# Patient Record
Sex: FEMALE | Race: BLACK OR AFRICAN AMERICAN | NOT HISPANIC OR LATINO | URBAN - METROPOLITAN AREA
[De-identification: names, ages, dates, MRNs, and addresses within clinical notes are randomized per-mention and may not be internally consistent; named-entity substitution may affect disease eponyms.]

---

## 2017-03-11 ENCOUNTER — EMERGENCY (EMERGENCY)
Facility: HOSPITAL | Age: 77
LOS: 1 days | Discharge: HOME | End: 2017-03-11
Attending: EMERGENCY MEDICINE | Admitting: EMERGENCY MEDICINE
Payer: MEDICARE

## 2017-03-11 VITALS
SYSTOLIC BLOOD PRESSURE: 140 MMHG | OXYGEN SATURATION: 100 % | DIASTOLIC BLOOD PRESSURE: 69 MMHG | TEMPERATURE: 98 F | HEART RATE: 58 BPM | RESPIRATION RATE: 16 BRPM

## 2017-03-11 VITALS
DIASTOLIC BLOOD PRESSURE: 74 MMHG | SYSTOLIC BLOOD PRESSURE: 125 MMHG | TEMPERATURE: 98 F | HEART RATE: 52 BPM | RESPIRATION RATE: 16 BRPM | OXYGEN SATURATION: 100 %

## 2017-03-11 DIAGNOSIS — K62.89 OTHER SPECIFIED DISEASES OF ANUS AND RECTUM: Chronic | ICD-10-CM

## 2017-03-11 DIAGNOSIS — K27.9 PEPTIC ULCER, SITE UNSPECIFIED, UNSPECIFIED AS ACUTE OR CHRONIC, WITHOUT HEMORRHAGE OR PERFORATION: Chronic | ICD-10-CM

## 2017-03-11 DIAGNOSIS — Z96.659 PRESENCE OF UNSPECIFIED ARTIFICIAL KNEE JOINT: Chronic | ICD-10-CM

## 2017-03-11 DIAGNOSIS — Z90.710 ACQUIRED ABSENCE OF BOTH CERVIX AND UTERUS: Chronic | ICD-10-CM

## 2017-03-11 LAB
ALBUMIN SERPL ELPH-MCNC: 3.7 G/DL — SIGNIFICANT CHANGE UP (ref 3.3–5)
ALP SERPL-CCNC: 79 U/L — SIGNIFICANT CHANGE UP (ref 40–120)
ALT FLD-CCNC: 14 U/L — SIGNIFICANT CHANGE UP (ref 4–33)
APPEARANCE UR: CLEAR — SIGNIFICANT CHANGE UP
AST SERPL-CCNC: 27 U/L — SIGNIFICANT CHANGE UP (ref 4–32)
BACTERIA # UR AUTO: SIGNIFICANT CHANGE UP
BASOPHILS # BLD AUTO: 0.02 K/UL — SIGNIFICANT CHANGE UP (ref 0–0.2)
BASOPHILS NFR BLD AUTO: 0.4 % — SIGNIFICANT CHANGE UP (ref 0–2)
BILIRUB SERPL-MCNC: 0.6 MG/DL — SIGNIFICANT CHANGE UP (ref 0.2–1.2)
BILIRUB UR-MCNC: NEGATIVE — SIGNIFICANT CHANGE UP
BLOOD UR QL VISUAL: NEGATIVE — SIGNIFICANT CHANGE UP
BUN SERPL-MCNC: 26 MG/DL — HIGH (ref 7–23)
CALCIUM SERPL-MCNC: 9.8 MG/DL — SIGNIFICANT CHANGE UP (ref 8.4–10.5)
CHLORIDE SERPL-SCNC: 103 MMOL/L — SIGNIFICANT CHANGE UP (ref 98–107)
CK MB BLD-MCNC: 1.36 NG/ML — SIGNIFICANT CHANGE UP (ref 1–4.7)
CK MB BLD-MCNC: SIGNIFICANT CHANGE UP (ref 0–2.5)
CK SERPL-CCNC: 112 U/L — SIGNIFICANT CHANGE UP (ref 25–170)
CO2 SERPL-SCNC: 24 MMOL/L — SIGNIFICANT CHANGE UP (ref 22–31)
COLOR SPEC: SIGNIFICANT CHANGE UP
CREAT SERPL-MCNC: 0.82 MG/DL — SIGNIFICANT CHANGE UP (ref 0.5–1.3)
EOSINOPHIL # BLD AUTO: 0.04 K/UL — SIGNIFICANT CHANGE UP (ref 0–0.5)
EOSINOPHIL NFR BLD AUTO: 0.8 % — SIGNIFICANT CHANGE UP (ref 0–6)
GLUCOSE SERPL-MCNC: 101 MG/DL — HIGH (ref 70–99)
GLUCOSE UR-MCNC: NEGATIVE — SIGNIFICANT CHANGE UP
HCT VFR BLD CALC: 37.5 % — SIGNIFICANT CHANGE UP (ref 34.5–45)
HGB BLD-MCNC: 12.2 G/DL — SIGNIFICANT CHANGE UP (ref 11.5–15.5)
IMM GRANULOCYTES NFR BLD AUTO: 0 % — SIGNIFICANT CHANGE UP (ref 0–1.5)
KETONES UR-MCNC: NEGATIVE — SIGNIFICANT CHANGE UP
LEUKOCYTE ESTERASE UR-ACNC: NEGATIVE — SIGNIFICANT CHANGE UP
LYMPHOCYTES # BLD AUTO: 1.77 K/UL — SIGNIFICANT CHANGE UP (ref 1–3.3)
LYMPHOCYTES # BLD AUTO: 35.2 % — SIGNIFICANT CHANGE UP (ref 13–44)
MCHC RBC-ENTMCNC: 26.2 PG — LOW (ref 27–34)
MCHC RBC-ENTMCNC: 32.5 % — SIGNIFICANT CHANGE UP (ref 32–36)
MCV RBC AUTO: 80.5 FL — SIGNIFICANT CHANGE UP (ref 80–100)
MONOCYTES # BLD AUTO: 0.33 K/UL — SIGNIFICANT CHANGE UP (ref 0–0.9)
MONOCYTES NFR BLD AUTO: 6.6 % — SIGNIFICANT CHANGE UP (ref 2–14)
NEUTROPHILS # BLD AUTO: 2.87 K/UL — SIGNIFICANT CHANGE UP (ref 1.8–7.4)
NEUTROPHILS NFR BLD AUTO: 57 % — SIGNIFICANT CHANGE UP (ref 43–77)
NITRITE UR-MCNC: NEGATIVE — SIGNIFICANT CHANGE UP
PH UR: 6.5 — SIGNIFICANT CHANGE UP (ref 4.6–8)
PLATELET # BLD AUTO: 139 K/UL — LOW (ref 150–400)
PMV BLD: 12.6 FL — SIGNIFICANT CHANGE UP (ref 7–13)
POTASSIUM SERPL-MCNC: 4.2 MMOL/L — SIGNIFICANT CHANGE UP (ref 3.5–5.3)
POTASSIUM SERPL-SCNC: 4.2 MMOL/L — SIGNIFICANT CHANGE UP (ref 3.5–5.3)
PROT SERPL-MCNC: 8.1 G/DL — SIGNIFICANT CHANGE UP (ref 6–8.3)
PROT UR-MCNC: NEGATIVE — SIGNIFICANT CHANGE UP
RBC # BLD: 4.66 M/UL — SIGNIFICANT CHANGE UP (ref 3.8–5.2)
RBC # FLD: 15.6 % — HIGH (ref 10.3–14.5)
SODIUM SERPL-SCNC: 141 MMOL/L — SIGNIFICANT CHANGE UP (ref 135–145)
SP GR SPEC: 1 — SIGNIFICANT CHANGE UP (ref 1–1.03)
TROPONIN T SERPL-MCNC: < 0.06 NG/ML — SIGNIFICANT CHANGE UP (ref 0–0.06)
TROPONIN T SERPL-MCNC: < 0.06 NG/ML — SIGNIFICANT CHANGE UP (ref 0–0.06)
UROBILINOGEN FLD QL: NORMAL E.U. — SIGNIFICANT CHANGE UP (ref 0.1–0.2)
WBC # BLD: 5.03 K/UL — SIGNIFICANT CHANGE UP (ref 3.8–10.5)
WBC # FLD AUTO: 5.03 K/UL — SIGNIFICANT CHANGE UP (ref 3.8–10.5)
WBC UR QL: SIGNIFICANT CHANGE UP (ref 0–?)

## 2017-03-11 PROCEDURE — 93010 ELECTROCARDIOGRAM REPORT: CPT

## 2017-03-11 PROCEDURE — 74176 CT ABD & PELVIS W/O CONTRAST: CPT | Mod: 26

## 2017-03-11 PROCEDURE — 71010: CPT | Mod: 26

## 2017-03-11 PROCEDURE — 99284 EMERGENCY DEPT VISIT MOD MDM: CPT | Mod: 25,GC

## 2017-03-11 RX ORDER — MORPHINE SULFATE 50 MG/1
4 CAPSULE, EXTENDED RELEASE ORAL ONCE
Qty: 0 | Refills: 0 | Status: DISCONTINUED | OUTPATIENT
Start: 2017-03-11 | End: 2017-03-11

## 2017-03-11 RX ORDER — ONDANSETRON 8 MG/1
4 TABLET, FILM COATED ORAL ONCE
Qty: 0 | Refills: 0 | Status: COMPLETED | OUTPATIENT
Start: 2017-03-11 | End: 2017-03-11

## 2017-03-11 RX ORDER — POLYETHYLENE GLYCOL 3350 17 G/17G
17 POWDER, FOR SOLUTION ORAL
Qty: 119 | Refills: 0
Start: 2017-03-11 | End: 2017-03-18

## 2017-03-11 RX ADMIN — MORPHINE SULFATE 4 MILLIGRAM(S): 50 CAPSULE, EXTENDED RELEASE ORAL at 12:35

## 2017-03-11 RX ADMIN — MORPHINE SULFATE 4 MILLIGRAM(S): 50 CAPSULE, EXTENDED RELEASE ORAL at 12:36

## 2017-03-11 NOTE — ED PROVIDER NOTE - OBJECTIVE STATEMENT
78yo F with PMH of CAD (OM2 100%, RPL1 90%), MVP, L carotid stenosis, DM2, HLD, h/o PUD s/p surg, p/w R lateral/RLQ/flank pain. Reports 78yo F with PMH of CAD (OM2 100%, RPL1 90%), MVP, L carotid stenosis, DM2, HLD, h/o PUD s/p surg, p/w R lateral abd pain x 1 day. Pt has b/l LBP x 1 week (Had heavy lifting 2d in a row). Woke up this morning with R lateral pain, dull sensation, radiating to groin, constant, severity 8/10, associated with nausea and heart "feeling funny". Denies fever, chills, CP, palpitation, pounding, irregular heart beat, SOB, heartburn, constipation, vomit, hematuria, dysuria or vaginal discharge/bleed. Last BM this morning with normal contour and no melena. 76yo F with PMH of CAD (OM2 100%, RPL1 90%), MVP, L carotid stenosis, DM2, HLD, h/o PUD s/p surg, p/w R lateral abd pain x 1 day. Pt has b/l LBP x 1 week (Had heavy lifting 2d in a row). Woke up this morning with R lateral pain, dull sensation, radiating to groin, constant, 8/10, associated with nausea and after onset of flank pain chest "feeling funny". Cannot describe chest discomfort, states not pain, pressure, heaviness, just felt weird, resolved spontaneously, lasted a few seconds.  Denies fever, chills, CP, palpitation, pounding, irregular heart beat, SOB, no upper back pain, constipation, vomit, hematuria, dysuria or vaginal discharge/bleed. Last BM this morning with normal contour and no melena.

## 2017-03-11 NOTE — ED PROVIDER NOTE - MEDICAL DECISION MAKING DETAILS
CBC, CMP, CE, lipase, UA, CT abd, pain control. CBC, CMP, CE, lipase, UA, CT abd, pain control.  Fink att: 76 yo woman with CAD presenting with two complaints.   R flank pain, abdomen benign.  Differential includes, nephrolithiasis, mass, less likely AAA.  No CVAT, not c/w pyelonephritis.  Transient chest sensation very atypical, patient has CAD being managed medically at this time.  Repeat troponin.  Radial pulses symmetric, no murmur, no sharp, stabbing, or abrupt onset chest pain, not c/w aortic dissection.  No PE risk factors. CBC, CMP, CE, lipase, UA, CT abd, pain control.  Fink att: 76 yo woman with CAD presenting with two complaints.   R flank pain, abdomen benign.  Differential includes, nephrolithiasis, mass, less likely AAA.  No CVAT, not c/w pyelonephritis.  Transient chest sensation very atypical, patient has CAD being managed medically at this time.  Repeat troponin.  Radial pulses symmetric, no murmur, no sharp, stabbing, or abrupt onset chest pain, not c/w aortic dissection.  No PE risk factors..

## 2017-03-11 NOTE — ED PROVIDER NOTE - FAMILY HISTORY
Mother  Still living? Unknown  Family history of stroke, Age at diagnosis: Age Unknown     Sibling  Still living? Yes, Estimated age: Age Unknown  Family history of stroke, Age at diagnosis: Age Unknown  Family history of hypertension, Age at diagnosis: Age Unknown  Family history of throat cancer, Age at diagnosis: Age Unknown  Family history of heart disease, Age at diagnosis: Age Unknown Mother  Still living? Unknown  Family history of stroke, Age at diagnosis: Age Unknown     Sibling  Still living? Yes, Estimated age: Age Unknown  Family history of stroke, Age at diagnosis: Age Unknown  Family history of hypertension, Age at diagnosis: Age Unknown  Family history of throat cancer, Age at diagnosis: Age Unknown  Family history of heart disease, Age at diagnosis: Age Unknown  Family history of multiple myeloma, Age at diagnosis: Age Unknown     Child  Still living? Unknown  Family history of breast cancer, Age at diagnosis: Age Unknown

## 2017-03-11 NOTE — ED ADULT TRIAGE NOTE - CHIEF COMPLAINT QUOTE
pt with a c/o chest discomfort and lower right sided flank pain. pt denies sob.  pmhx HTN , CAD  , Pre diabetic

## 2017-03-11 NOTE — ED ADULT NURSE NOTE - OBJECTIVE STATEMENT
PAtient recevied to spot 19, A&ox4, ambulatory.  PAtient reporting lower back pan 8/10 bilaterally for the last week.  LAst night patient states she woke up with 8/10 RLQ pain radiating to right groin.  Denies dysuria, denies hematuria.  Patient also c/o nausea, denies chest pain, denies SOB.

## 2017-03-11 NOTE — ED PROVIDER NOTE - PHYSICAL EXAMINATION
Tay att: General: Well appearing, nontoxic, no acute distress; Head: Normocephalic Atraumatic; Eyes: PERRL, EOMI; ENT: Airway patent; Neck: supple; Chest: Lungs clear to auscultation bilateral; Cardiac: Regular rate and rhythm, no murmurs, rubs or gallops; Abdomen: soft, no CVAT, nondistended; no guarding or rebound; Musculoskeletal: Calves symmetric, nontender, no palpable cord; Skin: No rash, normal skin tone; Neuro: Alert and Oriented to person, place, and time; No focal deficit, CN 2-12 symmetric and intact

## 2017-03-11 NOTE — ED PROVIDER NOTE - PMH
Anemia    Arthritis    Carotid artery stenosis  L  DM2 (diabetes mellitus, type 2)  diet controlled  Hypercholesterolemia    Hypothyroidism    MVP (mitral valve prolapse)    Uterine cancer  treated surgically

## 2017-03-11 NOTE — ED PROVIDER NOTE - CHPI ED SYMPTOMS NEG
no vomiting/no hematuria/no fever/no chills/no diarrhea/no blood in stool/no burning urination/no dysuria

## 2017-03-11 NOTE — ED PROVIDER NOTE - PSH
H/O total knee replacement  R  H/O: hysterectomy  due to Uterine cancer  PUD (peptic ulcer disease)  treated surgically in 1980  Rectal cyst  treated surgically

## 2017-05-25 ENCOUNTER — APPOINTMENT (OUTPATIENT)
Dept: VASCULAR SURGERY | Facility: CLINIC | Age: 77
End: 2017-05-25

## 2017-05-25 VITALS
HEIGHT: 62 IN | WEIGHT: 164 LBS | SYSTOLIC BLOOD PRESSURE: 125 MMHG | HEART RATE: 62 BPM | TEMPERATURE: 97.8 F | DIASTOLIC BLOOD PRESSURE: 85 MMHG | BODY MASS INDEX: 30.18 KG/M2

## 2017-08-03 ENCOUNTER — OUTPATIENT (OUTPATIENT)
Dept: OUTPATIENT SERVICES | Facility: HOSPITAL | Age: 77
LOS: 1 days | Discharge: HOME | End: 2017-08-03
Payer: MEDICARE

## 2017-08-03 VITALS
RESPIRATION RATE: 14 BRPM | HEART RATE: 52 BPM | SYSTOLIC BLOOD PRESSURE: 92 MMHG | DIASTOLIC BLOOD PRESSURE: 55 MMHG | OXYGEN SATURATION: 100 % | TEMPERATURE: 98 F

## 2017-08-03 DIAGNOSIS — R94.39 ABNORMAL RESULT OF OTHER CARDIOVASCULAR FUNCTION STUDY: ICD-10-CM

## 2017-08-03 DIAGNOSIS — Z90.710 ACQUIRED ABSENCE OF BOTH CERVIX AND UTERUS: Chronic | ICD-10-CM

## 2017-08-03 DIAGNOSIS — K27.9 PEPTIC ULCER, SITE UNSPECIFIED, UNSPECIFIED AS ACUTE OR CHRONIC, WITHOUT HEMORRHAGE OR PERFORATION: Chronic | ICD-10-CM

## 2017-08-03 DIAGNOSIS — K62.89 OTHER SPECIFIED DISEASES OF ANUS AND RECTUM: Chronic | ICD-10-CM

## 2017-08-03 DIAGNOSIS — Z96.659 PRESENCE OF UNSPECIFIED ARTIFICIAL KNEE JOINT: Chronic | ICD-10-CM

## 2017-08-03 LAB
BUN SERPL-MCNC: 27 MG/DL — HIGH (ref 7–23)
CALCIUM SERPL-MCNC: 10.6 MG/DL — HIGH (ref 8.4–10.5)
CHLORIDE SERPL-SCNC: 106 MMOL/L — SIGNIFICANT CHANGE UP (ref 98–107)
CO2 SERPL-SCNC: 28 MMOL/L — SIGNIFICANT CHANGE UP (ref 22–31)
CREAT SERPL-MCNC: 0.99 MG/DL — SIGNIFICANT CHANGE UP (ref 0.5–1.3)
GLUCOSE SERPL-MCNC: 97 MG/DL — SIGNIFICANT CHANGE UP (ref 70–99)
HBA1C BLD-MCNC: 6.1 % — HIGH (ref 4–5.6)
HCT VFR BLD CALC: 41.2 % — SIGNIFICANT CHANGE UP (ref 34.5–45)
HGB BLD-MCNC: 13.3 G/DL — SIGNIFICANT CHANGE UP (ref 11.5–15.5)
MCHC RBC-ENTMCNC: 26 PG — LOW (ref 27–34)
MCHC RBC-ENTMCNC: 32.3 % — SIGNIFICANT CHANGE UP (ref 32–36)
MCV RBC AUTO: 80.6 FL — SIGNIFICANT CHANGE UP (ref 80–100)
NRBC # FLD: 0 — SIGNIFICANT CHANGE UP
PLATELET # BLD AUTO: 161 K/UL — SIGNIFICANT CHANGE UP (ref 150–400)
PMV BLD: 12.6 FL — SIGNIFICANT CHANGE UP (ref 7–13)
POTASSIUM SERPL-MCNC: 3.8 MMOL/L — SIGNIFICANT CHANGE UP (ref 3.5–5.3)
POTASSIUM SERPL-SCNC: 3.8 MMOL/L — SIGNIFICANT CHANGE UP (ref 3.5–5.3)
RBC # BLD: 5.11 M/UL — SIGNIFICANT CHANGE UP (ref 3.8–5.2)
RBC # FLD: 15.8 % — HIGH (ref 10.3–14.5)
SODIUM SERPL-SCNC: 147 MMOL/L — HIGH (ref 135–145)
WBC # BLD: 5.05 K/UL — SIGNIFICANT CHANGE UP (ref 3.8–10.5)
WBC # FLD AUTO: 5.05 K/UL — SIGNIFICANT CHANGE UP (ref 3.8–10.5)

## 2017-08-03 PROCEDURE — 93010 ELECTROCARDIOGRAM REPORT: CPT

## 2017-08-03 RX ORDER — ATORVASTATIN CALCIUM 80 MG/1
1 TABLET, FILM COATED ORAL
Qty: 0 | Refills: 0 | COMMUNITY

## 2017-08-03 RX ORDER — SODIUM CHLORIDE 9 MG/ML
3 INJECTION INTRAMUSCULAR; INTRAVENOUS; SUBCUTANEOUS EVERY 8 HOURS
Qty: 0 | Refills: 0 | Status: DISCONTINUED | OUTPATIENT
Start: 2017-08-03 | End: 2017-08-18

## 2017-08-03 RX ORDER — CARVEDILOL PHOSPHATE 80 MG/1
1 CAPSULE, EXTENDED RELEASE ORAL
Qty: 0 | Refills: 0 | COMMUNITY

## 2017-08-03 RX ORDER — AMLODIPINE BESYLATE 2.5 MG/1
1 TABLET ORAL
Qty: 0 | Refills: 0 | COMMUNITY

## 2017-08-03 NOTE — H&P CARDIOLOGY - FAMILY HISTORY
Mother  Still living? Unknown  Family history of stroke, Age at diagnosis: Age Unknown     Sibling  Still living? Yes, Estimated age: Age Unknown  Family history of stroke, Age at diagnosis: Age Unknown  Family history of hypertension, Age at diagnosis: Age Unknown  Family history of throat cancer, Age at diagnosis: Age Unknown  Family history of heart disease, Age at diagnosis: Age Unknown  Family history of multiple myeloma, Age at diagnosis: Age Unknown     Child  Still living? Unknown  Family history of breast cancer, Age at diagnosis: Age Unknown

## 2017-08-03 NOTE — H&P CARDIOLOGY - HISTORY OF PRESENT ILLNESS
77 y.o. female presents today for elective cardiac catheterization. The patient c/o chest pain with exertion (walking fast, running) started 1 year ago, L sided, 4/10, resolved rest. Denies SOB, palpitations. Admits to occasional dizziness with body position changes (getting up from sitting position fast). Denies b/l lower extremities edema. 77 y.o. female presents today for elective cardiac catheterization. The patient c/o chest pain with exertion (walking fast, running) started 1 year ago, L sided, 4/10, resolved rest. Denies SOB, palpitations. Admits to occasional dizziness with body position changes (getting up from sitting position fast). Denies b/l lower extremities edema. The patient was evaluated by her cardiologist, was was found to have abnormal stress test, recommended to have cardiac cath.     Cardiac cath 8/2016 - OM2 100%, oRCA 75%, Normal LV  Stress test 7/2017 - inferolateral ischemia. EF 58%

## 2017-08-03 NOTE — H&P CARDIOLOGY - PMH
Anemia    Arthritis    Carotid artery stenosis  L  DM2 (diabetes mellitus, type 2)  diet controlled  Hypercholesterolemia    Hypothyroidism    MVP (mitral valve prolapse)    PUD (peptic ulcer disease)    Uterine cancer  treated surgically Anemia    Arthritis    CAD (coronary artery disease)  OM 2 100%, RCA 75  Carotid artery stenosis  L  DM2 (diabetes mellitus, type 2)  diet controlled  Hypercholesterolemia    Hypothyroidism    MVP (mitral valve prolapse)    PUD (peptic ulcer disease)    Uterine cancer  treated surgically

## 2017-10-18 ENCOUNTER — EMERGENCY (EMERGENCY)
Facility: HOSPITAL | Age: 77
LOS: 1 days | Discharge: HOME | End: 2017-10-18
Attending: EMERGENCY MEDICINE | Admitting: EMERGENCY MEDICINE
Payer: MEDICARE

## 2017-10-18 VITALS
OXYGEN SATURATION: 100 % | HEART RATE: 52 BPM | DIASTOLIC BLOOD PRESSURE: 86 MMHG | SYSTOLIC BLOOD PRESSURE: 105 MMHG | RESPIRATION RATE: 15 BRPM

## 2017-10-18 VITALS
TEMPERATURE: 98 F | OXYGEN SATURATION: 100 % | SYSTOLIC BLOOD PRESSURE: 102 MMHG | DIASTOLIC BLOOD PRESSURE: 60 MMHG | RESPIRATION RATE: 18 BRPM | HEART RATE: 49 BPM

## 2017-10-18 DIAGNOSIS — K27.9 PEPTIC ULCER, SITE UNSPECIFIED, UNSPECIFIED AS ACUTE OR CHRONIC, WITHOUT HEMORRHAGE OR PERFORATION: Chronic | ICD-10-CM

## 2017-10-18 DIAGNOSIS — Z90.710 ACQUIRED ABSENCE OF BOTH CERVIX AND UTERUS: Chronic | ICD-10-CM

## 2017-10-18 DIAGNOSIS — Z96.659 PRESENCE OF UNSPECIFIED ARTIFICIAL KNEE JOINT: Chronic | ICD-10-CM

## 2017-10-18 DIAGNOSIS — K62.89 OTHER SPECIFIED DISEASES OF ANUS AND RECTUM: Chronic | ICD-10-CM

## 2017-10-18 DIAGNOSIS — R42 DIZZINESS AND GIDDINESS: ICD-10-CM

## 2017-10-18 LAB
ALBUMIN SERPL ELPH-MCNC: 3.9 G/DL — SIGNIFICANT CHANGE UP (ref 3.3–5)
ALP SERPL-CCNC: 65 U/L — SIGNIFICANT CHANGE UP (ref 40–120)
ALT FLD-CCNC: 20 U/L — SIGNIFICANT CHANGE UP (ref 4–33)
AST SERPL-CCNC: 52 U/L — HIGH (ref 4–32)
BASE EXCESS BLDV CALC-SCNC: 1.1 MMOL/L — SIGNIFICANT CHANGE UP
BASOPHILS # BLD AUTO: 0.03 K/UL — SIGNIFICANT CHANGE UP (ref 0–0.2)
BASOPHILS NFR BLD AUTO: 0.7 % — SIGNIFICANT CHANGE UP (ref 0–2)
BILIRUB SERPL-MCNC: 0.4 MG/DL — SIGNIFICANT CHANGE UP (ref 0.2–1.2)
BLOOD GAS VENOUS - CREATININE: 0.74 MG/DL — SIGNIFICANT CHANGE UP (ref 0.5–1.3)
BUN SERPL-MCNC: 19 MG/DL — SIGNIFICANT CHANGE UP (ref 7–23)
BUN SERPL-MCNC: 22 MG/DL — SIGNIFICANT CHANGE UP (ref 7–23)
CALCIUM SERPL-MCNC: 8.2 MG/DL — LOW (ref 8.4–10.5)
CALCIUM SERPL-MCNC: 9.3 MG/DL — SIGNIFICANT CHANGE UP (ref 8.4–10.5)
CHLORIDE BLDV-SCNC: 110 MMOL/L — HIGH (ref 96–108)
CHLORIDE SERPL-SCNC: 105 MMOL/L — SIGNIFICANT CHANGE UP (ref 98–107)
CHLORIDE SERPL-SCNC: 108 MMOL/L — HIGH (ref 98–107)
CK MB BLD-MCNC: 1 — SIGNIFICANT CHANGE UP (ref 0–2.5)
CK MB BLD-MCNC: 1.51 NG/ML — SIGNIFICANT CHANGE UP (ref 1–4.7)
CK SERPL-CCNC: 155 U/L — SIGNIFICANT CHANGE UP (ref 25–170)
CO2 SERPL-SCNC: 24 MMOL/L — SIGNIFICANT CHANGE UP (ref 22–31)
CO2 SERPL-SCNC: 26 MMOL/L — SIGNIFICANT CHANGE UP (ref 22–31)
CREAT SERPL-MCNC: 0.77 MG/DL — SIGNIFICANT CHANGE UP (ref 0.5–1.3)
CREAT SERPL-MCNC: 0.82 MG/DL — SIGNIFICANT CHANGE UP (ref 0.5–1.3)
EOSINOPHIL # BLD AUTO: 0.11 K/UL — SIGNIFICANT CHANGE UP (ref 0–0.5)
EOSINOPHIL NFR BLD AUTO: 2.4 % — SIGNIFICANT CHANGE UP (ref 0–6)
GAS PNL BLDV: 138 MMOL/L — SIGNIFICANT CHANGE UP (ref 136–146)
GLUCOSE BLDV-MCNC: 107 — HIGH (ref 70–99)
GLUCOSE SERPL-MCNC: 104 MG/DL — HIGH (ref 70–99)
GLUCOSE SERPL-MCNC: 107 MG/DL — HIGH (ref 70–99)
HCO3 BLDV-SCNC: 24 MMOL/L — SIGNIFICANT CHANGE UP (ref 20–27)
HCT VFR BLD CALC: 39.8 % — SIGNIFICANT CHANGE UP (ref 34.5–45)
HCT VFR BLDV CALC: 38.9 % — SIGNIFICANT CHANGE UP (ref 34.5–45)
HGB BLD-MCNC: 12.7 G/DL — SIGNIFICANT CHANGE UP (ref 11.5–15.5)
HGB BLDV-MCNC: 12.6 G/DL — SIGNIFICANT CHANGE UP (ref 11.5–15.5)
IMM GRANULOCYTES # BLD AUTO: 0.01 # — SIGNIFICANT CHANGE UP
IMM GRANULOCYTES NFR BLD AUTO: 0.2 % — SIGNIFICANT CHANGE UP (ref 0–1.5)
LACTATE BLDV-MCNC: 2 MMOL/L — SIGNIFICANT CHANGE UP (ref 0.5–2)
LYMPHOCYTES # BLD AUTO: 1.72 K/UL — SIGNIFICANT CHANGE UP (ref 1–3.3)
LYMPHOCYTES # BLD AUTO: 37.8 % — SIGNIFICANT CHANGE UP (ref 13–44)
MCHC RBC-ENTMCNC: 26.1 PG — LOW (ref 27–34)
MCHC RBC-ENTMCNC: 31.9 % — LOW (ref 32–36)
MCV RBC AUTO: 81.9 FL — SIGNIFICANT CHANGE UP (ref 80–100)
MONOCYTES # BLD AUTO: 0.42 K/UL — SIGNIFICANT CHANGE UP (ref 0–0.9)
MONOCYTES NFR BLD AUTO: 9.2 % — SIGNIFICANT CHANGE UP (ref 2–14)
NEUTROPHILS # BLD AUTO: 2.26 K/UL — SIGNIFICANT CHANGE UP (ref 1.8–7.4)
NEUTROPHILS NFR BLD AUTO: 49.7 % — SIGNIFICANT CHANGE UP (ref 43–77)
NRBC # FLD: 0 — SIGNIFICANT CHANGE UP
PCO2 BLDV: 52 MMHG — HIGH (ref 41–51)
PH BLDV: 7.33 PH — SIGNIFICANT CHANGE UP (ref 7.32–7.43)
PLATELET # BLD AUTO: 125 K/UL — LOW (ref 150–400)
PMV BLD: 13.1 FL — HIGH (ref 7–13)
PO2 BLDV: 34 MMHG — LOW (ref 35–40)
POTASSIUM BLDV-SCNC: 5.8 MMOL/L — HIGH (ref 3.4–4.5)
POTASSIUM SERPL-MCNC: 3.8 MMOL/L — SIGNIFICANT CHANGE UP (ref 3.5–5.3)
POTASSIUM SERPL-MCNC: SIGNIFICANT CHANGE UP MMOL/L (ref 3.5–5.3)
POTASSIUM SERPL-SCNC: 3.8 MMOL/L — SIGNIFICANT CHANGE UP (ref 3.5–5.3)
POTASSIUM SERPL-SCNC: SIGNIFICANT CHANGE UP MMOL/L (ref 3.5–5.3)
PROT SERPL-MCNC: 8.4 G/DL — HIGH (ref 6–8.3)
RBC # BLD: 4.86 M/UL — SIGNIFICANT CHANGE UP (ref 3.8–5.2)
RBC # FLD: 15.7 % — HIGH (ref 10.3–14.5)
SAO2 % BLDV: 58 % — LOW (ref 60–85)
SODIUM SERPL-SCNC: 139 MMOL/L — SIGNIFICANT CHANGE UP (ref 135–145)
SODIUM SERPL-SCNC: 144 MMOL/L — SIGNIFICANT CHANGE UP (ref 135–145)
TROPONIN T SERPL-MCNC: < 0.06 NG/ML — SIGNIFICANT CHANGE UP (ref 0–0.06)
WBC # BLD: 4.55 K/UL — SIGNIFICANT CHANGE UP (ref 3.8–10.5)
WBC # FLD AUTO: 4.55 K/UL — SIGNIFICANT CHANGE UP (ref 3.8–10.5)

## 2017-10-18 PROCEDURE — 99285 EMERGENCY DEPT VISIT HI MDM: CPT

## 2017-10-18 PROCEDURE — 70450 CT HEAD/BRAIN W/O DYE: CPT | Mod: 26

## 2017-10-18 RX ORDER — ACETAMINOPHEN 500 MG
650 TABLET ORAL ONCE
Qty: 0 | Refills: 0 | Status: COMPLETED | OUTPATIENT
Start: 2017-10-18 | End: 2017-10-18

## 2017-10-18 RX ORDER — SODIUM CHLORIDE 9 MG/ML
1000 INJECTION INTRAMUSCULAR; INTRAVENOUS; SUBCUTANEOUS ONCE
Qty: 0 | Refills: 0 | Status: COMPLETED | OUTPATIENT
Start: 2017-10-18 | End: 2017-10-18

## 2017-10-18 RX ORDER — MECLIZINE HCL 12.5 MG
25 TABLET ORAL ONCE
Qty: 0 | Refills: 0 | Status: COMPLETED | OUTPATIENT
Start: 2017-10-18 | End: 2017-10-18

## 2017-10-18 RX ADMIN — Medication 650 MILLIGRAM(S): at 12:08

## 2017-10-18 RX ADMIN — Medication 650 MILLIGRAM(S): at 13:32

## 2017-10-18 RX ADMIN — SODIUM CHLORIDE 1000 MILLILITER(S): 9 INJECTION INTRAMUSCULAR; INTRAVENOUS; SUBCUTANEOUS at 12:08

## 2017-10-18 RX ADMIN — Medication 25 MILLIGRAM(S): at 12:08

## 2017-10-18 NOTE — ED PROVIDER NOTE - MEDICAL DECISION MAKING DETAILS
Dizziness with improving symptoms, still mild ataxia- will give meclizine, tylenol for ha, labs and ct head, neuro c/s

## 2017-10-18 NOTE — ED ADULT TRIAGE NOTE - CHIEF COMPLAINT QUOTE
Patient brought to ER from water aerobics and completed workout. Felt dizzy and things got a little foggy. Has a slight headache and nausea.

## 2017-10-18 NOTE — CONSULT NOTE ADULT - PROBLEM SELECTOR RECOMMENDATION 9
- If symptoms resolve as above, recommend outpatient neurology follow up for further management, Dr. Quan [phone]  - If symptoms persist would obtain MRI brain w/o contrast, MRA head w/o contrast, MRA neck w/ contrast  - C/w ASA and lipitor - Consider orthostatic vitals  - If symptoms resolve as above, recommend outpatient neurology follow up for further management, Dr. Quan [phone]  - If symptoms persist would obtain MRI brain w/o contrast, MRA head w/o contrast, MRA neck w/ contrast  - C/w ASA and lipitor - Consider orthostatic vitals  - If symptoms resolve as above, recommend outpatient neurology follow up for further management, Dr. Quan [phone]  - If symptoms persist would obtain MRI brain w/o contrast, MRA head w/o contrast, MRA neck w/ contrast  - C/w ASA and lipitor

## 2017-10-18 NOTE — CONSULT NOTE ADULT - SUBJECTIVE AND OBJECTIVE BOX
Neurology Consult Note    Name  MARYCARMEN CAMPOVERDE    HPI: 78 y/o RH AA woman w/ PMH HTN, HLD, CAD (no stents) c/o dizziness since 9:20 AM today. Patient was at a water aerobics class, finished the class, and went to get dressed. While getting dressed, she started to feel dizziness, described as lightheadedness. She denies vertigo at this presentation (has had peripheral vertigo in the past). Symptoms were worse with movement. Also felt unsteady on her feet. The lightheadedness worsened over the course of the next 30-40 minutes while driving. Patient felt her vision become unfocused during her drive. Describes a frontal headache a/w her dizziness, but denies pain. States it is more of a severe lightheadedness. Only ate half a banana this morning, but usually does not eat breakfast. Denies any focal weakness or numbness. No longer has blurred vision. Feels her dizziness has also improved after receiving IV fluids and meclizine. Still feels mildly unsteady when ambulating.    Review of Systems:  Constitutional: No fever, chills, fatigue, weakness  Eyes: As above  ENT:  No difficulty hearing, tinnitus, vertigo; No sinus or throat pain  Neck: No pain or stiffness  Respiratory: No cough, dyspnea, wheezing   Cardiovascular: No chest pain, palpitations  Gastrointestinal: No abdominal or epigastric pain. No nausea, vomiting  No diarrhea or constipation.   Genitourinary: No dysuria, frequency, hematuria or incontinence  Neurological: As above  Psychiatric: No depression, anxiety, mood swings or difficulty sleeping  Musculoskeletal: No joint pain or swelling; No muscle, back or extremity pain  Skin: No itching, burning, rashes or lesions   Endocrine: No heat or cold intolerance  Allergy and Immunologic: No hives or eczema    MEDICATIONS  (STANDING): · 	amLODIPine 5 mg oral tablet: 1 tab(s) orally once a day  · 	atorvastatin 20 mg oral tablet: 1 tab(s) orally once a day  · 	carvedilol 6.25 mg oral tablet: 1 tab(s) orally 2 times a day  · 	hydroCHLOROthiazide 25 mg oral tablet: 1 tab(s) orally once a day  · 	Imdur: 90 milligram(s) orally once a day  · 	levothyroxine 75 mcg (0.075 mg) oral tablet: 1 tab(s) orally once a day  · 	aspirin 81 mg oral tablet: 1 tab(s) orally once a day    Allergies  latex (Rash)  penicillin (Faint)    PAST MEDICAL & SURGICAL HISTORY:  CAD (coronary artery disease): OM 2 100%, RCA 75  PUD (peptic ulcer disease)  Anemia  Arthritis  Remote uterine cancer: treated surgically  Carotid artery stenosis: L (follows w/ vascular surgery)  MVP (mitral valve prolapse)  Hypercholesterolemia  Hypothyroidism  Rectal cyst: treated surgically  PUD (peptic ulcer disease): treated surgically in 1980  H/O: hysterectomy: due to Uterine cancer  H/O total knee replacement: R    FH: Mother and sister had strokes    SH: Denies EtOH, cigarette, or drug use    Objective:   Vital Signs Last 24 Hrs  T(C): 36.4 (18 Oct 2017 11:38), Max: 36.5 (18 Oct 2017 10:54)  T(F): 97.6 (18 Oct 2017 11:38), Max: 97.7 (18 Oct 2017 10:54)  HR: 54 (18 Oct 2017 11:38) (49 - 54)  BP: 104/68 (18 Oct 2017 11:38) (102/60 - 104/68)  RR: 14 (18 Oct 2017 11:38) (14 - 18)  SpO2: 100% (18 Oct 2017 11:38) (100% - 100%)    General Adult Exam  GENERAL APPEARANCE: Well developed, well nourished, alert and cooperative, and appears to be in no acute distress.    Neurological Exam:  Mental Status: Orientated to self, date and place.  Attention intact.  No aphasia or neglect.     Cranial Nerves: PERRL, EOMI, VFF, no nystagmus or diplopia. CN V1-3 intact to light touch.  No facial asymmetry.  Tongue, uvula and palate midline.  Sternocleidomastoid and Trapezius intact bilaterally.    Motor:   Tone: normal.                  Strength:   No drift x 4 extremities  [] Upper extremity                      Delt       Bicep    Tricep                                                  R         5/5        5/5        5/5       5/5                                               L          5/5        5/5        5/5       5/5  [] Lower extremity                       HF          KE          KF        DF         PF                                               R        5/5        5/5        5/5       5/5       5/5                                               L         5/5        5/5       5/5       5/5        5/5  Pronator drift: none                 Tremor: No resting, postural or action tremor.  No myoclonus.    Sensation: intact to light touch x 4 extremities. No extinction on double simultaneous stimulation    Deep Tendon Reflexes: 2+ bilateral biceps, triceps, brachioradialis, 2+ left knee, right knee 0 (s/p knee replacement)  Toes flexor bilaterally    Coord: No ataxia or dysmetria on FTN or HTS b/l    Gait: Cautious. No ataxia. Able to ambulate independently and turn without difficulty    Other:      10-18    139  |  105  |  22  ----------------------------<  104<H>  Test not performed SPECIMEN GROSSLY HEMOLYZED   |  24  |  0.82    Ca    8.2<L>      18 Oct 2017 11:35    TPro  8.4<H>  /  Alb  3.9  /  TBili  0.4  /  DBili  x   /  AST  52<H>  /  ALT  20  /  AlkPhos  65  10-18    LIVER FUNCTIONS - ( 18 Oct 2017 11:35 )  Alb: 3.9 g/dL / Pro: 8.4 g/dL / ALK PHOS: 65 u/L / ALT: 20 u/L / AST: 52 u/L / GGT: x             Radiology    < from: CT Head No Cont (10.18.17 @ 12:55) >    Findings:  The lateral ventricles have a normal configuration.    There is no evidence of acute hemorrhage, mass or mass-effect in the   posterior fossa or in the supratentorial region.    Evaluation of the osseous structures with the appropriate window   demonstrates hyperostosis frontalis interna which is likely of no   clinical significance.    The visualized paranasal sinuses mastoid and middle ear regions appear   clear..    Impression:  Unremarkable noncontrast head CT.    < end of copied text > Neurology Consult Note    Name  MARYCARMEN CAMPOVERDE    HPI: 76 y/o RH AA woman w/ PMH HTN, HLD, CAD (no stents) c/o dizziness since 9:20 AM today. Patient was at a water aerobics class, finished the class, and went to get dressed. While getting dressed, she started to feel dizziness, described as lightheadedness. She denies vertigo at this presentation (has had peripheral vertigo in the past). Symptoms were worse with movement. Also felt unsteady on her feet. The lightheadedness worsened over the course of the next 30-40 minutes while driving. Patient felt her vision become unfocused during her drive. Describes a frontal headache a/w her dizziness, but denies pain. States it is more of a severe lightheadedness. Only ate half a banana this morning, but usually does not eat breakfast. Denies any focal weakness or numbness. No longer has blurred vision. Feels her dizziness has also improved after receiving IV fluids and meclizine. Still feels mildly unsteady when ambulating. Patient felt same symptoms after water aerobics before, but they resolved faster.    Review of Systems:  Constitutional: No fever, chills, fatigue, weakness  Eyes: As above  ENT:  No difficulty hearing, tinnitus, vertigo; No sinus or throat pain  Neck: No pain or stiffness  Respiratory: No cough, dyspnea, wheezing   Cardiovascular: No chest pain, palpitations  Gastrointestinal: No abdominal or epigastric pain. No nausea, vomiting  No diarrhea or constipation.   Genitourinary: No dysuria, frequency, hematuria or incontinence  Neurological: As above  Psychiatric: No depression, anxiety, mood swings or difficulty sleeping  Musculoskeletal: No joint pain or swelling; No muscle, back or extremity pain  Skin: No itching, burning, rashes or lesions   Endocrine: No heat or cold intolerance  Allergy and Immunologic: No hives or eczema    MEDICATIONS  (STANDING): · 	amLODIPine 5 mg oral tablet: 1 tab(s) orally once a day  · 	atorvastatin 20 mg oral tablet: 1 tab(s) orally once a day  · 	carvedilol 6.25 mg oral tablet: 1 tab(s) orally 2 times a day  · 	hydroCHLOROthiazide 25 mg oral tablet: 1 tab(s) orally once a day  · 	Imdur: 90 milligram(s) orally once a day  · 	levothyroxine 75 mcg (0.075 mg) oral tablet: 1 tab(s) orally once a day  · 	aspirin 81 mg oral tablet: 1 tab(s) orally once a day    Allergies  latex (Rash)  penicillin (Faint)    PAST MEDICAL & SURGICAL HISTORY:  CAD (coronary artery disease): OM 2 100%, RCA 75  PUD (peptic ulcer disease)  Anemia  Arthritis  Remote uterine cancer: treated surgically  Carotid artery stenosis: L (follows w/ vascular surgery)  MVP (mitral valve prolapse)  Hypercholesterolemia  Hypothyroidism  Rectal cyst: treated surgically  PUD (peptic ulcer disease): treated surgically in 1980  H/O: hysterectomy: due to Uterine cancer  H/O total knee replacement: R    FH: Mother and sister had strokes    SH: Denies EtOH, cigarette, or drug use    Objective:   Vital Signs Last 24 Hrs  T(C): 36.4 (18 Oct 2017 11:38), Max: 36.5 (18 Oct 2017 10:54)  T(F): 97.6 (18 Oct 2017 11:38), Max: 97.7 (18 Oct 2017 10:54)  HR: 54 (18 Oct 2017 11:38) (49 - 54)  BP: 104/68 (18 Oct 2017 11:38) (102/60 - 104/68)  RR: 14 (18 Oct 2017 11:38) (14 - 18)  SpO2: 100% (18 Oct 2017 11:38) (100% - 100%)    General Adult Exam  GENERAL APPEARANCE: Well developed, well nourished, alert and cooperative, and appears to be in no acute distress.    Neurological Exam:  Mental Status: Orientated to self, date and place.  Attention intact.  No aphasia or neglect.     Cranial Nerves: PERRL, EOMI, VFF, no nystagmus or diplopia. CN V1-3 intact to light touch.  No facial asymmetry.  Tongue, uvula and palate midline.  Sternocleidomastoid and Trapezius intact bilaterally.    Motor:   Tone: normal.                  Strength:   No drift x 4 extremities  [] Upper extremity                      Delt       Bicep    Tricep                                                  R         5/5        5/5        5/5       5/5                                               L          5/5        5/5        5/5       5/5  [] Lower extremity                       HF          KE          KF        DF         PF                                               R        5/5        5/5        5/5       5/5       5/5                                               L         5/5        5/5       5/5       5/5        5/5  Pronator drift: none                 Tremor: No resting, postural or action tremor.  No myoclonus.    Sensation: intact to light touch x 4 extremities. No extinction on double simultaneous stimulation    Deep Tendon Reflexes: 2+ bilateral biceps, triceps, brachioradialis, 2+ left knee, right knee 0 (s/p knee replacement)  Toes flexor bilaterally    Coord: No ataxia or dysmetria on FTN or HTS b/l    Gait: Cautious. No ataxia. Able to ambulate independently and turn without difficulty    Other:      10-18    139  |  105  |  22  ----------------------------<  104<H>  Test not performed SPECIMEN GROSSLY HEMOLYZED   |  24  |  0.82    Ca    8.2<L>      18 Oct 2017 11:35    TPro  8.4<H>  /  Alb  3.9  /  TBili  0.4  /  DBili  x   /  AST  52<H>  /  ALT  20  /  AlkPhos  65  10-18    LIVER FUNCTIONS - ( 18 Oct 2017 11:35 )  Alb: 3.9 g/dL / Pro: 8.4 g/dL / ALK PHOS: 65 u/L / ALT: 20 u/L / AST: 52 u/L / GGT: x             Radiology    < from: CT Head No Cont (10.18.17 @ 12:55) >    Findings:  The lateral ventricles have a normal configuration.    There is no evidence of acute hemorrhage, mass or mass-effect in the   posterior fossa or in the supratentorial region.    Evaluation of the osseous structures with the appropriate window   demonstrates hyperostosis frontalis interna which is likely of no   clinical significance.    The visualized paranasal sinuses mastoid and middle ear regions appear   clear..    Impression:  Unremarkable noncontrast head CT.    < end of copied text >

## 2017-10-18 NOTE — CONSULT NOTE ADULT - ATTENDING COMMENTS
pt withvertigo, improving, nonfocal exam. If continues to improve may fu as outpt for further neuro blackmon

## 2017-10-18 NOTE — ED PROVIDER NOTE - OBJECTIVE STATEMENT
77 year old female, PMHx of HTN, pre-diabetes, HLD, CAD (no stents needed one month ago s/p cath); presents to the ED for dizziness. Patient states she was in her water aerobics class this morning at the [facility] and felt normal. However, when she was getting changed she began to feel dizzy. She states she felt "weird" and off balance. She got into her car and was driving home when the dizziness became worse and she began having changes in her vision described as "Hard to focus", prompting her to call 911 and come to the ED. Endorses nausea, no vomiting, and a gradual onset, frontal headache. She states she has had vertigo x 2 several years ago and these symptoms were different. She denies chest pain, shortness of breath, f/c/n/v/d, numbness, tingling, weakness, or other complaints.

## 2017-10-18 NOTE — ED PROVIDER NOTE - PROGRESS NOTE DETAILS
Aldair: patient ambulating well, feels steady on her feet ambulating. Spoke with neurology, ok with an outpt w/u with Dr. Lobo. Will provide patient with his number for follow up.

## 2017-10-18 NOTE — ED PROVIDER NOTE - CHPI ED SYMPTOMS NEG
no weakness/no numbness/no confusion/no loss of consciousness/no vomiting/no change in level of consciousness

## 2017-10-18 NOTE — ED ADULT NURSE NOTE - OBJECTIVE STATEMENT
Pt. received in room 10. Pt. a&ox3. Respirations appear equal and unlabored, breath sounds clear bilaterally. Pt. is 78yo female complaining of dizziness and nausea after attempting to drive home after completing a water aerobics class. Pt. states she became too dizzy to drive and began experiencing blurry vision she pulled over and called 911. Pt. is bradycardiac and hypotensive. Pt. denies chest pain, SOB, change in LOC, numbness/tingling. Abdomen is soft, non-tender, non-distended. Skin is clear, dry, intact, and cool to touch. IV lock in place 22g right hand. Labs sent. MD at bedside. Pt. on cardiac monitor. Will continue to monitor.

## 2017-10-18 NOTE — ED ADULT NURSE NOTE - PMH
Anemia    Arthritis    CAD (coronary artery disease)  OM 2 100%, RCA 75  Carotid artery stenosis  L  DM2 (diabetes mellitus, type 2)  diet controlled  Hypercholesterolemia    Hypothyroidism    MVP (mitral valve prolapse)    PUD (peptic ulcer disease)    Uterine cancer  treated surgically

## 2017-10-18 NOTE — CONSULT NOTE ADULT - ASSESSMENT
78 y/o RH AA woman w/ PMH HTN, HLD, CAD (no stents) c/o lightheadedness and unsteadiness since this morning following aerobic class, symptoms improving after meclizine and IV hydration. Neurologic exam reveals no focal abnormalities. CTH demonstrates no acute pathology. Given symptom description, may be more likely pre-syncopal event. If symptoms resolve following completion of IV fluids and after eating, would recommend outpatient neurology followup. If they persist, given risk factors, may pursue additional studies.

## 2017-10-18 NOTE — ED ADULT NURSE REASSESSMENT NOTE - NS ED NURSE REASSESS COMMENT FT1
Pt. reports feeling much better than arrival. Reports no nausea, slight dizziness, and slight headache at a 2/10. Pt. awaiting neuro consult for dispo.

## 2017-10-24 ENCOUNTER — LABORATORY RESULT (OUTPATIENT)
Age: 77
End: 2017-10-24

## 2017-10-24 ENCOUNTER — APPOINTMENT (OUTPATIENT)
Dept: RHEUMATOLOGY | Facility: CLINIC | Age: 77
End: 2017-10-24
Payer: MEDICARE

## 2017-10-24 VITALS
DIASTOLIC BLOOD PRESSURE: 70 MMHG | OXYGEN SATURATION: 97 % | HEIGHT: 62 IN | SYSTOLIC BLOOD PRESSURE: 119 MMHG | HEART RATE: 54 BPM | WEIGHT: 160 LBS | TEMPERATURE: 97.6 F | BODY MASS INDEX: 29.44 KG/M2

## 2017-10-24 DIAGNOSIS — E03.9 HYPOTHYROIDISM, UNSPECIFIED: ICD-10-CM

## 2017-10-24 DIAGNOSIS — M31.4 AORTIC ARCH SYNDROME [TAKAYASU]: ICD-10-CM

## 2017-10-24 PROCEDURE — 99205 OFFICE O/P NEW HI 60 MIN: CPT | Mod: GC

## 2017-10-25 LAB
ALBUMIN SERPL ELPH-MCNC: 3.9 G/DL
ALP BLD-CCNC: 77 U/L
ALT SERPL-CCNC: 14 U/L
ANION GAP SERPL CALC-SCNC: 15 MMOL/L
APPEARANCE: CLEAR
AST SERPL-CCNC: 22 U/L
BACTERIA: NEGATIVE
BASOPHILS # BLD AUTO: 0.01 K/UL
BASOPHILS NFR BLD AUTO: 0.3 %
BILIRUB SERPL-MCNC: 0.4 MG/DL
BILIRUBIN URINE: NEGATIVE
BLOOD URINE: NEGATIVE
BUN SERPL-MCNC: 24 MG/DL
CALCIUM SERPL-MCNC: 10 MG/DL
CHLORIDE SERPL-SCNC: 109 MMOL/L
CO2 SERPL-SCNC: 20 MMOL/L
COLOR: YELLOW
CREAT SERPL-MCNC: 0.89 MG/DL
CREAT SPEC-SCNC: 58 MG/DL
CREAT/PROT UR: 0.2 RATIO
CRP SERPL-MCNC: 1 MG/DL
EOSINOPHIL # BLD AUTO: 0.1 K/UL
EOSINOPHIL NFR BLD AUTO: 2.5 %
ERYTHROCYTE [SEDIMENTATION RATE] IN BLOOD BY WESTERGREN METHOD: 53 MM/HR
GLUCOSE QUALITATIVE U: NEGATIVE MG/DL
GLUCOSE SERPL-MCNC: 100 MG/DL
HBV CORE IGG+IGM SER QL: NONREACTIVE
HBV SURFACE AB SER QL: NONREACTIVE
HBV SURFACE AG SER QL: NONREACTIVE
HCT VFR BLD CALC: 34.8 %
HCV AB SER QL: NONREACTIVE
HCV S/CO RATIO: 0.12 S/CO
HGB BLD-MCNC: 11.5 G/DL
IMM GRANULOCYTES NFR BLD AUTO: 0 %
KETONES URINE: NEGATIVE
LEUKOCYTE ESTERASE URINE: NEGATIVE
LYMPHOCYTES # BLD AUTO: 1.94 K/UL
LYMPHOCYTES NFR BLD AUTO: 49.2 %
MAN DIFF?: NORMAL
MCHC RBC-ENTMCNC: 26.1 PG
MCHC RBC-ENTMCNC: 33 GM/DL
MCV RBC AUTO: 79.1 FL
MICROSCOPIC-UA: NORMAL
MONOCYTES # BLD AUTO: 0.33 K/UL
MONOCYTES NFR BLD AUTO: 8.4 %
NEUTROPHILS # BLD AUTO: 1.56 K/UL
NEUTROPHILS NFR BLD AUTO: 39.6 %
NITRITE URINE: NEGATIVE
PH URINE: 7
PLATELET # BLD AUTO: 135 K/UL
POTASSIUM SERPL-SCNC: 4.2 MMOL/L
PROT SERPL-MCNC: 7.6 G/DL
PROT UR-MCNC: 10 MG/DL
PROTEIN URINE: NEGATIVE MG/DL
RBC # BLD: 4.4 M/UL
RBC # FLD: 16.1 %
RED BLOOD CELLS URINE: 1 /HPF
SODIUM SERPL-SCNC: 144 MMOL/L
SPECIFIC GRAVITY URINE: 1.02
SQUAMOUS EPITHELIAL CELLS: 0 /HPF
UROBILINOGEN URINE: NEGATIVE MG/DL
WBC # FLD AUTO: 3.94 K/UL
WHITE BLOOD CELLS URINE: 1 /HPF

## 2017-10-26 LAB
ADJUSTED MITOGEN: 9.2 IU/ML
ADJUSTED TB AG: -0.01 IU/ML
IL6 SERPL-MCNC: 2.2 PG/ML
M TB IFN-G BLD-IMP: NEGATIVE
QUANTIFERON GOLD NIL: 0.05 IU/ML

## 2017-11-03 ENCOUNTER — TRANSCRIPTION ENCOUNTER (OUTPATIENT)
Age: 77
End: 2017-11-03

## 2017-11-27 ENCOUNTER — MOBILE ON CALL (OUTPATIENT)
Age: 77
End: 2017-11-27

## 2018-02-08 ENCOUNTER — APPOINTMENT (OUTPATIENT)
Dept: VASCULAR SURGERY | Facility: CLINIC | Age: 78
End: 2018-02-08
Payer: MEDICARE

## 2018-02-08 VITALS — HEIGHT: 62 IN | WEIGHT: 160 LBS | BODY MASS INDEX: 29.44 KG/M2 | TEMPERATURE: 97.7 F

## 2018-02-08 PROCEDURE — 99213 OFFICE O/P EST LOW 20 MIN: CPT | Mod: 25

## 2018-02-08 PROCEDURE — 93923 UPR/LXTR ART STDY 3+ LVLS: CPT

## 2018-06-18 ENCOUNTER — INPATIENT (INPATIENT)
Facility: HOSPITAL | Age: 78
LOS: 4 days | Discharge: HOME | End: 2018-06-23
Attending: INTERNAL MEDICINE | Admitting: INTERNAL MEDICINE
Payer: MEDICARE

## 2018-06-18 VITALS
DIASTOLIC BLOOD PRESSURE: 57 MMHG | TEMPERATURE: 101 F | SYSTOLIC BLOOD PRESSURE: 95 MMHG | RESPIRATION RATE: 18 BRPM | OXYGEN SATURATION: 100 % | HEART RATE: 59 BPM

## 2018-06-18 DIAGNOSIS — Z96.659 PRESENCE OF UNSPECIFIED ARTIFICIAL KNEE JOINT: Chronic | ICD-10-CM

## 2018-06-18 DIAGNOSIS — Z90.710 ACQUIRED ABSENCE OF BOTH CERVIX AND UTERUS: Chronic | ICD-10-CM

## 2018-06-18 DIAGNOSIS — K62.89 OTHER SPECIFIED DISEASES OF ANUS AND RECTUM: Chronic | ICD-10-CM

## 2018-06-18 DIAGNOSIS — K27.9 PEPTIC ULCER, SITE UNSPECIFIED, UNSPECIFIED AS ACUTE OR CHRONIC, WITHOUT HEMORRHAGE OR PERFORATION: Chronic | ICD-10-CM

## 2018-06-18 LAB
ALBUMIN SERPL ELPH-MCNC: 3.8 G/DL — SIGNIFICANT CHANGE UP (ref 3.3–5)
ALP SERPL-CCNC: 74 U/L — SIGNIFICANT CHANGE UP (ref 40–120)
ALT FLD-CCNC: 12 U/L — SIGNIFICANT CHANGE UP (ref 4–33)
APTT BLD: 26.1 SEC — LOW (ref 27.5–37.4)
AST SERPL-CCNC: 19 U/L — SIGNIFICANT CHANGE UP (ref 4–32)
BASE EXCESS BLDV CALC-SCNC: 3.8 MMOL/L — SIGNIFICANT CHANGE UP
BASOPHILS # BLD AUTO: 0.02 K/UL — SIGNIFICANT CHANGE UP (ref 0–0.2)
BASOPHILS NFR BLD AUTO: 0.2 % — SIGNIFICANT CHANGE UP (ref 0–2)
BILIRUB SERPL-MCNC: 0.7 MG/DL — SIGNIFICANT CHANGE UP (ref 0.2–1.2)
BLD GP AB SCN SERPL QL: NEGATIVE — SIGNIFICANT CHANGE UP
BLOOD GAS VENOUS - CREATININE: 0.78 MG/DL — SIGNIFICANT CHANGE UP (ref 0.5–1.3)
BUN SERPL-MCNC: 14 MG/DL — SIGNIFICANT CHANGE UP (ref 7–23)
CALCIUM SERPL-MCNC: 9.6 MG/DL — SIGNIFICANT CHANGE UP (ref 8.4–10.5)
CHLORIDE BLDV-SCNC: 112 MMOL/L — HIGH (ref 96–108)
CHLORIDE SERPL-SCNC: 104 MMOL/L — SIGNIFICANT CHANGE UP (ref 98–107)
CO2 SERPL-SCNC: 25 MMOL/L — SIGNIFICANT CHANGE UP (ref 22–31)
CREAT SERPL-MCNC: 0.82 MG/DL — SIGNIFICANT CHANGE UP (ref 0.5–1.3)
EOSINOPHIL # BLD AUTO: 0.1 K/UL — SIGNIFICANT CHANGE UP (ref 0–0.5)
EOSINOPHIL NFR BLD AUTO: 1 % — SIGNIFICANT CHANGE UP (ref 0–6)
GAS PNL BLDV: 138 MMOL/L — SIGNIFICANT CHANGE UP (ref 136–146)
GLUCOSE BLDV-MCNC: 105 — HIGH (ref 70–99)
GLUCOSE SERPL-MCNC: 99 MG/DL — SIGNIFICANT CHANGE UP (ref 70–99)
HCO3 BLDV-SCNC: 26 MMOL/L — SIGNIFICANT CHANGE UP (ref 20–27)
HCT VFR BLD CALC: 38.1 % — SIGNIFICANT CHANGE UP (ref 34.5–45)
HCT VFR BLDV CALC: 38.4 % — SIGNIFICANT CHANGE UP (ref 34.5–45)
HGB BLD-MCNC: 12.1 G/DL — SIGNIFICANT CHANGE UP (ref 11.5–15.5)
HGB BLDV-MCNC: 12.5 G/DL — SIGNIFICANT CHANGE UP (ref 11.5–15.5)
IMM GRANULOCYTES # BLD AUTO: 0.01 # — SIGNIFICANT CHANGE UP
IMM GRANULOCYTES NFR BLD AUTO: 0.1 % — SIGNIFICANT CHANGE UP (ref 0–1.5)
INR BLD: 1.09 — SIGNIFICANT CHANGE UP (ref 0.88–1.17)
LACTATE BLDV-MCNC: 1 MMOL/L — SIGNIFICANT CHANGE UP (ref 0.5–2)
LYMPHOCYTES # BLD AUTO: 2.16 K/UL — SIGNIFICANT CHANGE UP (ref 1–3.3)
LYMPHOCYTES # BLD AUTO: 21.8 % — SIGNIFICANT CHANGE UP (ref 13–44)
MCHC RBC-ENTMCNC: 25.6 PG — LOW (ref 27–34)
MCHC RBC-ENTMCNC: 31.8 % — LOW (ref 32–36)
MCV RBC AUTO: 80.7 FL — SIGNIFICANT CHANGE UP (ref 80–100)
MONOCYTES # BLD AUTO: 0.65 K/UL — SIGNIFICANT CHANGE UP (ref 0–0.9)
MONOCYTES NFR BLD AUTO: 6.5 % — SIGNIFICANT CHANGE UP (ref 2–14)
NEUTROPHILS # BLD AUTO: 6.99 K/UL — SIGNIFICANT CHANGE UP (ref 1.8–7.4)
NEUTROPHILS NFR BLD AUTO: 70.4 % — SIGNIFICANT CHANGE UP (ref 43–77)
NRBC # FLD: 0 — SIGNIFICANT CHANGE UP
OB PNL STL: NEGATIVE — SIGNIFICANT CHANGE UP
PCO2 BLDV: 46 MMHG — SIGNIFICANT CHANGE UP (ref 41–51)
PH BLDV: 7.4 PH — SIGNIFICANT CHANGE UP (ref 7.32–7.43)
PLATELET # BLD AUTO: 140 K/UL — LOW (ref 150–400)
PMV BLD: 12.2 FL — SIGNIFICANT CHANGE UP (ref 7–13)
PO2 BLDV: < 24 MMHG — LOW (ref 35–40)
POTASSIUM BLDV-SCNC: 3.8 MMOL/L — SIGNIFICANT CHANGE UP (ref 3.4–4.5)
POTASSIUM SERPL-MCNC: 3.9 MMOL/L — SIGNIFICANT CHANGE UP (ref 3.5–5.3)
POTASSIUM SERPL-SCNC: 3.9 MMOL/L — SIGNIFICANT CHANGE UP (ref 3.5–5.3)
PROT SERPL-MCNC: 8.1 G/DL — SIGNIFICANT CHANGE UP (ref 6–8.3)
PROTHROM AB SERPL-ACNC: 12.5 SEC — SIGNIFICANT CHANGE UP (ref 9.8–13.1)
RBC # BLD: 4.72 M/UL — SIGNIFICANT CHANGE UP (ref 3.8–5.2)
RBC # FLD: 16.2 % — HIGH (ref 10.3–14.5)
RH IG SCN BLD-IMP: POSITIVE — SIGNIFICANT CHANGE UP
RH IG SCN BLD-IMP: POSITIVE — SIGNIFICANT CHANGE UP
SAO2 % BLDV: 27.1 % — LOW (ref 60–85)
SODIUM SERPL-SCNC: 141 MMOL/L — SIGNIFICANT CHANGE UP (ref 135–145)
WBC # BLD: 9.93 K/UL — SIGNIFICANT CHANGE UP (ref 3.8–10.5)
WBC # FLD AUTO: 9.93 K/UL — SIGNIFICANT CHANGE UP (ref 3.8–10.5)

## 2018-06-18 RX ORDER — SODIUM CHLORIDE 9 MG/ML
1000 INJECTION INTRAMUSCULAR; INTRAVENOUS; SUBCUTANEOUS ONCE
Qty: 0 | Refills: 0 | Status: COMPLETED | OUTPATIENT
Start: 2018-06-18 | End: 2018-06-18

## 2018-06-18 RX ADMIN — SODIUM CHLORIDE 1000 MILLILITER(S): 9 INJECTION INTRAMUSCULAR; INTRAVENOUS; SUBCUTANEOUS at 23:01

## 2018-06-18 RX ADMIN — SODIUM CHLORIDE 1000 MILLILITER(S): 9 INJECTION INTRAMUSCULAR; INTRAVENOUS; SUBCUTANEOUS at 21:16

## 2018-06-18 NOTE — ED PROVIDER NOTE - PROGRESS NOTE DETAILS
We discussed w/pt that we felt the pt would benefit from an inpatient hospital admission despite the inherent risks of hospitalization.  The pt agreed to admission.  Discussed case w/admitting attending medical doctor who agreed to admit to their service.  Text-paged the MAR ([id number]) w/pt's information & my spectralink ([id number]).

## 2018-06-18 NOTE — ED PROVIDER NOTE - PHYSICAL EXAMINATION
no pallor, large laparotomy scar no pallor, large laparotomy scar  No abd bruits or thrills.  No pulsatile abd masses.  No pulse deficits x4 ext.  dry mm

## 2018-06-18 NOTE — ED PROVIDER NOTE - OBJECTIVE STATEMENT
78y F hx PUDz s/p surg ~15yrs ago, CAD no stents, HTN, GERD, preDM now sent from  p/w BRBPR.  Pt st yesterday she had multiple episodes of n/v/d w/ retching, nonbloody BM.  this has resolved today.  Today pt st he has no appetite, feels tired & has mild nagging b/l lower abd pain.  Today when she had a small formed BM, she noticed the stool coated in blood but none on the TP.  Denies fever, hx Afib,     PMD: Erik Aragon  Meds: ASA, amlodipine, coreg, valsartan, atrovastatin, synthroid, imdur

## 2018-06-18 NOTE — ED ADULT TRIAGE NOTE - CHIEF COMPLAINT QUOTE
pt BIBA from urgent care, pt sent to ED for evaluation of BRBPR today.  pt reports blood was on her stool.  denies abd pain.  deneis blood thinners

## 2018-06-18 NOTE — ED ADULT NURSE NOTE - OBJECTIVE STATEMENT
received pt to room 9 for evaluation of bright red blood per rectum on stool x 1 episode today, sent by urgent care for further evaluation, denies use of anticoagulants. pt presents awake a&ox4, denies dizziness or ha. skin warm, dry, appropriate for race. respirations even unlabored. denies cp or sob. abdomen soft nontender nondistended. denies n/v/d. denies fevers/chills, endorses decreased PO intake. ivl placed. bloods drawn and sent. ns 0.9% bolus infusing. family at bedside.

## 2018-06-18 NOTE — ED PROVIDER NOTE - MEDICAL DECISION MAKING DETAILS
vipin abd pain & blood on stool following a day of n/v/d w/ lethargy & anorexia today.  soft BP.  No obvious infectious etiology.  Likely hypovolemic.  DDx also includes colitis, abd infection, GI bleed vs less likely ischemic colitis.  Plan for labs, CT, volume repletion & ongoing eval vipin abd pain & blood on stool following a day of n/v/d w/ lethargy & anorexia today.  soft BP.  No obvious infectious etiology.  Likely hypovolemic.  DDx also includes colitis, abd infection, GI bleed vs less likely ischemic colitis.  No rectal fever on my exam. Plan for labs, CT, volume repletion & ongoing eval

## 2018-06-19 ENCOUNTER — TRANSCRIPTION ENCOUNTER (OUTPATIENT)
Age: 78
End: 2018-06-19

## 2018-06-19 DIAGNOSIS — R93.8 ABNORMAL FINDINGS ON DIAGNOSTIC IMAGING OF OTHER SPECIFIED BODY STRUCTURES: ICD-10-CM

## 2018-06-19 DIAGNOSIS — R05 COUGH: ICD-10-CM

## 2018-06-19 DIAGNOSIS — K52.9 NONINFECTIVE GASTROENTERITIS AND COLITIS, UNSPECIFIED: ICD-10-CM

## 2018-06-19 DIAGNOSIS — D64.9 ANEMIA, UNSPECIFIED: ICD-10-CM

## 2018-06-19 DIAGNOSIS — E11.9 TYPE 2 DIABETES MELLITUS WITHOUT COMPLICATIONS: ICD-10-CM

## 2018-06-19 LAB
CRP SERPL-MCNC: 92.5 MG/L — HIGH
ERYTHROCYTE [SEDIMENTATION RATE] IN BLOOD: 43 MM/HR — HIGH (ref 4–25)
HCT VFR BLD CALC: 36.3 % — SIGNIFICANT CHANGE UP (ref 34.5–45)
HCT VFR BLD CALC: 41.1 % — SIGNIFICANT CHANGE UP (ref 34.5–45)
HGB BLD-MCNC: 11.6 G/DL — SIGNIFICANT CHANGE UP (ref 11.5–15.5)
HGB BLD-MCNC: 12.7 G/DL — SIGNIFICANT CHANGE UP (ref 11.5–15.5)
MCHC RBC-ENTMCNC: 25.2 PG — LOW (ref 27–34)
MCHC RBC-ENTMCNC: 25.6 PG — LOW (ref 27–34)
MCHC RBC-ENTMCNC: 30.9 % — LOW (ref 32–36)
MCHC RBC-ENTMCNC: 32 % — SIGNIFICANT CHANGE UP (ref 32–36)
MCV RBC AUTO: 80 FL — SIGNIFICANT CHANGE UP (ref 80–100)
MCV RBC AUTO: 81.7 FL — SIGNIFICANT CHANGE UP (ref 80–100)
NRBC # FLD: 0 — SIGNIFICANT CHANGE UP
NRBC # FLD: 0 — SIGNIFICANT CHANGE UP
PLATELET # BLD AUTO: 136 K/UL — LOW (ref 150–400)
PLATELET # BLD AUTO: 141 K/UL — LOW (ref 150–400)
PMV BLD: 11.3 FL — SIGNIFICANT CHANGE UP (ref 7–13)
PMV BLD: 12.7 FL — SIGNIFICANT CHANGE UP (ref 7–13)
RBC # BLD: 4.54 M/UL — SIGNIFICANT CHANGE UP (ref 3.8–5.2)
RBC # BLD: 5.03 M/UL — SIGNIFICANT CHANGE UP (ref 3.8–5.2)
RBC # FLD: 16.3 % — HIGH (ref 10.3–14.5)
RBC # FLD: 16.5 % — HIGH (ref 10.3–14.5)
WBC # BLD: 6.14 K/UL — SIGNIFICANT CHANGE UP (ref 3.8–10.5)
WBC # BLD: 7.69 K/UL — SIGNIFICANT CHANGE UP (ref 3.8–10.5)
WBC # FLD AUTO: 6.14 K/UL — SIGNIFICANT CHANGE UP (ref 3.8–10.5)
WBC # FLD AUTO: 7.69 K/UL — SIGNIFICANT CHANGE UP (ref 3.8–10.5)

## 2018-06-19 PROCEDURE — 99222 1ST HOSP IP/OBS MODERATE 55: CPT | Mod: GC

## 2018-06-19 PROCEDURE — 71250 CT THORAX DX C-: CPT | Mod: 26

## 2018-06-19 PROCEDURE — 74177 CT ABD & PELVIS W/CONTRAST: CPT | Mod: 26

## 2018-06-19 RX ORDER — ASPIRIN/CALCIUM CARB/MAGNESIUM 324 MG
81 TABLET ORAL DAILY
Qty: 0 | Refills: 0 | Status: DISCONTINUED | OUTPATIENT
Start: 2018-06-19 | End: 2018-06-23

## 2018-06-19 RX ORDER — ISOSORBIDE MONONITRATE 60 MG/1
60 TABLET, EXTENDED RELEASE ORAL DAILY
Qty: 0 | Refills: 0 | Status: DISCONTINUED | OUTPATIENT
Start: 2018-06-19 | End: 2018-06-23

## 2018-06-19 RX ORDER — ATORVASTATIN CALCIUM 80 MG/1
20 TABLET, FILM COATED ORAL AT BEDTIME
Qty: 0 | Refills: 0 | Status: DISCONTINUED | OUTPATIENT
Start: 2018-06-19 | End: 2018-06-23

## 2018-06-19 RX ORDER — IPRATROPIUM/ALBUTEROL SULFATE 18-103MCG
3 AEROSOL WITH ADAPTER (GRAM) INHALATION EVERY 6 HOURS
Qty: 0 | Refills: 0 | Status: DISCONTINUED | OUTPATIENT
Start: 2018-06-19 | End: 2018-06-23

## 2018-06-19 RX ORDER — AMLODIPINE BESYLATE 2.5 MG/1
5 TABLET ORAL DAILY
Qty: 0 | Refills: 0 | Status: DISCONTINUED | OUTPATIENT
Start: 2018-06-19 | End: 2018-06-23

## 2018-06-19 RX ORDER — METRONIDAZOLE 500 MG
500 TABLET ORAL EVERY 8 HOURS
Qty: 0 | Refills: 0 | Status: DISCONTINUED | OUTPATIENT
Start: 2018-06-19 | End: 2018-06-23

## 2018-06-19 RX ORDER — METRONIDAZOLE 500 MG
500 TABLET ORAL ONCE
Qty: 0 | Refills: 0 | Status: COMPLETED | OUTPATIENT
Start: 2018-06-19 | End: 2018-06-19

## 2018-06-19 RX ORDER — CIPROFLOXACIN LACTATE 400MG/40ML
400 VIAL (ML) INTRAVENOUS EVERY 12 HOURS
Qty: 0 | Refills: 0 | Status: DISCONTINUED | OUTPATIENT
Start: 2018-06-19 | End: 2018-06-23

## 2018-06-19 RX ORDER — CIPROFLOXACIN LACTATE 400MG/40ML
400 VIAL (ML) INTRAVENOUS ONCE
Qty: 0 | Refills: 0 | Status: COMPLETED | OUTPATIENT
Start: 2018-06-19 | End: 2018-06-19

## 2018-06-19 RX ORDER — LEVOTHYROXINE SODIUM 125 MCG
75 TABLET ORAL DAILY
Qty: 0 | Refills: 0 | Status: DISCONTINUED | OUTPATIENT
Start: 2018-06-19 | End: 2018-06-23

## 2018-06-19 RX ORDER — HYDROCHLOROTHIAZIDE 25 MG
25 TABLET ORAL DAILY
Qty: 0 | Refills: 0 | Status: DISCONTINUED | OUTPATIENT
Start: 2018-06-19 | End: 2018-06-23

## 2018-06-19 RX ORDER — ACETAMINOPHEN 500 MG
650 TABLET ORAL EVERY 6 HOURS
Qty: 0 | Refills: 0 | Status: DISCONTINUED | OUTPATIENT
Start: 2018-06-19 | End: 2018-06-23

## 2018-06-19 RX ORDER — CARVEDILOL PHOSPHATE 80 MG/1
6.25 CAPSULE, EXTENDED RELEASE ORAL EVERY 12 HOURS
Qty: 0 | Refills: 0 | Status: DISCONTINUED | OUTPATIENT
Start: 2018-06-19 | End: 2018-06-20

## 2018-06-19 RX ORDER — SODIUM CHLORIDE 9 MG/ML
1000 INJECTION INTRAMUSCULAR; INTRAVENOUS; SUBCUTANEOUS
Qty: 0 | Refills: 0 | Status: DISCONTINUED | OUTPATIENT
Start: 2018-06-19 | End: 2018-06-22

## 2018-06-19 RX ADMIN — Medication 100 MILLIGRAM(S): at 22:39

## 2018-06-19 RX ADMIN — Medication 100 MILLIGRAM(S): at 08:00

## 2018-06-19 RX ADMIN — Medication 650 MILLIGRAM(S): at 14:18

## 2018-06-19 RX ADMIN — SODIUM CHLORIDE 75 MILLILITER(S): 9 INJECTION INTRAMUSCULAR; INTRAVENOUS; SUBCUTANEOUS at 13:27

## 2018-06-19 RX ADMIN — Medication 200 MILLIGRAM(S): at 17:02

## 2018-06-19 RX ADMIN — Medication 3 MILLILITER(S): at 22:24

## 2018-06-19 RX ADMIN — ATORVASTATIN CALCIUM 20 MILLIGRAM(S): 80 TABLET, FILM COATED ORAL at 22:39

## 2018-06-19 RX ADMIN — Medication 200 MILLIGRAM(S): at 05:17

## 2018-06-19 RX ADMIN — Medication 100 MILLIGRAM(S): at 14:18

## 2018-06-19 RX ADMIN — Medication 650 MILLIGRAM(S): at 15:18

## 2018-06-19 NOTE — CONSULT NOTE ADULT - ATTENDING COMMENTS
BOWEL: Status post gastrectomy and gastrojejunostomy. Wall thickening of   the mid descending to the proximal sigmoid colon. No bowel obstruction.   Unremarkable appendix.   PERITONEUM: No free air or drainable collection.  VESSELS: Atherosclerotic change of the abdominal aorta and its branches   with associated ostial narrowing at the celiac axis, renal arteries and   URIEL, which otherwise patent beyond the origins. Attenuated, unopacified   proximal SMA with distal reconstitution, indicating chronic   occlusion/thrombosis noted on 2/24/2015.  RETROPERITONEUM: No lymphadenopathy.    ABDOMINAL WALL: Postsurgical changes along the anterior abdominal and   pelvic wall. Small fat-containing umbilical hernia.  BONES: Degenerative changes of the spine.    IMPRESSION:     Wall thickening of the mid descending to the proximal sigmoid colon,   suggesting colitis (infectious, inflammatory or ischemic in etiology).   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    Patchy right middle and lower lobe opacities, suspicious for pneumonia.   Recommend clinical correlation and follow-up to assess resolution   following completion of treatment.    Additional findings as described. Pt seen and examined. Agree with fellow's note. Plan discussed with patient and primary team.     Patient had chief complaint of BRBPR/colitis.     Plan as above. Please call with questions. Will sign off.

## 2018-06-19 NOTE — DISCHARGE NOTE ADULT - ADDITIONAL INSTRUCTIONS
outpatient GI clinic [phone]  in 1 month of discharge outpatient GI clinic [phone]  in 1 month of discharge  Follow up with Dr Hamilton within 1 week. Call for appointment.  Take medications as prescribed

## 2018-06-19 NOTE — H&P ADULT - ASSESSMENT
pt with above history presented with bleeding bright blood per rectum  < from: CT Abdomen and Pelvis w/ Oral Cont and w/ IV Cont (06.19.18 @ 02:23) >  Wall thickening of the mid descending to the proximal sigmoid colon,   suggesting colitis (infectious, inflammatory or ischemic in etiology).   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    Patchy right middle and lower lobe opacities, suspicious for pneumonia.     < end of copied text >  - evaluated by gI - cbc q12, start cipro/ flagyl   - pulm eval     DM2- ISS    CAD - cont aspirin, statin, imdur    Hypothyroidism - cont home dose synthroid f/u TFTS

## 2018-06-19 NOTE — CONSULT NOTE ADULT - SUBJECTIVE AND OBJECTIVE BOX
Patient is a 78y old  Female who presents with BRBPR     HPI: 79 yo female with pmh as below p/w brbpr. Hx goes back to 2 days ptp when the patient had profuse vomiting (10 x) 3 hours after eating lobster after which she felt tired and dizzy. Vomiting resolved then she developed profuse watery diarrhea around 3 x with large volume. yesterday she had a soft small bm with a large amount of blood, no bms today.  Patient otherwise notes having URI symptoms since 3 weeks for which she s receiving medications (cant remember).   Patient only takes asa as antiplatelet agent, no NSAIDs as per her      No GI   egd 15 yrs ago had PUD s/p surgery (was not bleeding as per her)   Colonoscopy 15 yrs ago ? result   FH non significant  usually has 3 x normal to hard bms per day, no weight loss        ROS wnl except as described above     PAST MEDICAL & SURGICAL HISTORY:  CAD (coronary artery disease): OM 2 100%, RCA 75  PUD (peptic ulcer disease)  DM2 (diabetes mellitus, type 2): diet controlled  Anemia  Arthritis  Uterine cancer: treated surgically  Carotid artery stenosis: L  MVP (mitral valve prolapse)  Hypercholesterolemia  Hypothyroidism  Rectal cyst: treated surgically  PUD (peptic ulcer disease): treated surgically in 1980  H/O: hysterectomy: due to Uterine cancer  H/O total knee replacement: R      Home Medications:  amLODIPine 5 mg oral tablet: 1 tab(s) orally once a day (03 Aug 2017 13:55)  aspirin 81 mg oral tablet: 1 tab(s) orally once a day (03 Aug 2017 13:55)  atorvastatin 20 mg oral tablet: 1 tab(s) orally once a day (03 Aug 2017 13:55)  carvedilol 6.25 mg oral tablet: 1 tab(s) orally 2 times a day (03 Aug 2017 13:55)  hydroCHLOROthiazide 25 mg oral tablet: 1 tab(s) orally once a day (03 Aug 2017 13:55)  Imdur: 90 milligram(s) orally once a day (03 Aug 2017 13:55)  levothyroxine 75 mcg (0.075 mg) oral tablet: 1 tab(s) orally once a day (03 Aug 2017 13:55)      MEDICATIONS  (STANDING):    MEDICATIONS  (PRN):      Allergies    latex (Rash)  penicillin (Faint)    Intolerances        FAMILY HISTORY:  Family history of breast cancer  Family history of multiple myeloma  Family history of heart disease  Family history of throat cancer  Family history of hypertension  Family history of stroke      SOCIAL    REVIEW OF SYSTEMS    Vital Signs Last 24 Hrs  T(C): 36.9 (19 Jun 2018 06:33), Max: 38.1 (18 Jun 2018 19:29)  T(F): 98.5 (19 Jun 2018 06:33), Max: 100.5 (18 Jun 2018 19:29)  HR: 53 (19 Jun 2018 09:20) (53 - 68)  BP: 87/54 (19 Jun 2018 09:20) (87/54 - 120/83)  BP(mean): --  RR: 18 (19 Jun 2018 09:20) (16 - 19)  SpO2: 99% (19 Jun 2018 09:20) (99% - 100%)    GENERAL:  no distress  SKIN: intact   HEENT:  NC/AT,  anicteric  CHEST:   no increased effort, breath sounds clear  HEART:  Regular rhythm  ABDOMEN:  Soft, non-tender, non-distended, normoactive bowel sounds,  no masses ,no hepato-splenomegaly, no signs of chronic liver disease  EXTEREMITIES:  no cyanosis        CBC Full  -  ( 18 Jun 2018 21:20 )  WBC Count : 9.93 K/uL  Hemoglobin : 12.1 g/dL  Hematocrit : 38.1 %  Platelet Count - Automated : 140 K/uL  Mean Cell Volume : 80.7 fL  Mean Cell Hemoglobin : 25.6 pg  Mean Cell Hemoglobin Concentration : 31.8 %  Auto Neutrophil # : 6.99 K/uL  Auto Lymphocyte # : 2.16 K/uL  Auto Monocyte # : 0.65 K/uL  Auto Eosinophil # : 0.10 K/uL  Auto Basophil # : 0.02 K/uL  Auto Neutrophil % : 70.4 %  Auto Lymphocyte % : 21.8 %  Auto Monocyte % : 6.5 %  Auto Eosinophil % : 1.0 %  Auto Basophil % : 0.2 %      Hemoglobin: 12.1 g/dL (06-18-18 @ 21:20)  Aspartate Aminotransferase (AST/SGOT): 19 u/L (06-18-18 @ 21:20)  Bilirubin Total, Serum: 0.7 mg/dL (06-18-18 @ 21:20)  Alanine Aminotransferase (ALT/SGPT): 12 u/L (06-18-18 @ 21:20)  Alkaline Phosphatase, Serum: 74 u/L (06-18-18 @ 21:20)  INR: 1.09 (06-18-18 @ 21:20)      PT/INR - ( 18 Jun 2018 21:20 )   PT: 12.5 SEC;   INR: 1.09          PTT - ( 18 Jun 2018 21:20 )  PTT:26.1 SEC    06-18    141  |  104  |  14  ----------------------------<  99  3.9   |  25  |  0.82    Ca    9.6      18 Jun 2018 21:20    TPro  8.1  /  Alb  3.8  /  TBili  0.7  /  DBili  x   /  AST  19  /  ALT  12  /  AlkPhos  74  06-18              RADIOLOGY Patient is a 78y old  Female who presents with BRBPR     HPI: 77 yo female with pmh as below p/w brbpr. Hx goes back to 2 days ptp when the patient had profuse vomiting (10 x) 3 hours after eating lobster after which she felt tired and dizzy. Vomiting resolved then she developed profuse watery diarrhea around 3 x with large volume. yesterday she had a soft small bm with a large amount of blood, no bms today.  Patient otherwise notes having URI symptoms since 3 weeks for which she s receiving medications (cant remember).   Patient only takes asa as antiplatelet agent, no NSAIDs as per her      No GI   egd 15 yrs ago had PUD s/p surgery (was not bleeding as per her)   Colonoscopy 15 yrs ago ? result   FH non significant  usually has 3 x normal to hard bms per day, no weight loss        ROS wnl except as described above     PAST MEDICAL & SURGICAL HISTORY:  CAD (coronary artery disease): OM 2 100%, RCA 75  PUD (peptic ulcer disease)  DM2 (diabetes mellitus, type 2): diet controlled  Anemia  Arthritis  Uterine cancer: treated surgically  Carotid artery stenosis: L  MVP (mitral valve prolapse)  Hypercholesterolemia  Hypothyroidism  Rectal cyst: treated surgically  PUD (peptic ulcer disease): treated surgically in 1980  H/O: hysterectomy: due to Uterine cancer  H/O total knee replacement: R      Home Medications:  amLODIPine 5 mg oral tablet: 1 tab(s) orally once a day (03 Aug 2017 13:55)  aspirin 81 mg oral tablet: 1 tab(s) orally once a day (03 Aug 2017 13:55)  atorvastatin 20 mg oral tablet: 1 tab(s) orally once a day (03 Aug 2017 13:55)  carvedilol 6.25 mg oral tablet: 1 tab(s) orally 2 times a day (03 Aug 2017 13:55)  hydroCHLOROthiazide 25 mg oral tablet: 1 tab(s) orally once a day (03 Aug 2017 13:55)  Imdur: 90 milligram(s) orally once a day (03 Aug 2017 13:55)  levothyroxine 75 mcg (0.075 mg) oral tablet: 1 tab(s) orally once a day (03 Aug 2017 13:55)      MEDICATIONS  (STANDING):    MEDICATIONS  (PRN):      Allergies    latex (Rash)  penicillin (Faint)    Intolerances        FAMILY HISTORY:  Family history of breast cancer  Family history of multiple myeloma  Family history of heart disease  Family history of throat cancer  Family history of hypertension  Family history of stroke      SOCIAL    REVIEW OF SYSTEMS    Vital Signs Last 24 Hrs  T(C): 36.9 (19 Jun 2018 06:33), Max: 38.1 (18 Jun 2018 19:29)  T(F): 98.5 (19 Jun 2018 06:33), Max: 100.5 (18 Jun 2018 19:29)  HR: 53 (19 Jun 2018 09:20) (53 - 68)  BP: 87/54 (19 Jun 2018 09:20) (87/54 - 120/83)  BP(mean): --  RR: 18 (19 Jun 2018 09:20) (16 - 19)  SpO2: 99% (19 Jun 2018 09:20) (99% - 100%)    GENERAL:  no distress  SKIN: intact   HEENT:  NC/AT,  anicteric  CHEST:   no increased effort, breath sounds clear  HEART:  Regular rhythm  ABDOMEN:  Soft, non-tender, non-distended, normoactive bowel sounds,  no masses ,no hepato-splenomegaly, no signs of chronic liver disease  EXTEREMITIES:  no cyanosis        CBC Full  -  ( 18 Jun 2018 21:20 )  WBC Count : 9.93 K/uL  Hemoglobin : 12.1 g/dL  Hematocrit : 38.1 %  Platelet Count - Automated : 140 K/uL  Mean Cell Volume : 80.7 fL  Mean Cell Hemoglobin : 25.6 pg  Mean Cell Hemoglobin Concentration : 31.8 %  Auto Neutrophil # : 6.99 K/uL  Auto Lymphocyte # : 2.16 K/uL  Auto Monocyte # : 0.65 K/uL  Auto Eosinophil # : 0.10 K/uL  Auto Basophil # : 0.02 K/uL  Auto Neutrophil % : 70.4 %  Auto Lymphocyte % : 21.8 %  Auto Monocyte % : 6.5 %  Auto Eosinophil % : 1.0 %  Auto Basophil % : 0.2 %      Hemoglobin: 12.1 g/dL (06-18-18 @ 21:20)  Aspartate Aminotransferase (AST/SGOT): 19 u/L (06-18-18 @ 21:20)  Bilirubin Total, Serum: 0.7 mg/dL (06-18-18 @ 21:20)  Alanine Aminotransferase (ALT/SGPT): 12 u/L (06-18-18 @ 21:20)  Alkaline Phosphatase, Serum: 74 u/L (06-18-18 @ 21:20)  INR: 1.09 (06-18-18 @ 21:20)      PT/INR - ( 18 Jun 2018 21:20 )   PT: 12.5 SEC;   INR: 1.09          PTT - ( 18 Jun 2018 21:20 )  PTT:26.1 SEC    06-18    141  |  104  |  14  ----------------------------<  99  3.9   |  25  |  0.82    Ca    9.6      18 Jun 2018 21:20    TPro  8.1  /  Alb  3.8  /  TBili  0.7  /  DBili  x   /  AST  19  /  ALT  12  /  AlkPhos  74  06-18              RADIOLOGY CTAP: Status post gastrectomy and gastrojejunostomy. Wall thickening of the mid descending to the proximal sigmoid colon. Attenuated, unopacified proximal SMA with distal reconstitution, indicating chronic occlusion/thrombosis noted on 2/24/2015. Postsurgical changes along the anterior abdominal and pelvic wall. Small fat-containing umbilical hernia.  Patchy right middle and lower lobe opacities, suspicious for pneumonia.

## 2018-06-19 NOTE — DISCHARGE NOTE ADULT - SECONDARY DIAGNOSIS.
Pneumonia Type 2 diabetes mellitus without complication, unspecified whether long term insulin use Anemia, unspecified type Hypothyroidism Hypercholesterolemia CAD (coronary artery disease)

## 2018-06-19 NOTE — DISCHARGE NOTE ADULT - OTHER SIGNIFICANT FINDINGS
EKG: NSR @ 62, 1st deg avb  ESR: 43            PLT: 140           Occult neg            CRP: 92.5  6/19 CT Abd/Pelvis w/cont - wall thickening of the mid descending to the proximal sigmoid colon, suggesting colitis (infectious, inflammatory or ischemic in etiology). Chronic proximal SMA occlusion/thrombosis, note on 2/24/15. Patchy right middle and lower lobe opacities, suspicious for pneumonia. Recommend clinical correlation and follow up to assess resolution following completion of treatment. Additional findings as described.  6/19 CT Chest w/o cont - unchanged patchy nodular opacities in the right middle and lower lobes consistent with pneumia.  6/21/18 - Echo -  Mitral annular calcification, otherwise normal mitral  valve.  2. Increased relative wall thickness with normal left ventricular mass index, consistent with concentric left ventricular remodeling.  3. Normal left ventricular systolic function. No segmental wall motion abnormalities.  4. The right ventricle is not well visualized.  5. Estimated pulmonary artery systolic pressure equals 58 mm Hg, assuming right atrial pressure equals 10  mm Hg, consistent with moderate pulmonary hypertension.

## 2018-06-19 NOTE — DISCHARGE NOTE ADULT - MEDICATION SUMMARY - MEDICATIONS TO CHANGE
I will SWITCH the dose or number of times a day I take the medications listed below when I get home from the hospital:    Imdur  -- 90 milligram(s) by mouth once a day

## 2018-06-19 NOTE — DISCHARGE NOTE ADULT - CARE PLAN
Principal Discharge DX:	Ulcerative colitis  Goal:	Resolution of symptoms  Assessment and plan of treatment:	Follow up with PMD  within 1 week, Call for appointment  Secondary Diagnosis:	Pneumonia  Assessment and plan of treatment:	Follow with PMD  Secondary Diagnosis:	Type 2 diabetes mellitus without complication, unspecified whether long term insulin use  Assessment and plan of treatment:	Follow with PMD  Secondary Diagnosis:	Anemia, unspecified type  Assessment and plan of treatment:	Follow with PMD  Secondary Diagnosis:	Hypothyroidism  Assessment and plan of treatment:	Follow with PMD  Secondary Diagnosis:	Hypercholesterolemia  Assessment and plan of treatment:	Follow with PMD  Secondary Diagnosis:	CAD (coronary artery disease)  Assessment and plan of treatment:	Follow with PMD

## 2018-06-19 NOTE — PATIENT PROFILE ADULT. - NS TRANSFER PATIENT BELONGINGS
Jewelry/Money (specify)/$30,,1 pair of earrings, wallet, pair jeans, tshirt, pair of flip flops, bra, panties/Cell Phone/PDA (specify)

## 2018-06-19 NOTE — DISCHARGE NOTE ADULT - PROVIDER TOKENS
TOKEN:'[id number]',FREE:[LAST:[GI clinic],PHONE:[(   )    -],FAX:[(   )    -],ADDRESS:[outpatient GI clinic [phone]  in 1 month of discharge]]

## 2018-06-19 NOTE — H&P ADULT - HISTORY OF PRESENT ILLNESS
78y F hx PUDz s/p surg ~15yrs ago, CAD no stents, HTN, GERD, preDM now sent from  p/w BRBPR.  Pt st yesterday she had multiple episodes of n/v/d w/ retching, nonbloody BM.  this has resolved today.  Today pt st he has no appetite, feels tired & has mild nagging b/l lower abd pain.  Today when she had a small formed BM, she noticed the stool coated in blood but none on the TP.  Denies fever, hx Afib, 78y F hx PUDz s/p surg ~15yrs ago, CAD no stents, HTN, GERD, preDM now sent from  p/w BRBPR.  Pt st yesterday she had multiple episodes of n/v/d w/ retching, nonbloody BM.  this has resolved today.  Today pt st he has no appetite, feels tired & has mild nagging b/l lower abd pain.  Today when she had a small formed BM, she noticed the stool coated in blood but none on the TP.  Denies fever, cough, chest pain, shortness of breath, weakness numbness in ext , headache, sore throat , no recent use of abx , weight loss, lossof appetite, chnage in bowle habits

## 2018-06-19 NOTE — CONSULT NOTE ADULT - ASSESSMENT
79 yo female with pmh as below p/w brbpr. Hx goes back to 2 days ptp when the patient had profuse vomiting (10 x) 3 hours after eating lobster after which she felt tired and dizzy. Vomiting resolved then she developed profuse watery diarrhea around 3 x with large volume. yesterday she had a soft small bm with a large amount of blood, no bms today.    1- Watery diarrhea / BRBPR   r/o infectious diarrhea complicated by ischemic colitis vs infectious colitis vs IBD   check esr, crp, stool C Diff, ova parasite, culture and stain, stool giardia, serum ameba   IVF   advance diet to lactose free low residue   c/w cipro and flagyl   avoid hypotension   daily cbc 77 yo female with pmh as below p/w brbpr. Hx goes back to 2 days ptp when the patient had profuse vomiting (10 x) 3 hours after eating lobster after which she felt tired and dizzy. Vomiting resolved then she developed profuse watery diarrhea around 3 x with large volume. yesterday she had a soft small bm with a large amount of blood, no bms today.    1- Watery diarrhea / BRBPR   r/o infectious diarrhea complicated by ischemic colitis vs infectious colitis vs IBD   check esr, crp, stool C Diff, ova parasite, culture and stain, stool giardia, serum ameba   IVF   start clear liquids for now    c/w cipro and flagyl   avoid hypotension   daily cbc   will discuss flex sigm / colonoscopy     2- chronic SMA thrombus   observe for now     3- r/o PNA / chronic thrombocytopenia   management as per primary team 79 yo female with pmh as below p/w brbpr. Hx goes back to 2 days ptp when the patient had profuse vomiting (10 x) 3 hours after eating lobster after which she felt tired and dizzy. Vomiting resolved then she developed profuse watery diarrhea around 3 x with large volume. yesterday she had a soft small bm with a large amount of blood, no bms today.     1- Watery diarrhea / BRBPR   r/o infectious diarrhea complicated by ischemic colitis vs infectious colitis vs IBD   check esr, crp, stool C Diff, ova parasite, culture and stain, stool giardia, serum ameba   diet as tolerated.     c/w cipro and flagyl   avoid hypotension   daily cbc   If not improving, will consider flexible sigmoidoscopy. Otherwise will sign off. Please call us with questions.   Pt merits outpatient colonoscopy as well. She should follow-up [phone] in 1month of discharge.     2- chronic SMA thrombus   observe for now     3- r/o PNA / chronic thrombocytopenia   management as per primary team

## 2018-06-19 NOTE — DISCHARGE NOTE ADULT - HOSPITAL COURSE
78y F hx PUDz s/p surg ~15yrs ago, CAD no stents, HTN, GERD, preDM now sent from  p/w BRBPR.  Hospital Course:    Pneumonia  -monitor off abx, no signs and symptoms    Colitis  -monitor CBC  -f/u outpatient GI clinic [phone]  in 1 month of discharge 78y F hx PUDz s/p surg ~15yrs ago, CAD no stents, HTN, GERD, preDM now sent from  p/w BRBPR.  Hospital Course:  ESR: 43            PLT: 140           Occult neg            CRP: 92.5  6/19 CT Abd/Pelvis w/cont - wall thickening of the mid descending to the proximal sigmoid colon, suggesting colitis (infectious, inflammatory or ischemic in etiology). Chronic proximal SMA occlusion/thrombosis, note on 2/24/15. Patchy right middle and lower lobe opacities, suspicious for pneumonia. Recommend clinical correlation and follow up to assess resolution following completion of treatment. Additional findings as described.  6/19 CT Chest w/o cont - unchanged patchy nodular opacities in the right middle and lower lobes consistent with pneumia.  6/21/18 - Echo -  Mitral annular calcification, otherwise normal mitral  valve.  2. Increased relative wall thickness with normal left ventricular mass index, consistent with concentric left ventricular remodeling.  3. Normal left ventricular systolic function. No segmental wall motion abnormalities.  4. The right ventricle is not well visualized.  5. Estimated pulmonary artery systolic pressure equals 58 mm Hg, assuming right atrial pressure equals 10  mm Hg, consistent with moderate pulmonary hypertension.        Pneumonia  -monitor off abx, no signs and symptoms    ulcerative Colitis  -monitor CBC, on Cipro /Flagyl antibiotics  -f/u outpatient GI clinic [phone]  in 1 month of discharge

## 2018-06-19 NOTE — DISCHARGE NOTE ADULT - CARE PROVIDER_API CALL
Jim Hamilton), Internal Medicine  [address] [place] [address]  Phone: [phone]  Fax: [phone]    GI clinic,   outpatient GI clinic [phone]  in 1 month of discharge  Phone: (   )    -  Fax: (   )    -

## 2018-06-19 NOTE — CHART NOTE - NSCHARTNOTEFT_GEN_A_CORE
Spoke with Dr Hamilton regarding CT chest results, no need for new antibiotic as she is asymptomatic, pt currently on cipro/flagyl for colitis.

## 2018-06-19 NOTE — DISCHARGE NOTE ADULT - MEDICATION SUMMARY - MEDICATIONS TO TAKE
I will START or STAY ON the medications listed below when I get home from the hospital:    Flagyl 500 mg oral tablet  -- 1 tab(s) by mouth 3 times a day x 5 days  -- Indication: For Colitis    aspirin 81 mg oral delayed release tablet  -- 1 tab(s) by mouth once a day  -- Indication: For CAD (coronary artery disease)    acetaminophen 325 mg oral tablet  -- 2 tab(s) by mouth every 6 hours, As needed, Moderate Pain (4 - 6)  -- Indication: For Pain    isosorbide mononitrate 60 mg oral tablet, extended release  -- 1 tab(s) by mouth once a day  -- Indication: For CAD (coronary artery disease)    atorvastatin 20 mg oral tablet  -- 1 tab(s) by mouth once a day (at bedtime)  -- Indication: For Hypercholesterolemia    carvedilol 6.25 mg oral tablet  -- 1 tab(s) by mouth every 12 hours  -- Indication: For CAD (coronary artery disease)    albuterol 90 mcg/inh inhalation aerosol  -- 2 puff(s) inhaled 4 times a day, As Needed  -- Indication: For shortness of breath    amLODIPine 5 mg oral tablet  -- 1 tab(s) by mouth once a day  -- Indication: For Hypertension    hydroCHLOROthiazide 25 mg oral tablet  -- 1 tab(s) by mouth once a day  -- Indication: For Hypertension    guaiFENesin 600 mg oral tablet, extended release  -- 1 tab(s) by mouth every 12 hours  -- Indication: For Cough, persistentco    MiraLax - oral powder for reconstitution  -- 17 gram(s) by mouth once a day  -- Dilute this medication with liquid before administration.  It is very important that you take or use this exactly as directed.  Do not skip doses or discontinue unless directed by your doctor.    -- Indication: For Constipation    Cipro 500 mg oral tablet  -- 1 tab(s) by mouth every 12 hours x 5 days  -- Indication: For Colitis    levothyroxine 75 mcg (0.075 mg) oral tablet  -- 1 tab(s) by mouth once a day  -- Indication: For Hypothyroidism

## 2018-06-19 NOTE — CONSULT NOTE ADULT - SUBJECTIVE AND OBJECTIVE BOX
PULMONARY CONSULT    Initial HPI on admission:  HPI:  78y F hx PUDz s/p surg ~15yrs ago, CAD no stents, HTN, GERD, preDM now sent from  p/w BRBPR.  Pt st yesterday she had multiple episodes of n/v/d w/ retching, nonbloody BM.  this has resolved today.  Today pt st he has no appetite, feels tired & has mild nagging b/l lower abd pain.  Today when she had a small formed BM, she noticed the stool coated in blood but none on the TP.  Denies fever, hx Afib, (19 Jun 2018 16:02)      BRIEF HOSPITAL COURSE: Complains of cough X 3 weeks. Started with upper resp symptoms, then proceeded to tickle in throat. Denies postnasal drip. Cough was dry and then had yellow phlegm recently. Denies fever or chills. Was given cough syrup but no abx by primary.     PAST MEDICAL & SURGICAL HISTORY:  CAD (coronary artery disease): OM 2 100%, RCA 75  PUD (peptic ulcer disease)  DM2 (diabetes mellitus, type 2): diet controlled  Anemia  Arthritis  Uterine cancer: treated surgically  Carotid artery stenosis: L  MVP (mitral valve prolapse)  Hypercholesterolemia  Hypothyroidism  Rectal cyst: treated surgically  PUD (peptic ulcer disease): treated surgically in 1980  H/O: hysterectomy: due to Uterine cancer  H/O total knee replacement: R    Allergies    latex (Rash)  penicillin (Faint)    Intolerances      FAMILY HISTORY:  Family history of breast cancer  Family history of multiple myeloma  Family history of heart disease  Family history of throat cancer  Family history of hypertension  Family history of stroke    Social history: no tobacco    Review of Systems:  CONSTITUTIONAL: No fever, chills, or fatigue  EYES: No eye pain, visual disturbances, or discharge  ENMT:  No difficulty hearing, tinnitus, vertigo; No sinus or throat pain  NECK: No pain or stiffness  RESPIRATORY: Per above  CARDIOVASCULAR: No chest pain, palpitations, dizziness, or leg swelling  GASTROINTESTINAL: N/V/BRBPR  GENITOURINARY: No dysuria, frequency, hematuria, or incontinence  NEUROLOGICAL: No headaches, memory loss, loss of strength, numbness, or tremors  SKIN: No itching, burning, rashes, or lesions   MUSCULOSKELETAL: No joint pain or swelling; No muscle, back, or extremity pain  PSYCHIATRIC: No depression, anxiety, mood swings, or difficulty sleeping      Medications:  MEDICATIONS  (STANDING):  ciprofloxacin   IVPB 400 milliGRAM(s) IV Intermittent every 12 hours  metroNIDAZOLE  IVPB 500 milliGRAM(s) IV Intermittent every 8 hours  sodium chloride 0.9%. 1000 milliLiter(s) (75 mL/Hr) IV Continuous <Continuous>    MEDICATIONS  (PRN):  acetaminophen   Tablet. 650 milliGRAM(s) Oral every 6 hours PRN Moderate Pain (4 - 6)    Vital Signs Last 24 Hrs  T(C): 36.7 (19 Jun 2018 15:31), Max: 38.1 (18 Jun 2018 19:29)  T(F): 98 (19 Jun 2018 15:31), Max: 100.5 (18 Jun 2018 19:29)  HR: 49 (19 Jun 2018 15:31) (49 - 68)  BP: 122/52 (19 Jun 2018 15:31) (87/54 - 122/52)  BP(mean): --  RR: 16 (19 Jun 2018 15:31) (16 - 19)  SpO2: 100% (19 Jun 2018 15:31) (99% - 100%)      VBG pH 7.40 06-18 @ 21:20  VBG pCO2 46 06-18 @ 21:20  VBG O2 sat 27.1 06-18 @ 21:20  VBG lactate 1.0 06-18 @ 21:20    LABS:                        12.7   7.69  )-----------( 136      ( 19 Jun 2018 13:30 )             41.1     06-18    141  |  104  |  14  ----------------------------<  99  3.9   |  25  |  0.82    Ca    9.6      18 Jun 2018 21:20    TPro  8.1  /  Alb  3.8  /  TBili  0.7  /  DBili  x   /  AST  19  /  ALT  12  /  AlkPhos  74  06-18    CAPILLARY BLOOD GLUCOSE    PT/INR - ( 18 Jun 2018 21:20 )   PT: 12.5 SEC;   INR: 1.09          PTT - ( 18 Jun 2018 21:20 )  PTT:26.1 SEC    CULTURES: (if applicable)    Physical Examination:    General: No acute distress.      HEENT: Pupils equal, reactive to light.  Symmetric.    PULM: mildly coarse BS b/l    CVS: S1, S2    ABD: Soft, nondistended, nontender, normoactive bowel sounds, no masses    EXT: No edema, nontender    SKIN: Warm and well perfused, no rashes noted.    NEURO: Alert, oriented, interactive, nonfocal    RADIOLOGY REVIEWED  CXR:    CT abd: colitis, scattered RLL opacities    TTE:

## 2018-06-19 NOTE — DISCHARGE NOTE ADULT - PATIENT PORTAL LINK FT
You can access the [facility] Patient Portal, offered by [facility], by registering with the following website: [email or web address]

## 2018-06-20 LAB
ALBUMIN SERPL ELPH-MCNC: 3.3 G/DL — SIGNIFICANT CHANGE UP (ref 3.3–5)
ALBUMIN SERPL ELPH-MCNC: 3.3 G/DL — SIGNIFICANT CHANGE UP (ref 3.3–5)
ALP SERPL-CCNC: 68 U/L — SIGNIFICANT CHANGE UP (ref 40–120)
ALP SERPL-CCNC: 68 U/L — SIGNIFICANT CHANGE UP (ref 40–120)
ALT FLD-CCNC: 11 U/L — SIGNIFICANT CHANGE UP (ref 4–33)
ALT FLD-CCNC: 11 U/L — SIGNIFICANT CHANGE UP (ref 4–33)
AST SERPL-CCNC: 18 U/L — SIGNIFICANT CHANGE UP (ref 4–32)
AST SERPL-CCNC: 18 U/L — SIGNIFICANT CHANGE UP (ref 4–32)
BASOPHILS # BLD AUTO: 0.02 K/UL — SIGNIFICANT CHANGE UP (ref 0–0.2)
BASOPHILS NFR BLD AUTO: 0.4 % — SIGNIFICANT CHANGE UP (ref 0–2)
BILIRUB DIRECT SERPL-MCNC: 0.1 MG/DL — SIGNIFICANT CHANGE UP (ref 0.1–0.2)
BILIRUB SERPL-MCNC: 0.6 MG/DL — SIGNIFICANT CHANGE UP (ref 0.2–1.2)
BILIRUB SERPL-MCNC: 0.6 MG/DL — SIGNIFICANT CHANGE UP (ref 0.2–1.2)
BUN SERPL-MCNC: 7 MG/DL — SIGNIFICANT CHANGE UP (ref 7–23)
BUN SERPL-MCNC: 7 MG/DL — SIGNIFICANT CHANGE UP (ref 7–23)
CALCIUM SERPL-MCNC: 9.3 MG/DL — SIGNIFICANT CHANGE UP (ref 8.4–10.5)
CALCIUM SERPL-MCNC: 9.3 MG/DL — SIGNIFICANT CHANGE UP (ref 8.4–10.5)
CHLORIDE SERPL-SCNC: 104 MMOL/L — SIGNIFICANT CHANGE UP (ref 98–107)
CHLORIDE SERPL-SCNC: 104 MMOL/L — SIGNIFICANT CHANGE UP (ref 98–107)
CO2 SERPL-SCNC: 26 MMOL/L — SIGNIFICANT CHANGE UP (ref 22–31)
CO2 SERPL-SCNC: 26 MMOL/L — SIGNIFICANT CHANGE UP (ref 22–31)
CREAT SERPL-MCNC: 0.68 MG/DL — SIGNIFICANT CHANGE UP (ref 0.5–1.3)
CREAT SERPL-MCNC: 0.68 MG/DL — SIGNIFICANT CHANGE UP (ref 0.5–1.3)
EOSINOPHIL # BLD AUTO: 0.17 K/UL — SIGNIFICANT CHANGE UP (ref 0–0.5)
EOSINOPHIL NFR BLD AUTO: 3.2 % — SIGNIFICANT CHANGE UP (ref 0–6)
GLUCOSE SERPL-MCNC: 81 MG/DL — SIGNIFICANT CHANGE UP (ref 70–99)
GLUCOSE SERPL-MCNC: 81 MG/DL — SIGNIFICANT CHANGE UP (ref 70–99)
HBA1C BLD-MCNC: 5.7 % — HIGH (ref 4–5.6)
HCT VFR BLD CALC: 36.7 % — SIGNIFICANT CHANGE UP (ref 34.5–45)
HCT VFR BLD CALC: 36.7 % — SIGNIFICANT CHANGE UP (ref 34.5–45)
HGB BLD-MCNC: 11.9 G/DL — SIGNIFICANT CHANGE UP (ref 11.5–15.5)
HGB BLD-MCNC: 11.9 G/DL — SIGNIFICANT CHANGE UP (ref 11.5–15.5)
IMM GRANULOCYTES # BLD AUTO: 0.01 # — SIGNIFICANT CHANGE UP
IMM GRANULOCYTES NFR BLD AUTO: 0.2 % — SIGNIFICANT CHANGE UP (ref 0–1.5)
LYMPHOCYTES # BLD AUTO: 1.21 K/UL — SIGNIFICANT CHANGE UP (ref 1–3.3)
LYMPHOCYTES # BLD AUTO: 22.5 % — SIGNIFICANT CHANGE UP (ref 13–44)
MCHC RBC-ENTMCNC: 25.5 PG — LOW (ref 27–34)
MCHC RBC-ENTMCNC: 25.5 PG — LOW (ref 27–34)
MCHC RBC-ENTMCNC: 32.4 % — SIGNIFICANT CHANGE UP (ref 32–36)
MCHC RBC-ENTMCNC: 32.4 % — SIGNIFICANT CHANGE UP (ref 32–36)
MCV RBC AUTO: 78.8 FL — LOW (ref 80–100)
MCV RBC AUTO: 78.8 FL — LOW (ref 80–100)
MONOCYTES # BLD AUTO: 0.53 K/UL — SIGNIFICANT CHANGE UP (ref 0–0.9)
MONOCYTES NFR BLD AUTO: 9.9 % — SIGNIFICANT CHANGE UP (ref 2–14)
NEUTROPHILS # BLD AUTO: 3.44 K/UL — SIGNIFICANT CHANGE UP (ref 1.8–7.4)
NEUTROPHILS NFR BLD AUTO: 63.8 % — SIGNIFICANT CHANGE UP (ref 43–77)
NRBC # FLD: 0 — SIGNIFICANT CHANGE UP
NRBC # FLD: 0 — SIGNIFICANT CHANGE UP
PLATELET # BLD AUTO: 124 K/UL — LOW (ref 150–400)
PLATELET # BLD AUTO: 124 K/UL — LOW (ref 150–400)
PMV BLD: 12.2 FL — SIGNIFICANT CHANGE UP (ref 7–13)
PMV BLD: 12.2 FL — SIGNIFICANT CHANGE UP (ref 7–13)
POTASSIUM SERPL-MCNC: 3.1 MMOL/L — LOW (ref 3.5–5.3)
POTASSIUM SERPL-MCNC: 3.1 MMOL/L — LOW (ref 3.5–5.3)
POTASSIUM SERPL-SCNC: 3.1 MMOL/L — LOW (ref 3.5–5.3)
POTASSIUM SERPL-SCNC: 3.1 MMOL/L — LOW (ref 3.5–5.3)
PROCALCITONIN SERPL-MCNC: 0.02 NG/ML — SIGNIFICANT CHANGE UP (ref 0.02–0.1)
PROT SERPL-MCNC: 7.7 G/DL — SIGNIFICANT CHANGE UP (ref 6–8.3)
PROT SERPL-MCNC: 7.7 G/DL — SIGNIFICANT CHANGE UP (ref 6–8.3)
RBC # BLD: 4.66 M/UL — SIGNIFICANT CHANGE UP (ref 3.8–5.2)
RBC # BLD: 4.66 M/UL — SIGNIFICANT CHANGE UP (ref 3.8–5.2)
RBC # FLD: 16.5 % — HIGH (ref 10.3–14.5)
RBC # FLD: 16.5 % — HIGH (ref 10.3–14.5)
SODIUM SERPL-SCNC: 140 MMOL/L — SIGNIFICANT CHANGE UP (ref 135–145)
SODIUM SERPL-SCNC: 140 MMOL/L — SIGNIFICANT CHANGE UP (ref 135–145)
WBC # BLD: 5.38 K/UL — SIGNIFICANT CHANGE UP (ref 3.8–10.5)
WBC # BLD: 5.38 K/UL — SIGNIFICANT CHANGE UP (ref 3.8–10.5)
WBC # FLD AUTO: 5.38 K/UL — SIGNIFICANT CHANGE UP (ref 3.8–10.5)
WBC # FLD AUTO: 5.38 K/UL — SIGNIFICANT CHANGE UP (ref 3.8–10.5)

## 2018-06-20 RX ORDER — CARVEDILOL PHOSPHATE 80 MG/1
6.25 CAPSULE, EXTENDED RELEASE ORAL EVERY 12 HOURS
Qty: 0 | Refills: 0 | Status: DISCONTINUED | OUTPATIENT
Start: 2018-06-20 | End: 2018-06-21

## 2018-06-20 RX ORDER — POTASSIUM CHLORIDE 20 MEQ
40 PACKET (EA) ORAL ONCE
Qty: 0 | Refills: 0 | Status: COMPLETED | OUTPATIENT
Start: 2018-06-20 | End: 2018-06-20

## 2018-06-20 RX ADMIN — ISOSORBIDE MONONITRATE 60 MILLIGRAM(S): 60 TABLET, EXTENDED RELEASE ORAL at 05:53

## 2018-06-20 RX ADMIN — Medication 40 MILLIEQUIVALENT(S): at 17:35

## 2018-06-20 RX ADMIN — Medication 100 MILLIGRAM(S): at 21:28

## 2018-06-20 RX ADMIN — SODIUM CHLORIDE 75 MILLILITER(S): 9 INJECTION INTRAMUSCULAR; INTRAVENOUS; SUBCUTANEOUS at 05:04

## 2018-06-20 RX ADMIN — Medication 100 MILLIGRAM(S): at 14:23

## 2018-06-20 RX ADMIN — SODIUM CHLORIDE 100 MILLILITER(S): 9 INJECTION INTRAMUSCULAR; INTRAVENOUS; SUBCUTANEOUS at 17:11

## 2018-06-20 RX ADMIN — Medication 200 MILLIGRAM(S): at 17:37

## 2018-06-20 RX ADMIN — Medication 200 MILLIGRAM(S): at 05:53

## 2018-06-20 RX ADMIN — SODIUM CHLORIDE 75 MILLILITER(S): 9 INJECTION INTRAMUSCULAR; INTRAVENOUS; SUBCUTANEOUS at 09:54

## 2018-06-20 RX ADMIN — Medication 3 MILLILITER(S): at 16:14

## 2018-06-20 RX ADMIN — Medication 3 MILLILITER(S): at 22:48

## 2018-06-20 RX ADMIN — Medication 100 MILLIGRAM(S): at 05:04

## 2018-06-20 RX ADMIN — SODIUM CHLORIDE 100 MILLILITER(S): 9 INJECTION INTRAMUSCULAR; INTRAVENOUS; SUBCUTANEOUS at 21:32

## 2018-06-20 RX ADMIN — Medication 81 MILLIGRAM(S): at 11:59

## 2018-06-20 RX ADMIN — ATORVASTATIN CALCIUM 20 MILLIGRAM(S): 80 TABLET, FILM COATED ORAL at 21:28

## 2018-06-20 RX ADMIN — Medication 75 MICROGRAM(S): at 05:53

## 2018-06-20 RX ADMIN — Medication 3 MILLILITER(S): at 09:44

## 2018-06-20 NOTE — PROGRESS NOTE ADULT - SUBJECTIVE AND OBJECTIVE BOX
Follow-up Pulm Progress Note    No new respiratory events overnight.  Denies SOB/CP. o2 sat 99% on ra, + cough    Medications:  MEDICATIONS  (STANDING):  ALBUTerol/ipratropium for Nebulization 3 milliLiter(s) Nebulizer every 6 hours  amLODIPine   Tablet 5 milliGRAM(s) Oral daily  aspirin enteric coated 81 milliGRAM(s) Oral daily  atorvastatin 20 milliGRAM(s) Oral at bedtime  carvedilol 6.25 milliGRAM(s) Oral every 12 hours  ciprofloxacin   IVPB 400 milliGRAM(s) IV Intermittent every 12 hours  hydrochlorothiazide 25 milliGRAM(s) Oral daily  isosorbide   mononitrate ER Tablet (IMDUR) 60 milliGRAM(s) Oral daily  levothyroxine 75 MICROGram(s) Oral daily  metroNIDAZOLE  IVPB 500 milliGRAM(s) IV Intermittent every 8 hours  sodium chloride 0.9%. 1000 milliLiter(s) (75 mL/Hr) IV Continuous <Continuous>    MEDICATIONS  (PRN):  acetaminophen   Tablet. 650 milliGRAM(s) Oral every 6 hours PRN Moderate Pain (4 - 6)          Vital Signs Last 24 Hrs  T(C): 36.8 (20 Jun 2018 05:52), Max: 36.8 (20 Jun 2018 05:52)  T(F): 98.3 (20 Jun 2018 05:52), Max: 98.3 (20 Jun 2018 05:52)  HR: 56 (20 Jun 2018 05:52) (49 - 63)  BP: 124/64 (20 Jun 2018 05:52) (87/54 - 124/64)  BP(mean): --  RR: 18 (20 Jun 2018 05:52) (16 - 18)  SpO2: 100% (20 Jun 2018 05:52) (96% - 100%)      VBG pH 7.40 06-18 @ 21:20    VBG pCO2 46 06-18 @ 21:20    VBG O2 sat 27.1 06-18 @ 21:20    VBG lactate 1.0 06-18 @ 21:20          LABS:                        11.9   5.38  )-----------( 124      ( 20 Jun 2018 07:18 )             36.7     06-20    140  |  104  |  7   ----------------------------<  81  3.1<L>   |  26  |  0.68    Ca    9.3      20 Jun 2018 07:18    TPro  7.7  /  Alb  3.3  /  TBili  0.6  /  DBili  0.1  /  AST  18  /  ALT  11  /  AlkPhos  68  06-20          CAPILLARY BLOOD GLUCOSE        PT/INR - ( 18 Jun 2018 21:20 )   PT: 12.5 SEC;   INR: 1.09          PTT - ( 18 Jun 2018 21:20 )  PTT:26.1 SEC                    CULTURES: (if applicable)          Physical Examination:  PULM: Coarse ronchi R>L bilaterally, no significant sputum production  CVS: S1, S2 heard    RADIOLOGY REVIEWED  CXR:     CT chest:/prelim- R lung pactch nodular/ggo Follow-up Pulm Progress Note    No new respiratory events overnight.  Denies SOB/CP. o2 sat 99% on ra, + cough    Medications:  MEDICATIONS  (STANDING):  ALBUTerol/ipratropium for Nebulization 3 milliLiter(s) Nebulizer every 6 hours  amLODIPine   Tablet 5 milliGRAM(s) Oral daily  aspirin enteric coated 81 milliGRAM(s) Oral daily  atorvastatin 20 milliGRAM(s) Oral at bedtime  carvedilol 6.25 milliGRAM(s) Oral every 12 hours  ciprofloxacin   IVPB 400 milliGRAM(s) IV Intermittent every 12 hours  hydrochlorothiazide 25 milliGRAM(s) Oral daily  isosorbide   mononitrate ER Tablet (IMDUR) 60 milliGRAM(s) Oral daily  levothyroxine 75 MICROGram(s) Oral daily  metroNIDAZOLE  IVPB 500 milliGRAM(s) IV Intermittent every 8 hours  sodium chloride 0.9%. 1000 milliLiter(s) (75 mL/Hr) IV Continuous <Continuous>    MEDICATIONS  (PRN):  acetaminophen   Tablet. 650 milliGRAM(s) Oral every 6 hours PRN Moderate Pain (4 - 6)    Vital Signs Last 24 Hrs  T(C): 36.8 (20 Jun 2018 05:52), Max: 36.8 (20 Jun 2018 05:52)  T(F): 98.3 (20 Jun 2018 05:52), Max: 98.3 (20 Jun 2018 05:52)  HR: 56 (20 Jun 2018 05:52) (49 - 63)  BP: 124/64 (20 Jun 2018 05:52) (87/54 - 124/64)  BP(mean): --  RR: 18 (20 Jun 2018 05:52) (16 - 18)  SpO2: 100% (20 Jun 2018 05:52) (96% - 100%)      VBG pH 7.40 06-18 @ 21:20    VBG pCO2 46 06-18 @ 21:20    VBG O2 sat 27.1 06-18 @ 21:20    VBG lactate 1.0 06-18 @ 21:20    LABS:                        11.9   5.38  )-----------( 124      ( 20 Jun 2018 07:18 )             36.7     06-20    140  |  104  |  7   ----------------------------<  81  3.1<L>   |  26  |  0.68    Ca    9.3      20 Jun 2018 07:18    TPro  7.7  /  Alb  3.3  /  TBili  0.6  /  DBili  0.1  /  AST  18  /  ALT  11  /  AlkPhos  68  06-20    CAPILLARY BLOOD GLUCOSE    PT/INR - ( 18 Jun 2018 21:20 )   PT: 12.5 SEC;   INR: 1.09          PTT - ( 18 Jun 2018 21:20 )  PTT:26.1 SEC    CULTURES: (if applicable)    Physical Examination:  PULM: Coarse rhonchi R>L bilaterally, no significant sputum production  CVS: S1, S2 heard    RADIOLOGY REVIEWED  CXR:     CT chest:/prelim- R lung patch nodular/ggo Follow-up Pulm Progress Note    No new respiratory events overnight.  Denies SOB/CP. o2 sat 99% on ra, + cough    Medications:  MEDICATIONS  (STANDING):  ALBUTerol/ipratropium for Nebulization 3 milliLiter(s) Nebulizer every 6 hours  amLODIPine   Tablet 5 milliGRAM(s) Oral daily  aspirin enteric coated 81 milliGRAM(s) Oral daily  atorvastatin 20 milliGRAM(s) Oral at bedtime  carvedilol 6.25 milliGRAM(s) Oral every 12 hours  ciprofloxacin   IVPB 400 milliGRAM(s) IV Intermittent every 12 hours  hydrochlorothiazide 25 milliGRAM(s) Oral daily  isosorbide   mononitrate ER Tablet (IMDUR) 60 milliGRAM(s) Oral daily  levothyroxine 75 MICROGram(s) Oral daily  metroNIDAZOLE  IVPB 500 milliGRAM(s) IV Intermittent every 8 hours  sodium chloride 0.9%. 1000 milliLiter(s) (75 mL/Hr) IV Continuous <Continuous>    MEDICATIONS  (PRN):  acetaminophen   Tablet. 650 milliGRAM(s) Oral every 6 hours PRN Moderate Pain (4 - 6)    Vital Signs Last 24 Hrs  T(C): 36.8 (20 Jun 2018 05:52), Max: 36.8 (20 Jun 2018 05:52)  T(F): 98.3 (20 Jun 2018 05:52), Max: 98.3 (20 Jun 2018 05:52)  HR: 56 (20 Jun 2018 05:52) (49 - 63)  BP: 124/64 (20 Jun 2018 05:52) (87/54 - 124/64)  BP(mean): --  RR: 18 (20 Jun 2018 05:52) (16 - 18)  SpO2: 100% (20 Jun 2018 05:52) (96% - 100%)      VBG pH 7.40 06-18 @ 21:20    VBG pCO2 46 06-18 @ 21:20    VBG O2 sat 27.1 06-18 @ 21:20    VBG lactate 1.0 06-18 @ 21:20    LABS:                        11.9   5.38  )-----------( 124      ( 20 Jun 2018 07:18 )             36.7     06-20    140  |  104  |  7   ----------------------------<  81  3.1<L>   |  26  |  0.68    Ca    9.3      20 Jun 2018 07:18    TPro  7.7  /  Alb  3.3  /  TBili  0.6  /  DBili  0.1  /  AST  18  /  ALT  11  /  AlkPhos  68  06-20    CAPILLARY BLOOD GLUCOSE    PT/INR - ( 18 Jun 2018 21:20 )   PT: 12.5 SEC;   INR: 1.09          PTT - ( 18 Jun 2018 21:20 )  PTT:26.1 SEC    CULTURES: (if applicable)    Physical Examination:  PULM: Coarse rhonchi R>L bilaterally, no significant sputum production  CVS: S1, S2 heard    RADIOLOGY REVIEWED  CXR:     CT chest:/prelim- RML/RLL opacities c/w PNA

## 2018-06-20 NOTE — CONSULT NOTE ADULT - SUBJECTIVE AND OBJECTIVE BOX
CHIEF COMPLAINT: Diarrhea    HISTORY OF PRESENT ILLNESS:  This is an 78 year old woman with CAD, HTN, and pre-diabetes who presented to [facility] on 6/19/2018 with BRBPR. Ms. Dennis was found to have colitis. She denies chest pain or SOB.     Allergies  latex (Rash)  penicillin (Faint)	    MEDICATIONS:  amLODIPine   Tablet 5 milliGRAM(s) Oral daily  aspirin enteric coated 81 milliGRAM(s) Oral daily  carvedilol 6.25 milliGRAM(s) Oral every 12 hours  hydrochlorothiazide 25 milliGRAM(s) Oral daily  isosorbide   mononitrate ER Tablet (IMDUR) 60 milliGRAM(s) Oral daily  ciprofloxacin   IVPB 400 milliGRAM(s) IV Intermittent every 12 hours  metroNIDAZOLE  IVPB 500 milliGRAM(s) IV Intermittent every 8 hours  ALBUTerol/ipratropium for Nebulization 3 milliLiter(s) Nebulizer every 6 hours  acetaminophen   Tablet. 650 milliGRAM(s) Oral every 6 hours PRN  atorvastatin 20 milliGRAM(s) Oral at bedtime  levothyroxine 75 MICROGram(s) Oral daily    sodium chloride 0.9%. 1000 milliLiter(s) IV Continuous <Continuous>      PAST MEDICAL & SURGICAL HISTORY:  CAD (coronary artery disease): OM 2 100%, RCA 75  PUD (peptic ulcer disease)  DM2 (diabetes mellitus, type 2): diet controlled  Anemia  Arthritis  Uterine cancer: treated surgically  Carotid artery stenosis: L  MVP (mitral valve prolapse)  Hypercholesterolemia  Hypothyroidism  Rectal cyst: treated surgically  PUD (peptic ulcer disease): treated surgically in 1980  H/O: hysterectomy: due to Uterine cancer  H/O total knee replacement: R      FAMILY HISTORY:  Family history of breast cancer  Family history of multiple myeloma  Family history of heart disease  Family history of throat cancer  Family history of hypertension  Family history of stroke      SOCIAL HISTORY:    Non-smoker        REVIEW OF SYSTEMS:  See HPI, otherwise complete 10 point review of systems negative      PHYSICAL EXAM:  T(C): 36.9 (06-20-18 @ 14:28), Max: 36.9 (06-20-18 @ 14:28)  HR: 82 (06-20-18 @ 16:15) (53 - 84)  BP: 86/48 (06-20-18 @ 14:28) (85/46 - 124/64)  RR: 16 (06-20-18 @ 14:28) (16 - 18)  SpO2: 98% (06-20-18 @ 16:15) (96% - 100%)  Wt(kg): --  I&O's Summary      Appearance: No Acute Distress	  HEENT:  Normal oral mucosa, PERRL, EOMI	  Cardiovascular: Normal S1 S2, No JVD, No murmurs/rubs/gallops  Respiratory: Lungs clear to auscultation bilaterally  Gastrointestinal:  Soft, Non-tender, + BS	  Skin: No rashes, No ecchymoses, No cyanosis	  Neurologic: Non-focal  Extremities: No clubbing, cyanosis or edema  Vascular: Peripheral pulses palpable 2+ bilaterally  Psychiatry: A & O x 3, Mood & affect appropriate    Laboratory Data:	 	    CBC Full  -  ( 20 Jun 2018 07:18 )  WBC Count : 5.38 K/uL  Hemoglobin : 11.9 g/dL  Hematocrit : 36.7 %  Platelet Count - Automated : 124 K/uL  Mean Cell Volume : 78.8 fL  Mean Cell Hemoglobin : 25.5 pg  Mean Cell Hemoglobin Concentration : 32.4 %  Auto Neutrophil # : 3.44 K/uL  Auto Lymphocyte # : 1.21 K/uL  Auto Monocyte # : 0.53 K/uL  Auto Eosinophil # : 0.17 K/uL  Auto Basophil # : 0.02 K/uL  Auto Neutrophil % : 63.8 %  Auto Lymphocyte % : 22.5 %  Auto Monocyte % : 9.9 %  Auto Eosinophil % : 3.2 %  Auto Basophil % : 0.4 %    06-20    140  |  104  |  7   ----------------------------<  81  3.1<L>   |  26  |  0.68  06-18    141  |  104  |  14  ----------------------------<  99  3.9   |  25  |  0.82    Ca    9.3      20 Jun 2018 07:18  Ca    9.6      18 Jun 2018 21:20    TPro  7.7  /  Alb  3.3  /  TBili  0.6  /  DBili  0.1  /  AST  18  /  ALT  11  /  AlkPhos  68  06-20  TPro  8.1  /  Alb  3.8  /  TBili  0.7  /  DBili  x   /  AST  19  /  ALT  12  /  AlkPhos  74  06-18      Interpretation of Telemetry: Sinus	    ECG:  	      Assessment: 78 year old woman with CAD, HTN, and pre-DM presents with colitis.    Plan of Care:    #CAD-   Ms. Page displays no signs or symptoms of acute coronary syndrome.  EKG is sinus without ischemic changes.   Continue ASA, statin, and Coreg for medical management of known RCA and LCx disease.  Consider TTE for dilated pulmonary artery seen on CT scan.    #HTN-   BP controlled on current regimen.       64 minutes spent on total encounter; more than 50% of the visit was spent counseling and/or coordinating care by the attending physician.   	  Sumit Bailey MD [facility]  Cardiovascular Diseases  [phone] CHIEF COMPLAINT: Diarrhea    HISTORY OF PRESENT ILLNESS:  This is an 78 year old woman with CAD, HTN, and pre-diabetes who presented to [facility] on 6/19/2018 with BRBPR. Ms. Dennis was found to have colitis. She denies chest pain or SOB.     Allergies  latex (Rash)  penicillin (Faint)	    MEDICATIONS:  amLODIPine   Tablet 5 milliGRAM(s) Oral daily  aspirin enteric coated 81 milliGRAM(s) Oral daily  carvedilol 6.25 milliGRAM(s) Oral every 12 hours  hydrochlorothiazide 25 milliGRAM(s) Oral daily  isosorbide   mononitrate ER Tablet (IMDUR) 60 milliGRAM(s) Oral daily  ciprofloxacin   IVPB 400 milliGRAM(s) IV Intermittent every 12 hours  metroNIDAZOLE  IVPB 500 milliGRAM(s) IV Intermittent every 8 hours  ALBUTerol/ipratropium for Nebulization 3 milliLiter(s) Nebulizer every 6 hours  acetaminophen   Tablet. 650 milliGRAM(s) Oral every 6 hours PRN  atorvastatin 20 milliGRAM(s) Oral at bedtime  levothyroxine 75 MICROGram(s) Oral daily    sodium chloride 0.9%. 1000 milliLiter(s) IV Continuous <Continuous>      PAST MEDICAL & SURGICAL HISTORY:  CAD (coronary artery disease): OM 2 100%, RCA 75  PUD (peptic ulcer disease)  DM2 (diabetes mellitus, type 2): diet controlled  Anemia  Arthritis  Uterine cancer: treated surgically  Carotid artery stenosis: L  MVP (mitral valve prolapse)  Hypercholesterolemia  Hypothyroidism  Rectal cyst: treated surgically  PUD (peptic ulcer disease): treated surgically in 1980  H/O: hysterectomy: due to Uterine cancer  H/O total knee replacement: R      FAMILY HISTORY:  Family history of breast cancer  Family history of multiple myeloma  Family history of heart disease  Family history of throat cancer  Family history of hypertension  Family history of stroke      SOCIAL HISTORY:    Non-smoker        REVIEW OF SYSTEMS:  See HPI, otherwise complete 10 point review of systems negative      PHYSICAL EXAM:  T(C): 36.9 (06-20-18 @ 14:28), Max: 36.9 (06-20-18 @ 14:28)  HR: 82 (06-20-18 @ 16:15) (53 - 84)  BP: 86/48 (06-20-18 @ 14:28) (85/46 - 124/64)  RR: 16 (06-20-18 @ 14:28) (16 - 18)  SpO2: 98% (06-20-18 @ 16:15) (96% - 100%)  Wt(kg): --  I&O's Summary      Appearance: No Acute Distress	  HEENT:  Normal oral mucosa, PERRL, EOMI	  Cardiovascular: Normal S1 S2, No JVD, No murmurs/rubs/gallops  Respiratory: Lungs clear to auscultation bilaterally  Gastrointestinal:  Soft, Non-tender, + BS	  Skin: No rashes, No ecchymoses, No cyanosis	  Neurologic: Non-focal  Extremities: No clubbing, cyanosis or edema  Vascular: Peripheral pulses palpable 2+ bilaterally  Psychiatry: A & O x 3, Mood & affect appropriate    Laboratory Data:	 	    CBC Full  -  ( 20 Jun 2018 07:18 )  WBC Count : 5.38 K/uL  Hemoglobin : 11.9 g/dL  Hematocrit : 36.7 %  Platelet Count - Automated : 124 K/uL  Mean Cell Volume : 78.8 fL  Mean Cell Hemoglobin : 25.5 pg  Mean Cell Hemoglobin Concentration : 32.4 %  Auto Neutrophil # : 3.44 K/uL  Auto Lymphocyte # : 1.21 K/uL  Auto Monocyte # : 0.53 K/uL  Auto Eosinophil # : 0.17 K/uL  Auto Basophil # : 0.02 K/uL  Auto Neutrophil % : 63.8 %  Auto Lymphocyte % : 22.5 %  Auto Monocyte % : 9.9 %  Auto Eosinophil % : 3.2 %  Auto Basophil % : 0.4 %    06-20    140  |  104  |  7   ----------------------------<  81  3.1<L>   |  26  |  0.68  06-18    141  |  104  |  14  ----------------------------<  99  3.9   |  25  |  0.82    Ca    9.3      20 Jun 2018 07:18  Ca    9.6      18 Jun 2018 21:20    TPro  7.7  /  Alb  3.3  /  TBili  0.6  /  DBili  0.1  /  AST  18  /  ALT  11  /  AlkPhos  68  06-20  TPro  8.1  /  Alb  3.8  /  TBili  0.7  /  DBili  x   /  AST  19  /  ALT  12  /  AlkPhos  74  06-18      Interpretation of Telemetry: n/a  ECG:  Sinus; 1st degree AV delay      Assessment: 78 year old woman with CAD, HTN, and pre-DM presents with colitis.    Plan of Care:    #CAD-   Ms. Page displays no signs or symptoms of acute coronary syndrome.  EKG is sinus; 1st degree AV delay without ischemic changes.   Continue ASA, statin, and Coreg for medical management of known RCA and LCx disease.  Consider TTE for dilated pulmonary artery seen on CT scan.    #HTN-   BP controlled on current regimen.       64 minutes spent on total encounter; more than 50% of the visit was spent counseling and/or coordinating care by the attending physician.   	  Sumit Bailey MD [facility]  Cardiovascular Diseases  [phone]

## 2018-06-20 NOTE — PROGRESS NOTE ADULT - PROBLEM SELECTOR PLAN 1
f/u final report of ct chest  r/o pna vs pneumonitis  await procalcitonin level f/u final report of ct chest  r/o pna vs pneumonitis  await procalcitonin level  pt clinically responding  if she worsens, would broaden coverage  cipro not ideal for PNA but should cover most bugs

## 2018-06-20 NOTE — PROGRESS NOTE ADULT - SUBJECTIVE AND OBJECTIVE BOX
chief complaint : brbpr        SUBJECTIVE / OVERNIGHT EVENTS: pt denies abd pain, bleeding per rectum, c/o bloating sensation , no chest pain, sob      MEDICATIONS  (STANDING):  ALBUTerol/ipratropium for Nebulization 3 milliLiter(s) Nebulizer every 6 hours  amLODIPine   Tablet 5 milliGRAM(s) Oral daily  aspirin enteric coated 81 milliGRAM(s) Oral daily  atorvastatin 20 milliGRAM(s) Oral at bedtime  carvedilol 6.25 milliGRAM(s) Oral every 12 hours  ciprofloxacin   IVPB 400 milliGRAM(s) IV Intermittent every 12 hours  hydrochlorothiazide 25 milliGRAM(s) Oral daily  isosorbide   mononitrate ER Tablet (IMDUR) 60 milliGRAM(s) Oral daily  levothyroxine 75 MICROGram(s) Oral daily  metroNIDAZOLE  IVPB 500 milliGRAM(s) IV Intermittent every 8 hours  sodium chloride 0.9%. 1000 milliLiter(s) (100 mL/Hr) IV Continuous <Continuous>    MEDICATIONS  (PRN):  acetaminophen   Tablet. 650 milliGRAM(s) Oral every 6 hours PRN Moderate Pain (4 - 6)        CAPILLARY BLOOD GLUCOSE    Vital Signs Last 24 Hrs  T(C): 36.7 (20 Jun 2018 21:06), Max: 36.9 (20 Jun 2018 14:28)  T(F): 98 (20 Jun 2018 21:06), Max: 98.4 (20 Jun 2018 14:28)  HR: 53 (20 Jun 2018 21:06) (53 - 84)  BP: 116/60 (20 Jun 2018 21:06) (80/51 - 124/64)  BP(mean): --  RR: 18 (20 Jun 2018 21:06) (16 - 18)  SpO2: 100% (20 Jun 2018 21:06) (96% - 100%)    I&O's Summary      Constitutional: No fever, fatigue  Skin: No rash.  Eyes: No recent vision problems or eye pain.  ENT: No congestion, ear pain, or sore throat.  Cardiovascular: No chest pain or palpation.  Respiratory: No cough, shortness of breath, congestion, or wheezing.  Gastrointestinal: No abdominal pain, nausea, vomiting, or diarrhea. bloating+  Genitourinary: No dysuria.  Musculoskeletal: No joint swelling.  Neurologic: No headache.    PHYSICAL EXAM:  GENERAL: NAD  EYES: EOMI, PERRLA  NECK: Supple, No JVD  CHEST/LUNG: dec breath sounds rt base  HEART:  S1 , S2 +  ABDOMEN: soft, bs+  EXTREMITIES: no edema   NEUROLOGY:alert awake oriented       LABS:                        11.9   5.38  )-----------( 124      ( 20 Jun 2018 07:18 )             36.7     06-20    140  |  104  |  7   ----------------------------<  81  3.1<L>   |  26  |  0.68    Ca    9.3      20 Jun 2018 07:18    TPro  7.7  /  Alb  3.3  /  TBili  0.6  /  DBili  0.1  /  AST  18  /  ALT  11  /  AlkPhos  68  06-20              RADIOLOGY & ADDITIONAL TESTS:    Imaging Personally Reviewed:    Consultant(s) Notes Reviewed:      Care Discussed with Consultants/Other Providers:

## 2018-06-21 LAB
BUN SERPL-MCNC: 7 MG/DL — SIGNIFICANT CHANGE UP (ref 7–23)
CALCIUM SERPL-MCNC: 8.9 MG/DL — SIGNIFICANT CHANGE UP (ref 8.4–10.5)
CHLORIDE SERPL-SCNC: 108 MMOL/L — HIGH (ref 98–107)
CO2 SERPL-SCNC: 20 MMOL/L — LOW (ref 22–31)
CREAT SERPL-MCNC: 0.66 MG/DL — SIGNIFICANT CHANGE UP (ref 0.5–1.3)
GLUCOSE SERPL-MCNC: 101 MG/DL — HIGH (ref 70–99)
HCT VFR BLD CALC: 32.4 % — LOW (ref 34.5–45)
HGB BLD-MCNC: 10.4 G/DL — LOW (ref 11.5–15.5)
L PNEUMO AG UR QL: NEGATIVE — SIGNIFICANT CHANGE UP
MAGNESIUM SERPL-MCNC: 1.9 MG/DL — SIGNIFICANT CHANGE UP (ref 1.6–2.6)
MCHC RBC-ENTMCNC: 25.3 PG — LOW (ref 27–34)
MCHC RBC-ENTMCNC: 32.1 % — SIGNIFICANT CHANGE UP (ref 32–36)
MCV RBC AUTO: 78.8 FL — LOW (ref 80–100)
NRBC # FLD: 0 — SIGNIFICANT CHANGE UP
O+P SPEC CONC: SIGNIFICANT CHANGE UP
PLATELET # BLD AUTO: 123 K/UL — LOW (ref 150–400)
PMV BLD: 12.1 FL — SIGNIFICANT CHANGE UP (ref 7–13)
POTASSIUM SERPL-MCNC: 3.7 MMOL/L — SIGNIFICANT CHANGE UP (ref 3.5–5.3)
POTASSIUM SERPL-SCNC: 3.7 MMOL/L — SIGNIFICANT CHANGE UP (ref 3.5–5.3)
RBC # BLD: 4.11 M/UL — SIGNIFICANT CHANGE UP (ref 3.8–5.2)
RBC # FLD: 16.3 % — HIGH (ref 10.3–14.5)
SODIUM SERPL-SCNC: 140 MMOL/L — SIGNIFICANT CHANGE UP (ref 135–145)
SPECIMEN SOURCE: SIGNIFICANT CHANGE UP
SPECIMEN SOURCE: SIGNIFICANT CHANGE UP
TRI STN SPEC: SIGNIFICANT CHANGE UP
WBC # BLD: 4.5 K/UL — SIGNIFICANT CHANGE UP (ref 3.8–10.5)
WBC # FLD AUTO: 4.5 K/UL — SIGNIFICANT CHANGE UP (ref 3.8–10.5)

## 2018-06-21 PROCEDURE — 93306 TTE W/DOPPLER COMPLETE: CPT | Mod: 26

## 2018-06-21 RX ORDER — CARVEDILOL PHOSPHATE 80 MG/1
6.25 CAPSULE, EXTENDED RELEASE ORAL EVERY 12 HOURS
Qty: 0 | Refills: 0 | Status: DISCONTINUED | OUTPATIENT
Start: 2018-06-21 | End: 2018-06-23

## 2018-06-21 RX ADMIN — AMLODIPINE BESYLATE 5 MILLIGRAM(S): 2.5 TABLET ORAL at 05:37

## 2018-06-21 RX ADMIN — Medication 75 MICROGRAM(S): at 05:43

## 2018-06-21 RX ADMIN — Medication 81 MILLIGRAM(S): at 13:23

## 2018-06-21 RX ADMIN — ATORVASTATIN CALCIUM 20 MILLIGRAM(S): 80 TABLET, FILM COATED ORAL at 21:14

## 2018-06-21 RX ADMIN — SODIUM CHLORIDE 100 MILLILITER(S): 9 INJECTION INTRAMUSCULAR; INTRAVENOUS; SUBCUTANEOUS at 16:53

## 2018-06-21 RX ADMIN — Medication 100 MILLIGRAM(S): at 21:13

## 2018-06-21 RX ADMIN — ISOSORBIDE MONONITRATE 60 MILLIGRAM(S): 60 TABLET, EXTENDED RELEASE ORAL at 13:23

## 2018-06-21 RX ADMIN — Medication 3 MILLILITER(S): at 04:28

## 2018-06-21 RX ADMIN — Medication 100 MILLIGRAM(S): at 06:59

## 2018-06-21 RX ADMIN — Medication 100 MILLIGRAM(S): at 13:23

## 2018-06-21 RX ADMIN — Medication 200 MILLIGRAM(S): at 18:00

## 2018-06-21 RX ADMIN — Medication 25 MILLIGRAM(S): at 05:43

## 2018-06-21 RX ADMIN — Medication 3 MILLILITER(S): at 10:37

## 2018-06-21 RX ADMIN — CARVEDILOL PHOSPHATE 6.25 MILLIGRAM(S): 80 CAPSULE, EXTENDED RELEASE ORAL at 18:00

## 2018-06-21 RX ADMIN — Medication 200 MILLIGRAM(S): at 05:37

## 2018-06-21 RX ADMIN — Medication 3 MILLILITER(S): at 15:46

## 2018-06-21 RX ADMIN — Medication 3 MILLILITER(S): at 22:05

## 2018-06-21 RX ADMIN — CARVEDILOL PHOSPHATE 6.25 MILLIGRAM(S): 80 CAPSULE, EXTENDED RELEASE ORAL at 05:43

## 2018-06-21 NOTE — CONSULT NOTE ADULT - ASSESSMENT
1. colitis possibly ischemic  2. anemia  3. chronic CAD stable no active angina  4. PVD peripheral BP lower than central BP  5. no active cardia issues    Recommend  proceed as per medicine/GI/vascular  no further cardiac worrkup  take BP in legs

## 2018-06-21 NOTE — PROGRESS NOTE ADULT - SUBJECTIVE AND OBJECTIVE BOX
Follow-up Pulm Progress Note    No new respiratory events overnight.  Denies SOB/CP. cough resolved "i feel like new" ambulated to bathroom without sob    Medications:  MEDICATIONS  (STANDING):  ALBUTerol/ipratropium for Nebulization 3 milliLiter(s) Nebulizer every 6 hours  amLODIPine   Tablet 5 milliGRAM(s) Oral daily  aspirin enteric coated 81 milliGRAM(s) Oral daily  atorvastatin 20 milliGRAM(s) Oral at bedtime  carvedilol 6.25 milliGRAM(s) Oral every 12 hours  ciprofloxacin   IVPB 400 milliGRAM(s) IV Intermittent every 12 hours  hydrochlorothiazide 25 milliGRAM(s) Oral daily  isosorbide   mononitrate ER Tablet (IMDUR) 60 milliGRAM(s) Oral daily  levothyroxine 75 MICROGram(s) Oral daily  metroNIDAZOLE  IVPB 500 milliGRAM(s) IV Intermittent every 8 hours  sodium chloride 0.9%. 1000 milliLiter(s) (100 mL/Hr) IV Continuous <Continuous>    MEDICATIONS  (PRN):  acetaminophen   Tablet. 650 milliGRAM(s) Oral every 6 hours PRN Moderate Pain (4 - 6)          Vital Signs Last 24 Hrs  T(C): 36.6 (21 Jun 2018 05:33), Max: 36.9 (20 Jun 2018 14:28)  T(F): 97.8 (21 Jun 2018 05:33), Max: 98.4 (20 Jun 2018 14:28)  HR: 58 (21 Jun 2018 05:33) (50 - 84)  BP: 124/70 (21 Jun 2018 05:33) (80/51 - 124/70)  BP(mean): --  RR: 18 (21 Jun 2018 05:33) (16 - 18)  SpO2: 100% (21 Jun 2018 05:33) (98% - 100%)              LABS:                        10.4   4.50  )-----------( 123      ( 21 Jun 2018 06:26 )             32.4     06-21    140  |  108<H>  |  7   ----------------------------<  101<H>  3.7   |  20<L>  |  0.66    Ca    8.9      21 Jun 2018 06:26  Mg     1.9     06-21    TPro  7.7  /  Alb  3.3  /  TBili  0.6  /  DBili  0.1  /  AST  18  /  ALT  11  /  AlkPhos  68  06-20          CAPILLARY BLOOD GLUCOSE            Procalcitonin, Serum: 0.02 ng/mL (06-20-18 @ 07:18)                  CULTURES: (if applicable)          Physical Examination:  PULM: minimal scattered rhonchi  bilaterally, no significant sputum production  CVS: S1, S2 heard    RADIOLOGY REVIEWED  CXR:     CT chest:< from: CT Chest No Cont (06.19.18 @ 20:44) >    IMPRESSION:     Tree-in-bud and nodular opacities in the right middle and lower lobes,   consistent with bronchopneumonia. A followup is recommended in 4-6 weeks   to ensure resolution.     < end of copied text >

## 2018-06-21 NOTE — PROGRESS NOTE ADULT - PROBLEM SELECTOR PLAN 1
pna vs pneumonitis  procalcitonin negative  pt clinically responding  if she worsens, would broaden coverage  cipro not ideal for PNA but should cover most bugs    f/u ct chest non contrast in 6 weeks to ensure resolution of pna, d/w pt

## 2018-06-21 NOTE — CONSULT NOTE ADULT - SUBJECTIVE AND OBJECTIVE BOX
CHIEF COMPLAINT:  evalute BP and cardiac status  HISTORY OF PRESENT ILLNESS:  HPI:  78y F hx PUDz s/p surg ~15yrs ago, CAD no stents, HTN, GERD, preDM now sent from  p/w BRBPR.  Pt st yesterday she had multiple episodes of n/v/d w/ retching, nonbloody BM.  this has resolved today.  Today pt st he has no appetite, feels tired & has mild nagging b/l lower abd pain.  Today when she had a small formed BM, she noticed the stool coated in blood but none on the TP.  Denies fever, cough, chest pain, shortness of breath, weakness numbness in ext , headache, sore throat , no recent use of abx , weight loss, lossof appetite, chnage in bowle habits (19 Jun 2018 16:02)    Patient has hx of periprral vascular disease BP difficult to obtain in arms and centrally patient is hypertensiove. BP best taken in legs  has severe osital and mid RCA disease on cath 6 months ago patient opted for medical therapy  today feels well no N,V diaphoresis  PAST MEDICAL & SURGICAL HISTORY:  CAD (coronary artery disease): OM 2 100%, RCA 75  PUD (peptic ulcer disease)  DM2 (diabetes mellitus, type 2): diet controlled  Anemia  Arthritis  Uterine cancer: treated surgically  Carotid artery stenosis: L  MVP (mitral valve prolapse)  Hypercholesterolemia  Hypothyroidism  Rectal cyst: treated surgically  PUD (peptic ulcer disease): treated surgically in 1980  H/O: hysterectomy: due to Uterine cancer  H/O total knee replacement: R          MEDICATIONS:  amLODIPine   Tablet 5 milliGRAM(s) Oral daily  aspirin enteric coated 81 milliGRAM(s) Oral daily  carvedilol 6.25 milliGRAM(s) Oral every 12 hours  hydrochlorothiazide 25 milliGRAM(s) Oral daily  isosorbide   mononitrate ER Tablet (IMDUR) 60 milliGRAM(s) Oral daily    ciprofloxacin   IVPB 400 milliGRAM(s) IV Intermittent every 12 hours  metroNIDAZOLE  IVPB 500 milliGRAM(s) IV Intermittent every 8 hours    ALBUTerol/ipratropium for Nebulization 3 milliLiter(s) Nebulizer every 6 hours    acetaminophen   Tablet. 650 milliGRAM(s) Oral every 6 hours PRN      atorvastatin 20 milliGRAM(s) Oral at bedtime  levothyroxine 75 MICROGram(s) Oral daily    sodium chloride 0.9%. 1000 milliLiter(s) IV Continuous <Continuous>      FAMILY HISTORY:  Family history of breast cancer  Family history of multiple myeloma  Family history of heart disease  Family history of throat cancer  Family history of hypertension  Family history of stroke      Allergies    latex (Rash)  penicillin (Faint)    Intolerances      PHYSICAL EXAM:  T(C): 36.6 (06-21-18 @ 05:33), Max: 36.9 (06-20-18 @ 14:28)  HR: 58 (06-21-18 @ 05:33) (50 - 84)  BP: 124/70 (06-21-18 @ 05:33) (80/51 - 124/70)  RR: 18 (06-21-18 @ 05:33) (16 - 18)  SpO2: 100% (06-21-18 @ 05:33) (98% - 100%)  Wt(kg): --  I&O's Summary      Appearance: Normal looks well NAD	  HEENT:   Normal oral mucosa, PERRL, EOMI	  Cardiovascular: Normal S1 S2, No JVD, No murmurs P2 increased  Respiratory: Lungs decreased Bs bases	  Psychiatry: A & O x 3, Mood & affect appropriate  Gastrointestinal:  Soft, Non-tender, + BS	  Skin: No rashes, No ecchymoses, No cyanosis	  Neurologic: Non-focal  Extremities: No clubbing, cyanosis or edema    TELEMETRY: 	    ECG:  	  RADIOLOGY:   < from: CT Chest No Cont (06.19.18 @ 20:44) >  MPRESSION:     Tree-in-bud and nodular opacities in the right middle and lower lobes,   consistent with bronchopneumonia. A followup is recommended in 4-6 weeks   to ensure resolution.       Dilated main pulmonary artery, which can be seen in setting of pulmonary   hypertension.       < from: CT Abdomen and Pelvis w/ Oral Cont and w/ IV Cont (06.19.18 @ 02:23) >  IMPRESSION:     Wall thickening of the mid descending to the proximal sigmoid colon,   suggesting colitis (infectious, inflammatory or ischemic in etiology).   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    Patchy right middle and lower lobe opacities, suspicious for pneumonia.   Recommend clinical correlation and follow-up to assess resolution   following completion of treatment.    Additional findings as described.    < end of copied text >  	  	  LABS:	 	    CARDIAC MARKERS:                                  10.4   4.50  )-----------( 123      ( 21 Jun 2018 06:26 )             32.4     06-21    140  |  108<H>  |  7   ----------------------------<  101<H>  3.7   |  20<L>  |  0.66    Ca    8.9      21 Jun 2018 06:26  Mg     1.9     06-21    TPro  7.7  /  Alb  3.3  /  TBili  0.6  /  DBili  0.1  /  AST  18  /  ALT  11  /  AlkPhos  68  06-20    proBNP:   Lipid Profile:   HgA1c:   TSH:

## 2018-06-21 NOTE — PROGRESS NOTE ADULT - SUBJECTIVE AND OBJECTIVE BOX
chief complaint : brbpr        SUBJECTIVE / OVERNIGHT EVENTS: pt denies abd pain, bleeding per rectum, c/o bloating sensation , no chest pain, sob    MEDICATIONS  (STANDING):  ALBUTerol/ipratropium for Nebulization 3 milliLiter(s) Nebulizer every 6 hours  amLODIPine   Tablet 5 milliGRAM(s) Oral daily  aspirin enteric coated 81 milliGRAM(s) Oral daily  atorvastatin 20 milliGRAM(s) Oral at bedtime  carvedilol 6.25 milliGRAM(s) Oral every 12 hours  ciprofloxacin   IVPB 400 milliGRAM(s) IV Intermittent every 12 hours  hydrochlorothiazide 25 milliGRAM(s) Oral daily  isosorbide   mononitrate ER Tablet (IMDUR) 60 milliGRAM(s) Oral daily  levothyroxine 75 MICROGram(s) Oral daily  metroNIDAZOLE  IVPB 500 milliGRAM(s) IV Intermittent every 8 hours  sodium chloride 0.9%. 1000 milliLiter(s) (100 mL/Hr) IV Continuous <Continuous>    MEDICATIONS  (PRN):  acetaminophen   Tablet. 650 milliGRAM(s) Oral every 6 hours PRN Moderate Pain (4 - 6)    Vital Signs Last 24 Hrs  T(C): 36.6 (21 Jun 2018 21:06), Max: 36.6 (21 Jun 2018 05:33)  T(F): 97.9 (21 Jun 2018 21:06), Max: 97.9 (21 Jun 2018 21:06)  HR: 50 (21 Jun 2018 22:05) (50 - 84)  BP: 111/61 (21 Jun 2018 21:06) (111/61 - 153/95)  BP(mean): --  RR: 18 (21 Jun 2018 21:06) (18 - 18)  SpO2: 98% (21 Jun 2018 22:05) (98% - 100%)    Constitutional: No fever, fatigue  Skin: No rash.  Eyes: No recent vision problems or eye pain.  ENT: No congestion, ear pain, or sore throat.  Cardiovascular: No chest pain or palpation.  Respiratory: No cough, shortness of breath, congestion, or wheezing.  Gastrointestinal: No abdominal pain, nausea, vomiting, or diarrhea. bloating+  Genitourinary: No dysuria.  Musculoskeletal: No joint swelling.  Neurologic: No headache.    PHYSICAL EXAM:  GENERAL: NAD  EYES: EOMI, PERRLA  NECK: Supple, No JVD  CHEST/LUNG: dec breath sounds rt base  HEART:  S1 , S2 +  ABDOMEN: soft, bs+  EXTREMITIES: no edema   NEUROLOGY:alert awake oriented       LABS:  06-21    140  |  108<H>  |  7   ----------------------------<  101<H>  3.7   |  20<L>  |  0.66    Ca    8.9      21 Jun 2018 06:26  Mg     1.9     06-21    TPro  7.7  /  Alb  3.3  /  TBili  0.6  /  DBili  0.1  /  AST  18  /  ALT  11  /  AlkPhos  68  06-20    Creatinine Trend: 0.66 <--, 0.68 <--, 0.82 <--                        10.4   4.50  )-----------( 123      ( 21 Jun 2018 06:26 )             32.4     Urine Studies:            LIVER FUNCTIONS - ( 20 Jun 2018 07:18 )  Alb: 3.3 g/dL / Pro: 7.7 g/dL / ALK PHOS: 68 u/L / ALT: 11 u/L / AST: 18 u/L / GGT: x

## 2018-06-21 NOTE — PROGRESS NOTE ADULT - SUBJECTIVE AND OBJECTIVE BOX
Cardiovascular Disease Progress Note    Overnight events: No acute events overnight. Ms. Dennis denies chest pain or SOB.   Otherwise review of systems negative    Objective Findings:  T(C): 36.6 (06-21-18 @ 05:33), Max: 36.9 (06-20-18 @ 14:28)  HR: 58 (06-21-18 @ 05:33) (50 - 84)  BP: 124/70 (06-21-18 @ 05:33) (80/51 - 124/70)  RR: 18 (06-21-18 @ 05:33) (16 - 18)  SpO2: 100% (06-21-18 @ 05:33) (98% - 100%)  Wt(kg): --  Daily     Daily       Physical Exam:  Gen: NAD  HEENT: EOMI  CV: RRR, normal S1 + S2, no m/r/g  Lungs: CTAB  Abd: soft, non-tender  Ext: No edema    Telemetry: n/a    Laboratory Data:                        10.4   4.50  )-----------( 123      ( 21 Jun 2018 06:26 )             32.4     06-21    140  |  108<H>  |  7   ----------------------------<  101<H>  3.7   |  20<L>  |  0.66    Ca    8.9      21 Jun 2018 06:26  Mg     1.9     06-21    TPro  7.7  /  Alb  3.3  /  TBili  0.6  /  DBili  0.1  /  AST  18  /  ALT  11  /  AlkPhos  68  06-20              Inpatient Medications:  MEDICATIONS  (STANDING):  ALBUTerol/ipratropium for Nebulization 3 milliLiter(s) Nebulizer every 6 hours  amLODIPine   Tablet 5 milliGRAM(s) Oral daily  aspirin enteric coated 81 milliGRAM(s) Oral daily  atorvastatin 20 milliGRAM(s) Oral at bedtime  carvedilol 6.25 milliGRAM(s) Oral every 12 hours  ciprofloxacin   IVPB 400 milliGRAM(s) IV Intermittent every 12 hours  hydrochlorothiazide 25 milliGRAM(s) Oral daily  isosorbide   mononitrate ER Tablet (IMDUR) 60 milliGRAM(s) Oral daily  levothyroxine 75 MICROGram(s) Oral daily  metroNIDAZOLE  IVPB 500 milliGRAM(s) IV Intermittent every 8 hours  sodium chloride 0.9%. 1000 milliLiter(s) (100 mL/Hr) IV Continuous <Continuous>      Assessment: 78 year old woman with CAD, HTN, and pre-DM presents with colitis.    Plan of Care:    #CAD-   Ms. Page displays no signs or symptoms of acute coronary syndrome.  EKG is sinus; 1st degree AV delay without ischemic changes.   Continue ASA, statin, and Coreg for medical management of known RCA and LCx disease.  Consider TTE for dilated pulmonary artery seen on CT scan.    #HTN-   BP controlled on current regimen.       Over 25 minutes spent on total encounter; more than 50% of the visit was spent counseling and/or coordinating care by the attending physician.      Sumit Bailey MD [facility]  Cardiovascular Disease  [phone] Cardiovascular Disease Progress Note    Overnight events: No acute events overnight. Ms. Dennis denies chest pain or SOB.   Otherwise review of systems negative    Objective Findings:  T(C): 36.6 (06-21-18 @ 05:33), Max: 36.9 (06-20-18 @ 14:28)  HR: 58 (06-21-18 @ 05:33) (50 - 84)  BP: 124/70 (06-21-18 @ 05:33) (80/51 - 124/70)  RR: 18 (06-21-18 @ 05:33) (16 - 18)  SpO2: 100% (06-21-18 @ 05:33) (98% - 100%)  Wt(kg): --  Daily     Daily       Physical Exam:  Gen: NAD  HEENT: EOMI  CV: RRR, normal S1 + S2, no m/r/g  Lungs: CTAB  Abd: soft, non-tender  Ext: No edema    Telemetry: n/a    Laboratory Data:                        10.4   4.50  )-----------( 123      ( 21 Jun 2018 06:26 )             32.4     06-21    140  |  108<H>  |  7   ----------------------------<  101<H>  3.7   |  20<L>  |  0.66    Ca    8.9      21 Jun 2018 06:26  Mg     1.9     06-21    TPro  7.7  /  Alb  3.3  /  TBili  0.6  /  DBili  0.1  /  AST  18  /  ALT  11  /  AlkPhos  68  06-20              Inpatient Medications:  MEDICATIONS  (STANDING):  ALBUTerol/ipratropium for Nebulization 3 milliLiter(s) Nebulizer every 6 hours  amLODIPine   Tablet 5 milliGRAM(s) Oral daily  aspirin enteric coated 81 milliGRAM(s) Oral daily  atorvastatin 20 milliGRAM(s) Oral at bedtime  carvedilol 6.25 milliGRAM(s) Oral every 12 hours  ciprofloxacin   IVPB 400 milliGRAM(s) IV Intermittent every 12 hours  hydrochlorothiazide 25 milliGRAM(s) Oral daily  isosorbide   mononitrate ER Tablet (IMDUR) 60 milliGRAM(s) Oral daily  levothyroxine 75 MICROGram(s) Oral daily  metroNIDAZOLE  IVPB 500 milliGRAM(s) IV Intermittent every 8 hours  sodium chloride 0.9%. 1000 milliLiter(s) (100 mL/Hr) IV Continuous <Continuous>      Assessment: 78 year old woman with CAD, HTN, and pre-DM presents with colitis.    Plan of Care:    #CAD-   Ms. Page displays no signs or symptoms of acute coronary syndrome.  EKG is sinus; 1st degree AV delay without ischemic changes.   Continue ASA, statin, and Coreg for medical management of known RCA and LCx disease.  Consider TTE for dilated pulmonary artery seen on CT scan.    #HTN-   BP controlled on current regimen.     Management of colitis, as per primary team.       Over 25 minutes spent on total encounter; more than 50% of the visit was spent counseling and/or coordinating care by the attending physician.      Sumit Bailey MD [facility]  Cardiovascular Disease  [phone]

## 2018-06-22 LAB
BASOPHILS # BLD AUTO: 0.03 K/UL — SIGNIFICANT CHANGE UP (ref 0–0.2)
BASOPHILS NFR BLD AUTO: 0.7 % — SIGNIFICANT CHANGE UP (ref 0–2)
BUN SERPL-MCNC: 9 MG/DL — SIGNIFICANT CHANGE UP (ref 7–23)
CALCIUM SERPL-MCNC: 9.5 MG/DL — SIGNIFICANT CHANGE UP (ref 8.4–10.5)
CHLORIDE SERPL-SCNC: 107 MMOL/L — SIGNIFICANT CHANGE UP (ref 98–107)
CO2 SERPL-SCNC: 25 MMOL/L — SIGNIFICANT CHANGE UP (ref 22–31)
CREAT SERPL-MCNC: 0.77 MG/DL — SIGNIFICANT CHANGE UP (ref 0.5–1.3)
EOSINOPHIL # BLD AUTO: 0.17 K/UL — SIGNIFICANT CHANGE UP (ref 0–0.5)
EOSINOPHIL NFR BLD AUTO: 4.1 % — SIGNIFICANT CHANGE UP (ref 0–6)
GLUCOSE SERPL-MCNC: 91 MG/DL — SIGNIFICANT CHANGE UP (ref 70–99)
HCT VFR BLD CALC: 30.4 % — LOW (ref 34.5–45)
HGB BLD-MCNC: 9.9 G/DL — LOW (ref 11.5–15.5)
IMM GRANULOCYTES # BLD AUTO: 0.01 # — SIGNIFICANT CHANGE UP
IMM GRANULOCYTES NFR BLD AUTO: 0.2 % — SIGNIFICANT CHANGE UP (ref 0–1.5)
LYMPHOCYTES # BLD AUTO: 1.41 K/UL — SIGNIFICANT CHANGE UP (ref 1–3.3)
LYMPHOCYTES # BLD AUTO: 34.4 % — SIGNIFICANT CHANGE UP (ref 13–44)
MCHC RBC-ENTMCNC: 25.4 PG — LOW (ref 27–34)
MCHC RBC-ENTMCNC: 32.6 % — SIGNIFICANT CHANGE UP (ref 32–36)
MCV RBC AUTO: 78.1 FL — LOW (ref 80–100)
MONOCYTES # BLD AUTO: 0.5 K/UL — SIGNIFICANT CHANGE UP (ref 0–0.9)
MONOCYTES NFR BLD AUTO: 12.2 % — SIGNIFICANT CHANGE UP (ref 2–14)
NEUTROPHILS # BLD AUTO: 1.98 K/UL — SIGNIFICANT CHANGE UP (ref 1.8–7.4)
NEUTROPHILS NFR BLD AUTO: 48.4 % — SIGNIFICANT CHANGE UP (ref 43–77)
NRBC # FLD: 0 — SIGNIFICANT CHANGE UP
PLATELET # BLD AUTO: 117 K/UL — LOW (ref 150–400)
PMV BLD: 12.7 FL — SIGNIFICANT CHANGE UP (ref 7–13)
POTASSIUM SERPL-MCNC: 3.7 MMOL/L — SIGNIFICANT CHANGE UP (ref 3.5–5.3)
POTASSIUM SERPL-SCNC: 3.7 MMOL/L — SIGNIFICANT CHANGE UP (ref 3.5–5.3)
RBC # BLD: 3.89 M/UL — SIGNIFICANT CHANGE UP (ref 3.8–5.2)
RBC # FLD: 16.5 % — HIGH (ref 10.3–14.5)
SODIUM SERPL-SCNC: 143 MMOL/L — SIGNIFICANT CHANGE UP (ref 135–145)
WBC # BLD: 4.1 K/UL — SIGNIFICANT CHANGE UP (ref 3.8–10.5)
WBC # FLD AUTO: 4.1 K/UL — SIGNIFICANT CHANGE UP (ref 3.8–10.5)

## 2018-06-22 RX ADMIN — ATORVASTATIN CALCIUM 20 MILLIGRAM(S): 80 TABLET, FILM COATED ORAL at 22:16

## 2018-06-22 RX ADMIN — AMLODIPINE BESYLATE 5 MILLIGRAM(S): 2.5 TABLET ORAL at 05:36

## 2018-06-22 RX ADMIN — Medication 25 MILLIGRAM(S): at 05:36

## 2018-06-22 RX ADMIN — Medication 75 MICROGRAM(S): at 05:34

## 2018-06-22 RX ADMIN — CARVEDILOL PHOSPHATE 6.25 MILLIGRAM(S): 80 CAPSULE, EXTENDED RELEASE ORAL at 05:36

## 2018-06-22 RX ADMIN — Medication 81 MILLIGRAM(S): at 11:48

## 2018-06-22 RX ADMIN — Medication 600 MILLIGRAM(S): at 17:39

## 2018-06-22 RX ADMIN — SODIUM CHLORIDE 100 MILLILITER(S): 9 INJECTION INTRAMUSCULAR; INTRAVENOUS; SUBCUTANEOUS at 11:48

## 2018-06-22 RX ADMIN — SODIUM CHLORIDE 100 MILLILITER(S): 9 INJECTION INTRAMUSCULAR; INTRAVENOUS; SUBCUTANEOUS at 06:50

## 2018-06-22 RX ADMIN — Medication 3 MILLILITER(S): at 04:44

## 2018-06-22 RX ADMIN — Medication 100 MILLIGRAM(S): at 22:25

## 2018-06-22 RX ADMIN — Medication 100 MILLIGRAM(S): at 06:50

## 2018-06-22 RX ADMIN — Medication 3 MILLILITER(S): at 10:49

## 2018-06-22 RX ADMIN — Medication 3 MILLILITER(S): at 21:43

## 2018-06-22 RX ADMIN — ISOSORBIDE MONONITRATE 60 MILLIGRAM(S): 60 TABLET, EXTENDED RELEASE ORAL at 11:48

## 2018-06-22 RX ADMIN — Medication 200 MILLIGRAM(S): at 17:36

## 2018-06-22 RX ADMIN — Medication 100 MILLIGRAM(S): at 16:19

## 2018-06-22 RX ADMIN — Medication 200 MILLIGRAM(S): at 05:33

## 2018-06-22 RX ADMIN — Medication 3 MILLILITER(S): at 16:10

## 2018-06-22 NOTE — PROGRESS NOTE ADULT - PROBLEM SELECTOR PROBLEM 5
Type 2 diabetes mellitus without complication, unspecified whether long term insulin use

## 2018-06-22 NOTE — PROGRESS NOTE ADULT - SUBJECTIVE AND OBJECTIVE BOX
chief complaint : brbpr        SUBJECTIVE / OVERNIGHT EVENTS: pt denies abd pain, bleeding per rectum, no chest pain, sob    MEDICATIONS  (STANDING):  ALBUTerol/ipratropium for Nebulization 3 milliLiter(s) Nebulizer every 6 hours  amLODIPine   Tablet 5 milliGRAM(s) Oral daily  aspirin enteric coated 81 milliGRAM(s) Oral daily  atorvastatin 20 milliGRAM(s) Oral at bedtime  carvedilol 6.25 milliGRAM(s) Oral every 12 hours  ciprofloxacin   IVPB 400 milliGRAM(s) IV Intermittent every 12 hours  guaiFENesin  milliGRAM(s) Oral every 12 hours  hydrochlorothiazide 25 milliGRAM(s) Oral daily  isosorbide   mononitrate ER Tablet (IMDUR) 60 milliGRAM(s) Oral daily  levothyroxine 75 MICROGram(s) Oral daily  metroNIDAZOLE  IVPB 500 milliGRAM(s) IV Intermittent every 8 hours    MEDICATIONS  (PRN):  acetaminophen   Tablet. 650 milliGRAM(s) Oral every 6 hours PRN Moderate Pain (4 - 6)    Vital Signs Last 24 Hrs  T(C): 36.4 (22 Jun 2018 15:31), Max: 36.6 (22 Jun 2018 05:32)  T(F): 97.6 (22 Jun 2018 15:31), Max: 97.8 (22 Jun 2018 05:32)  HR: 59 (22 Jun 2018 21:44) (54 - 68)  BP: 118/64 (22 Jun 2018 16:53) (118/64 - 156/61)  BP(mean): --  RR: 16 (22 Jun 2018 15:31) (16 - 18)  SpO2: 98% (22 Jun 2018 21:44) (97% - 100%)    Constitutional: No fever, fatigue  Skin: No rash.  Eyes: No recent vision problems or eye pain.  ENT: No congestion, ear pain, or sore throat.  Cardiovascular: No chest pain or palpation.  Respiratory: No cough, shortness of breath, congestion, or wheezing.  Gastrointestinal: No abdominal pain, nausea, vomiting, or diarrhea. bloating+  Genitourinary: No dysuria.  Musculoskeletal: No joint swelling.  Neurologic: No headache.    PHYSICAL EXAM:  GENERAL: NAD  EYES: EOMI, PERRLA  NECK: Supple, No JVD  CHEST/LUNG: dec breath sounds rt base  HEART:  S1 , S2 +  ABDOMEN: soft, bs+  EXTREMITIES: no edema   NEUROLOGY:alert awake oriented     LABS:  06-22    143  |  107  |  9   ----------------------------<  91  3.7   |  25  |  0.77    Ca    9.5      22 Jun 2018 11:43  Mg     1.9     06-21      Creatinine Trend: 0.77 <--, 0.66 <--, 0.68 <--, 0.82 <--                        9.9    4.10  )-----------( 117      ( 22 Jun 2018 07:28 )             30.4     Urine Studies:

## 2018-06-22 NOTE — PROGRESS NOTE ADULT - SUBJECTIVE AND OBJECTIVE BOX
Follow-up Pulm Progress Note    No new respiratory events overnight.  Denies SOB/CP. Wants to go home.     Medications:  MEDICATIONS  (STANDING):  ALBUTerol/ipratropium for Nebulization 3 milliLiter(s) Nebulizer every 6 hours  amLODIPine   Tablet 5 milliGRAM(s) Oral daily  aspirin enteric coated 81 milliGRAM(s) Oral daily  atorvastatin 20 milliGRAM(s) Oral at bedtime  carvedilol 6.25 milliGRAM(s) Oral every 12 hours  ciprofloxacin   IVPB 400 milliGRAM(s) IV Intermittent every 12 hours  guaiFENesin  milliGRAM(s) Oral every 12 hours  hydrochlorothiazide 25 milliGRAM(s) Oral daily  isosorbide   mononitrate ER Tablet (IMDUR) 60 milliGRAM(s) Oral daily  levothyroxine 75 MICROGram(s) Oral daily  metroNIDAZOLE  IVPB 500 milliGRAM(s) IV Intermittent every 8 hours  sodium chloride 0.9%. 1000 milliLiter(s) (100 mL/Hr) IV Continuous <Continuous>    MEDICATIONS  (PRN):  acetaminophen   Tablet. 650 milliGRAM(s) Oral every 6 hours PRN Moderate Pain (4 - 6)    Vital Signs Last 24 Hrs  T(C): 36.4 (22 Jun 2018 15:31), Max: 36.6 (21 Jun 2018 17:59)  T(F): 97.6 (22 Jun 2018 15:31), Max: 97.9 (21 Jun 2018 21:06)  HR: 65 (22 Jun 2018 16:10) (50 - 68)  BP: 155/62 (22 Jun 2018 15:31) (111/61 - 156/61)  BP(mean): --  RR: 16 (22 Jun 2018 15:31) (16 - 18)  SpO2: 97% (22 Jun 2018 16:10) (97% - 100%)    LABS:                        9.9    4.10  )-----------( 117      ( 22 Jun 2018 07:28 )             30.4     06-22    143  |  107  |  9   ----------------------------<  91  3.7   |  25  |  0.77    Ca    9.5      22 Jun 2018 11:43  Mg     1.9     06-21    CAPILLARY BLOOD GLUCOSE    Procalcitonin, Serum: 0.02 ng/mL (06-20-18 @ 07:18)    CULTURES: (if applicable)    Physical Examination:  PULM: Clear to auscultation bilaterally, no significant sputum production  CVS: S1, S2 heard    RADIOLOGY REVIEWED  CXR:     CT chest:    TTE:

## 2018-06-22 NOTE — PROGRESS NOTE ADULT - PROBLEM SELECTOR PLAN 3
as per gI  GI follow up for GERD optimization  c/w cipro, flagyl

## 2018-06-23 VITALS
OXYGEN SATURATION: 99 % | TEMPERATURE: 98 F | RESPIRATION RATE: 18 BRPM | HEART RATE: 51 BPM | DIASTOLIC BLOOD PRESSURE: 73 MMHG | SYSTOLIC BLOOD PRESSURE: 127 MMHG

## 2018-06-23 LAB
BACTERIA STL CULT: SIGNIFICANT CHANGE UP
BASOPHILS # BLD AUTO: 0.03 K/UL — SIGNIFICANT CHANGE UP (ref 0–0.2)
BASOPHILS NFR BLD AUTO: 0.6 % — SIGNIFICANT CHANGE UP (ref 0–2)
BUN SERPL-MCNC: 9 MG/DL — SIGNIFICANT CHANGE UP (ref 7–23)
BUN SERPL-MCNC: 9 MG/DL — SIGNIFICANT CHANGE UP (ref 7–23)
CALCIUM SERPL-MCNC: 9.6 MG/DL — SIGNIFICANT CHANGE UP (ref 8.4–10.5)
CALCIUM SERPL-MCNC: 9.6 MG/DL — SIGNIFICANT CHANGE UP (ref 8.4–10.5)
CHLORIDE SERPL-SCNC: 106 MMOL/L — SIGNIFICANT CHANGE UP (ref 98–107)
CHLORIDE SERPL-SCNC: 106 MMOL/L — SIGNIFICANT CHANGE UP (ref 98–107)
CO2 SERPL-SCNC: 24 MMOL/L — SIGNIFICANT CHANGE UP (ref 22–31)
CO2 SERPL-SCNC: 24 MMOL/L — SIGNIFICANT CHANGE UP (ref 22–31)
CREAT SERPL-MCNC: 0.72 MG/DL — SIGNIFICANT CHANGE UP (ref 0.5–1.3)
CREAT SERPL-MCNC: 0.72 MG/DL — SIGNIFICANT CHANGE UP (ref 0.5–1.3)
EOSINOPHIL # BLD AUTO: 0.15 K/UL — SIGNIFICANT CHANGE UP (ref 0–0.5)
EOSINOPHIL NFR BLD AUTO: 3.1 % — SIGNIFICANT CHANGE UP (ref 0–6)
GLUCOSE SERPL-MCNC: 87 MG/DL — SIGNIFICANT CHANGE UP (ref 70–99)
GLUCOSE SERPL-MCNC: 87 MG/DL — SIGNIFICANT CHANGE UP (ref 70–99)
HCT VFR BLD CALC: 35 % — SIGNIFICANT CHANGE UP (ref 34.5–45)
HCT VFR BLD CALC: 35 % — SIGNIFICANT CHANGE UP (ref 34.5–45)
HGB BLD-MCNC: 11.5 G/DL — SIGNIFICANT CHANGE UP (ref 11.5–15.5)
HGB BLD-MCNC: 11.5 G/DL — SIGNIFICANT CHANGE UP (ref 11.5–15.5)
IMM GRANULOCYTES # BLD AUTO: 0.01 # — SIGNIFICANT CHANGE UP
IMM GRANULOCYTES NFR BLD AUTO: 0.2 % — SIGNIFICANT CHANGE UP (ref 0–1.5)
LYMPHOCYTES # BLD AUTO: 1.66 K/UL — SIGNIFICANT CHANGE UP (ref 1–3.3)
LYMPHOCYTES # BLD AUTO: 33.9 % — SIGNIFICANT CHANGE UP (ref 13–44)
MAGNESIUM SERPL-MCNC: 1.9 MG/DL — SIGNIFICANT CHANGE UP (ref 1.6–2.6)
MCHC RBC-ENTMCNC: 25.4 PG — LOW (ref 27–34)
MCHC RBC-ENTMCNC: 25.4 PG — LOW (ref 27–34)
MCHC RBC-ENTMCNC: 32.9 % — SIGNIFICANT CHANGE UP (ref 32–36)
MCHC RBC-ENTMCNC: 32.9 % — SIGNIFICANT CHANGE UP (ref 32–36)
MCV RBC AUTO: 77.4 FL — LOW (ref 80–100)
MCV RBC AUTO: 77.4 FL — LOW (ref 80–100)
MONOCYTES # BLD AUTO: 0.53 K/UL — SIGNIFICANT CHANGE UP (ref 0–0.9)
MONOCYTES NFR BLD AUTO: 10.8 % — SIGNIFICANT CHANGE UP (ref 2–14)
NEUTROPHILS # BLD AUTO: 2.51 K/UL — SIGNIFICANT CHANGE UP (ref 1.8–7.4)
NEUTROPHILS NFR BLD AUTO: 51.4 % — SIGNIFICANT CHANGE UP (ref 43–77)
NRBC # FLD: 0 — SIGNIFICANT CHANGE UP
NRBC # FLD: 0 — SIGNIFICANT CHANGE UP
PLATELET # BLD AUTO: 146 K/UL — LOW (ref 150–400)
PLATELET # BLD AUTO: 146 K/UL — LOW (ref 150–400)
PMV BLD: 12.3 FL — SIGNIFICANT CHANGE UP (ref 7–13)
PMV BLD: 12.3 FL — SIGNIFICANT CHANGE UP (ref 7–13)
POTASSIUM SERPL-MCNC: 3.6 MMOL/L — SIGNIFICANT CHANGE UP (ref 3.5–5.3)
POTASSIUM SERPL-MCNC: 3.6 MMOL/L — SIGNIFICANT CHANGE UP (ref 3.5–5.3)
POTASSIUM SERPL-SCNC: 3.6 MMOL/L — SIGNIFICANT CHANGE UP (ref 3.5–5.3)
POTASSIUM SERPL-SCNC: 3.6 MMOL/L — SIGNIFICANT CHANGE UP (ref 3.5–5.3)
RBC # BLD: 4.52 M/UL — SIGNIFICANT CHANGE UP (ref 3.8–5.2)
RBC # BLD: 4.52 M/UL — SIGNIFICANT CHANGE UP (ref 3.8–5.2)
RBC # FLD: 16.6 % — HIGH (ref 10.3–14.5)
RBC # FLD: 16.6 % — HIGH (ref 10.3–14.5)
SODIUM SERPL-SCNC: 141 MMOL/L — SIGNIFICANT CHANGE UP (ref 135–145)
SODIUM SERPL-SCNC: 141 MMOL/L — SIGNIFICANT CHANGE UP (ref 135–145)
WBC # BLD: 4.89 K/UL — SIGNIFICANT CHANGE UP (ref 3.8–10.5)
WBC # BLD: 4.89 K/UL — SIGNIFICANT CHANGE UP (ref 3.8–10.5)
WBC # FLD AUTO: 4.89 K/UL — SIGNIFICANT CHANGE UP (ref 3.8–10.5)
WBC # FLD AUTO: 4.89 K/UL — SIGNIFICANT CHANGE UP (ref 3.8–10.5)

## 2018-06-23 RX ORDER — ASPIRIN/CALCIUM CARB/MAGNESIUM 324 MG
1 TABLET ORAL
Qty: 30 | Refills: 0
Start: 2018-06-23 | End: 2018-07-22

## 2018-06-23 RX ORDER — ATORVASTATIN CALCIUM 80 MG/1
1 TABLET, FILM COATED ORAL
Qty: 30 | Refills: 0
Start: 2018-06-23 | End: 2018-07-22

## 2018-06-23 RX ORDER — MOXIFLOXACIN HYDROCHLORIDE TABLETS, 400 MG 400 MG/1
1 TABLET, FILM COATED ORAL
Qty: 10 | Refills: 0
Start: 2018-06-23 | End: 2018-06-27

## 2018-06-23 RX ORDER — METRONIDAZOLE 500 MG
1 TABLET ORAL
Qty: 15 | Refills: 0
Start: 2018-06-23 | End: 2018-06-27

## 2018-06-23 RX ORDER — AMLODIPINE BESYLATE 2.5 MG/1
1 TABLET ORAL
Qty: 30 | Refills: 0
Start: 2018-06-23 | End: 2018-07-22

## 2018-06-23 RX ORDER — LEVOTHYROXINE SODIUM 125 MCG
1 TABLET ORAL
Qty: 30 | Refills: 0
Start: 2018-06-23 | End: 2018-07-22

## 2018-06-23 RX ORDER — AMLODIPINE BESYLATE 2.5 MG/1
1 TABLET ORAL
Qty: 0 | Refills: 0 | COMMUNITY

## 2018-06-23 RX ORDER — CARVEDILOL PHOSPHATE 80 MG/1
1 CAPSULE, EXTENDED RELEASE ORAL
Qty: 60 | Refills: 0
Start: 2018-06-23 | End: 2018-07-22

## 2018-06-23 RX ORDER — CARVEDILOL PHOSPHATE 80 MG/1
1 CAPSULE, EXTENDED RELEASE ORAL
Qty: 0 | Refills: 0 | COMMUNITY

## 2018-06-23 RX ORDER — ISOSORBIDE MONONITRATE 60 MG/1
90 TABLET, EXTENDED RELEASE ORAL
Qty: 0 | Refills: 0 | COMMUNITY

## 2018-06-23 RX ORDER — ACETAMINOPHEN 500 MG
2 TABLET ORAL
Qty: 0 | Refills: 0 | DISCHARGE
Start: 2018-06-23

## 2018-06-23 RX ORDER — ATORVASTATIN CALCIUM 80 MG/1
1 TABLET, FILM COATED ORAL
Qty: 0 | Refills: 0 | COMMUNITY

## 2018-06-23 RX ORDER — ISOSORBIDE MONONITRATE 60 MG/1
1 TABLET, EXTENDED RELEASE ORAL
Qty: 30 | Refills: 0
Start: 2018-06-23 | End: 2018-07-22

## 2018-06-23 RX ORDER — ALBUTEROL 90 UG/1
2 AEROSOL, METERED ORAL
Qty: 1 | Refills: 0
Start: 2018-06-23 | End: 2018-07-22

## 2018-06-23 RX ADMIN — Medication 75 MICROGRAM(S): at 05:21

## 2018-06-23 RX ADMIN — Medication 3 MILLILITER(S): at 10:19

## 2018-06-23 RX ADMIN — Medication 200 MILLIGRAM(S): at 06:32

## 2018-06-23 RX ADMIN — Medication 600 MILLIGRAM(S): at 05:20

## 2018-06-23 RX ADMIN — CARVEDILOL PHOSPHATE 6.25 MILLIGRAM(S): 80 CAPSULE, EXTENDED RELEASE ORAL at 05:21

## 2018-06-23 RX ADMIN — Medication 25 MILLIGRAM(S): at 05:21

## 2018-06-23 RX ADMIN — Medication 100 MILLIGRAM(S): at 13:19

## 2018-06-23 RX ADMIN — Medication 81 MILLIGRAM(S): at 11:03

## 2018-06-23 RX ADMIN — AMLODIPINE BESYLATE 5 MILLIGRAM(S): 2.5 TABLET ORAL at 05:21

## 2018-06-23 RX ADMIN — Medication 100 MILLIGRAM(S): at 05:20

## 2018-06-23 RX ADMIN — Medication 3 MILLILITER(S): at 03:59

## 2018-06-23 RX ADMIN — ISOSORBIDE MONONITRATE 60 MILLIGRAM(S): 60 TABLET, EXTENDED RELEASE ORAL at 11:03

## 2018-06-23 NOTE — PROGRESS NOTE ADULT - PROVIDER SPECIALTY LIST ADULT
Cardiology
Internal Medicine
Pulmonology

## 2018-06-23 NOTE — PROGRESS NOTE ADULT - ASSESSMENT
77 yo lady with cad, dm, anemia, uterine ca-surgically tx, arthrits, MvP, hld, PUD-gerd. c/o cough x 3 weeks, with increased GERD complaints. Developed n/v, now being treated for colitis. Called to eval for cough and abnormal CT chest
77 yo lady with cad, dm, anemia, uterine ca-surgically tx, arthrits, MvP, hld, PUD-gerd. c/o cough x 3 weeks, with increased GERD complaints. Developed n/v, now being treated for colitis. Called to eval for cough and abnormal CT chest
79 yo lady with cad, dm, anemia, uterine ca-surgically tx, arthrits, MvP, hld, PUD-gerd. c/o cough x 3 weeks, with increased GERD complaints. Developed n/v, now being treated for colitis. Called to eval for cough and abnormal CT chest
pt with above history presented with bleeding bright blood per rectum  < from: CT Abdomen and Pelvis w/ Oral Cont and w/ IV Cont (06.19.18 @ 02:23) >  Wall thickening of the mid descending to the proximal sigmoid colon,   suggesting colitis (infectious, inflammatory or ischemic in etiology).   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    Patchy right middle and lower lobe opacities, suspicious for pneumonia.     < end of copied text >  - evaluated by gI -  advanced diet, cipro/ flagyl -   - pt clinically improved , tolerated diet , dc pt with out pt gi / cards f/u  - pulm eval reviewed begin; duonebs       DM2- ISS    CAD - cont aspirin, statin, imdur    Hypothyroidism - cont home dose synthroid f/u TFTS
pt with above history presented with bleeding bright blood per rectum  < from: CT Abdomen and Pelvis w/ Oral Cont and w/ IV Cont (06.19.18 @ 02:23) >  Wall thickening of the mid descending to the proximal sigmoid colon,   suggesting colitis (infectious, inflammatory or ischemic in etiology).   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    Patchy right middle and lower lobe opacities, suspicious for pneumonia.     < end of copied text >  - evaluated by gI - cbc q12, advance diet, cipro/ flagyl   - pulm eval reviewed begin; duonebs 1 unit dose hhn q6h.       DM2- ISS    CAD - cont aspirin, statin, imdur    Hypothyroidism - cont home dose synthroid f/u TFTS
pt with above history presented with bleeding bright blood per rectum  < from: CT Abdomen and Pelvis w/ Oral Cont and w/ IV Cont (06.19.18 @ 02:23) >  Wall thickening of the mid descending to the proximal sigmoid colon,   suggesting colitis (infectious, inflammatory or ischemic in etiology).   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    Patchy right middle and lower lobe opacities, suspicious for pneumonia.     < end of copied text >  - evaluated by gI - cbc q12, advance diet, cipro/ flagyl   - pulm eval reviewed begin; duonebs 1 unit dose hhn q6h.       DM2- ISS    CAD - cont aspirin, statin, imdur    Hypothyroidism - cont home dose synthroid f/u TFTS
pt with above history presented with bleeding bright blood per rectum  < from: CT Abdomen and Pelvis w/ Oral Cont and w/ IV Cont (06.19.18 @ 02:23) >  Wall thickening of the mid descending to the proximal sigmoid colon,   suggesting colitis (infectious, inflammatory or ischemic in etiology).   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    Patchy right middle and lower lobe opacities, suspicious for pneumonia.     < end of copied text >  - evaluated by gI - cbc q12, start cipro/ flagyl   - pulm eval reviewed begin; duonebs 1 unit dose hhn q6h.       DM2- ISS    CAD - cont aspirin, statin, imdur    Hypothyroidism - cont home dose synthroid f/u TFTS

## 2018-06-23 NOTE — PROGRESS NOTE ADULT - SUBJECTIVE AND OBJECTIVE BOX
chief complaint : brbpr        SUBJECTIVE / OVERNIGHT EVENTS: pt denies abd pain, bleeding per rectum, no chest pain, sob    MEDICATIONS  (STANDING):  ALBUTerol/ipratropium for Nebulization 3 milliLiter(s) Nebulizer every 6 hours  amLODIPine   Tablet 5 milliGRAM(s) Oral daily  aspirin enteric coated 81 milliGRAM(s) Oral daily  atorvastatin 20 milliGRAM(s) Oral at bedtime  carvedilol 6.25 milliGRAM(s) Oral every 12 hours  ciprofloxacin   IVPB 400 milliGRAM(s) IV Intermittent every 12 hours  guaiFENesin  milliGRAM(s) Oral every 12 hours  hydrochlorothiazide 25 milliGRAM(s) Oral daily  isosorbide   mononitrate ER Tablet (IMDUR) 60 milliGRAM(s) Oral daily  levothyroxine 75 MICROGram(s) Oral daily  metroNIDAZOLE  IVPB 500 milliGRAM(s) IV Intermittent every 8 hours    MEDICATIONS  (PRN):  acetaminophen   Tablet. 650 milliGRAM(s) Oral every 6 hours PRN Moderate Pain (4 - 6)    Vital Signs Last 24 Hrs  T(C): 36.7 (23 Jun 2018 10:54), Max: 36.7 (23 Jun 2018 10:54)  T(F): 98 (23 Jun 2018 10:54), Max: 98 (23 Jun 2018 10:54)  HR: 51 (23 Jun 2018 10:54) (51 - 63)  BP: 127/73 (23 Jun 2018 10:54) (117/72 - 127/73)  BP(mean): --  RR: 18 (23 Jun 2018 10:54) (18 - 18)  SpO2: 99% (23 Jun 2018 10:54) (97% - 99%)    Constitutional: No fever, fatigue  Skin: No rash.  Eyes: No recent vision problems or eye pain.  ENT: No congestion, ear pain, or sore throat.  Cardiovascular: No chest pain or palpation.  Respiratory: No cough, shortness of breath, congestion, or wheezing.  Gastrointestinal: No abdominal pain, nausea, vomiting, or diarrhea. bloating+  Genitourinary: No dysuria.  Musculoskeletal: No joint swelling.  Neurologic: No headache.    PHYSICAL EXAM:  GENERAL: NAD  EYES: EOMI, PERRLA  NECK: Supple, No JVD  CHEST/LUNG: dec breath sounds rt base  HEART:  S1 , S2 +  ABDOMEN: soft, bs+  EXTREMITIES: no edema   NEUROLOGY:alert awake oriented     LABS:  06-23    141  |  106  |  9   ----------------------------<  87  3.6   |  24  |  0.72    Ca    9.6      23 Jun 2018 05:30  Mg     1.9     06-23      Creatinine Trend: 0.72 <--, 0.77 <--, 0.66 <--, 0.68 <--, 0.82 <--                        11.5   4.89  )-----------( 146      ( 23 Jun 2018 05:30 )             35.0     Urine Studies:

## 2018-06-25 LAB — G LAMBLIA AG STL QL: NEGATIVE — SIGNIFICANT CHANGE UP

## 2018-07-16 PROBLEM — I25.10 ATHEROSCLEROTIC HEART DISEASE OF NATIVE CORONARY ARTERY WITHOUT ANGINA PECTORIS: Chronic | Status: ACTIVE | Noted: 2017-08-03

## 2018-08-23 ENCOUNTER — APPOINTMENT (OUTPATIENT)
Dept: VASCULAR SURGERY | Facility: CLINIC | Age: 78
End: 2018-08-23

## 2019-02-05 ENCOUNTER — EMERGENCY (EMERGENCY)
Facility: HOSPITAL | Age: 79
LOS: 1 days | Discharge: HOME | End: 2019-02-05
Attending: EMERGENCY MEDICINE | Admitting: EMERGENCY MEDICINE
Payer: MEDICARE

## 2019-02-05 VITALS
HEART RATE: 55 BPM | OXYGEN SATURATION: 100 % | TEMPERATURE: 99 F | DIASTOLIC BLOOD PRESSURE: 62 MMHG | SYSTOLIC BLOOD PRESSURE: 81 MMHG | RESPIRATION RATE: 17 BRPM

## 2019-02-05 VITALS — TEMPERATURE: 98 F | DIASTOLIC BLOOD PRESSURE: 45 MMHG | HEART RATE: 76 BPM | SYSTOLIC BLOOD PRESSURE: 103 MMHG

## 2019-02-05 DIAGNOSIS — Z96.659 PRESENCE OF UNSPECIFIED ARTIFICIAL KNEE JOINT: Chronic | ICD-10-CM

## 2019-02-05 DIAGNOSIS — K27.9 PEPTIC ULCER, SITE UNSPECIFIED, UNSPECIFIED AS ACUTE OR CHRONIC, WITHOUT HEMORRHAGE OR PERFORATION: Chronic | ICD-10-CM

## 2019-02-05 DIAGNOSIS — K62.89 OTHER SPECIFIED DISEASES OF ANUS AND RECTUM: Chronic | ICD-10-CM

## 2019-02-05 DIAGNOSIS — Z90.710 ACQUIRED ABSENCE OF BOTH CERVIX AND UTERUS: Chronic | ICD-10-CM

## 2019-02-05 LAB
ALBUMIN SERPL ELPH-MCNC: 3.6 G/DL — SIGNIFICANT CHANGE UP (ref 3.3–5)
ALP SERPL-CCNC: 71 U/L — SIGNIFICANT CHANGE UP (ref 40–120)
ALT FLD-CCNC: 16 U/L — SIGNIFICANT CHANGE UP (ref 4–33)
ANION GAP SERPL CALC-SCNC: 15 MMO/L — HIGH (ref 7–14)
APTT BLD: 31 SEC — SIGNIFICANT CHANGE UP (ref 27.5–36.3)
AST SERPL-CCNC: 40 U/L — HIGH (ref 4–32)
BASE EXCESS BLDV CALC-SCNC: 1.9 MMOL/L — SIGNIFICANT CHANGE UP
BASOPHILS # BLD AUTO: 0.03 K/UL — SIGNIFICANT CHANGE UP (ref 0–0.2)
BASOPHILS NFR BLD AUTO: 0.5 % — SIGNIFICANT CHANGE UP (ref 0–2)
BILIRUB SERPL-MCNC: 0.3 MG/DL — SIGNIFICANT CHANGE UP (ref 0.2–1.2)
BLOOD GAS VENOUS - CREATININE: 0.72 MG/DL — SIGNIFICANT CHANGE UP (ref 0.5–1.3)
BUN SERPL-MCNC: 23 MG/DL — SIGNIFICANT CHANGE UP (ref 7–23)
CALCIUM SERPL-MCNC: 10.2 MG/DL — SIGNIFICANT CHANGE UP (ref 8.4–10.5)
CHLORIDE BLDV-SCNC: 109 MMOL/L — HIGH (ref 96–108)
CHLORIDE SERPL-SCNC: 103 MMOL/L — SIGNIFICANT CHANGE UP (ref 98–107)
CO2 SERPL-SCNC: 20 MMOL/L — LOW (ref 22–31)
CREAT SERPL-MCNC: 0.78 MG/DL — SIGNIFICANT CHANGE UP (ref 0.5–1.3)
EOSINOPHIL # BLD AUTO: 0.1 K/UL — SIGNIFICANT CHANGE UP (ref 0–0.5)
EOSINOPHIL NFR BLD AUTO: 1.6 % — SIGNIFICANT CHANGE UP (ref 0–6)
GAS PNL BLDV: 136 MMOL/L — SIGNIFICANT CHANGE UP (ref 136–146)
GLUCOSE BLDV-MCNC: 105 — HIGH (ref 70–99)
GLUCOSE SERPL-MCNC: 106 MG/DL — HIGH (ref 70–99)
HCO3 BLDV-SCNC: 26 MMOL/L — SIGNIFICANT CHANGE UP (ref 20–27)
HCT VFR BLD CALC: 39.1 % — SIGNIFICANT CHANGE UP (ref 34.5–45)
HCT VFR BLDV CALC: 38.1 % — SIGNIFICANT CHANGE UP (ref 34.5–45)
HGB BLD-MCNC: 12.4 G/DL — SIGNIFICANT CHANGE UP (ref 11.5–15.5)
HGB BLDV-MCNC: 12.4 G/DL — SIGNIFICANT CHANGE UP (ref 11.5–15.5)
IMM GRANULOCYTES NFR BLD AUTO: 0.2 % — SIGNIFICANT CHANGE UP (ref 0–1.5)
INR BLD: 1.03 — SIGNIFICANT CHANGE UP (ref 0.88–1.17)
LACTATE BLDV-MCNC: 1.9 MMOL/L — SIGNIFICANT CHANGE UP (ref 0.5–2)
LYMPHOCYTES # BLD AUTO: 2.61 K/UL — SIGNIFICANT CHANGE UP (ref 1–3.3)
LYMPHOCYTES # BLD AUTO: 41.1 % — SIGNIFICANT CHANGE UP (ref 13–44)
MCHC RBC-ENTMCNC: 26.3 PG — LOW (ref 27–34)
MCHC RBC-ENTMCNC: 31.7 % — LOW (ref 32–36)
MCV RBC AUTO: 82.8 FL — SIGNIFICANT CHANGE UP (ref 80–100)
MONOCYTES # BLD AUTO: 0.58 K/UL — SIGNIFICANT CHANGE UP (ref 0–0.9)
MONOCYTES NFR BLD AUTO: 9.1 % — SIGNIFICANT CHANGE UP (ref 2–14)
NEUTROPHILS # BLD AUTO: 3.02 K/UL — SIGNIFICANT CHANGE UP (ref 1.8–7.4)
NEUTROPHILS NFR BLD AUTO: 47.5 % — SIGNIFICANT CHANGE UP (ref 43–77)
NRBC # FLD: 0 K/UL — LOW (ref 25–125)
PCO2 BLDV: 43 MMHG — SIGNIFICANT CHANGE UP (ref 41–51)
PH BLDV: 7.4 PH — SIGNIFICANT CHANGE UP (ref 7.32–7.43)
PLATELET # BLD AUTO: 172 K/UL — SIGNIFICANT CHANGE UP (ref 150–400)
PMV BLD: 12.2 FL — SIGNIFICANT CHANGE UP (ref 7–13)
PO2 BLDV: 45 MMHG — HIGH (ref 35–40)
POTASSIUM BLDV-SCNC: 3.9 MMOL/L — SIGNIFICANT CHANGE UP (ref 3.4–4.5)
POTASSIUM SERPL-MCNC: 4.9 MMOL/L — SIGNIFICANT CHANGE UP (ref 3.5–5.3)
POTASSIUM SERPL-SCNC: 4.9 MMOL/L — SIGNIFICANT CHANGE UP (ref 3.5–5.3)
PROT SERPL-MCNC: 8.2 G/DL — SIGNIFICANT CHANGE UP (ref 6–8.3)
PROTHROM AB SERPL-ACNC: 11.5 SEC — SIGNIFICANT CHANGE UP (ref 9.8–13.1)
RBC # BLD: 4.72 M/UL — SIGNIFICANT CHANGE UP (ref 3.8–5.2)
RBC # FLD: 15 % — HIGH (ref 10.3–14.5)
SAO2 % BLDV: 79.4 % — SIGNIFICANT CHANGE UP (ref 60–85)
SODIUM SERPL-SCNC: 138 MMOL/L — SIGNIFICANT CHANGE UP (ref 135–145)
TROPONIN T, HIGH SENSITIVITY: 8 NG/L — SIGNIFICANT CHANGE UP (ref ?–14)
TROPONIN T, HIGH SENSITIVITY: 8 NG/L — SIGNIFICANT CHANGE UP (ref ?–14)
WBC # BLD: 6.35 K/UL — SIGNIFICANT CHANGE UP (ref 3.8–10.5)
WBC # FLD AUTO: 6.35 K/UL — SIGNIFICANT CHANGE UP (ref 3.8–10.5)

## 2019-02-05 PROCEDURE — 70450 CT HEAD/BRAIN W/O DYE: CPT | Mod: 26

## 2019-02-05 PROCEDURE — 93010 ELECTROCARDIOGRAM REPORT: CPT

## 2019-02-05 PROCEDURE — 99285 EMERGENCY DEPT VISIT HI MDM: CPT | Mod: 25,GC

## 2019-02-05 RX ORDER — SODIUM CHLORIDE 9 MG/ML
500 INJECTION INTRAMUSCULAR; INTRAVENOUS; SUBCUTANEOUS ONCE
Qty: 0 | Refills: 0 | Status: COMPLETED | OUTPATIENT
Start: 2019-02-05 | End: 2019-02-05

## 2019-02-05 RX ORDER — METOCLOPRAMIDE HCL 10 MG
10 TABLET ORAL ONCE
Qty: 0 | Refills: 0 | Status: COMPLETED | OUTPATIENT
Start: 2019-02-05 | End: 2019-02-05

## 2019-02-05 RX ORDER — SODIUM CHLORIDE 9 MG/ML
1000 INJECTION INTRAMUSCULAR; INTRAVENOUS; SUBCUTANEOUS ONCE
Qty: 0 | Refills: 0 | Status: COMPLETED | OUTPATIENT
Start: 2019-02-05 | End: 2019-02-05

## 2019-02-05 RX ORDER — MECLIZINE HCL 12.5 MG
50 TABLET ORAL ONCE
Qty: 0 | Refills: 0 | Status: COMPLETED | OUTPATIENT
Start: 2019-02-05 | End: 2019-02-05

## 2019-02-05 RX ORDER — ONDANSETRON 8 MG/1
4 TABLET, FILM COATED ORAL ONCE
Qty: 0 | Refills: 0 | Status: COMPLETED | OUTPATIENT
Start: 2019-02-05 | End: 2019-02-05

## 2019-02-05 RX ORDER — MIDAZOLAM HYDROCHLORIDE 1 MG/ML
2 INJECTION, SOLUTION INTRAMUSCULAR; INTRAVENOUS ONCE
Qty: 0 | Refills: 0 | Status: DISCONTINUED | OUTPATIENT
Start: 2019-02-05 | End: 2019-02-05

## 2019-02-05 RX ORDER — MECLIZINE HCL 12.5 MG
1 TABLET ORAL
Qty: 15 | Refills: 0
Start: 2019-02-05 | End: 2019-02-09

## 2019-02-05 RX ADMIN — SODIUM CHLORIDE 1000 MILLILITER(S): 9 INJECTION INTRAMUSCULAR; INTRAVENOUS; SUBCUTANEOUS at 19:40

## 2019-02-05 RX ADMIN — Medication 1 MILLIGRAM(S): at 21:13

## 2019-02-05 RX ADMIN — SODIUM CHLORIDE 1000 MILLILITER(S): 9 INJECTION INTRAMUSCULAR; INTRAVENOUS; SUBCUTANEOUS at 15:43

## 2019-02-05 RX ADMIN — Medication 10 MILLIGRAM(S): at 17:55

## 2019-02-05 RX ADMIN — ONDANSETRON 4 MILLIGRAM(S): 8 TABLET, FILM COATED ORAL at 15:43

## 2019-02-05 RX ADMIN — Medication 50 MILLIGRAM(S): at 15:37

## 2019-02-05 RX ADMIN — Medication 10 MILLIGRAM(S): at 19:52

## 2019-02-05 RX ADMIN — SODIUM CHLORIDE 1000 MILLILITER(S): 9 INJECTION INTRAMUSCULAR; INTRAVENOUS; SUBCUTANEOUS at 16:06

## 2019-02-05 RX ADMIN — SODIUM CHLORIDE 1000 MILLILITER(S): 9 INJECTION INTRAMUSCULAR; INTRAVENOUS; SUBCUTANEOUS at 17:11

## 2019-02-05 NOTE — ED ADULT NURSE REASSESSMENT NOTE - NS ED NURSE REASSESS COMMENT FT1
Break coverage RN: Pt endorsed feeling that "room was spinning" when attempting to ambulate to bathroom with assistance.  Pt placed back in stretcher, told not to get up by herself.  MD Mckee aware.  Pt to be admitted.

## 2019-02-05 NOTE — ED PROVIDER NOTE - NS ED ROS FT
Shalini Bravo MD:   General: denies fever, chills  HENT: denies nasal congestion, sore throat, rhinorrhea  Eyes: +vision changes  CV: denies chest pain  Resp: denies difficulty breathing, cough  Abdominal: +nausea, +vomiting, diarrhea, abdominal pain, blood in stool, dark stool  : denies pain with urination  MSK: denies recent trauma  Neuro: denies headaches, numbness, tingling, dizziness, lightheadedness.  Skin: denies new rashes  Endocrine: denies recent weight loss

## 2019-02-05 NOTE — ED ADULT NURSE NOTE - OBJECTIVE STATEMENT
pt received to 14, aox3.  pt c/o epigastric "hunger feeling".  sudden onset of dizziness and n/v today.  pt presents to ED hypotensive.  SL placed, labs sent, v/s as noted.  pt on tele monitor.  MD at bedside, awaiting further orders, will monitor

## 2019-02-05 NOTE — ED PROVIDER NOTE - PROGRESS NOTE DETAILS
Shalini Bravo MD: pt states she feels more subdued, no vomiting since meds. Pending CT head, repeat troponin Patient improved. CT head with no pathology. Feels improved with resolution of symptoms and stable gait. Evaluated by neuro, agreed peripheral in nature. To follow up with own ENT.

## 2019-02-05 NOTE — ED PROVIDER NOTE - PHYSICAL EXAMINATION
Shalini Bravo MD:   CONSTITUTIONAL: Nontoxic, well nourished, well developed, elderly female, vomiting in stretcher   HEAD: Normocephalic; atraumatic  EYES: Normal inspection, EOMI, PERRLA  ENMT: External appears normal; normal oropharynx  NECK: Supple; non-tender; no cervical lymphadenopathy  CARD: RRR; no audible murmurs, rubs, or gallops  RESP: No respiratory distress, lungs ctab/l  ABD: Soft, non-distended; non-tender; no rebound or guarding  EXT: No LE pitting edema or calf tenderness; distal pulses intact with good capillary refill  SKIN: Warm, dry, intact  NEURO: aaox3, CN II-IX intact, 5/5 strength b/l UE and LE, sensation intact in all extremities, no pronator drift, unable to assess gait, finger to nose intact, no disdiadokinesia, symmetric b/l, Babinski with downgoing toes b/l.

## 2019-02-05 NOTE — ED PROVIDER NOTE - ATTENDING CONTRIBUTION TO CARE
Dr. Dominguez: 78 yo female with CAD, HTN, HLD, PUD, DM, anemia, uterine cancer, MVP, carotid stenosis, hypothyroid, in ED with acute onset of positional dizziness with severe vomiting.  Pt states that initially room spinning sensation was NOT positional but it has since become very positional to the point where she is afraid to move her head for fear of exacerbating it.  No fever, HA, visual changes, hearing changes, numbness, tingling, focal weakness, abdominal pain.  On exam pt uncomfortable appearing, in moderate distress due to room-spinninf sensation, heart RRR, lungs CTAB, abd NTND, extremities without swelling, strength 5/5 in all extremities and skin without rash.  Of note, pt HR normal and then when vomiting pt has ?vagal response and HR drops to 30s (asymptomatic) and then rebounds to normal rate again when vomiting stops.

## 2019-02-05 NOTE — ED PROVIDER NOTE - NSFOLLOWUPINSTRUCTIONS_ED_ALL_ED_FT
Follow up with your primary doctor within 24-48 hours for further evaluation.  Follow up with ENT within 1 week for further evaluation and management.   Take meclizine 25 mg every 8 hours as needed for dizziness.  Return to emergency department for worsening dizziness and instability while walking or numbness/weakness, facial droop, visual changes or slurred speech.

## 2019-02-05 NOTE — ED PROVIDER NOTE - OBJECTIVE STATEMENT
Shalini Bravo MD: 78yo F with PMH of CAD, HTN, HLD, PUD, DM, anemia, uterine ca, MVP, carotid stenosis, hypothyroidism who presents with dizziness since 1PM today. As per pt, she was sitting on cough watching TV when symptoms started. Dizziness is described as room is moving, worsens with movement, associated with multiple episodes of NBNB vomiting. Recent URI symptoms including rhinitis, congestion, cough that has resolved. No hearing loss, tinnitis, numbness/tingling, weakness, fever, chills, diarrhea, syncope, lightheadedness, abdominal pain, HA.

## 2019-02-05 NOTE — ED PROVIDER NOTE - MEDICAL DECISION MAKING DETAILS
Shalini Bravo MD: 80yo F with PMH of CAD, HTN, HLD, PUD, DM, anemia, uterine ca, MVP, carotid stenosis, hypothyroidism who presents with dizziness since 1PM today. Pt hemodynamically stable, afebrile. Pt is actively vomiting in ED, worse with positioning. Acute in onset. Likely peripheral vertigo, Shalini Bravo MD: 80yo F with PMH of CAD, HTN, HLD, PUD, DM, anemia, uterine ca, MVP, carotid stenosis, hypothyroidism who presents with dizziness since 1PM today. Pt hemodynamically stable, afebrile. Pt is actively vomiting in ED, worse with positioning. Acute in onset. Likely peripheral vertigo, BPPV vs vestibular neuritis, but due to age, will obtain CT head, labs, EKG, treat vertigo with meclizine and zofran Shalini Bravo MD: 80yo F with PMH of CAD, HTN, HLD, PUD, DM, anemia, uterine ca, MVP, carotid stenosis, hypothyroidism who presents with dizziness since 1PM today. Pt hemodynamically stable, afebrile. Pt is actively vomiting in ED, worse with positioning. Acute in onset, no FNDs on exam. Likely peripheral vertigo, BPPV vs vestibular neuritis, but due to age, will obtain CT head, labs, EKG, treat vertigo with meclizine and zofran

## 2019-08-29 ENCOUNTER — EMERGENCY (EMERGENCY)
Facility: HOSPITAL | Age: 79
LOS: 1 days | Discharge: HOME | End: 2019-08-29
Attending: EMERGENCY MEDICINE | Admitting: EMERGENCY MEDICINE
Payer: MEDICARE

## 2019-08-29 VITALS
DIASTOLIC BLOOD PRESSURE: 65 MMHG | SYSTOLIC BLOOD PRESSURE: 100 MMHG | OXYGEN SATURATION: 100 % | RESPIRATION RATE: 19 BRPM | HEART RATE: 55 BPM

## 2019-08-29 VITALS
HEART RATE: 51 BPM | OXYGEN SATURATION: 100 % | TEMPERATURE: 98 F | SYSTOLIC BLOOD PRESSURE: 74 MMHG | RESPIRATION RATE: 20 BRPM | DIASTOLIC BLOOD PRESSURE: 41 MMHG

## 2019-08-29 DIAGNOSIS — K62.89 OTHER SPECIFIED DISEASES OF ANUS AND RECTUM: Chronic | ICD-10-CM

## 2019-08-29 DIAGNOSIS — Z96.659 PRESENCE OF UNSPECIFIED ARTIFICIAL KNEE JOINT: Chronic | ICD-10-CM

## 2019-08-29 DIAGNOSIS — Z90.710 ACQUIRED ABSENCE OF BOTH CERVIX AND UTERUS: Chronic | ICD-10-CM

## 2019-08-29 DIAGNOSIS — K27.9 PEPTIC ULCER, SITE UNSPECIFIED, UNSPECIFIED AS ACUTE OR CHRONIC, WITHOUT HEMORRHAGE OR PERFORATION: Chronic | ICD-10-CM

## 2019-08-29 LAB
ALBUMIN SERPL ELPH-MCNC: 3.8 G/DL — SIGNIFICANT CHANGE UP (ref 3.3–5)
ALP SERPL-CCNC: 68 U/L — SIGNIFICANT CHANGE UP (ref 40–120)
ALT FLD-CCNC: 18 U/L — SIGNIFICANT CHANGE UP (ref 4–33)
ANION GAP SERPL CALC-SCNC: 11 MMO/L — SIGNIFICANT CHANGE UP (ref 7–14)
APTT BLD: 22.8 SEC — LOW (ref 27.5–36.3)
AST SERPL-CCNC: 27 U/L — SIGNIFICANT CHANGE UP (ref 4–32)
BASOPHILS # BLD AUTO: 0.03 K/UL — SIGNIFICANT CHANGE UP (ref 0–0.2)
BASOPHILS NFR BLD AUTO: 0.6 % — SIGNIFICANT CHANGE UP (ref 0–2)
BILIRUB SERPL-MCNC: 0.3 MG/DL — SIGNIFICANT CHANGE UP (ref 0.2–1.2)
BUN SERPL-MCNC: 23 MG/DL — SIGNIFICANT CHANGE UP (ref 7–23)
CALCIUM SERPL-MCNC: 10.1 MG/DL — SIGNIFICANT CHANGE UP (ref 8.4–10.5)
CHLORIDE SERPL-SCNC: 108 MMOL/L — HIGH (ref 98–107)
CO2 SERPL-SCNC: 23 MMOL/L — SIGNIFICANT CHANGE UP (ref 22–31)
CREAT SERPL-MCNC: 0.87 MG/DL — SIGNIFICANT CHANGE UP (ref 0.5–1.3)
EOSINOPHIL # BLD AUTO: 0.06 K/UL — SIGNIFICANT CHANGE UP (ref 0–0.5)
EOSINOPHIL NFR BLD AUTO: 1.1 % — SIGNIFICANT CHANGE UP (ref 0–6)
GLUCOSE SERPL-MCNC: 107 MG/DL — HIGH (ref 70–99)
HCT VFR BLD CALC: 34.9 % — SIGNIFICANT CHANGE UP (ref 34.5–45)
HGB BLD-MCNC: 10.7 G/DL — LOW (ref 11.5–15.5)
IMM GRANULOCYTES NFR BLD AUTO: 0.4 % — SIGNIFICANT CHANGE UP (ref 0–1.5)
INR BLD: 1.03 — SIGNIFICANT CHANGE UP (ref 0.88–1.17)
LIDOCAIN IGE QN: 46.3 U/L — SIGNIFICANT CHANGE UP (ref 7–60)
LYMPHOCYTES # BLD AUTO: 1.45 K/UL — SIGNIFICANT CHANGE UP (ref 1–3.3)
LYMPHOCYTES # BLD AUTO: 27.6 % — SIGNIFICANT CHANGE UP (ref 13–44)
MCHC RBC-ENTMCNC: 24.3 PG — LOW (ref 27–34)
MCHC RBC-ENTMCNC: 30.7 % — LOW (ref 32–36)
MCV RBC AUTO: 79.1 FL — LOW (ref 80–100)
MONOCYTES # BLD AUTO: 0.55 K/UL — SIGNIFICANT CHANGE UP (ref 0–0.9)
MONOCYTES NFR BLD AUTO: 10.5 % — SIGNIFICANT CHANGE UP (ref 2–14)
NEUTROPHILS # BLD AUTO: 3.14 K/UL — SIGNIFICANT CHANGE UP (ref 1.8–7.4)
NEUTROPHILS NFR BLD AUTO: 59.8 % — SIGNIFICANT CHANGE UP (ref 43–77)
NRBC # FLD: 0 K/UL — SIGNIFICANT CHANGE UP (ref 0–0)
PLATELET # BLD AUTO: 143 K/UL — LOW (ref 150–400)
PMV BLD: 12.4 FL — SIGNIFICANT CHANGE UP (ref 7–13)
POTASSIUM SERPL-MCNC: 4.1 MMOL/L — SIGNIFICANT CHANGE UP (ref 3.5–5.3)
POTASSIUM SERPL-SCNC: 4.1 MMOL/L — SIGNIFICANT CHANGE UP (ref 3.5–5.3)
PROT SERPL-MCNC: 7.7 G/DL — SIGNIFICANT CHANGE UP (ref 6–8.3)
PROTHROM AB SERPL-ACNC: 11.7 SEC — SIGNIFICANT CHANGE UP (ref 9.8–13.1)
RBC # BLD: 4.41 M/UL — SIGNIFICANT CHANGE UP (ref 3.8–5.2)
RBC # FLD: 17.1 % — HIGH (ref 10.3–14.5)
SODIUM SERPL-SCNC: 142 MMOL/L — SIGNIFICANT CHANGE UP (ref 135–145)
TROPONIN T, HIGH SENSITIVITY: 15 NG/L — SIGNIFICANT CHANGE UP (ref ?–14)
TROPONIN T, HIGH SENSITIVITY: 17 NG/L — SIGNIFICANT CHANGE UP (ref ?–14)
WBC # BLD: 5.25 K/UL — SIGNIFICANT CHANGE UP (ref 3.8–10.5)
WBC # FLD AUTO: 5.25 K/UL — SIGNIFICANT CHANGE UP (ref 3.8–10.5)

## 2019-08-29 PROCEDURE — 99284 EMERGENCY DEPT VISIT MOD MDM: CPT | Mod: 25

## 2019-08-29 PROCEDURE — 93010 ELECTROCARDIOGRAM REPORT: CPT

## 2019-08-29 RX ORDER — SODIUM CHLORIDE 9 MG/ML
1000 INJECTION INTRAMUSCULAR; INTRAVENOUS; SUBCUTANEOUS ONCE
Refills: 0 | Status: COMPLETED | OUTPATIENT
Start: 2019-08-29 | End: 2019-08-29

## 2019-08-29 RX ORDER — ONDANSETRON 8 MG/1
4 TABLET, FILM COATED ORAL ONCE
Refills: 0 | Status: COMPLETED | OUTPATIENT
Start: 2019-08-29 | End: 2019-08-29

## 2019-08-29 RX ADMIN — SODIUM CHLORIDE 1000 MILLILITER(S): 9 INJECTION INTRAMUSCULAR; INTRAVENOUS; SUBCUTANEOUS at 17:20

## 2019-08-29 NOTE — ED ADULT NURSE NOTE - CHIEF COMPLAINT QUOTE
Patient brought to Er from home by EMS for c/o nausea and vomiting since 3pm. Pt had a halter monitor placed around 2:30pm Denies chest pain. Pt states she ate something out of fridge that did not agree with her. Pt runs very hypotensive and bradycardic and has no cardiac symptoms.    As per patient :Cardiologist takes her pressure on her right ankle. 88/58

## 2019-08-29 NOTE — ED ADULT NURSE NOTE - OBJECTIVE STATEMENT
facilitator rn note:Received pt into spot #22 via stretcher, accompanied by . Pt A/O x 3 calm cooperative, smiling, answering questions appropriately. Denies any pain at present. Pt c/o nausea/vomiting/diarrhea today after eating leftover food from refrigerator that stopped working. Pt states after eating, pt had to go to bathroom and while on toilet, felt dizziness, nausea , "intense pain in my stomach" and diarrhea. Pt denies any pain now, denies nausea at present. BP in triage low. Pt states BP tends to be low. Pt states her normal cardiologist obtains BP from her right thigh or right ankle. BP obtained on left arm 102/84, BOP left arm 63/50, repeated left arm 70/43. BP right thigh 114/46, BP right ankle 102/60. report given to primary RN Johanna.

## 2019-08-29 NOTE — ED PROVIDER NOTE - PATIENT PORTAL LINK FT
You can access the FollowMyHealth Patient Portal offered by [facility] by registering at the following website: [email or web address]. By joining [facility]’s FollowMyHealth portal, you will also be able to view your health information using other applications (apps) compatible with our system.

## 2019-08-29 NOTE — ED PROVIDER NOTE - CLINICAL SUMMARY MEDICAL DECISION MAKING FREE TEXT BOX
80 Y/O F PMH Hypotension, CAD, Osteoarthritis, presents with nausea, vomiting and diarrhea since 3PM today which was 15min after she ate rice and chicken which was left over from yesterday. BP was in 70s systollic, pt did not syncopize endorses H/O Low bp. Fluids and labs ordered, pt has no abdominal tenderness to palpation EKG largely normal will send trop to eval for cardiac cause of hypotension likely from dehydration. BP responding appropriately to fluids, pt stable at this time.

## 2019-08-29 NOTE — ED PROVIDER NOTE - ATTENDING CONTRIBUTION TO CARE
agree with PA note  "80 Y/O F PMH Hypotension, CAD, Osteoarthritis, presents with nausea, vomiting and diarrhea since 3PM today which was 15min after she ate rice and chicken which was left over from yesterday and in her fridge which she found to be broken. Pt states that she felt lightheaded after the episode of nausea but did not pass out. Pt denies any other symptoms or acute complaints. No CP, L arm PN, Jaw Pain, abd pn."  States currently no complaints.  Saw cardiologist due to hearing "wooshing sound".  Denies syncope.  Scheduled for echo    PE: well appearing; initially hypotensive (80 systolic which pt states is normal for her); on repeat 130 systolic; systolic murmur; CTAB/L; abd soft/NT/ND ext: no edema    Imp: likely AGE from uncooked food; now improved; labs, IVF and reassess

## 2019-08-29 NOTE — ED ADULT NURSE NOTE - HEIGHT IN FEET
Occupational Therapy Daily Treatment Note     Date: 3/29/2019  Name: Lety Vogel  Clinic Number: [id number]    Therapy Diagnosis:   Encounter Diagnoses   Name Primary?    Stiffness of left wrist, not elsewhere classified     Stiffness of left hand, not elsewhere classified     Effusion, left hand     Effusion, left wrist      Physician: Esperanza Moore PA    Physician Orders: Custom orthotic- splint in supination to protect DRUJ  Eval and treat, modalities as needed  1-2x/wk for 6+ weeks  Medical Diagnosis: Closed fracture of distal end of left radius, unspecified fracture morphology, initial encounter  Surgical Procedure and Date: ORIF AMIRA GUERRERO, 1/11/19  Evaluation Date: 1/30/19  Insurance Authorization Period Expiration: 12/31/19  Plan of Care Certification Period: 4/19/19  Date of Return to MD: 4/8/19     Visit # / Visits authorized: 14 / 50 (50 OT/PT combined max per year)  Time In:11:05 AM  Time Out: 11:55AM  Total Billable Time: 50 minutes     Precautions:  Standard     Subjective     Pt concerned that arm is still so stiff and swollen  Pain: 7/10 with composite wrist flexion  Location: left wrist      Objective       Lety received the following supervised modalities after being cleared for contradictions for 10 minutes:   Patient received fluidotherapy to increase blood flow, circulation, desensitization, sensory re-education and for pain management.      Lety received the following manual therapy techniques for 10 minutes:   -Passive wrist ROM, all planes  -Passive composite flexion of wrist and fingers  -Passive flexor compartment stretch    Lety received therapeutic exercises for 30 minutes including:  -EDC glides with highlighter x 2' to decrease intrinsic tightness  -Wrist maze x 2'  -Forearm sup/pro holds with dowel x 2'  -Hand helper 1 red band x 30 reps  -Yellow clothespin 3 jaw isidoro pinch 2 x 15 reps  -Flexor compartment stretch with thera web 30 sec x 3  -  Home Exercises and  Education Provided     Education provided: Continue HEP    Written Home Exercises Provided: Patient instructed to cont prior HEP.  Exercises were reviewed and Lety was able to demonstrate them prior to the end of the session.  Lety demonstrated good  understanding of the HEP provided.   .   See EMR under Patient Instructions for exercises provided on initial eval and 2/1/2019.      Assessment     Pt with small gains in wrist flexion and extension today. She reports she can tolerate LLPS for about 5 minutes during HEP performance.    Lety is progressing well towards her goals and there are no updates to goals at this time. Pt prognosis is Good.     Pt will continue to benefit from skilled outpatient occupational therapy to address the deficits listed in the problem list on initial evaluation provide pt/family education and to maximize pt's level of independence in the home and community environment.     Anticipated barriers to occupational therapy: work travelling conflicts    Pt's spiritual, cultural and educational needs considered and pt agreeable to plan of care and goals.    Goals:  STG1: [place] with HEP (1 week)-Met  STG2: Decrease edema to mild levels (2 weeks)-Progressing  STG3: Increase finger flexion to DPC (2 weeks)-Progressing  STG4: Increase wrist flex/ext ROM to 75% WNL (4 weeks)-Progressing     LTG1: Increase wrist/elbow/finger AROM to WNL (discharge)  LTG2: Return to previous activities safely and independently (discharge)    Plan:    Continue OT 2 times weekly for 6 weeks until 4/19/19  Updates/Grading for next session: . Pt will be out of town next week.      Brooklynn Whelan, OT    5

## 2019-08-29 NOTE — ED PROVIDER NOTE - NSFOLLOWUPINSTRUCTIONS_ED_ALL_ED_FT
YOUR BLOODWORK ALL LOOKS GREAT    PLEASE FOLLOW UP WITH YOUR PRIMARY DOCTOR FOR CONTINUED EVALUATION AND CARE    YOU MIGHT HAVE CONTRACTED FOOD POISONING AND THIS WAS WHY YOU WERE HAVING YOUR SYMPTOMS, BUT THIS SORT OF THING USUALLY PASSES WITHIN 24-48 HRS. REMEMBER TO DRINK LOTS OF WATER AT HOME AND AVOID HEAVY OR GREASY FOODS FOR THE NEXT 24 HRS.

## 2019-08-29 NOTE — ED ADULT TRIAGE NOTE - CHIEF COMPLAINT QUOTE
Patient brought to Er from home by EMS for c/o nausea and vomiting since 3pm. Pt had a halter monitor placed around 2:30pm Denies chest pain. Pt states she ate something out of fridge that did not agree with her. Patient brought to Er from home by EMS for c/o nausea and vomiting since 3pm. Pt had a halter monitor placed around 2:30pm Denies chest pain. Pt states she ate something out of fridge that did not agree with her. Pt runs very hypotensive daily.   As per patient :Cardiologist takes her pressure on her right ankle. 88/58 Patient brought to Er from home by EMS for c/o nausea and vomiting since 3pm. Pt had a halter monitor placed around 2:30pm Denies chest pain. Pt states she ate something out of fridge that did not agree with her. Pt runs very hypotensive and bradycardic and has no cardiac symptoms.    As per patient :Cardiologist takes her pressure on her right ankle. 88/58

## 2019-08-29 NOTE — ED PROVIDER NOTE - OBJECTIVE STATEMENT
78 Y/O F 80 Y/O F PMH Hypotension, CAD, Osteoarthritis, 80 Y/O F PMH Hypotension, CAD, Osteoarthritis, presents with nausea, vomiting and diarrhea since 3PM today which was 15min after she ate rice and chicken which was left over from yesterday and in her fridge which she found to be broken. Pt states that she felt lightheaded after the episode of nausea but did not pass out. Pt denies any other symptoms or acute complaints. No CP, L arm PN, Jaw Pain, abd pn.

## 2020-01-01 ENCOUNTER — APPOINTMENT (OUTPATIENT)
Dept: HEMATOLOGY ONCOLOGY | Facility: CLINIC | Age: 80
End: 2020-01-01

## 2020-01-01 ENCOUNTER — APPOINTMENT (OUTPATIENT)
Dept: ELECTROPHYSIOLOGY | Facility: CLINIC | Age: 80
End: 2020-01-01

## 2020-01-01 ENCOUNTER — INPATIENT (INPATIENT)
Facility: HOSPITAL | Age: 80
LOS: 4 days | Discharge: HOME | End: 2020-06-16
Attending: GENERAL PRACTICE | Admitting: GENERAL PRACTICE
Payer: MEDICARE

## 2020-01-01 ENCOUNTER — TRANSCRIPTION ENCOUNTER (OUTPATIENT)
Age: 80
End: 2020-01-01

## 2020-01-01 ENCOUNTER — INPATIENT (INPATIENT)
Facility: HOSPITAL | Age: 80
LOS: 2 days | Discharge: HOME HEALTH | End: 2020-08-05
Attending: INTERNAL MEDICINE | Admitting: INTERNAL MEDICINE
Payer: MEDICARE

## 2020-01-01 ENCOUNTER — OUTPATIENT (OUTPATIENT)
Dept: OUTPATIENT SERVICES | Facility: HOSPITAL | Age: 80
LOS: 1 days | Discharge: HOME | End: 2020-01-01

## 2020-01-01 ENCOUNTER — APPOINTMENT (OUTPATIENT)
Dept: HEMATOLOGY ONCOLOGY | Facility: CLINIC | Age: 80
End: 2020-01-01
Payer: MEDICARE

## 2020-01-01 ENCOUNTER — APPOINTMENT (OUTPATIENT)
Dept: CARDIOLOGY | Facility: CLINIC | Age: 80
End: 2020-01-01

## 2020-01-01 VITALS
HEART RATE: 47 BPM | SYSTOLIC BLOOD PRESSURE: 121 MMHG | OXYGEN SATURATION: 98 % | TEMPERATURE: 98 F | RESPIRATION RATE: 20 BRPM | DIASTOLIC BLOOD PRESSURE: 72 MMHG

## 2020-01-01 VITALS
OXYGEN SATURATION: 100 % | SYSTOLIC BLOOD PRESSURE: 130 MMHG | HEART RATE: 60 BPM | TEMPERATURE: 97 F | DIASTOLIC BLOOD PRESSURE: 77 MMHG | RESPIRATION RATE: 18 BRPM

## 2020-01-01 VITALS
TEMPERATURE: 98 F | RESPIRATION RATE: 16 BRPM | SYSTOLIC BLOOD PRESSURE: 110 MMHG | HEART RATE: 70 BPM | DIASTOLIC BLOOD PRESSURE: 60 MMHG | OXYGEN SATURATION: 100 %

## 2020-01-01 VITALS
HEART RATE: 64 BPM | OXYGEN SATURATION: 100 % | RESPIRATION RATE: 17 BRPM | DIASTOLIC BLOOD PRESSURE: 106 MMHG | SYSTOLIC BLOOD PRESSURE: 127 MMHG

## 2020-01-01 DIAGNOSIS — Z96.659 PRESENCE OF UNSPECIFIED ARTIFICIAL KNEE JOINT: Chronic | ICD-10-CM

## 2020-01-01 DIAGNOSIS — K27.9 PEPTIC ULCER, SITE UNSPECIFIED, UNSPECIFIED AS ACUTE OR CHRONIC, WITHOUT HEMORRHAGE OR PERFORATION: Chronic | ICD-10-CM

## 2020-01-01 DIAGNOSIS — Z95.5 PRESENCE OF CORONARY ANGIOPLASTY IMPLANT AND GRAFT: ICD-10-CM

## 2020-01-01 DIAGNOSIS — I10 ESSENTIAL (PRIMARY) HYPERTENSION: ICD-10-CM

## 2020-01-01 DIAGNOSIS — I95.9 HYPOTENSION, UNSPECIFIED: ICD-10-CM

## 2020-01-01 DIAGNOSIS — E78.00 PURE HYPERCHOLESTEROLEMIA, UNSPECIFIED: ICD-10-CM

## 2020-01-01 DIAGNOSIS — Z95.5 PRESENCE OF CORONARY ANGIOPLASTY IMPLANT AND GRAFT: Chronic | ICD-10-CM

## 2020-01-01 DIAGNOSIS — K62.89 OTHER SPECIFIED DISEASES OF ANUS AND RECTUM: Chronic | ICD-10-CM

## 2020-01-01 DIAGNOSIS — D69.6 THROMBOCYTOPENIA, UNSPECIFIED: ICD-10-CM

## 2020-01-01 DIAGNOSIS — N39.0 URINARY TRACT INFECTION, SITE NOT SPECIFIED: ICD-10-CM

## 2020-01-01 DIAGNOSIS — R00.1 BRADYCARDIA, UNSPECIFIED: ICD-10-CM

## 2020-01-01 DIAGNOSIS — I25.10 ATHEROSCLEROTIC HEART DISEASE OF NATIVE CORONARY ARTERY WITHOUT ANGINA PECTORIS: ICD-10-CM

## 2020-01-01 DIAGNOSIS — Z90.710 ACQUIRED ABSENCE OF BOTH CERVIX AND UTERUS: Chronic | ICD-10-CM

## 2020-01-01 DIAGNOSIS — I21.4 NON-ST ELEVATION (NSTEMI) MYOCARDIAL INFARCTION: ICD-10-CM

## 2020-01-01 DIAGNOSIS — E03.9 HYPOTHYROIDISM, UNSPECIFIED: ICD-10-CM

## 2020-01-01 DIAGNOSIS — I77.1 STRICTURE OF ARTERY: ICD-10-CM

## 2020-01-01 DIAGNOSIS — I65.29 OCCLUSION AND STENOSIS OF UNSPECIFIED CAROTID ARTERY: ICD-10-CM

## 2020-01-01 DIAGNOSIS — Z29.9 ENCOUNTER FOR PROPHYLACTIC MEASURES, UNSPECIFIED: ICD-10-CM

## 2020-01-01 DIAGNOSIS — D64.9 ANEMIA, UNSPECIFIED: ICD-10-CM

## 2020-01-01 LAB
ALBUMIN SERPL ELPH-MCNC: 3.2 G/DL — LOW (ref 3.3–5)
ALBUMIN SERPL ELPH-MCNC: 3.5 G/DL — SIGNIFICANT CHANGE UP (ref 3.3–5)
ALBUMIN SERPL ELPH-MCNC: 3.6 G/DL — SIGNIFICANT CHANGE UP (ref 3.3–5)
ALBUMIN SERPL ELPH-MCNC: 3.7 G/DL — SIGNIFICANT CHANGE UP (ref 3.3–5)
ALBUMIN SERPL ELPH-MCNC: 4.2 G/DL — SIGNIFICANT CHANGE UP (ref 3.3–5)
ALP SERPL-CCNC: 63 U/L — SIGNIFICANT CHANGE UP (ref 40–120)
ALP SERPL-CCNC: 65 U/L — SIGNIFICANT CHANGE UP (ref 40–120)
ALP SERPL-CCNC: 65 U/L — SIGNIFICANT CHANGE UP (ref 40–120)
ALP SERPL-CCNC: 72 U/L — SIGNIFICANT CHANGE UP (ref 40–120)
ALP SERPL-CCNC: 81 U/L — SIGNIFICANT CHANGE UP (ref 40–120)
ALT FLD-CCNC: 25 U/L — SIGNIFICANT CHANGE UP (ref 4–33)
ALT FLD-CCNC: 35 U/L — HIGH (ref 4–33)
ALT FLD-CCNC: 36 U/L — HIGH (ref 4–33)
ALT FLD-CCNC: 40 U/L — HIGH (ref 4–33)
ALT FLD-CCNC: 58 U/L — HIGH (ref 4–33)
ANION GAP SERPL CALC-SCNC: 10 MMO/L — SIGNIFICANT CHANGE UP (ref 7–14)
ANION GAP SERPL CALC-SCNC: 10 MMO/L — SIGNIFICANT CHANGE UP (ref 7–14)
ANION GAP SERPL CALC-SCNC: 11 MMO/L — SIGNIFICANT CHANGE UP (ref 7–14)
ANION GAP SERPL CALC-SCNC: 11 MMO/L — SIGNIFICANT CHANGE UP (ref 7–14)
ANION GAP SERPL CALC-SCNC: 12 MMO/L — SIGNIFICANT CHANGE UP (ref 7–14)
ANION GAP SERPL CALC-SCNC: 13 MMO/L — SIGNIFICANT CHANGE UP (ref 7–14)
ANION GAP SERPL CALC-SCNC: 14 MMO/L — SIGNIFICANT CHANGE UP (ref 7–14)
ANION GAP SERPL CALC-SCNC: 16 MMO/L — HIGH (ref 7–14)
ANION GAP SERPL CALC-SCNC: 8 MMO/L — SIGNIFICANT CHANGE UP (ref 7–14)
ANISOCYTOSIS BLD QL: SLIGHT — SIGNIFICANT CHANGE UP
ANISOCYTOSIS BLD QL: SLIGHT — SIGNIFICANT CHANGE UP
APPEARANCE UR: CLEAR — SIGNIFICANT CHANGE UP
APTT BLD: 25.5 SEC — LOW (ref 27.5–36.3)
APTT BLD: 31.8 SEC — SIGNIFICANT CHANGE UP (ref 27–36.3)
APTT BLD: 38.2 SEC — HIGH (ref 27–36.3)
APTT BLD: 46 SEC — HIGH (ref 27–36.3)
APTT BLD: > 200 SEC — CRITICAL HIGH (ref 27–36.3)
AST SERPL-CCNC: 118 U/L — HIGH (ref 4–32)
AST SERPL-CCNC: 24 U/L — SIGNIFICANT CHANGE UP (ref 4–32)
AST SERPL-CCNC: 33 U/L — HIGH (ref 4–32)
AST SERPL-CCNC: 51 U/L — HIGH (ref 4–32)
AST SERPL-CCNC: 62 U/L — HIGH (ref 4–32)
BACTERIA # UR AUTO: NEGATIVE — SIGNIFICANT CHANGE UP
BASOPHILS # BLD AUTO: 0.02 K/UL — SIGNIFICANT CHANGE UP (ref 0–0.2)
BASOPHILS # BLD AUTO: 0.02 K/UL — SIGNIFICANT CHANGE UP (ref 0–0.2)
BASOPHILS # BLD AUTO: 0.03 K/UL — SIGNIFICANT CHANGE UP (ref 0–0.2)
BASOPHILS NFR BLD AUTO: 0.4 % — SIGNIFICANT CHANGE UP (ref 0–2)
BASOPHILS NFR BLD AUTO: 0.4 % — SIGNIFICANT CHANGE UP (ref 0–2)
BASOPHILS NFR BLD AUTO: 0.6 % — SIGNIFICANT CHANGE UP (ref 0–2)
BASOPHILS NFR SPEC: 0.9 % — SIGNIFICANT CHANGE UP (ref 0–2)
BASOPHILS NFR SPEC: 0.9 % — SIGNIFICANT CHANGE UP (ref 0–2)
BILIRUB SERPL-MCNC: 0.2 MG/DL — SIGNIFICANT CHANGE UP (ref 0.2–1.2)
BILIRUB SERPL-MCNC: 0.2 MG/DL — SIGNIFICANT CHANGE UP (ref 0.2–1.2)
BILIRUB SERPL-MCNC: 0.3 MG/DL — SIGNIFICANT CHANGE UP (ref 0.2–1.2)
BILIRUB SERPL-MCNC: 0.5 MG/DL — SIGNIFICANT CHANGE UP (ref 0.2–1.2)
BILIRUB SERPL-MCNC: 0.5 MG/DL — SIGNIFICANT CHANGE UP (ref 0.2–1.2)
BILIRUB UR-MCNC: NEGATIVE — SIGNIFICANT CHANGE UP
BLASTS # FLD: 0 % — SIGNIFICANT CHANGE UP (ref 0–0)
BLASTS # FLD: 0 % — SIGNIFICANT CHANGE UP (ref 0–0)
BLD GP AB SCN SERPL QL: NEGATIVE — SIGNIFICANT CHANGE UP
BLOOD UR QL VISUAL: NEGATIVE — SIGNIFICANT CHANGE UP
BUN SERPL-MCNC: 11 MG/DL — SIGNIFICANT CHANGE UP (ref 7–23)
BUN SERPL-MCNC: 12 MG/DL — SIGNIFICANT CHANGE UP (ref 7–23)
BUN SERPL-MCNC: 12 MG/DL — SIGNIFICANT CHANGE UP (ref 7–23)
BUN SERPL-MCNC: 17 MG/DL — SIGNIFICANT CHANGE UP (ref 7–23)
BUN SERPL-MCNC: 24 MG/DL — HIGH (ref 7–23)
BUN SERPL-MCNC: 31 MG/DL — HIGH (ref 7–23)
BUN SERPL-MCNC: 41 MG/DL — HIGH (ref 7–23)
BUN SERPL-MCNC: 7 MG/DL — SIGNIFICANT CHANGE UP (ref 7–23)
BUN SERPL-MCNC: 9 MG/DL — SIGNIFICANT CHANGE UP (ref 7–23)
CALCIUM SERPL-MCNC: 10.3 MG/DL — SIGNIFICANT CHANGE UP (ref 8.4–10.5)
CALCIUM SERPL-MCNC: 9 MG/DL — SIGNIFICANT CHANGE UP (ref 8.4–10.5)
CALCIUM SERPL-MCNC: 9.3 MG/DL — SIGNIFICANT CHANGE UP (ref 8.4–10.5)
CALCIUM SERPL-MCNC: 9.4 MG/DL — SIGNIFICANT CHANGE UP (ref 8.4–10.5)
CALCIUM SERPL-MCNC: 9.5 MG/DL — SIGNIFICANT CHANGE UP (ref 8.4–10.5)
CALCIUM SERPL-MCNC: 9.7 MG/DL — SIGNIFICANT CHANGE UP (ref 8.4–10.5)
CHLORIDE SERPL-SCNC: 106 MMOL/L — SIGNIFICANT CHANGE UP (ref 98–107)
CHLORIDE SERPL-SCNC: 106 MMOL/L — SIGNIFICANT CHANGE UP (ref 98–107)
CHLORIDE SERPL-SCNC: 107 MMOL/L — SIGNIFICANT CHANGE UP (ref 98–107)
CHLORIDE SERPL-SCNC: 107 MMOL/L — SIGNIFICANT CHANGE UP (ref 98–107)
CHLORIDE SERPL-SCNC: 108 MMOL/L — HIGH (ref 98–107)
CHLORIDE SERPL-SCNC: 108 MMOL/L — HIGH (ref 98–107)
CHLORIDE SERPL-SCNC: 109 MMOL/L — HIGH (ref 98–107)
CHLORIDE SERPL-SCNC: 110 MMOL/L — HIGH (ref 98–107)
CHLORIDE SERPL-SCNC: 110 MMOL/L — HIGH (ref 98–107)
CHLORIDE SERPL-SCNC: 111 MMOL/L — HIGH (ref 98–107)
CHLORIDE SERPL-SCNC: 112 MMOL/L — HIGH (ref 98–107)
CHOLEST SERPL-MCNC: 110 MG/DL — LOW (ref 120–199)
CHOLEST SERPL-MCNC: 133 MG/DL — SIGNIFICANT CHANGE UP (ref 120–199)
CK MB BLD-MCNC: 1.48 NG/ML — SIGNIFICANT CHANGE UP (ref 1–4.7)
CK MB BLD-MCNC: 16 NG/ML — HIGH (ref 1–4.7)
CK MB BLD-MCNC: 21.83 NG/ML — HIGH (ref 1–4.7)
CK MB BLD-MCNC: 4.6 — HIGH (ref 0–2.5)
CK MB BLD-MCNC: 5.5 — HIGH (ref 0–2.5)
CK MB BLD-MCNC: 8.4 NG/ML — HIGH (ref 1–4.7)
CK MB BLD-MCNC: SIGNIFICANT CHANGE UP (ref 0–2.5)
CK SERPL-CCNC: 114 U/L — SIGNIFICANT CHANGE UP (ref 25–170)
CK SERPL-CCNC: 181 U/L — HIGH (ref 25–170)
CK SERPL-CCNC: 281 U/L — HIGH (ref 25–170)
CK SERPL-CCNC: 394 U/L — HIGH (ref 25–170)
CO2 SERPL-SCNC: 19 MMOL/L — LOW (ref 22–31)
CO2 SERPL-SCNC: 19 MMOL/L — LOW (ref 22–31)
CO2 SERPL-SCNC: 20 MMOL/L — LOW (ref 22–31)
CO2 SERPL-SCNC: 21 MMOL/L — LOW (ref 22–31)
CO2 SERPL-SCNC: 21 MMOL/L — LOW (ref 22–31)
CO2 SERPL-SCNC: 22 MMOL/L — SIGNIFICANT CHANGE UP (ref 22–31)
CO2 SERPL-SCNC: 23 MMOL/L — SIGNIFICANT CHANGE UP (ref 22–31)
CO2 SERPL-SCNC: 24 MMOL/L — SIGNIFICANT CHANGE UP (ref 22–31)
CO2 SERPL-SCNC: 24 MMOL/L — SIGNIFICANT CHANGE UP (ref 22–31)
COLOR SPEC: SIGNIFICANT CHANGE UP
CREAT SERPL-MCNC: 0.65 MG/DL — SIGNIFICANT CHANGE UP (ref 0.5–1.3)
CREAT SERPL-MCNC: 0.66 MG/DL — SIGNIFICANT CHANGE UP (ref 0.5–1.3)
CREAT SERPL-MCNC: 0.71 MG/DL — SIGNIFICANT CHANGE UP (ref 0.5–1.3)
CREAT SERPL-MCNC: 0.72 MG/DL — SIGNIFICANT CHANGE UP (ref 0.5–1.3)
CREAT SERPL-MCNC: 0.72 MG/DL — SIGNIFICANT CHANGE UP (ref 0.5–1.3)
CREAT SERPL-MCNC: 0.73 MG/DL — SIGNIFICANT CHANGE UP (ref 0.5–1.3)
CREAT SERPL-MCNC: 0.75 MG/DL — SIGNIFICANT CHANGE UP (ref 0.5–1.3)
CREAT SERPL-MCNC: 0.77 MG/DL — SIGNIFICANT CHANGE UP (ref 0.5–1.3)
CREAT SERPL-MCNC: 0.86 MG/DL — SIGNIFICANT CHANGE UP (ref 0.5–1.3)
CREAT SERPL-MCNC: 0.94 MG/DL — SIGNIFICANT CHANGE UP (ref 0.5–1.3)
CREAT SERPL-MCNC: 1.18 MG/DL — SIGNIFICANT CHANGE UP (ref 0.5–1.3)
CULTURE RESULTS: SIGNIFICANT CHANGE UP
CULTURE RESULTS: SIGNIFICANT CHANGE UP
D DIMER BLD IA.RAPID-MCNC: 470 NG/ML — SIGNIFICANT CHANGE UP
DACRYOCYTES BLD QL SMEAR: SLIGHT — SIGNIFICANT CHANGE UP
ELLIPTOCYTES BLD QL SMEAR: SLIGHT — SIGNIFICANT CHANGE UP
ELLIPTOCYTES BLD QL SMEAR: SLIGHT — SIGNIFICANT CHANGE UP
EOSINOPHIL # BLD AUTO: 0.04 K/UL — SIGNIFICANT CHANGE UP (ref 0–0.5)
EOSINOPHIL # BLD AUTO: 0.06 K/UL — SIGNIFICANT CHANGE UP (ref 0–0.5)
EOSINOPHIL # BLD AUTO: 0.07 K/UL — SIGNIFICANT CHANGE UP (ref 0–0.5)
EOSINOPHIL NFR BLD AUTO: 0.8 % — SIGNIFICANT CHANGE UP (ref 0–6)
EOSINOPHIL NFR BLD AUTO: 1.2 % — SIGNIFICANT CHANGE UP (ref 0–6)
EOSINOPHIL NFR BLD AUTO: 1.4 % — SIGNIFICANT CHANGE UP (ref 0–6)
EOSINOPHIL NFR FLD: 0.9 % — SIGNIFICANT CHANGE UP (ref 0–6)
EOSINOPHIL NFR FLD: 1.8 % — SIGNIFICANT CHANGE UP (ref 0–6)
FERRITIN SERPL-MCNC: 26.29 NG/ML — SIGNIFICANT CHANGE UP (ref 15–150)
FERRITIN SERPL-MCNC: 7.61 NG/ML — LOW (ref 15–150)
FOLATE SERPL-MCNC: 12.7 NG/ML — SIGNIFICANT CHANGE UP (ref 4.7–20)
FOLATE SERPL-MCNC: 13.1 NG/ML — SIGNIFICANT CHANGE UP (ref 4.7–20)
GIANT PLATELETS BLD QL SMEAR: PRESENT — SIGNIFICANT CHANGE UP
GIANT PLATELETS BLD QL SMEAR: PRESENT — SIGNIFICANT CHANGE UP
GLUCOSE SERPL-MCNC: 106 MG/DL — HIGH (ref 70–99)
GLUCOSE SERPL-MCNC: 121 MG/DL — HIGH (ref 70–99)
GLUCOSE SERPL-MCNC: 122 MG/DL — HIGH (ref 70–99)
GLUCOSE SERPL-MCNC: 133 MG/DL — HIGH (ref 70–99)
GLUCOSE SERPL-MCNC: 80 MG/DL — SIGNIFICANT CHANGE UP (ref 70–99)
GLUCOSE SERPL-MCNC: 80 MG/DL — SIGNIFICANT CHANGE UP (ref 70–99)
GLUCOSE SERPL-MCNC: 82 MG/DL — SIGNIFICANT CHANGE UP (ref 70–99)
GLUCOSE SERPL-MCNC: 83 MG/DL — SIGNIFICANT CHANGE UP (ref 70–99)
GLUCOSE SERPL-MCNC: 85 MG/DL — SIGNIFICANT CHANGE UP (ref 70–99)
GLUCOSE SERPL-MCNC: 91 MG/DL — SIGNIFICANT CHANGE UP (ref 70–99)
GLUCOSE SERPL-MCNC: 92 MG/DL — SIGNIFICANT CHANGE UP (ref 70–99)
GLUCOSE UR-MCNC: NEGATIVE — SIGNIFICANT CHANGE UP
HAPTOGLOB SERPL-MCNC: 176 MG/DL — SIGNIFICANT CHANGE UP (ref 34–200)
HAPTOGLOB SERPL-MCNC: 196 MG/DL — SIGNIFICANT CHANGE UP (ref 34–200)
HBA1C BLD-MCNC: 5.3 % — SIGNIFICANT CHANGE UP (ref 4–5.6)
HBA1C BLD-MCNC: 5.7 % — HIGH (ref 4–5.6)
HCT VFR BLD CALC: 22.7 % — LOW (ref 34.5–45)
HCT VFR BLD CALC: 26.8 % — LOW (ref 34.5–45)
HCT VFR BLD CALC: 27.9 % — LOW (ref 34.5–45)
HCT VFR BLD CALC: 29.4 % — LOW (ref 34.5–45)
HCT VFR BLD CALC: 30 % — LOW (ref 34.5–45)
HCT VFR BLD CALC: 30.2 % — LOW (ref 34.5–45)
HCT VFR BLD CALC: 30.5 % — LOW (ref 34.5–45)
HCT VFR BLD CALC: 32 % — LOW (ref 34.5–45)
HCT VFR BLD CALC: 32 % — LOW (ref 34.5–45)
HCT VFR BLD CALC: 32.7 % — LOW (ref 34.5–45)
HCT VFR BLD CALC: 34.7 % — SIGNIFICANT CHANGE UP (ref 34.5–45)
HCT VFR BLD CALC: 35.2 % — SIGNIFICANT CHANGE UP (ref 34.5–45)
HCT VFR BLD CALC: 36.1 % — SIGNIFICANT CHANGE UP (ref 34.5–45)
HDLC SERPL-MCNC: 68 MG/DL — HIGH (ref 45–65)
HDLC SERPL-MCNC: 81 MG/DL — HIGH (ref 45–65)
HGB A MFR BLD: 97.1 % — SIGNIFICANT CHANGE UP
HGB A2 MFR BLD: 2.6 % — SIGNIFICANT CHANGE UP (ref 2.4–3.5)
HGB BLD-MCNC: 10.1 G/DL — LOW (ref 11.5–15.5)
HGB BLD-MCNC: 10.5 G/DL — LOW (ref 11.5–15.5)
HGB BLD-MCNC: 10.7 G/DL — LOW (ref 11.5–15.5)
HGB BLD-MCNC: 10.8 G/DL — LOW (ref 11.5–15.5)
HGB BLD-MCNC: 6.7 G/DL — CRITICAL LOW (ref 11.5–15.5)
HGB BLD-MCNC: 8.2 G/DL — LOW (ref 11.5–15.5)
HGB BLD-MCNC: 8.4 G/DL — LOW (ref 11.5–15.5)
HGB BLD-MCNC: 9.1 G/DL — LOW (ref 11.5–15.5)
HGB BLD-MCNC: 9.2 G/DL — LOW (ref 11.5–15.5)
HGB BLD-MCNC: 9.2 G/DL — LOW (ref 11.5–15.5)
HGB BLD-MCNC: 9.5 G/DL — LOW (ref 11.5–15.5)
HGB BLD-MCNC: 9.5 G/DL — LOW (ref 11.5–15.5)
HGB BLD-MCNC: 9.7 G/DL — LOW (ref 11.5–15.5)
HGB ELECT COMMENT: SIGNIFICANT CHANGE UP
HGB F MFR BLD: < 1 % — SIGNIFICANT CHANGE UP (ref 0–1.5)
HYALINE CASTS # UR AUTO: NEGATIVE — SIGNIFICANT CHANGE UP
HYPOCHROMIA BLD QL: SLIGHT — SIGNIFICANT CHANGE UP
IMM GRANULOCYTES NFR BLD AUTO: 0.2 % — SIGNIFICANT CHANGE UP (ref 0–1.5)
INR BLD: 0.99 — SIGNIFICANT CHANGE UP (ref 0.88–1.17)
INR BLD: 1.12 — SIGNIFICANT CHANGE UP (ref 0.88–1.17)
INR BLD: 1.14 — SIGNIFICANT CHANGE UP (ref 0.88–1.17)
INR BLD: 1.19 — HIGH (ref 0.88–1.17)
IRON SATN MFR SERPL: 16 UG/DL — LOW (ref 30–160)
IRON SATN MFR SERPL: 256 UG/DL — SIGNIFICANT CHANGE UP (ref 140–530)
IRON SATN MFR SERPL: 27 UG/DL — LOW (ref 30–160)
IRON SATN MFR SERPL: 346 UG/DL — SIGNIFICANT CHANGE UP (ref 140–530)
KETONES UR-MCNC: NEGATIVE — SIGNIFICANT CHANGE UP
LDH SERPL L TO P-CCNC: 214 U/L — SIGNIFICANT CHANGE UP (ref 135–225)
LDH SERPL L TO P-CCNC: 230 U/L — HIGH (ref 135–225)
LEUKOCYTE ESTERASE UR-ACNC: SIGNIFICANT CHANGE UP
LIPID PNL WITH DIRECT LDL SERPL: 43 MG/DL — SIGNIFICANT CHANGE UP
LIPID PNL WITH DIRECT LDL SERPL: 51 MG/DL — SIGNIFICANT CHANGE UP
LYMPHOCYTES # BLD AUTO: 1.12 K/UL — SIGNIFICANT CHANGE UP (ref 1–3.3)
LYMPHOCYTES # BLD AUTO: 1.25 K/UL — SIGNIFICANT CHANGE UP (ref 1–3.3)
LYMPHOCYTES # BLD AUTO: 1.28 K/UL — SIGNIFICANT CHANGE UP (ref 1–3.3)
LYMPHOCYTES # BLD AUTO: 22 % — SIGNIFICANT CHANGE UP (ref 13–44)
LYMPHOCYTES # BLD AUTO: 24.7 % — SIGNIFICANT CHANGE UP (ref 13–44)
LYMPHOCYTES # BLD AUTO: 25.5 % — SIGNIFICANT CHANGE UP (ref 13–44)
LYMPHOCYTES NFR SPEC AUTO: 14.3 % — SIGNIFICANT CHANGE UP (ref 13–44)
LYMPHOCYTES NFR SPEC AUTO: 15.5 % — SIGNIFICANT CHANGE UP (ref 13–44)
MAGNESIUM SERPL-MCNC: 1.6 MG/DL — SIGNIFICANT CHANGE UP (ref 1.6–2.6)
MAGNESIUM SERPL-MCNC: 1.8 MG/DL — SIGNIFICANT CHANGE UP (ref 1.6–2.6)
MAGNESIUM SERPL-MCNC: 1.9 MG/DL — SIGNIFICANT CHANGE UP (ref 1.6–2.6)
MAGNESIUM SERPL-MCNC: 2 MG/DL — SIGNIFICANT CHANGE UP (ref 1.6–2.6)
MAGNESIUM SERPL-MCNC: 2.2 MG/DL — SIGNIFICANT CHANGE UP (ref 1.6–2.6)
MAGNESIUM SERPL-MCNC: 2.2 MG/DL — SIGNIFICANT CHANGE UP (ref 1.6–2.6)
MANUAL SMEAR VERIFICATION: SIGNIFICANT CHANGE UP
MCHC RBC-ENTMCNC: 21.3 PG — LOW (ref 27–34)
MCHC RBC-ENTMCNC: 21.4 PG — LOW (ref 27–34)
MCHC RBC-ENTMCNC: 22.1 PG — LOW (ref 27–34)
MCHC RBC-ENTMCNC: 22.2 PG — LOW (ref 27–34)
MCHC RBC-ENTMCNC: 22.2 PG — LOW (ref 27–34)
MCHC RBC-ENTMCNC: 22.7 PG — LOW (ref 27–34)
MCHC RBC-ENTMCNC: 23.1 PG — LOW (ref 27–34)
MCHC RBC-ENTMCNC: 23.8 PG — LOW (ref 27–34)
MCHC RBC-ENTMCNC: 23.9 PG — LOW (ref 27–34)
MCHC RBC-ENTMCNC: 24 PG — LOW (ref 27–34)
MCHC RBC-ENTMCNC: 24.3 PG — LOW (ref 27–34)
MCHC RBC-ENTMCNC: 24.3 PG — LOW (ref 27–34)
MCHC RBC-ENTMCNC: 24.4 PG — LOW (ref 27–34)
MCHC RBC-ENTMCNC: 29.5 % — LOW (ref 32–36)
MCHC RBC-ENTMCNC: 29.7 % — LOW (ref 32–36)
MCHC RBC-ENTMCNC: 29.9 % — LOW (ref 32–36)
MCHC RBC-ENTMCNC: 30.1 % — LOW (ref 32–36)
MCHC RBC-ENTMCNC: 30.1 % — LOW (ref 32–36)
MCHC RBC-ENTMCNC: 30.3 % — LOW (ref 32–36)
MCHC RBC-ENTMCNC: 30.3 % — LOW (ref 32–36)
MCHC RBC-ENTMCNC: 30.4 % — LOW (ref 32–36)
MCHC RBC-ENTMCNC: 30.6 % — LOW (ref 32–36)
MCHC RBC-ENTMCNC: 30.7 % — LOW (ref 32–36)
MCHC RBC-ENTMCNC: 30.9 % — LOW (ref 32–36)
MCHC RBC-ENTMCNC: 31.1 % — LOW (ref 32–36)
MCHC RBC-ENTMCNC: 31.3 % — LOW (ref 32–36)
MCV RBC AUTO: 71 FL — LOW (ref 80–100)
MCV RBC AUTO: 72.1 FL — LOW (ref 80–100)
MCV RBC AUTO: 72.4 FL — LOW (ref 80–100)
MCV RBC AUTO: 73.7 FL — LOW (ref 80–100)
MCV RBC AUTO: 73.9 FL — LOW (ref 80–100)
MCV RBC AUTO: 74.6 FL — LOW (ref 80–100)
MCV RBC AUTO: 74.7 FL — LOW (ref 80–100)
MCV RBC AUTO: 77.6 FL — LOW (ref 80–100)
MCV RBC AUTO: 78.2 FL — LOW (ref 80–100)
MCV RBC AUTO: 78.4 FL — LOW (ref 80–100)
MCV RBC AUTO: 79.4 FL — LOW (ref 80–100)
MCV RBC AUTO: 79.9 FL — LOW (ref 80–100)
MCV RBC AUTO: 80 FL — SIGNIFICANT CHANGE UP (ref 80–100)
METAMYELOCYTES # FLD: 0 % — SIGNIFICANT CHANGE UP (ref 0–1)
METAMYELOCYTES # FLD: 0 % — SIGNIFICANT CHANGE UP (ref 0–1)
MICROCYTES BLD QL: SLIGHT — SIGNIFICANT CHANGE UP
MICROCYTES BLD QL: SLIGHT — SIGNIFICANT CHANGE UP
MONOCYTES # BLD AUTO: 0.38 K/UL — SIGNIFICANT CHANGE UP (ref 0–0.9)
MONOCYTES # BLD AUTO: 0.43 K/UL — SIGNIFICANT CHANGE UP (ref 0–0.9)
MONOCYTES # BLD AUTO: 0.51 K/UL — SIGNIFICANT CHANGE UP (ref 0–0.9)
MONOCYTES NFR BLD AUTO: 10 % — SIGNIFICANT CHANGE UP (ref 2–14)
MONOCYTES NFR BLD AUTO: 7.7 % — SIGNIFICANT CHANGE UP (ref 2–14)
MONOCYTES NFR BLD AUTO: 8.3 % — SIGNIFICANT CHANGE UP (ref 2–14)
MONOCYTES NFR BLD: 2.7 % — SIGNIFICANT CHANGE UP (ref 2–9)
MONOCYTES NFR BLD: 9.8 % — HIGH (ref 2–9)
MYELOCYTES NFR BLD: 0 % — SIGNIFICANT CHANGE UP (ref 0–0)
MYELOCYTES NFR BLD: 0 % — SIGNIFICANT CHANGE UP (ref 0–0)
NEUTROPHIL AB SER-ACNC: 69.6 % — SIGNIFICANT CHANGE UP (ref 43–77)
NEUTROPHIL AB SER-ACNC: 71.8 % — SIGNIFICANT CHANGE UP (ref 43–77)
NEUTROPHILS # BLD AUTO: 3.18 K/UL — SIGNIFICANT CHANGE UP (ref 1.8–7.4)
NEUTROPHILS # BLD AUTO: 3.38 K/UL — SIGNIFICANT CHANGE UP (ref 1.8–7.4)
NEUTROPHILS # BLD AUTO: 3.38 K/UL — SIGNIFICANT CHANGE UP (ref 1.8–7.4)
NEUTROPHILS NFR BLD AUTO: 64.8 % — SIGNIFICANT CHANGE UP (ref 43–77)
NEUTROPHILS NFR BLD AUTO: 65 % — SIGNIFICANT CHANGE UP (ref 43–77)
NEUTROPHILS NFR BLD AUTO: 66.6 % — SIGNIFICANT CHANGE UP (ref 43–77)
NEUTS BAND # BLD: 0 % — SIGNIFICANT CHANGE UP (ref 0–6)
NEUTS BAND # BLD: 0 % — SIGNIFICANT CHANGE UP (ref 0–6)
NITRITE UR-MCNC: NEGATIVE — SIGNIFICANT CHANGE UP
NRBC # FLD: 0 K/UL — SIGNIFICANT CHANGE UP (ref 0–0)
NRBC # FLD: 0.04 K/UL — SIGNIFICANT CHANGE UP (ref 0–0)
NRBC # FLD: 0.04 K/UL — SIGNIFICANT CHANGE UP (ref 0–0)
NT-PROBNP SERPL-SCNC: 1334 PG/ML — SIGNIFICANT CHANGE UP
OB PNL STL: NEGATIVE — SIGNIFICANT CHANGE UP
OB PNL STL: POSITIVE — SIGNIFICANT CHANGE UP
OTHER - HEMATOLOGY %: 0 — SIGNIFICANT CHANGE UP
OTHER - HEMATOLOGY %: 0 — SIGNIFICANT CHANGE UP
OVALOCYTES BLD QL SMEAR: SIGNIFICANT CHANGE UP
OVALOCYTES BLD QL SMEAR: SLIGHT — SIGNIFICANT CHANGE UP
PH UR: 6.5 — SIGNIFICANT CHANGE UP (ref 5–8)
PHOSPHATE SERPL-MCNC: 1.9 MG/DL — LOW (ref 2.5–4.5)
PHOSPHATE SERPL-MCNC: 2.2 MG/DL — LOW (ref 2.5–4.5)
PHOSPHATE SERPL-MCNC: 2.5 MG/DL — SIGNIFICANT CHANGE UP (ref 2.5–4.5)
PHOSPHATE SERPL-MCNC: 2.5 MG/DL — SIGNIFICANT CHANGE UP (ref 2.5–4.5)
PHOSPHATE SERPL-MCNC: 2.7 MG/DL — SIGNIFICANT CHANGE UP (ref 2.5–4.5)
PHOSPHATE SERPL-MCNC: 2.7 MG/DL — SIGNIFICANT CHANGE UP (ref 2.5–4.5)
PLATELET # BLD AUTO: 101 K/UL — LOW (ref 150–400)
PLATELET # BLD AUTO: 102 K/UL — LOW (ref 150–400)
PLATELET # BLD AUTO: 104 K/UL — LOW (ref 150–400)
PLATELET # BLD AUTO: 107 K/UL — LOW (ref 150–400)
PLATELET # BLD AUTO: 121 K/UL — LOW (ref 150–400)
PLATELET # BLD AUTO: 125 K/UL — LOW (ref 150–400)
PLATELET # BLD AUTO: 157 K/UL — SIGNIFICANT CHANGE UP (ref 150–400)
PLATELET # BLD AUTO: 158 K/UL — SIGNIFICANT CHANGE UP (ref 150–400)
PLATELET # BLD AUTO: 172 K/UL — SIGNIFICANT CHANGE UP (ref 150–400)
PLATELET # BLD AUTO: 178 K/UL — SIGNIFICANT CHANGE UP (ref 150–400)
PLATELET # BLD AUTO: 185 K/UL — SIGNIFICANT CHANGE UP (ref 150–400)
PLATELET # BLD AUTO: 186 K/UL — SIGNIFICANT CHANGE UP (ref 150–400)
PLATELET # BLD AUTO: 191 K/UL — SIGNIFICANT CHANGE UP (ref 150–400)
PLATELET COUNT - ESTIMATE: NORMAL — SIGNIFICANT CHANGE UP
PLATELET COUNT - ESTIMATE: SIGNIFICANT CHANGE UP
PMV BLD: 12.1 FL — SIGNIFICANT CHANGE UP (ref 7–13)
PMV BLD: 12.3 FL — SIGNIFICANT CHANGE UP (ref 7–13)
PMV BLD: SIGNIFICANT CHANGE UP FL (ref 7–13)
POIKILOCYTOSIS BLD QL AUTO: SIGNIFICANT CHANGE UP
POIKILOCYTOSIS BLD QL AUTO: SIGNIFICANT CHANGE UP
POLYCHROMASIA BLD QL SMEAR: SLIGHT — SIGNIFICANT CHANGE UP
POLYCHROMASIA BLD QL SMEAR: SLIGHT — SIGNIFICANT CHANGE UP
POTASSIUM SERPL-MCNC: 3.4 MMOL/L — LOW (ref 3.5–5.3)
POTASSIUM SERPL-MCNC: 3.4 MMOL/L — LOW (ref 3.5–5.3)
POTASSIUM SERPL-MCNC: 3.5 MMOL/L — SIGNIFICANT CHANGE UP (ref 3.5–5.3)
POTASSIUM SERPL-MCNC: 3.7 MMOL/L — SIGNIFICANT CHANGE UP (ref 3.5–5.3)
POTASSIUM SERPL-MCNC: 3.7 MMOL/L — SIGNIFICANT CHANGE UP (ref 3.5–5.3)
POTASSIUM SERPL-MCNC: 3.8 MMOL/L — SIGNIFICANT CHANGE UP (ref 3.5–5.3)
POTASSIUM SERPL-MCNC: 3.9 MMOL/L — SIGNIFICANT CHANGE UP (ref 3.5–5.3)
POTASSIUM SERPL-MCNC: 4 MMOL/L — SIGNIFICANT CHANGE UP (ref 3.5–5.3)
POTASSIUM SERPL-MCNC: 4.1 MMOL/L — SIGNIFICANT CHANGE UP (ref 3.5–5.3)
POTASSIUM SERPL-MCNC: 4.2 MMOL/L — SIGNIFICANT CHANGE UP (ref 3.5–5.3)
POTASSIUM SERPL-MCNC: 4.4 MMOL/L — SIGNIFICANT CHANGE UP (ref 3.5–5.3)
POTASSIUM SERPL-SCNC: 3.4 MMOL/L — LOW (ref 3.5–5.3)
POTASSIUM SERPL-SCNC: 3.4 MMOL/L — LOW (ref 3.5–5.3)
POTASSIUM SERPL-SCNC: 3.5 MMOL/L — SIGNIFICANT CHANGE UP (ref 3.5–5.3)
POTASSIUM SERPL-SCNC: 3.7 MMOL/L — SIGNIFICANT CHANGE UP (ref 3.5–5.3)
POTASSIUM SERPL-SCNC: 3.7 MMOL/L — SIGNIFICANT CHANGE UP (ref 3.5–5.3)
POTASSIUM SERPL-SCNC: 3.8 MMOL/L — SIGNIFICANT CHANGE UP (ref 3.5–5.3)
POTASSIUM SERPL-SCNC: 3.9 MMOL/L — SIGNIFICANT CHANGE UP (ref 3.5–5.3)
POTASSIUM SERPL-SCNC: 4 MMOL/L — SIGNIFICANT CHANGE UP (ref 3.5–5.3)
POTASSIUM SERPL-SCNC: 4.1 MMOL/L — SIGNIFICANT CHANGE UP (ref 3.5–5.3)
POTASSIUM SERPL-SCNC: 4.2 MMOL/L — SIGNIFICANT CHANGE UP (ref 3.5–5.3)
POTASSIUM SERPL-SCNC: 4.4 MMOL/L — SIGNIFICANT CHANGE UP (ref 3.5–5.3)
PROMYELOCYTES # FLD: 0 % — SIGNIFICANT CHANGE UP (ref 0–0)
PROMYELOCYTES # FLD: 0 % — SIGNIFICANT CHANGE UP (ref 0–0)
PROT SERPL-MCNC: 6.5 G/DL — SIGNIFICANT CHANGE UP (ref 6–8.3)
PROT SERPL-MCNC: 6.9 G/DL — SIGNIFICANT CHANGE UP (ref 6–8.3)
PROT SERPL-MCNC: 6.9 G/DL — SIGNIFICANT CHANGE UP (ref 6–8.3)
PROT SERPL-MCNC: 7.2 G/DL — SIGNIFICANT CHANGE UP (ref 6–8.3)
PROT SERPL-MCNC: 7.2 G/DL — SIGNIFICANT CHANGE UP (ref 6–8.3)
PROT UR-MCNC: 30 — SIGNIFICANT CHANGE UP
PROTHROM AB SERPL-ACNC: 11.4 SEC — SIGNIFICANT CHANGE UP (ref 9.8–13.1)
PROTHROM AB SERPL-ACNC: 12.7 SEC — SIGNIFICANT CHANGE UP (ref 9.8–13.1)
PROTHROM AB SERPL-ACNC: 13.1 SEC — SIGNIFICANT CHANGE UP (ref 9.8–13.1)
PROTHROM AB SERPL-ACNC: 13.5 SEC — SIGNIFICANT CHANGE UP (ref 10.6–13.6)
RBC # BLD: 3.15 M/UL — LOW (ref 3.8–5.2)
RBC # BLD: 3.7 M/UL — LOW (ref 3.8–5.2)
RBC # BLD: 3.79 M/UL — LOW (ref 3.8–5.2)
RBC # BLD: 3.89 M/UL — SIGNIFICANT CHANGE UP (ref 3.8–5.2)
RBC # BLD: 3.93 M/UL — SIGNIFICANT CHANGE UP (ref 3.8–5.2)
RBC # BLD: 4 M/UL — SIGNIFICANT CHANGE UP (ref 3.8–5.2)
RBC # BLD: 4.06 M/UL — SIGNIFICANT CHANGE UP (ref 3.8–5.2)
RBC # BLD: 4.1 M/UL — SIGNIFICANT CHANGE UP (ref 3.8–5.2)
RBC # BLD: 4.29 M/UL — SIGNIFICANT CHANGE UP (ref 3.8–5.2)
RBC # BLD: 4.37 M/UL — SIGNIFICANT CHANGE UP (ref 3.8–5.2)
RBC # BLD: 4.38 M/UL — SIGNIFICANT CHANGE UP (ref 3.8–5.2)
RBC # BLD: 4.5 M/UL — SIGNIFICANT CHANGE UP (ref 3.8–5.2)
RBC # BLD: 4.52 M/UL — SIGNIFICANT CHANGE UP (ref 3.8–5.2)
RBC # FLD: 18.2 % — HIGH (ref 10.3–14.5)
RBC # FLD: 18.2 % — HIGH (ref 10.3–14.5)
RBC # FLD: 18.3 % — HIGH (ref 10.3–14.5)
RBC # FLD: 18.9 % — HIGH (ref 10.3–14.5)
RBC # FLD: 19.2 % — HIGH (ref 10.3–14.5)
RBC # FLD: 19.2 % — HIGH (ref 10.3–14.5)
RBC # FLD: 21.2 % — HIGH (ref 10.3–14.5)
RBC # FLD: 23.4 % — HIGH (ref 10.3–14.5)
RBC # FLD: 23.6 % — HIGH (ref 10.3–14.5)
RBC # FLD: 23.6 % — HIGH (ref 10.3–14.5)
RBC # FLD: 23.7 % — HIGH (ref 10.3–14.5)
RBC # FLD: 23.9 % — HIGH (ref 10.3–14.5)
RBC # FLD: 23.9 % — HIGH (ref 10.3–14.5)
RBC CASTS # UR COMP ASSIST: SIGNIFICANT CHANGE UP (ref 0–?)
RETICS #: 36 K/UL — SIGNIFICANT CHANGE UP (ref 25–125)
RETICS #: 43 K/UL — SIGNIFICANT CHANGE UP (ref 25–125)
RETICS/RBC NFR: 0.9 % — SIGNIFICANT CHANGE UP (ref 0.5–2.5)
RETICS/RBC NFR: 1.2 % — SIGNIFICANT CHANGE UP (ref 0.5–2.5)
RH IG SCN BLD-IMP: POSITIVE — SIGNIFICANT CHANGE UP
SARS-COV-2 RNA SPEC QL NAA+PROBE: SIGNIFICANT CHANGE UP
SCHISTOCYTES BLD QL AUTO: SLIGHT — SIGNIFICANT CHANGE UP
SCHISTOCYTES BLD QL AUTO: SLIGHT — SIGNIFICANT CHANGE UP
SMUDGE CELLS # BLD: PRESENT — SIGNIFICANT CHANGE UP
SODIUM SERPL-SCNC: 140 MMOL/L — SIGNIFICANT CHANGE UP (ref 135–145)
SODIUM SERPL-SCNC: 142 MMOL/L — SIGNIFICANT CHANGE UP (ref 135–145)
SODIUM SERPL-SCNC: 143 MMOL/L — SIGNIFICANT CHANGE UP (ref 135–145)
SODIUM SERPL-SCNC: 146 MMOL/L — HIGH (ref 135–145)
SP GR SPEC: 1.02 — SIGNIFICANT CHANGE UP (ref 1–1.04)
SPECIMEN SOURCE: SIGNIFICANT CHANGE UP
SPECIMEN SOURCE: SIGNIFICANT CHANGE UP
SQUAMOUS # UR AUTO: SIGNIFICANT CHANGE UP
T3 SERPL-MCNC: 58.5 NG/DL — LOW (ref 80–200)
T4 AB SER-ACNC: 8.24 UG/DL — SIGNIFICANT CHANGE UP (ref 5.1–13)
TRIGL SERPL-MCNC: 44 MG/DL — SIGNIFICANT CHANGE UP (ref 10–149)
TRIGL SERPL-MCNC: 44 MG/DL — SIGNIFICANT CHANGE UP (ref 10–149)
TROPONIN T, HIGH SENSITIVITY: 37 NG/L — SIGNIFICANT CHANGE UP (ref ?–14)
TROPONIN T, HIGH SENSITIVITY: 654 NG/L — CRITICAL HIGH (ref ?–14)
TROPONIN T, HIGH SENSITIVITY: 666 NG/L — CRITICAL HIGH (ref ?–14)
TROPONIN T, HIGH SENSITIVITY: 728 NG/L — CRITICAL HIGH (ref ?–14)
TROPONIN T, HIGH SENSITIVITY: 813 NG/L — CRITICAL HIGH (ref ?–14)
TSH SERPL-MCNC: 4.05 UIU/ML — SIGNIFICANT CHANGE UP (ref 0.27–4.2)
TSH SERPL-MCNC: 4.7 UIU/ML — HIGH (ref 0.27–4.2)
TSH SERPL-MCNC: 7.35 UIU/ML — HIGH (ref 0.27–4.2)
UIBC SERPL-MCNC: 229.2 UG/DL — SIGNIFICANT CHANGE UP (ref 110–370)
UIBC SERPL-MCNC: 329.5 UG/DL — SIGNIFICANT CHANGE UP (ref 110–370)
UROBILINOGEN FLD QL: NORMAL — SIGNIFICANT CHANGE UP
VARIANT LYMPHS # BLD: 4.5 % — SIGNIFICANT CHANGE UP
VARIANT LYMPHS # BLD: 7.3 % — SIGNIFICANT CHANGE UP
VIT B12 SERPL-MCNC: 493 PG/ML — SIGNIFICANT CHANGE UP (ref 200–900)
VIT B12 SERPL-MCNC: 741 PG/ML — SIGNIFICANT CHANGE UP (ref 200–900)
WBC # BLD: 3.78 K/UL — LOW (ref 3.8–10.5)
WBC # BLD: 4.34 K/UL — SIGNIFICANT CHANGE UP (ref 3.8–10.5)
WBC # BLD: 4.5 K/UL — SIGNIFICANT CHANGE UP (ref 3.8–10.5)
WBC # BLD: 4.6 K/UL — SIGNIFICANT CHANGE UP (ref 3.8–10.5)
WBC # BLD: 4.72 K/UL — SIGNIFICANT CHANGE UP (ref 3.8–10.5)
WBC # BLD: 4.75 K/UL — SIGNIFICANT CHANGE UP (ref 3.8–10.5)
WBC # BLD: 4.82 K/UL — SIGNIFICANT CHANGE UP (ref 3.8–10.5)
WBC # BLD: 4.91 K/UL — SIGNIFICANT CHANGE UP (ref 3.8–10.5)
WBC # BLD: 5.08 K/UL — SIGNIFICANT CHANGE UP (ref 3.8–10.5)
WBC # BLD: 5.11 K/UL — SIGNIFICANT CHANGE UP (ref 3.8–10.5)
WBC # BLD: 5.19 K/UL — SIGNIFICANT CHANGE UP (ref 3.8–10.5)
WBC # BLD: 5.23 K/UL — SIGNIFICANT CHANGE UP (ref 3.8–10.5)
WBC # BLD: 5.83 K/UL — SIGNIFICANT CHANGE UP (ref 3.8–10.5)
WBC # FLD AUTO: 3.78 K/UL — LOW (ref 3.8–10.5)
WBC # FLD AUTO: 4.34 K/UL — SIGNIFICANT CHANGE UP (ref 3.8–10.5)
WBC # FLD AUTO: 4.5 K/UL — SIGNIFICANT CHANGE UP (ref 3.8–10.5)
WBC # FLD AUTO: 4.6 K/UL — SIGNIFICANT CHANGE UP (ref 3.8–10.5)
WBC # FLD AUTO: 4.72 K/UL — SIGNIFICANT CHANGE UP (ref 3.8–10.5)
WBC # FLD AUTO: 4.75 K/UL — SIGNIFICANT CHANGE UP (ref 3.8–10.5)
WBC # FLD AUTO: 4.82 K/UL — SIGNIFICANT CHANGE UP (ref 3.8–10.5)
WBC # FLD AUTO: 4.91 K/UL — SIGNIFICANT CHANGE UP (ref 3.8–10.5)
WBC # FLD AUTO: 5.08 K/UL — SIGNIFICANT CHANGE UP (ref 3.8–10.5)
WBC # FLD AUTO: 5.11 K/UL — SIGNIFICANT CHANGE UP (ref 3.8–10.5)
WBC # FLD AUTO: 5.19 K/UL — SIGNIFICANT CHANGE UP (ref 3.8–10.5)
WBC # FLD AUTO: 5.23 K/UL — SIGNIFICANT CHANGE UP (ref 3.8–10.5)
WBC # FLD AUTO: 5.83 K/UL — SIGNIFICANT CHANGE UP (ref 3.8–10.5)
WBC UR QL: HIGH (ref 0–?)

## 2020-01-01 PROCEDURE — 93458 L HRT ARTERY/VENTRICLE ANGIO: CPT | Mod: 26,59

## 2020-01-01 PROCEDURE — 99233 SBSQ HOSP IP/OBS HIGH 50: CPT

## 2020-01-01 PROCEDURE — 99291 CRITICAL CARE FIRST HOUR: CPT

## 2020-01-01 PROCEDURE — 99223 1ST HOSP IP/OBS HIGH 75: CPT

## 2020-01-01 PROCEDURE — 99232 SBSQ HOSP IP/OBS MODERATE 35: CPT

## 2020-01-01 PROCEDURE — 71275 CT ANGIOGRAPHY CHEST: CPT | Mod: 26

## 2020-01-01 PROCEDURE — 71046 X-RAY EXAM CHEST 2 VIEWS: CPT | Mod: 26

## 2020-01-01 PROCEDURE — 71045 X-RAY EXAM CHEST 1 VIEW: CPT | Mod: 26

## 2020-01-01 PROCEDURE — 92941 PRQ TRLML REVSC TOT OCCL AMI: CPT | Mod: RC

## 2020-01-01 PROCEDURE — 99215 OFFICE O/P EST HI 40 MIN: CPT

## 2020-01-01 PROCEDURE — 93306 TTE W/DOPPLER COMPLETE: CPT | Mod: 26

## 2020-01-01 PROCEDURE — 70450 CT HEAD/BRAIN W/O DYE: CPT | Mod: 26

## 2020-01-01 PROCEDURE — 78580 LUNG PERFUSION IMAGING: CPT | Mod: 26

## 2020-01-01 PROCEDURE — 99233 SBSQ HOSP IP/OBS HIGH 50: CPT | Mod: GC

## 2020-01-01 RX ORDER — ATORVASTATIN CALCIUM 80 MG/1
20 TABLET, FILM COATED ORAL AT BEDTIME
Refills: 0 | Status: DISCONTINUED | OUTPATIENT
Start: 2020-01-01 | End: 2020-01-01

## 2020-01-01 RX ORDER — HEPARIN SODIUM 5000 [USP'U]/ML
4400 INJECTION INTRAVENOUS; SUBCUTANEOUS EVERY 6 HOURS
Refills: 0 | Status: DISCONTINUED | OUTPATIENT
Start: 2020-01-01 | End: 2020-01-01

## 2020-01-01 RX ORDER — CLOPIDOGREL BISULFATE 75 MG/1
1 TABLET, FILM COATED ORAL
Qty: 30 | Refills: 1
Start: 2020-01-01 | End: 2020-01-01

## 2020-01-01 RX ORDER — ATORVASTATIN CALCIUM 80 MG/1
1 TABLET, FILM COATED ORAL
Qty: 30 | Refills: 0
Start: 2020-01-01 | End: 2020-01-01

## 2020-01-01 RX ORDER — PANTOPRAZOLE 40 MG/1
40 TABLET, DELAYED RELEASE ORAL DAILY
Refills: 0 | Status: ACTIVE | COMMUNITY
Start: 2020-01-01

## 2020-01-01 RX ORDER — PANTOPRAZOLE SODIUM 20 MG/1
40 TABLET, DELAYED RELEASE ORAL ONCE
Refills: 0 | Status: COMPLETED | OUTPATIENT
Start: 2020-01-01 | End: 2020-01-01

## 2020-01-01 RX ORDER — ASPIRIN/CALCIUM CARB/MAGNESIUM 324 MG
324 TABLET ORAL ONCE
Refills: 0 | Status: COMPLETED | OUTPATIENT
Start: 2020-01-01 | End: 2020-01-01

## 2020-01-01 RX ORDER — SENNA PLUS 8.6 MG/1
1 TABLET ORAL DAILY
Refills: 0 | Status: DISCONTINUED | OUTPATIENT
Start: 2020-01-01 | End: 2020-01-01

## 2020-01-01 RX ORDER — HEPARIN SODIUM 5000 [USP'U]/ML
5000 INJECTION INTRAVENOUS; SUBCUTANEOUS EVERY 8 HOURS
Refills: 0 | Status: DISCONTINUED | OUTPATIENT
Start: 2020-01-01 | End: 2020-01-01

## 2020-01-01 RX ORDER — AMLODIPINE BESYLATE 2.5 MG/1
5 TABLET ORAL DAILY
Refills: 0 | Status: DISCONTINUED | OUTPATIENT
Start: 2020-01-01 | End: 2020-01-01

## 2020-01-01 RX ORDER — HYDROCHLOROTHIAZIDE 25 MG
25 TABLET ORAL DAILY
Refills: 0 | Status: DISCONTINUED | OUTPATIENT
Start: 2020-01-01 | End: 2020-01-01

## 2020-01-01 RX ORDER — POTASSIUM CHLORIDE 20 MEQ
40 PACKET (EA) ORAL EVERY 4 HOURS
Refills: 0 | Status: COMPLETED | OUTPATIENT
Start: 2020-01-01 | End: 2020-01-01

## 2020-01-01 RX ORDER — ISOSORBIDE MONONITRATE 60 MG/1
60 TABLET, EXTENDED RELEASE ORAL DAILY
Refills: 0 | Status: DISCONTINUED | OUTPATIENT
Start: 2020-01-01 | End: 2020-01-01

## 2020-01-01 RX ORDER — SODIUM CHLORIDE 9 MG/ML
1000 INJECTION INTRAMUSCULAR; INTRAVENOUS; SUBCUTANEOUS ONCE
Refills: 0 | Status: COMPLETED | OUTPATIENT
Start: 2020-01-01 | End: 2020-01-01

## 2020-01-01 RX ORDER — CLOPIDOGREL 75 MG/1
75 TABLET, FILM COATED ORAL DAILY
Refills: 0 | Status: ACTIVE | COMMUNITY
Start: 2020-01-01

## 2020-01-01 RX ORDER — ASPIRIN/CALCIUM CARB/MAGNESIUM 324 MG
81 TABLET ORAL DAILY
Refills: 0 | Status: DISCONTINUED | OUTPATIENT
Start: 2020-01-01 | End: 2020-01-01

## 2020-01-01 RX ORDER — ACETAMINOPHEN 500 MG
650 TABLET ORAL EVERY 6 HOURS
Refills: 0 | Status: DISCONTINUED | OUTPATIENT
Start: 2020-01-01 | End: 2020-01-01

## 2020-01-01 RX ORDER — ASPIRIN/CALCIUM CARB/MAGNESIUM 324 MG
1 TABLET ORAL
Qty: 30 | Refills: 0
Start: 2020-01-01 | End: 2020-01-01

## 2020-01-01 RX ORDER — ATORVASTATIN CALCIUM 80 MG/1
80 TABLET, FILM COATED ORAL AT BEDTIME
Refills: 0 | Status: DISCONTINUED | OUTPATIENT
Start: 2020-01-01 | End: 2020-01-01

## 2020-01-01 RX ORDER — HEPARIN SODIUM 5000 [USP'U]/ML
4400 INJECTION INTRAVENOUS; SUBCUTANEOUS ONCE
Refills: 0 | Status: COMPLETED | OUTPATIENT
Start: 2020-01-01 | End: 2020-01-01

## 2020-01-01 RX ORDER — VALSARTAN 160 MG/1
160 TABLET, COATED ORAL DAILY
Qty: 30 | Refills: 0 | Status: DISCONTINUED | COMMUNITY
Start: 2017-10-24 | End: 2020-01-01

## 2020-01-01 RX ORDER — HEPARIN SODIUM 5000 [USP'U]/ML
INJECTION INTRAVENOUS; SUBCUTANEOUS
Qty: 25000 | Refills: 0 | Status: DISCONTINUED | OUTPATIENT
Start: 2020-01-01 | End: 2020-01-01

## 2020-01-01 RX ORDER — IRON SUCROSE 20 MG/ML
100 INJECTION, SOLUTION INTRAVENOUS EVERY 24 HOURS
Refills: 0 | Status: COMPLETED | OUTPATIENT
Start: 2020-01-01 | End: 2020-01-01

## 2020-01-01 RX ORDER — CLOPIDOGREL BISULFATE 75 MG/1
300 TABLET, FILM COATED ORAL ONCE
Refills: 0 | Status: COMPLETED | OUTPATIENT
Start: 2020-01-01 | End: 2020-01-01

## 2020-01-01 RX ORDER — SENNA PLUS 8.6 MG/1
1 TABLET ORAL
Qty: 30 | Refills: 0
Start: 2020-01-01 | End: 2020-01-01

## 2020-01-01 RX ORDER — CHLORHEXIDINE GLUCONATE 213 G/1000ML
1 SOLUTION TOPICAL DAILY
Refills: 0 | Status: DISCONTINUED | OUTPATIENT
Start: 2020-01-01 | End: 2020-01-01

## 2020-01-01 RX ORDER — CEFTRIAXONE 500 MG/1
1000 INJECTION, POWDER, FOR SOLUTION INTRAMUSCULAR; INTRAVENOUS ONCE
Refills: 0 | Status: COMPLETED | OUTPATIENT
Start: 2020-01-01 | End: 2020-01-01

## 2020-01-01 RX ORDER — ONDANSETRON 8 MG/1
4 TABLET, FILM COATED ORAL ONCE
Refills: 0 | Status: COMPLETED | OUTPATIENT
Start: 2020-01-01 | End: 2020-01-01

## 2020-01-01 RX ORDER — PANTOPRAZOLE SODIUM 20 MG/1
1 TABLET, DELAYED RELEASE ORAL
Qty: 30 | Refills: 0
Start: 2020-01-01 | End: 2020-01-01

## 2020-01-01 RX ORDER — SOD SULF/SODIUM/NAHCO3/KCL/PEG
1000 SOLUTION, RECONSTITUTED, ORAL ORAL EVERY 4 HOURS
Refills: 0 | Status: COMPLETED | OUTPATIENT
Start: 2020-01-01 | End: 2020-01-01

## 2020-01-01 RX ORDER — HEPARIN SODIUM 5000 [USP'U]/ML
1100 INJECTION INTRAVENOUS; SUBCUTANEOUS
Qty: 25000 | Refills: 0 | Status: DISCONTINUED | OUTPATIENT
Start: 2020-01-01 | End: 2020-01-01

## 2020-01-01 RX ORDER — PANTOPRAZOLE SODIUM 20 MG/1
40 TABLET, DELAYED RELEASE ORAL
Refills: 0 | Status: DISCONTINUED | OUTPATIENT
Start: 2020-01-01 | End: 2020-01-01

## 2020-01-01 RX ORDER — ATORVASTATIN CALCIUM 80 MG/1
80 TABLET, FILM COATED ORAL DAILY
Refills: 0 | Status: ACTIVE | COMMUNITY
Start: 2020-01-01

## 2020-01-01 RX ORDER — POTASSIUM CHLORIDE 20 MEQ
40 PACKET (EA) ORAL ONCE
Refills: 0 | Status: COMPLETED | OUTPATIENT
Start: 2020-01-01 | End: 2020-01-01

## 2020-01-01 RX ORDER — POTASSIUM PHOSPHATE, MONOBASIC POTASSIUM PHOSPHATE, DIBASIC 236; 224 MG/ML; MG/ML
15 INJECTION, SOLUTION INTRAVENOUS ONCE
Refills: 0 | Status: COMPLETED | OUTPATIENT
Start: 2020-01-01 | End: 2020-01-01

## 2020-01-01 RX ORDER — CLOPIDOGREL BISULFATE 75 MG/1
75 TABLET, FILM COATED ORAL DAILY
Refills: 0 | Status: DISCONTINUED | OUTPATIENT
Start: 2020-01-01 | End: 2020-01-01

## 2020-01-01 RX ORDER — LEVOTHYROXINE SODIUM 125 MCG
75 TABLET ORAL DAILY
Refills: 0 | Status: DISCONTINUED | OUTPATIENT
Start: 2020-01-01 | End: 2020-01-01

## 2020-01-01 RX ADMIN — Medication 81 MILLIGRAM(S): at 12:07

## 2020-01-01 RX ADMIN — AMLODIPINE BESYLATE 5 MILLIGRAM(S): 2.5 TABLET ORAL at 05:23

## 2020-01-01 RX ADMIN — Medication 81 MILLIGRAM(S): at 09:19

## 2020-01-01 RX ADMIN — ATORVASTATIN CALCIUM 80 MILLIGRAM(S): 80 TABLET, FILM COATED ORAL at 22:19

## 2020-01-01 RX ADMIN — HEPARIN SODIUM 5000 UNIT(S): 5000 INJECTION INTRAVENOUS; SUBCUTANEOUS at 21:15

## 2020-01-01 RX ADMIN — IRON SUCROSE 210 MILLIGRAM(S): 20 INJECTION, SOLUTION INTRAVENOUS at 11:55

## 2020-01-01 RX ADMIN — PANTOPRAZOLE SODIUM 40 MILLIGRAM(S): 20 TABLET, DELAYED RELEASE ORAL at 05:30

## 2020-01-01 RX ADMIN — Medication 75 MICROGRAM(S): at 05:23

## 2020-01-01 RX ADMIN — CHLORHEXIDINE GLUCONATE 1 APPLICATION(S): 213 SOLUTION TOPICAL at 14:32

## 2020-01-01 RX ADMIN — IRON SUCROSE 210 MILLIGRAM(S): 20 INJECTION, SOLUTION INTRAVENOUS at 11:53

## 2020-01-01 RX ADMIN — Medication 75 MICROGRAM(S): at 06:03

## 2020-01-01 RX ADMIN — Medication 81 MILLIGRAM(S): at 13:25

## 2020-01-01 RX ADMIN — HEPARIN SODIUM 5000 UNIT(S): 5000 INJECTION INTRAVENOUS; SUBCUTANEOUS at 21:10

## 2020-01-01 RX ADMIN — IRON SUCROSE 210 MILLIGRAM(S): 20 INJECTION, SOLUTION INTRAVENOUS at 12:32

## 2020-01-01 RX ADMIN — Medication 75 MICROGRAM(S): at 09:40

## 2020-01-01 RX ADMIN — PANTOPRAZOLE SODIUM 40 MILLIGRAM(S): 20 TABLET, DELAYED RELEASE ORAL at 06:24

## 2020-01-01 RX ADMIN — ISOSORBIDE MONONITRATE 60 MILLIGRAM(S): 60 TABLET, EXTENDED RELEASE ORAL at 11:53

## 2020-01-01 RX ADMIN — HEPARIN SODIUM 11 UNIT(S)/HR: 5000 INJECTION INTRAVENOUS; SUBCUTANEOUS at 19:00

## 2020-01-01 RX ADMIN — HEPARIN SODIUM 5000 UNIT(S): 5000 INJECTION INTRAVENOUS; SUBCUTANEOUS at 13:25

## 2020-01-01 RX ADMIN — HEPARIN SODIUM 5000 UNIT(S): 5000 INJECTION INTRAVENOUS; SUBCUTANEOUS at 06:03

## 2020-01-01 RX ADMIN — HEPARIN SODIUM 5000 UNIT(S): 5000 INJECTION INTRAVENOUS; SUBCUTANEOUS at 21:20

## 2020-01-01 RX ADMIN — HEPARIN SODIUM 5000 UNIT(S): 5000 INJECTION INTRAVENOUS; SUBCUTANEOUS at 21:51

## 2020-01-01 RX ADMIN — Medication 25 MILLIGRAM(S): at 05:23

## 2020-01-01 RX ADMIN — AMLODIPINE BESYLATE 5 MILLIGRAM(S): 2.5 TABLET ORAL at 06:47

## 2020-01-01 RX ADMIN — HEPARIN SODIUM 5000 UNIT(S): 5000 INJECTION INTRAVENOUS; SUBCUTANEOUS at 05:54

## 2020-01-01 RX ADMIN — Medication 75 MICROGRAM(S): at 17:25

## 2020-01-01 RX ADMIN — HEPARIN SODIUM 5000 UNIT(S): 5000 INJECTION INTRAVENOUS; SUBCUTANEOUS at 12:38

## 2020-01-01 RX ADMIN — Medication 324 MILLIGRAM(S): at 09:36

## 2020-01-01 RX ADMIN — SODIUM CHLORIDE 1000 MILLILITER(S): 9 INJECTION INTRAMUSCULAR; INTRAVENOUS; SUBCUTANEOUS at 09:40

## 2020-01-01 RX ADMIN — CLOPIDOGREL BISULFATE 75 MILLIGRAM(S): 75 TABLET, FILM COATED ORAL at 09:18

## 2020-01-01 RX ADMIN — Medication 81 MILLIGRAM(S): at 11:53

## 2020-01-01 RX ADMIN — CEFTRIAXONE 100 MILLIGRAM(S): 500 INJECTION, POWDER, FOR SOLUTION INTRAMUSCULAR; INTRAVENOUS at 06:03

## 2020-01-01 RX ADMIN — CEFTRIAXONE 1000 MILLIGRAM(S): 500 INJECTION, POWDER, FOR SOLUTION INTRAMUSCULAR; INTRAVENOUS at 09:40

## 2020-01-01 RX ADMIN — Medication 75 MICROGRAM(S): at 06:47

## 2020-01-01 RX ADMIN — Medication 40 MILLIEQUIVALENT(S): at 11:43

## 2020-01-01 RX ADMIN — ATORVASTATIN CALCIUM 20 MILLIGRAM(S): 80 TABLET, FILM COATED ORAL at 21:13

## 2020-01-01 RX ADMIN — ISOSORBIDE MONONITRATE 60 MILLIGRAM(S): 60 TABLET, EXTENDED RELEASE ORAL at 13:25

## 2020-01-01 RX ADMIN — Medication 324 MILLIGRAM(S): at 06:50

## 2020-01-01 RX ADMIN — HEPARIN SODIUM 5000 UNIT(S): 5000 INJECTION INTRAVENOUS; SUBCUTANEOUS at 06:47

## 2020-01-01 RX ADMIN — Medication 25 MILLIGRAM(S): at 06:47

## 2020-01-01 RX ADMIN — HEPARIN SODIUM 5000 UNIT(S): 5000 INJECTION INTRAVENOUS; SUBCUTANEOUS at 22:00

## 2020-01-01 RX ADMIN — HEPARIN SODIUM 5000 UNIT(S): 5000 INJECTION INTRAVENOUS; SUBCUTANEOUS at 14:32

## 2020-01-01 RX ADMIN — ONDANSETRON 4 MILLIGRAM(S): 8 TABLET, FILM COATED ORAL at 04:25

## 2020-01-01 RX ADMIN — CLOPIDOGREL BISULFATE 75 MILLIGRAM(S): 75 TABLET, FILM COATED ORAL at 12:30

## 2020-01-01 RX ADMIN — HEPARIN SODIUM 4400 UNIT(S): 5000 INJECTION INTRAVENOUS; SUBCUTANEOUS at 06:59

## 2020-01-01 RX ADMIN — CEFTRIAXONE 100 MILLIGRAM(S): 500 INJECTION, POWDER, FOR SOLUTION INTRAMUSCULAR; INTRAVENOUS at 07:16

## 2020-01-01 RX ADMIN — SENNA PLUS 1 TABLET(S): 8.6 TABLET ORAL at 22:02

## 2020-01-01 RX ADMIN — Medication 81 MILLIGRAM(S): at 12:30

## 2020-01-01 RX ADMIN — SODIUM CHLORIDE 1000 MILLILITER(S): 9 INJECTION INTRAMUSCULAR; INTRAVENOUS; SUBCUTANEOUS at 05:44

## 2020-01-01 RX ADMIN — Medication 40 MILLIEQUIVALENT(S): at 11:55

## 2020-01-01 RX ADMIN — Medication 25 MILLIGRAM(S): at 05:44

## 2020-01-01 RX ADMIN — AMLODIPINE BESYLATE 5 MILLIGRAM(S): 2.5 TABLET ORAL at 05:44

## 2020-01-01 RX ADMIN — Medication 1000 MILLILITER(S): at 17:57

## 2020-01-01 RX ADMIN — CHLORHEXIDINE GLUCONATE 1 APPLICATION(S): 213 SOLUTION TOPICAL at 12:07

## 2020-01-01 RX ADMIN — Medication 10 MILLIGRAM(S): at 15:47

## 2020-01-01 RX ADMIN — Medication 75 MICROGRAM(S): at 06:24

## 2020-01-01 RX ADMIN — HEPARIN SODIUM 5000 UNIT(S): 5000 INJECTION INTRAVENOUS; SUBCUTANEOUS at 22:19

## 2020-01-01 RX ADMIN — SODIUM CHLORIDE 1000 MILLILITER(S): 9 INJECTION INTRAMUSCULAR; INTRAVENOUS; SUBCUTANEOUS at 04:25

## 2020-01-01 RX ADMIN — SODIUM CHLORIDE 1000 MILLILITER(S): 9 INJECTION INTRAMUSCULAR; INTRAVENOUS; SUBCUTANEOUS at 07:17

## 2020-01-01 RX ADMIN — HEPARIN SODIUM 5000 UNIT(S): 5000 INJECTION INTRAVENOUS; SUBCUTANEOUS at 13:58

## 2020-01-01 RX ADMIN — ISOSORBIDE MONONITRATE 60 MILLIGRAM(S): 60 TABLET, EXTENDED RELEASE ORAL at 12:38

## 2020-01-01 RX ADMIN — Medication 10 MILLIGRAM(S): at 22:00

## 2020-01-01 RX ADMIN — SODIUM CHLORIDE 1000 MILLILITER(S): 9 INJECTION INTRAMUSCULAR; INTRAVENOUS; SUBCUTANEOUS at 05:59

## 2020-01-01 RX ADMIN — HEPARIN SODIUM 5000 UNIT(S): 5000 INJECTION INTRAVENOUS; SUBCUTANEOUS at 13:15

## 2020-01-01 RX ADMIN — HEPARIN SODIUM 5000 UNIT(S): 5000 INJECTION INTRAVENOUS; SUBCUTANEOUS at 05:27

## 2020-01-01 RX ADMIN — HEPARIN SODIUM 5000 UNIT(S): 5000 INJECTION INTRAVENOUS; SUBCUTANEOUS at 14:25

## 2020-01-01 RX ADMIN — HEPARIN SODIUM 5000 UNIT(S): 5000 INJECTION INTRAVENOUS; SUBCUTANEOUS at 05:23

## 2020-01-01 RX ADMIN — Medication 25 MILLIGRAM(S): at 13:25

## 2020-01-01 RX ADMIN — HEPARIN SODIUM 11 UNIT(S)/HR: 5000 INJECTION INTRAVENOUS; SUBCUTANEOUS at 14:00

## 2020-01-01 RX ADMIN — PANTOPRAZOLE SODIUM 40 MILLIGRAM(S): 20 TABLET, DELAYED RELEASE ORAL at 08:57

## 2020-01-01 RX ADMIN — ATORVASTATIN CALCIUM 80 MILLIGRAM(S): 80 TABLET, FILM COATED ORAL at 21:59

## 2020-01-01 RX ADMIN — SENNA PLUS 1 TABLET(S): 8.6 TABLET ORAL at 09:19

## 2020-01-01 RX ADMIN — Medication 75 MICROGRAM(S): at 05:44

## 2020-01-01 RX ADMIN — ATORVASTATIN CALCIUM 20 MILLIGRAM(S): 80 TABLET, FILM COATED ORAL at 21:20

## 2020-01-01 RX ADMIN — HEPARIN SODIUM 900 UNIT(S)/HR: 5000 INJECTION INTRAVENOUS; SUBCUTANEOUS at 07:01

## 2020-01-01 RX ADMIN — Medication 75 MICROGRAM(S): at 05:30

## 2020-01-01 RX ADMIN — Medication 1000 MILLILITER(S): at 22:01

## 2020-01-01 RX ADMIN — ATORVASTATIN CALCIUM 20 MILLIGRAM(S): 80 TABLET, FILM COATED ORAL at 21:10

## 2020-01-01 RX ADMIN — AMLODIPINE BESYLATE 5 MILLIGRAM(S): 2.5 TABLET ORAL at 13:25

## 2020-01-01 RX ADMIN — Medication 40 MILLIEQUIVALENT(S): at 15:49

## 2020-01-01 RX ADMIN — Medication 81 MILLIGRAM(S): at 11:55

## 2020-01-01 RX ADMIN — ISOSORBIDE MONONITRATE 60 MILLIGRAM(S): 60 TABLET, EXTENDED RELEASE ORAL at 11:55

## 2020-01-01 RX ADMIN — PANTOPRAZOLE SODIUM 40 MILLIGRAM(S): 20 TABLET, DELAYED RELEASE ORAL at 05:53

## 2020-01-01 RX ADMIN — CLOPIDOGREL BISULFATE 300 MILLIGRAM(S): 75 TABLET, FILM COATED ORAL at 07:57

## 2020-01-01 RX ADMIN — ATORVASTATIN CALCIUM 20 MILLIGRAM(S): 80 TABLET, FILM COATED ORAL at 22:01

## 2020-01-01 RX ADMIN — Medication 75 MICROGRAM(S): at 05:53

## 2020-01-01 RX ADMIN — CLOPIDOGREL BISULFATE 75 MILLIGRAM(S): 75 TABLET, FILM COATED ORAL at 12:07

## 2020-01-01 RX ADMIN — ONDANSETRON 4 MILLIGRAM(S): 8 TABLET, FILM COATED ORAL at 08:57

## 2020-01-01 RX ADMIN — ATORVASTATIN CALCIUM 80 MILLIGRAM(S): 80 TABLET, FILM COATED ORAL at 21:29

## 2020-01-01 RX ADMIN — HEPARIN SODIUM 5000 UNIT(S): 5000 INJECTION INTRAVENOUS; SUBCUTANEOUS at 05:44

## 2020-01-01 RX ADMIN — ATORVASTATIN CALCIUM 20 MILLIGRAM(S): 80 TABLET, FILM COATED ORAL at 21:51

## 2020-01-01 RX ADMIN — POTASSIUM PHOSPHATE, MONOBASIC POTASSIUM PHOSPHATE, DIBASIC 62.5 MILLIMOLE(S): 236; 224 INJECTION, SOLUTION INTRAVENOUS at 12:29

## 2020-06-11 NOTE — ED PROVIDER NOTE - NS ED ROS FT
General: denies fever, chills, weight loss/weight gain.  HENT: denies nasal congestion, sore throat, rhinorrhea, ear pain  Eyes: denies visual changes, blurred vision, eye discharge, eye redness  Neck: denies neck pain, neck swelling  CV: denies chest pain, palpitations  Resp: denies difficulty breathing, cough  Abdominal: +nausea, vomiting; denies diarrhea, abdominal pain, blood in stool, dark stool  MSK: denies muscle aches, bony pain, leg pain, leg swelling  Neuro: +lightheadedness; denies headaches, numbness, tingling, dizziness  Skin: denies rashes, cuts, bruises  Hematologic: denies unexplained bruises

## 2020-06-11 NOTE — H&P ADULT - HISTORY OF PRESENT ILLNESS
80 y.o. F w/ PMHx of HTN, CAD, Cardiac Stents x2 (2019, ruptured peptic ulcer that required surgery, DM2 (diet-controlled,) HLD p/w sudden-onset lightheadedness and four episodes of nbnb vomiting since 1am.  Pt was sleeping, woke up at 1am to go to the bathroom. Suddenly felt dizzy, lightheaded, and vomited x 4. No LOC. States her dinner was leftover from the day before, rice w/ vegetables and a pork chop. In ED pt developed midsternal chest pain, described as "ache," 7/10, non-radiating, non-pleuritic, constant. Pt denies diarrhea, fever, syncope, SOB, abd pain, dysuria, hematuria, hematochezia, melena or sick contacts. 80 y.o. F w/ PMHx of HTN, CAD, Cardiac Stents x2 (2019, ruptured peptic ulcer that required surgery, Prediabetes, HLD p/w sudden-onset lightheadedness and four episodes of nbnb vomiting since 1am.  Pt was sleeping, woke up at 1am to go to the bathroom. Suddenly felt dizzy, lightheaded, and vomited x 4. No LOC. States her dinner was leftover from the day before, rice w/ vegetables and a pork chop. In ED pt developed midsternal chest pain, described as "ache," 7/10, non-radiating, non-pleuritic, constant. Pt denies diarrhea, fever, syncope, SOB, abd pain, dysuria, hematuria, hematochezia, melena or sick contacts.    Also in ED pt had an episode of bradycardia 33-45bpm for 6mins. 80 y.o. F w/ PMHx of HTN, CAD, Cardiac Stents x2 (2019, ruptured peptic ulcer that required surgery, Prediabetes, HLD p/w sudden-onset lightheadedness and four episodes of nbnb vomiting since 1am.  Pt was sleeping, woke up at 1am to go to the bathroom. Suddenly felt dizzy, lightheaded, and vomited x 4. No LOC. States her dinner was leftover from the day before, rice w/ vegetables and a pork chop. In ED pt developed midsternal chest pain, described as "ache," 7/10, non-radiating, non-pleuritic, constant.   Pt denies diarrhea, fever, syncope, SOB, abd pain, dysuria, hematuria, hematochezia, melena or sick contacts.    Also in ED pt had an episode of bradycardia 33-45bpm for 6mins. 80 y.o. F w/ PMHx of HTN, CAD, Cardiac Stents x2 (2019, ruptured peptic ulcer that required surgery, Prediabetes, HLD p/w sudden-onset lightheadedness and four episodes of nbnb vomiting since 1am.  Pt was sleeping, woke up at 1am to go to the bathroom. Suddenly felt dizzy, lightheaded, and vomited x 4. No LOC. States her dinner was leftover from the day before, rice w/ vegetables and a pork chop. In ED pt developed midsternal chest pain, described as "ache," 7/10, non-radiating, non-pleuritic, constant.   Pt denies diarrhea, fever, syncope, abd pain, dysuria, hematuria, hematochezia, melena or sick contacts.    Also in ED pt had an episode of bradycardia 33-45bpm for 6mins.

## 2020-06-11 NOTE — H&P ADULT - NSICDXPASTMEDICALHX_GEN_ALL_CORE_FT
PAST MEDICAL HISTORY:  Anemia     Arthritis     CAD (coronary artery disease) OM 2 100%, RCA 75    Carotid artery stenosis L    DM2 (diabetes mellitus, type 2) diet controlled    Hypercholesterolemia     Hypothyroidism     MVP (mitral valve prolapse)     PUD (peptic ulcer disease)     Uterine cancer treated surgically PAST MEDICAL HISTORY:  Anemia     Arthritis     CAD (coronary artery disease) OM 2 100%, RCA 75    Carotid artery stenosis L    History of prediabetes     Hypercholesterolemia     Hypothyroidism     MVP (mitral valve prolapse)     PUD (peptic ulcer disease)     Uterine cancer treated surgically

## 2020-06-11 NOTE — H&P ADULT - PROBLEM SELECTOR PLAN 2
Orthostatic blood pressures  Anemia labs ordered: TIBC, Iron, Ferritin, Retic, Haptoglobin, LDH, B12, Folate, Type and Screen  -will repeat CBC 6hr alvina  -will consider transfusing PRBC

## 2020-06-11 NOTE — H&P ADULT - ASSESSMENT
80 y.o. F w/ PMHx of HTN, CAD, Cardiac Stents x2 (2019, ruptured peptic ulcer that required surgery, HLD p/w dizziness, lightheadedness, feeling like passing out since this morning, followed by constant chest pain in ED, had an episode of bradycardia 30's-40's, found to have worsening anemia, admitted to tele for symptomatic bradycardia, r/o ACS, and symptomatic anemia.    +Symptomatic Bradycardia  +Symptomatic Anemia  +R/O UTI

## 2020-06-11 NOTE — ED PROVIDER NOTE - CLINICAL SUMMARY MEDICAL DECISION MAKING FREE TEXT BOX
Pt. is an 80 y.o. F p/w lightheadedness and four episodes of nbnb vomiting since.  Concerned for an arrhythmogenic vs ischemic vs mass occupying lesion vs hypothyroidism causing sxs.  Will get basics, trops, TSH, ekg, CXR, ct head non con, u/a w/ culture.  Will administer fluids, manage sxs, and reassess.  Will dispo pending results.

## 2020-06-11 NOTE — ED PROVIDER NOTE - OBJECTIVE STATEMENT
Pt. is an 80 y.o. F w/ PMHx of HTN, CAD, ruptured peptic ulcer that required surgery, DM, HLD p/w sudden-onset lightheadedness and four episodes of nbnb vomiting since 1am.  Pt. was talking and laughing w/ her  when sxs suddenly started.  States her dinner was leftover from the day before.  Denies diarrhea, syncope, CP, SOB, abd pain, dysuria, hematuria, hematochezia, melena.  Pt. states she had a similar incident a year ago but does not recall what her dx was or what was done to her.  Denies recent illnesses, vision/hearing changes.  +hx of cardiac stents, denies hx of MI, CVA.  Denies any motor/sensory abnormalities at this time.

## 2020-06-11 NOTE — H&P ADULT - PROBLEM SELECTOR PLAN 1
tele monitor   cardiac enzymes x 2  serial EKGs  -will hold Coreg for now  -ZOLL pads attached to patient at all times and Atropine by bedside  -obtain TTE  -check TFTs

## 2020-06-11 NOTE — H&P ADULT - NSICDXPASTSURGICALHX_GEN_ALL_CORE_FT
PAST SURGICAL HISTORY:  H/O total knee replacement R    H/O: hysterectomy due to Uterine cancer    PUD (peptic ulcer disease) treated surgically in 1980    Rectal cyst treated surgically    Stented coronary artery

## 2020-06-11 NOTE — H&P ADULT - PROBLEM SELECTOR PLAN 3
-s/p IV Ceftriaxone 1g x1 dose by ED.  -F/U UCx, will not continue antibiotics until UCx returns or pt becomes febrile or develops elevated WBC. -c/w IV Ceftriaxone 1g Q24h  -F/u UCx

## 2020-06-11 NOTE — H&P ADULT - ATTENDING COMMENTS
Patient seen, examined, and interviewed by me, case discussed with me, chart reviewed, agree with above H/P as reviewed and edited by me.  See  full note above.  iron deficiency anemia >> needs further workup when cardiac wise cleared  also given atypical pain in chest, among other things, recommended OP Mammogram   cardio appreciated  if further significant bradycardia, would get EP eval

## 2020-06-11 NOTE — ED ADULT NURSE REASSESSMENT NOTE - NS ED NURSE REASSESS COMMENT FT1
A+OX4.  Pt denies Hx DM or diet controlled diabetes.  Refusing FS A+OX4.  Pt denies Hx DM or diet controlled diabetes.  Refusing FS.  NP notified

## 2020-06-11 NOTE — ED PROVIDER NOTE - PROGRESS NOTE DETAILS
Pt was noted to have Hb 8.4 compared to previous 10.7 In Aug 2019. Went to obtain Occult blood feces and pt started to become bradycardic to 30s HR and started feeling lightheaded but was awake. Episode lasted approx 1 min, pt placed immediately in trendelenburg and EKG obtained but at that time pt HR in 50-60s. Pt denies chest pain, SOB during this episode. Trop 37, will repeat. Pt placed on Zoll and given IVF in pressure bag. Will need admission. Eleuterio PGY1 - Called pt.'s cardiologist, Dr. Chriss Clifford.  Asked to admit pt. to Dr. Nuñez and Dr. Clifford will follow him.  Called and paged Dr. Nuñez Eleuterio PGY1 - Called pt.'s cardiologist, Dr. Chriss Dempsey.  Asked to admit pt. to Dr. Nuñez and Dr. Dempsey will follow him.  Called and paged Dr. Nuñez Dr Nuñez called, will admit to his service for treatment. Started Abx for UTI. Culture sent.

## 2020-06-11 NOTE — ED PROVIDER NOTE - ATTENDING CONTRIBUTION TO CARE
HPI: 80 y.o. F w/ PMHx of HTN, CAD with 2 stents, ruptured peptic ulcer that required surgery, DM, HLD p/w sudden-onset lightheadedness and four episodes of nbnb vomiting since 1am.  Pt. was talking and laughing w/ her  when sxs suddenly started.  States her dinner was leftover from the day before.  Denies diarrhea, syncope, CP, SOB, abd pain, dysuria, hematuria, hematochezia, melena.  Pt. states she had a similar incident a year ago but does not recall what her dx was or what was done to her.  Denies recent illnesses, vision/hearing changes.  Denies any motor/sensory abnormalities at this time.  EXAM: Uncomfortable appearing, moaning in discomfort, eyes EOMI/PERRL, finger to nose intact, unable to ambulate secondary to lightheadedness, pulses palpable x 4 and equal, heart bradycardic but no M/R/G, JVD, lungs ctab, abd soft nontender, pelvis stable, no pitting edema BLE.   MDM: pt with multiple medical problems that had sudden onset lightheadedness and unable to ambulate secondary to this condition that was found to be bradycardic in ED. Neuro intact. Denies chest pain but in ED also weak. Consider both neuro and cardio issue leading to symptoms. Will need labs, imaging, and provide IVF and meds and reassess but pt most likely will need admission given her history and symptoms. Possible infectious as well but denies URI/UTI like symptoms. Will check UA and temp.

## 2020-06-11 NOTE — H&P ADULT - NSHPLABSRESULTS_GEN_ALL_CORE
EKG: Sinus Bradycardia with frequent PVCs @ 52 bpm  Trops: 37-->27  CT Head w/o con: neg  CXR: clear lungs  UA:moderate leuks, neg nitrates  H&H:8.4/27.9  GUIAC: neg

## 2020-06-11 NOTE — ED ADULT NURSE NOTE - OBJECTIVE STATEMENT
Pt received to room 17 complaining of nausea/vomiting x 1 day. AxOx4 and ambulatory at baseline. Unsteady gait at this time. Pt states at approximately 1am she began feeling dizzy and nauseous. Pt endorses dizziness at this time and vomiting three times, no blood in vomit. Pt actively vomiting brown colored emesis in room. Respirations even and unlabored, 100% on RA. Sinus bradycardia on monitor. MD aware. Pt denies headache, chest pain, SOB, diarrhea, fever, chills and cough. Past medical history of HTN, DM and cardiac stents. MD at bedside for eval. Awaiting MD orders. Will continue to monitor.

## 2020-06-11 NOTE — H&P ADULT - NSHPPHYSICALEXAM_GEN_ALL_CORE
T(C): 36.4 (06-11-20 @ 07:07), Max: 36.4 (06-11-20 @ 07:07)  HR: 68 (06-11-20 @ 07:07) (52 - 68)  BP: 107/63 (06-11-20 @ 07:07) (103/50 - 130/77)  RR: 20 (06-11-20 @ 07:07) (17 - 20)  SpO2: 96% (06-11-20 @ 07:07) (96% - 100%)

## 2020-06-11 NOTE — ED ADULT NURSE REASSESSMENT NOTE - NS ED NURSE REASSESS COMMENT FT1
NAD.  NP at bedside.  VSS.  Zoll and CM in progress.  Labs to be drawn 10am NAD.  NP at bedside.  VSS.  Zoll and CM in progress.  Labs to be drawn 10am.  O2 2L NC placed on pt by NP for comfort

## 2020-06-11 NOTE — CONSULT NOTE ADULT - SUBJECTIVE AND OBJECTIVE BOX
patient known to me from prior admissions. Now admitted with dizziness and nausea vomiting and bradycardia during nausea  · HPI Objective Statement: Pt. is an 80 y.o. F w/ PMHx of HTN, CAD, ruptured peptic ulcer that required surgery, DM, HLD p/w sudden-onset lightheadedness and four episodes of nbnb vomiting since 1am.  Pt. was talking and laughing w/ her  when sxs suddenly started.  States her dinner was leftover from the day before.  Denies diarrhea, syncope, CP, SOB, abd pain, dysuria, hematuria, hematochezia, melena.  Pt. states she had a similar incident a year ago but does not recall what her dx was or what was done to her.  Denies recent illnesses, vision/hearing changes.  +hx of cardiac stents, denies hx of MI, CVA.  Denies any motor/sensory abnormalities at this time.	  patient had catheterization in 2017 with normal LV function with progressive disease of the ostial and mid right coronary. She opted for medical therapy and has been asymptomatic from a cardiac point of view  She was readmitted in 2018 with abdominal discomfort and possible ischemic colitis and possible pneumonia  A central blood pressure is much higher than her arm pressures and her pressure should be taken in her legs  She had episode of bradycardia when she had nausea and vomiting and this has subsequently resolved  MEDICATIONS:  MEDICATIONS  (STANDING):  atorvastatin 20 milliGRAM(s) Oral at bedtime  heparin   Injectable 5000 Unit(s) SubCutaneous every 8 hours  iron sucrose IVPB 100 milliGRAM(s) IV Intermittent every 24 hours  isosorbide   mononitrate ER Tablet (IMDUR) 60 milliGRAM(s) Oral daily  levothyroxine 75 MICROGram(s) Oral daily      PHYSICAL EXAM:  T(C): 36.4 (20 @ 07:07), Max: 36.4 (20 @ 07:07)  HR: 56 (20 @ 12:25) (52 - 68)  BP: 102/65 (20 @ 12:25) (102/65 - 130/77)  RR: 17 (20 @ 12:25) (17 - 20)  SpO2: 100% (20 @ 12:25) (96% - 100%)  Wt(kg): --  I&O's Summary    Height (cm): 154.94 ( @ 08:15)  Weight (kg): 77.6 ( @ 08:15)  BMI (kg/m2): 32.3 ( @ 08:15)  BSA (m2): 1.77 ( @ 08:15)    Appearance: Normal	in good spirits no acute distress  HEENT:   Normal oral mucosa, PERRL, EOMI	  Cardiovascular: Normal S1 S2, No JVD, 1/6 murmur at the right base  Respiratory: Lungs clear to auscultation, normal effort 	  Gastrointestinal:  Soft, Non-tender, + BS	  Psychiatry:  Mood & affect appropriate  Ext: No edema  Peripheral pulses palpable 2+ bilaterally      LABS:    CARDIAC MARKERS:  CARDIAC MARKERS ( 2020 05:36 )  x     / x     / 114 u/L / 1.48 ng/mL / x                                    8.2    4.34  )-----------( 172      ( 2020 09:55 )             26.8         143  |  107  |  24<H>  ----------------------------<  133<H>  3.7   |  23  |  0.86    Ca    9.7      2020 04:20  Phos  2.5       Mg     2.2         TPro  7.2  /  Alb  4.2  /  TBili  0.3  /  DBili  x   /  AST  62<H>  /  ALT  40<H>  /  AlkPhos  81      proBNP:   Lipid Profile:   HgA1c:   TSH: Thyroid Stimulating Hormone, Serum: 7.35 uIU/mL ( @ 04:20)            TELEMETRY: 	    ECG:  	  RADIOLOGY:   DIAGNOSTIC TESTING:  [ ] Echocardiogram:  < from: Transthoracic Echocardiogram (18 @ 12:01) >  1. Mitral annular calcification, otherwise normal mitral  valve.  2. Increased relative wall thickness with normal left  ventricular mass index, consistent with concentric left  ventricular remodeling.  3. Normal left ventricular systolic function. No segmental  wall motion abnormalities.  4. The right ventricle is not well visualized.  5. Estimated pulmonary artery systolic pressure equals 58  mm Hg, assuming right atrial pressure equals 10  mm Hg,  consistent with moderate pulmonary hypertension.  ------------------------------------------------------------------------  Confirmed on  2018 - 15:12:10 by Jaylen Brown M.D. RPVI  ------------------------------------------------------------------------  < from: CT Head No Cont (20 @ 05:17) >    FINDINGS:    There is no acute intracranial mass-effect, hemorrhage, midline shift, or abnormal extra-axial fluid collection. Gray-white differentiation is maintained.    Mild chronic microvascular ischemic changes and atheromatous calcification along the carotid siphons.    Ventricles, sulci, and cisterns are normal in size for the patient's age without hydrocephalus. Basal cisterns are patent.     Visualized paranasal sinuses and mastoid air cells are clear. Calvarium is intact.     IMPRESSION:     No acute intracranial bleeding, mass effect, or shift. Mild chronic microvascular ischemic changes.     < from: Cardiac Cath Lab - Adult (17 @ 11:55) >  VENTRICLES: There were no left ventricular global or regional wall motion  abnormalities. hyperdynamic LV EF estimated was 75 %.  VALVES: MITRAL VALVE: The mitral valve exhibited no regurgitation.  CORONARY VESSELS: The coronary circulation is right dominant.  LM:   --  LM: Normal.  LAD:   --  LAD: Angiography showed minor luminal irregularities with no flow  limiting lesions.  CX:   --  OM2: There was a 100 % stenosis. small caliber vessel  RCA:   --  RCA: Angiography showed minor luminal irregularities with no flow  limiting lesions. Moderate diffuse disease througout with osital 60% and mid  90% that has progressed with left to right collaterals from the :CX to PL branch  --  Ostial RCA: There was a 60 % stenosis. damping unchanged from 1 year ago  --  Mid RCA: There was a 90 % stenosis. The lesion was eccentric.  COMPLICATIONS: No complications occurred during the cath lab visit.  DIAGNOSTIC IMPRESSIONS: Patient has hyperdynamic LV with systemic hypertension  in aorta, severe disease of mid RCA and moderate ostial RCA disease. The mid  RCA disease has progressed with left to right collaterals to PL branch  DIAGNOSTIC RECOMMENDATIONS: As patient is asymptomatic with preserved LV  function and patient prefers medical therapy. would continue medical therapy  and risk factor modification  Prepared and signed by  Chriss Dempsey M.D.  Signed 2017 13:04:43  < from: Cardiac Cath Lab - Adult (17 @ 11:55) >  HEMODYNAMIC TABLES  Pressures:  Baseline  Pressures:  - HR: 58  Pressures:  - Rhythm:  Pressures:  -- Aortic Pressure (S/D/M): 191/54/103  Pressures:  -- Left Ventricle (s/edp): 199/14/--  Pressures:  Post Angio  Pressures:  - HR: 60  Pressures:  -Rhythm:  Pressures:  -- Aortic Pressure (S/D/M): 183/54/100  Pressures:  -- Left Ventricle (s/edp): 193/12/--  Outputs:  Baseline  Outputs:  -- CALCULATIONS[age]  Outputs:  -- CALCULATIONS: Body Surface Area: 1.79  Outputs:  -- CALCULATIONS: Height in cm: 162.00  Outputs:  -- CALCULATIONS: Sex: Female  Outputs:  -- CALCULATIONS: Weight in k.40  Outputs:  -- OUTPUTS: O2 consumption: 224.18  Outputs:  -- OUTPUTS: Vo2 Indexed: 125.00  Outputs:  Post Angio  Outputs:  -- OUTPUTS: O2 consumption: 224.18  Outputs:  -- OUTPUTS: Vo2 Indexed: 125.00

## 2020-06-11 NOTE — ED ADULT NURSE REASSESSMENT NOTE - NS ED NURSE REASSESS COMMENT FT1
Pt became sinus bradycardic with heart rate of 30 while RN and MD present in the room. Pt complained of severe dizziness. Pt placed on zoll with pacer pads, placed in trendelenburg by MD, second 20G IV placed by RN Chava to right forearm, labs drawn and sent. Pt NSR on cardiac monitor at this time. Pt appears in NAD, respirations even and unlabored. Awaiting lab results. Will continue to monitor.

## 2020-06-11 NOTE — CONSULT NOTE ADULT - ASSESSMENT
1. Dizziness probably secondary to nausea vomiting with bradycardia secondary to increased vagal tone  2. Hypertensive cardiovascular disease with central aortic pressure higher then arm pressure  3. Single-vessel coronary disease of the right coronary artery asymptomatic  4. History of ulcer disease with ruptured ulcer any years ago  5. Anemia  6. Nausea and vomiting possible GI etiology    Recommendations  Monitor blood pressure in the legs rather than the arms  GI evaluation  Monitor her heart rate considering bradycardia and dizziness that she had earlier on  2-D echo to reevaluate LV and RV function  There does not appear to be any acute cardiac issues

## 2020-06-11 NOTE — H&P ADULT - RS GEN PE MLT RESP DETAILS PC
clear to auscultation bilaterally/no rales/no intercostal retractions/breath sounds equal/respirations non-labored/good air movement/airway patent/no chest wall tenderness/no rhonchi/no wheezes

## 2020-06-11 NOTE — ED ADULT NURSE REASSESSMENT NOTE - NS ED NURSE REASSESS COMMENT FT1
20G IV placed to left forearm by BONITA Larsen. Labs drawn and sent. Line clear and patent. Will continue to monitor.

## 2020-06-11 NOTE — ED ADULT TRIAGE NOTE - CHIEF COMPLAINT QUOTE
Pt is feeling dizzy, weak and nauseous and vomiting since an hour ago. Denies CP or SOB. PMH of cardiac stents. Meds: levothyroxine, aspirin ,HCTZ, carvedilol, clopidogrel.  Manny Garcia ([phone]

## 2020-06-11 NOTE — ED ADULT NURSE NOTE - CHIEF COMPLAINT QUOTE
Pt is feeling dizzy, weak and nauseous and vomiting since an hour ago. Denies CP or SOB. PMH of cardiac stents. Meds: levothyroxine, aspirin ,HCTZ, carvedilol, clopidogrel.  Manny Garcia ([phone]

## 2020-06-11 NOTE — ED PROVIDER NOTE - CARE PLAN
Principal Discharge DX:	1st degree AV block  Secondary Diagnosis:	Pre-syncope Principal Discharge DX:	Bradycardia  Secondary Diagnosis:	Pre-syncope

## 2020-06-11 NOTE — ED PROVIDER NOTE - PHYSICAL EXAMINATION
GENERAL: Patient awake alert NAD.  HEENT: NC/AT, PERRL, EOMI, no nystagmus noted.  LUNGS: CTAB, no wheezes or crackles.  CARDIAC: sinus bradycardia, no m/r/g.  ABDOMEN: Soft, NT, ND, No rebound, guarding.  EXT: No edema. No calf tenderness. CV 2+DP/PT bilaterally.  MSK: No spinal tenderness, no pain with movement, no deformities.  NEURO: A&Ox3. Moving all extremities. Unsteady gait upon ambulation.  Motor strength 5/5 in all four extremities, sensation intact in all four extremities.  SKIN: Warm and dry. No rash.  PSYCH: Normal affect.

## 2020-06-11 NOTE — H&P ADULT - NEGATIVE CARDIOVASCULAR SYMPTOMS
no claudication/no peripheral edema/no paroxysmal nocturnal dyspnea/no dyspnea on exertion/no orthopnea

## 2020-06-12 NOTE — CONSULT NOTE ADULT - SUBJECTIVE AND OBJECTIVE BOX
Chief Complaint:  Patient is a 80y old  Female who presents with a chief complaint of dizziness nausea and vomiting (2020 17:01)    History of prediabetes  CAD (coronary artery disease)  PUD (peptic ulcer disease)  DM2 (diabetes mellitus, type 2)  Anemia  Arthritis  Uterine cancer  Carotid artery stenosis  MVP (mitral valve prolapse)  Hypercholesterolemia  Hypothyroidism  Stented coronary artery  Rectal cyst  PUD (peptic ulcer disease)  H/O: hysterectomy  H/O total knee replacement     HPI:  80 y.o. F w/ PMHx of HTN, CAD, Cardiac Stents x2 (2019, ruptured peptic ulcer that required surgery, Prediabetes, HLD p/w sudden-onset lightheadedness and four episodes of nbnb vomiting since 1am.  Pt was sleeping, woke up at 1am to go to the bathroom. Suddenly felt dizzy, lightheaded, and vomited x 4. No LOC. States her dinner was leftover from the day before, rice w/ vegetables and a pork chop. In ED pt developed midsternal chest pain, described as "ache," 7/10, non-radiating, non-pleuritic, constant.   GI consulted for anemia. The patient reports that she has normal bowel habits, soft/formed and brown in color; she does not note any melena or hematochezia and has no abdominal pain. She states that she felt dizzy yesterday and it precipitated multiple episodes of nonbloody vomiting. She does have complaint of a feeling for "fullness/bloating" at her epigastrium that at times spontaneously occurs and other times is worsened with po intake.  She has had both egd/colonoscopy in the past, over 10 years ago that were unrevealing.     Pt denies diarrhea, fever, syncope, abd pain, dysuria, hematuria, hematochezia, melena or sick contacts.   Also in ED pt had an episode of bradycardia 33-45bpm for 6mins. (2020 08:15)      latex (Rash)  penicillin (Faint)      acetaminophen   Tablet .. 650 milliGRAM(s) Oral every 6 hours PRN  amLODIPine   Tablet 5 milliGRAM(s) Oral daily  aspirin enteric coated 81 milliGRAM(s) Oral daily  atorvastatin 20 milliGRAM(s) Oral at bedtime  heparin   Injectable 5000 Unit(s) SubCutaneous every 8 hours  hydrochlorothiazide 25 milliGRAM(s) Oral daily  iron sucrose IVPB 100 milliGRAM(s) IV Intermittent every 24 hours  isosorbide   mononitrate ER Tablet (IMDUR) 60 milliGRAM(s) Oral daily  levothyroxine 75 MICROGram(s) Oral daily        FAMILY HISTORY:  No pertinent family history in first degree relatives        Review of Systems:    General:  No wt loss, fevers, chills, night sweats, fatigue  Eyes:  Good vision, no reported pain  ENT:  No sore throat, pain, runny nose, dysphagia  CV:  No pain, palpitations, no lightheadedness  Resp:  No dyspnea, cough, tachypnea, wheezing  GI: denies n/v/d/c, abdominal pain, melena or brbpr   :  No pain, bleeding, incontinence, nocturia  Muscle:  No pain, weakness  Neuro:  No weakness, tingling, memory problems  Psych:  No fatigue, insomnia, mood problems, depression  Endocrine:  No polyuria, polydypsia, cold/heat intolerance  Heme:  No petechiae, ecchymosis, easy bruisability  Skin:  No rash, tattoos, scars, edema    Relevant Family History:   n/c    Relevant Social History: n/c      Physical Exam:    Vital Signs:  Vital Signs Last 24 Hrs  T(C): 36.8 (2020 08:50), Max: 37 (2020 06:32)  T(F): 98.3 (2020 08:50), Max: 98.6 (2020 06:32)  HR: 58 (2020 11:20) (54 - 66)  BP: 98/52 (2020 11:20) (95/45 - 106/46)  BP(mean): --  RR: 17 (2020 08:50) (17 - 17)  SpO2: 100% (2020 08:50) (99% - 100%)  Daily Height in cm: 154.94 (2020 20:56)    Daily Weight in k (2020 05:56)    General:  Appears stated age, well-groomed, nad  HEENT:  NC/AT,  conjunctivae clear and pink, no thyromegaly, nodules, adenopathy, no JVD  Chest:  Full & symmetric excursion, no increased effort, breath sounds clear  Cardiovascular:  Regular rhythm, S1, S2, no murmur/rub/S3/S4, no abdominal bruit, no edema  Abdomen:  Soft, non-tender, non-distended, normoactive bowel sounds,  no masses ,no hepatosplenomeagaly, no signs of chronic liver disease  Extremities:  no cyanosis,clubbing or edema  Skin:  No rash/erythema/ecchymoses/petechiae/wounds/abscess/warm/dry  Neuro/Psych:  A&Ox3  , no asterixis, no tremor, no encephalopathy    Laboratory:                            9.5    4.82  )-----------( 185      ( 2020 10:26 )             32.0     06-12    143  |  111<H>  |  17  ----------------------------<  85  3.4<L>   |  21<L>  |  0.72    Ca    9.0      2020 05:00  Phos  2.5     06-11  Mg     2.2     06-11    TPro  7.2  /  Alb  4.2  /  TBili  0.3  /  DBili  x   /  AST  62<H>  /  ALT  40<H>  /  AlkPhos  81  06-11    LIVER FUNCTIONS - ( 2020 04:20 )  Alb: 4.2 g/dL / Pro: 7.2 g/dL / ALK PHOS: 81 u/L / ALT: 40 u/L / AST: 62 u/L / GGT: x           PT/INR - ( 2020 11:40 )   PT: 13.1 SEC;   INR: 1.14          PTT - ( 2020 11:40 )  PTT:25.5 SEC  Urinalysis Basic - ( 2020 05:42 )    Color: LIGHT YELLOW / Appearance: CLEAR / S.017 / pH: 6.5  Gluc: NEGATIVE / Ketone: NEGATIVE  / Bili: NEGATIVE / Urobili: NORMAL   Blood: NEGATIVE / Protein: 30 / Nitrite: NEGATIVE   Leuk Esterase: MODERATE / RBC: 0-2 / WBC 11-25   Sq Epi: FEW / Non Sq Epi: x / Bacteria: NEGATIVE        Imaging:    < from: CT Angio Chest w/ IV Cont (20 @ 17:23) >    EXAM:  CT ANGIO CHEST (W)AW IC        PROCEDURE DATE:  2020         INTERPRETATION:  Clinical information: Chest pain. Exam is compared to previous study of 2015.    CT angiogram of the chest was obtained following administration of intravenous contrast. Approximately 90 cc of Omnipaque 350 was administered and 10 cc was discarded. Coronal, sagittal and MIP images were submitted for review.    No hilar and/or mediastinal adenopathy is noted.     Heart is enlarged in size. Calcification/stent noted within the coronary arteries. No pericardial effusion is noted. Main pulmonary artery measures 4 cm. No filling defects are noted.     No endobronchial lesions are noted. Few interlobular septal thickening as well as thickening of the peribronchovascular interstitium are noted bilaterally, more so within both lower lobes. Very small pleural effusions are noted bilaterally.    Below the diaphragm, visualized portions of the abdomen are unremarkable.     Degenerative changes of the spine are noted.    Impression: No pulmonary embolus is noted.    Findings suggestive of pulmonary edema.                  BRENDA BLACK M.D., ATTENDING RADIOLOGIST  This document has been electronically signed. 2020  6:40PM                  < end of copied text >

## 2020-06-12 NOTE — PHYSICAL THERAPY INITIAL EVALUATION ADULT - PERTINENT HX OF CURRENT PROBLEM, REHAB EVAL
80 y.o. F w/ PMHx of HTN, CAD, Cardiac Stents x2 (2019, ruptured peptic ulcer that required surgery, HLD p/w dizziness, lightheadedness, feeling like passing out since this morning, followed by constant chest pain in ED, had an episode of bradycardia 30's-40's, found to have worsening anemia, admitted to tele for symptomatic bradycardia, r/o ACS, and symptomatic anemia

## 2020-06-12 NOTE — PHYSICAL THERAPY INITIAL EVALUATION ADULT - ACTIVE RANGE OF MOTION EXAMINATION, REHAB EVAL
rosemary. upper extremity Active ROM was WNL (within normal limits)/bilateral lower extremity Active ROM was WNL (within normal limits)

## 2020-06-12 NOTE — PHYSICAL THERAPY INITIAL EVALUATION ADULT - ADDITIONAL COMMENTS
Pt reports that she lives in a private house with her , 2 grandson's and her grandson's girlfriends with ~4 steps to enter; (+)bilateral handrails; bedroom/bathroom on the first floor. Prior to hospital admission pt was completely independent and used no assistive device with ambulation. Pt denies any recent falls.    Pt left comfortable in chair, NAD, all lines intact, all precautions maintained, with call bell in reach, and RN aware of PT evaluation.

## 2020-06-12 NOTE — PHYSICAL THERAPY INITIAL EVALUATION ADULT - PATIENT PROFILE REVIEW, REHAB EVAL
yes/ACTIVITY: Ambulate with Assistance/ Bed in chair mode; spoke with RN Julia Martinez prior to PT evaluation--> Pt OK for PT consult/OOB activity

## 2020-06-12 NOTE — CHART NOTE - NSCHARTNOTEFT_GEN_A_CORE
[facility] MEDICINE NIGHT COVERAGE    Notified by RN - patient's AM labs as follows:             6.7    3.78  )-----------( 157      ( 12 Jun 2020 05:00 )             22.7     Patient seen at bedside. Reports she is chronically anemic and has received blood transfusions in the past without having a transfusion reaction. Risks vs. benefits of blood transfusion discussed with patient, patient expressed understanding and granted consent. Consent signed and placed in chart. Witnessed by RN at bedside. Will give 1U PRBC and check post transfusion CBC. Day team made aware. Will continue to monitor patient.    Cecilia Vargas PA-C  Medicine [id number]

## 2020-06-12 NOTE — CONSULT NOTE ADULT - ASSESSMENT
79yo F w/ PMHx of HTN, CAD, Cardiac Stents x2 (2019, ruptured peptic ulcer that required surgery, Prediabetes, HLD p/w sudden-onset lightheadedness and four episodes of nbnb vomiting since 1am.  GI consulted for anemia.      Occult (+) Anemia   new iron deficiency anemia r/o malignancy vs slow gib  good response to PRBC  trend h/h and transfuse PRN to keep Hgb >8  Protonix 40mg BID  maalox prn   keep stools soft  plans for EGD/Colonoscopy Monday  clear liquid diet Sunday, NPO p MN on Sunday   please check Covid Swab Saturday or Sunday for procedure    HTN  cont management per cardiology and medicine recs    Advanced care planning was discussed with patient and family.  Advanced care planning forms were reviewed and discussed.  Risks, benefits and alternatives of gastroenterologic procedures were discussed in detail and all questions were answered.  30 minutes spent.

## 2020-06-13 NOTE — PROGRESS NOTE ADULT - SUBJECTIVE AND OBJECTIVE BOX
GASTROENTEROLOGY    Patient seen and examined at bedside  Awake, alert, and oriented   No acute overnight events noted  She denies nausea/vomiting/abdominal pain  Having brown stools  Tolerating diet  upper gastrointestinal endoscopy/colonoscopy planned on Monday        MEDICATIONS  (STANDING):  amLODIPine   Tablet 5 milliGRAM(s) Oral daily  aspirin enteric coated 81 milliGRAM(s) Oral daily  atorvastatin 20 milliGRAM(s) Oral at bedtime  heparin   Injectable 5000 Unit(s) SubCutaneous every 8 hours  hydrochlorothiazide 25 milliGRAM(s) Oral daily  iron sucrose IVPB 100 milliGRAM(s) IV Intermittent every 24 hours  isosorbide   mononitrate ER Tablet (IMDUR) 60 milliGRAM(s) Oral daily  levothyroxine 75 MICROGram(s) Oral daily        T(F): 98.2 (06-13-20 @ 06:46), Max: 98.2 (06-13-20 @ 06:46)  HR: 56 (06-13-20 @ 06:46) (56 - 61)  BP: 115/63 (06-13-20 @ 06:46) (98/52 - 139/72)  RR: 18 (06-13-20 @ 06:46) (18 - 18)  SpO2: 100% (06-13-20 @ 06:46) (100% - 100%)  Wt(kg): --    PHYSICAL EXAM  GENERAL:   NAD  HEENT:  NC/AT, no JVD, sclera-anicteric  CHEST:  clear to ascultation bilaterally, respirations nonlabored  HEART:  +S1+S2   ABDOMEN:  Soft, non-tender, non-distended, + bowel sounds  EXTREMITIES:  no cyanosis, clubbing or edema  NEURO:  Alert, oriented  SKIN:  No rash/warm/dry      LABS:                        9.2<L>  5.83  )-----------( 158      ( 13 Jun 2020 06:12 )             30.0<L>  13 Jun 2020 06:12    06-13    143  |  108<H>  |  12  ----------------------------<  80  3.4<L>   |  22  |  0.65    Ca    9.5      13 Jun 2020 06:12        PT/INR - ( 11 Jun 2020 11:40 )   PT: 13.1 SEC;   INR: 1.14          PTT - ( 11 Jun 2020 11:40 )  PTT:25.5 SEC  I&O's Detail    12 Jun 2020 07:01  -  13 Jun 2020 07:00  --------------------------------------------------------  IN:    Oral Fluid: 240 mL  Total IN: 240 mL    OUT:  Total OUT: 0 mL    Total NET: 240 mL          Culture - Urine (collected 11 Jun 2020 09:10)  Source: .Urine  Final Report (12 Jun 2020 16:28):    <10,000 CFU/mL Normal Urogenital Lay    Culture - Urine (collected 11 Jun 2020 05:42)  Source: .Urine Clean Catch (Midstream)  Final Report (12 Jun 2020 16:26):    >=3 organisms. Probable collection contamination.

## 2020-06-13 NOTE — PROGRESS NOTE ADULT - SUBJECTIVE AND OBJECTIVE BOX
INTERVAL HPI/OVERNIGHT EVENTS: i feel good.   Vital Signs Last 24 Hrs  T(C): 36.4 (13 Jun 2020 21:50), Max: 36.8 (13 Jun 2020 06:46)  T(F): 97.6 (13 Jun 2020 21:50), Max: 98.2 (13 Jun 2020 06:46)  HR: 56 (13 Jun 2020 21:50) (55 - 56)  BP: 122/68 (13 Jun 2020 21:50) (115/63 - 128/91)  BP(mean): --  RR: 17 (13 Jun 2020 21:50) (17 - 18)  SpO2: 100% (13 Jun 2020 21:50) (100% - 100%)  I&O's Summary    12 Jun 2020 07:01  -  13 Jun 2020 07:00  --------------------------------------------------------  IN: 240 mL / OUT: 0 mL / NET: 240 mL    13 Jun 2020 07:01  -  13 Jun 2020 21:56  --------------------------------------------------------  IN: 120 mL / OUT: 0 mL / NET: 120 mL      MEDICATIONS  (STANDING):  amLODIPine   Tablet 5 milliGRAM(s) Oral daily  aspirin enteric coated 81 milliGRAM(s) Oral daily  atorvastatin 20 milliGRAM(s) Oral at bedtime  heparin   Injectable 5000 Unit(s) SubCutaneous every 8 hours  hydrochlorothiazide 25 milliGRAM(s) Oral daily  iron sucrose IVPB 100 milliGRAM(s) IV Intermittent every 24 hours  isosorbide   mononitrate ER Tablet (IMDUR) 60 milliGRAM(s) Oral daily  levothyroxine 75 MICROGram(s) Oral daily    MEDICATIONS  (PRN):  acetaminophen   Tablet .. 650 milliGRAM(s) Oral every 6 hours PRN Mild Pain (1 - 3), Moderate Pain (4 - 6)    LABS:                        9.2    5.83  )-----------( 158      ( 13 Jun 2020 06:12 )             30.0     06-13    140  |  108<H>  |  9   ----------------------------<  122<H>  4.2   |  24  |  0.66    Ca    9.5      13 Jun 2020 17:04  Phos  1.9     06-13  Mg     2.0     06-13          CAPILLARY BLOOD GLUCOSE              REVIEW OF SYSTEMS:  CONSTITUTIONAL: No fever, weight loss, or fatigue  EYES: No eye pain, visual disturbances, or discharge  ENMT:  No difficulty hearing, tinnitus, vertigo; No sinus or throat pain  NECK: No pain or stiffness  RESPIRATORY: No cough, wheezing, chills or hemoptysis; No shortness of breath  CARDIOVASCULAR: No chest pain, palpitations, dizziness, or leg swelling  GASTROINTESTINAL: No abdominal or epigastric pain. No nausea, vomiting, or hematemesis; No diarrhea or constipation. No melena or hematochezia.  GENITOURINARY: No dysuria, frequency, hematuria, or incontinence  NEUROLOGICAL: No headaches, memory loss, loss of strength, numbness, or tremors      Consultant(s) Notes Reviewed:  [x ] YES  [ ] NO    PHYSICAL EXAM:  GENERAL: NAD, well-groomed, well-developed,not in any distress ,  HEAD:  Atraumatic, Normocephalic  EYES: EOMI, PERRLA, conjunctiva and sclera clear  ENMT: No tonsillar erythema, exudates, or enlargement; Moist mucous membranes, Good dentition, No lesions  NECK: Supple, No JVD, Normal thyroid  NERVOUS SYSTEM:  Alert & Oriented X3, No focal deficit   CHEST/LUNG: Good air entry bilateral with no  rales, rhonchi, wheezing, or rubs  HEART: Regular rate and rhythm; No murmurs, rubs, or gallops  ABDOMEN: Soft, Nontender, Nondistended; Bowel sounds present  EXTREMITIES:  2+ Peripheral Pulses, No clubbing, cyanosis, or edema  SKIN: No rashes or lesions    Care Discussed with Consultants/Other Providers [ x] YES  [ ] NO

## 2020-06-14 NOTE — PROGRESS NOTE ADULT - SUBJECTIVE AND OBJECTIVE BOX
GASTROENTEROLOGY    Patient seen and examined at bedside  oob to chair  tolerating diet  no melena/brbpr  upper gastrointestinal endoscopy/colonoscopy planned tomorrow      MEDICATIONS  (STANDING):  amLODIPine   Tablet 5 milliGRAM(s) Oral daily  aspirin enteric coated 81 milliGRAM(s) Oral daily  atorvastatin 20 milliGRAM(s) Oral at bedtime  bisacodyl 10 milliGRAM(s) Oral every 4 hours  heparin   Injectable 5000 Unit(s) SubCutaneous every 8 hours  hydrochlorothiazide 25 milliGRAM(s) Oral daily  isosorbide   mononitrate ER Tablet (IMDUR) 60 milliGRAM(s) Oral daily  levothyroxine 75 MICROGram(s) Oral daily  polyethylene glycol/electrolyte Solution 1000 milliLiter(s) Oral every 4 hours        T(F): 97.6 (06-14-20 @ 12:10), Max: 98 (06-14-20 @ 05:40)  HR: 58 (06-14-20 @ 12:10) (56 - 64)  BP: 115/87 (06-14-20 @ 12:10) (110/58 - 148/92)  RR: 17 (06-14-20 @ 12:10) (17 - 17)  SpO2: 100% (06-14-20 @ 12:10) (100% - 100%)  Wt(kg): --    PHYSICAL EXAM  GENERAL:   NAD  HEENT:  NC/AT, no JVD, sclera-anicteric  CHEST:  clear to ascultation bilaterally, respirations nonlabored  HEART:  +S1+S2   ABDOMEN:  Soft, non-tender, non-distended, +bowel sounds  EXTREMITIES:  no cyanosis, clubbing or edema  NEURO:  Alert, oriented  SKIN:  No rash/warm/dry      LABS:                        9.1<L>  5.11  )-----------( 186      ( 14 Jun 2020 05:50 )             30.2<L>  14 Jun 2020 05:50    06-14    143  |  110<H>  |  9   ----------------------------<  82  4.0   |  23  |  0.71    Ca    9.5      14 Jun 2020 05:50  Phos  1.9     06-13  Mg     2.0     06-14          I&O's Detail    13 Jun 2020 07:01  -  14 Jun 2020 07:00  --------------------------------------------------------  IN:    Oral Fluid: 320 mL  Total IN: 320 mL    OUT:  Total OUT: 0 mL    Total NET: 320 mL

## 2020-06-16 NOTE — PROGRESS NOTE ADULT - SUBJECTIVE AND OBJECTIVE BOX
INTERVAL HPI/OVERNIGHT EVENTS:    feeling well   denies n/v/d/c, abdominal pain, melena or brbpr     MEDICATIONS  (STANDING):  amLODIPine   Tablet 5 milliGRAM(s) Oral daily  aspirin enteric coated 81 milliGRAM(s) Oral daily  atorvastatin 20 milliGRAM(s) Oral at bedtime  heparin   Injectable 5000 Unit(s) SubCutaneous every 8 hours  hydrochlorothiazide 25 milliGRAM(s) Oral daily  isosorbide   mononitrate ER Tablet (IMDUR) 60 milliGRAM(s) Oral daily  levothyroxine 75 MICROGram(s) Oral daily    MEDICATIONS  (PRN):  acetaminophen   Tablet .. 650 milliGRAM(s) Oral every 6 hours PRN Mild Pain (1 - 3), Moderate Pain (4 - 6)      Allergies    latex (Rash)  penicillin (Faint)    Intolerances        Review of Systems:    General:  No wt loss, fevers, chills, night sweats, fatigue   Eyes:  Good vision, no reported pain  ENT:  No sore throat, pain, runny nose, dysphagia  CV:  No pain, palpitations, hypo/hypertension  Resp:  No dyspnea, cough, tachypnea, wheezing  GI:  No pain, No nausea, No vomiting, No diarrhea, No constipation, No weight loss, No fever, No pruritis, No rectal bleeding, No melena, No dysphagia  :  No pain, bleeding, incontinence, nocturia  Muscle:  No pain, weakness  Neuro:  No weakness, tingling, memory problems  Psych:  No fatigue, insomnia, mood problems, depression  Endocrine:  No polyuria, polydypsia, cold/heat intolerance  Heme:  No petechiae, ecchymosis, easy bruisability  Skin:  No rash, tattoos, scars, edema      Vital Signs Last 24 Hrs  T(C): 36.7 (16 Jun 2020 05:21), Max: 36.9 (15 Boris 2020 13:25)  T(F): 98.1 (16 Jun 2020 05:21), Max: 98.5 (15 Boris 2020 13:25)  HR: 61 (16 Jun 2020 05:21) (61 - 76)  BP: 108/54 (16 Jun 2020 05:21) (71/54 - 120/86)  BP(mean): 69 (15 Boris 2020 12:15) (55 - 79)  RR: 16 (16 Jun 2020 05:21) (16 - 26)  SpO2: 100% (16 Jun 2020 05:21) (96% - 100%)    PHYSICAL EXAM:    Constitutional: NAD  HEENT: EOMI, throat clear  Neck: No LAD, supple  Respiratory: CTA and P  Cardiovascular: S1 and S2, RRR, no M  Gastrointestinal: BS+, soft, NT/ND, neg HSM,  Extremities: No peripheral edema, neg clubbing, cyanosis  Vascular: 2+ peripheral pulses  Neurological: A/O x 3, no focal deficits  Psychiatric: Normal mood, normal affect  Skin: No rashes      LABS:                        10.1   4.60  )-----------( 178      ( 16 Jun 2020 06:30 )             32.7     06-16    140  |  106  |  7   ----------------------------<  83  3.7   |  21<L>  |  0.72    Ca    9.7      16 Jun 2020 06:30  Phos  2.7     06-16  Mg     1.6     06-16            RADIOLOGY & ADDITIONAL TESTS:    < from: Upper Endoscopy (06.15.20 @ 10:06) >    [facility]  _______________________________________________________________________________  Patient Name: Lisa Dennis            Procedure Date: 6/15/2020 10:06 AM  MRN: [id number]                     Account Number: [id number]  YOB: 1940              Admit Type: Inpatient  Room: ENDO 02                         Gender: Female  Attending MD: Ananda Salgado MD      _______________________________________________________________________________     Procedure:           Upper GI endoscopy  Indications:         Iron deficiency anemia, Suspected upper gastrointestinal                        bleeding in patient with chronic blood loss  Providers:           Ananda Salgado MD  Medicines:           Monitored Anesthesia Care  Complications:       No immediate complications.  Procedure:           After obtaining informed consent, the endoscope was                        passed under direct vision. Throughout the procedure,                        the patient's blood pressure, pulse, and oxygen                        saturations were monitored continuously. The Endoscope                        was introduced through the mouth, and advanced to the                        second part of duodenum.                                                                           Findings:       The examined esophagus was normal.       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 The        gastrojejunal anastomosis was characterized by healthy appearing mucosa.        This was traversed. The efferent limb was examined. The afferent limb        was examined.                                                                                   Impression:          - Normal esophagus.                   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                       - No specimens collected.  Recommendation:      - [facility].                       - Advance diet as tolerated.                       -Consider other etiologies for anemia                       -Restart AC as per primary team                                                                                     Ananda Salgado  ___________________  Ananda Salgado MD  6/15/2020 11:27:04 AM  This report has been signed electronically.  Number of Addenda: 0    Note Initiated On: 6/15/2020 10:06 AM    < end of copied text >    < from: Colonoscopy (06.15.20 @ 10:06) >    [facility]  _______________________________________________________________________________  Patient Name: Lisa Dennis            Procedure Date: 6/15/2020 10:06 AM  MRN: [id number]                     Account Number: [id number]  YOB: 1940              Admit Type: Inpatient  Room: [facility] 02                         Gender: Female  Attending MD: Ananda Salgado MD      _______________________________________________________________________________     Procedure:           Colonoscopy  Indications:         Gastrointestinal bleeding  Providers:           Ananda Salgado MD  Medicines:           Monitored Anesthesia Care  Complications:       No immediate complications.  Procedure:           After I obtained informed consent, the scope was passed                        under direct vision. Throughout the procedure, the                        patient's blood pressure, pulse, and oxygen saturations                        were monitored continuously. The Colonoscope was                        introduced through the anus and advanced to the terminal                        ileum. The colonoscopy was performed without difficulty.                        The patient tolerated the procedure well. The quality of                        the bowel preparation was excellent.                                                                                   Findings:       One 5 mm sub-epithelial nodule was found in the recto-sigmoid colon.       The exam was otherwise without abnormality.       The terminal ileum appeared normal.                                                                                   Impression:          - Sub-epithelial nodule in the recto-sigmoid colon.                       -The examination was otherwise normal.                       - The examined portion of the ileum was normal.                       - No specimens collected.  Recommendation:      - [facility].   - Advance diet as tolerated.                       - Perform a lower endoscopic ultrasound (LEUS) as an                        outpatient                       -EGD today     Attending Participation:       I personally performed the entire procedure.                                                                                     Ananda Salgado  ___________________  Ananda Salgado MD  6/15/2020 11:21:23 AM  This report has been signed electronically.  Number of Addenda: 0    Note Initiated On: 6/15/2020 10:06 AM    < end of copied text >

## 2020-06-16 NOTE — DISCHARGE NOTE NURSING/CASE MANAGEMENT/SOCIAL WORK - PATIENT PORTAL LINK FT
You can access the FollowMyHealth Patient Portal offered by [facility] by registering at the following website: [email or web address]. By joining [employer]’s FollowMyHealth portal, you will also be able to view your health information using other applications (apps) compatible with our system.

## 2020-06-16 NOTE — DISCHARGE NOTE PROVIDER - PROVIDER TOKENS
PROVIDER:[TOKEN:[[id number]]],PROVIDER:[TOKEN:[[id number]]] PROVIDER:[TOKEN:[[id number]]],PROVIDER:[TOKEN:[[id number]]],FREE:[LAST:[Dr. Erik Coleman PCP],PHONE:[(   )    -],FAX:[(   )    -],ESTABLISHEDPATIENT:[T]]

## 2020-06-16 NOTE — DISCHARGE NOTE PROVIDER - CARE PROVIDER_API CALL
Demar Salomon (DO)  Internal Medicine  [address] [place] [address]  Phone: [phone]  Fax: [phone]  Follow Up Time:     Chriss Dempesy  CARDIOVASCULAR DISEASE  [address] [place] [address]  Phone: [phone]  Fax: [phone]  Follow Up Time: Demar Salomon (DO)  Internal Medicine  [address] [place] [address]  Phone: [phone]  Fax: [phone]  Follow Up Time:     Chriss Dempsey  CARDIOVASCULAR DISEASE  [address] [place] [address]  Phone: [phone]  Fax: [phone]  Follow Up Time:     Dr. Erik Coleman PCP,   Phone: (   )    -  Fax: (   )    -  Established Patient  Follow Up Time:

## 2020-06-16 NOTE — PROGRESS NOTE ADULT - SUBJECTIVE AND OBJECTIVE BOX
POST ANESTHESIA EVALUATION    80y Female POSTOP DAY 1      MENTAL STATUS: Patient participation [ x ] Awake     [  ] Arousable     [  ] Sedated    AIRWAY PATENCY: [ x ] Satisfactory  [  ] Other:     Vital Signs Last 24 Hrs  T(C): 36.9 (16 Jun 2020 11:39), Max: 36.9 (15 Boris 2020 21:08)  T(F): 98.4 (16 Jun 2020 11:39), Max: 98.5 (15 Boris 2020 21:08)  HR: 69 (16 Jun 2020 11:39) (61 - 69)  BP: 96/55 (16 Jun 2020 11:39) (96/55 - 120/86)  BP(mean): --  RR: 16 (16 Jun 2020 11:39) (16 - 16)  SpO2: 100% (16 Jun 2020 11:39) (100% - 100%)  I&O's Summary        NAUSEA/ VOMITTING:  [ x] NONE  [  ] CONTROLLED [  ] OTHER     PAIN: [x  ] CONTROLLED WITH CURRENT REGIMEN  [  ] OTHER    [ x ] NO APPARENT ANESTHESIA COMPLICATIONS

## 2020-06-16 NOTE — DISCHARGE NOTE PROVIDER - HOSPITAL COURSE
80 y.o. F w/ PMHx of HTN, CAD, Cardiac Stents x2 (2019, ruptured peptic ulcer that required surgery, HLD p/w dizziness, lightheadedness and feeling like passing out since this morning. In the ED, patient complained of chest pain and noted to have an episode of bradycardia with HR in 30-40s. Cardiac enzymes negative and EKG without evidence of ischemia. Patient without significant events on tele and remained vitally stable. CTA chest negative for PE. Patient noted to have occult positive anemia with Hgb drop to 6.7 with appropriate response s/p pRBC. Underwent EGD/colonoscopy on 6/15/2020- EGD unremarkable and colonoscopy notable for sub-epithelial nodule in the recto-sigmoid colon. Per GI, outpatient follow up recommended for lower endoscopic ultrasound. Course complicate by UTI for which patient completed treatment with Ceftriaxone. 80 y.o. F w/ PMHx of HTN, CAD, Cardiac Stents x2 (2019, ruptured peptic ulcer that required surgery, HLD p/w dizziness, lightheadedness and feeling like passing out since this morning. In the ED, patient complained of chest pain and noted to have an episode of bradycardia with HR in 30-40s. Carvedilol was discontinued. Cardiac enzymes negative and EKG without evidence of ischemia. Patient without significant events on tele and remained vitally stable. CTA chest negative for PE. Patient noted to have occult positive anemia with Hgb drop to 6.7 with appropriate response s/p pRBC. Underwent EGD/colonoscopy on 6/15/2020- EGD unremarkable and colonoscopy notable for sub-epithelial nodule in the recto-sigmoid colon. Per GI, outpatient follow up recommended for lower endoscopic ultrasound. Course complicate by UTI for which patient completed treatment with Ceftriaxone.         Patient seen and evaluated. Reviewed discharge medications with patient and attending. All new medications requiring new prescriptions were sent to the pharmacy of patient's choice. Reviewed need for prescription for previous home medications and new prescriptions sent if requested. Medically cleared/stable for discharge as per Dr. Nuñez on 6/16/2020 with appropriate follow up. Patient understands and agrees with plan of care.

## 2020-06-16 NOTE — PROGRESS NOTE ADULT - ASSESSMENT
M E D I C A L   A T T E N D I N G    F O L L O W    U P   N O T E                                     ------------------------------------------------------------------------------------------------    patient evaluated by me, case discussed with team, chart, medications, and physical exam reviewed, labs / tests  and vitals reviewed by me, as bellow.   Patient is stable for discharge today.  Patient to follow up with  GI, Cardio , PMD  See discharge document for full note.      ==================>> MEDICATIONS <<====================    amLODIPine   Tablet 5 milliGRAM(s) Oral daily  aspirin enteric coated 81 milliGRAM(s) Oral daily  atorvastatin 20 milliGRAM(s) Oral at bedtime  heparin   Injectable 5000 Unit(s) SubCutaneous every 8 hours  hydrochlorothiazide 25 milliGRAM(s) Oral daily  isosorbide   mononitrate ER Tablet (IMDUR) 60 milliGRAM(s) Oral daily  levothyroxine 75 MICROGram(s) Oral daily    MEDICATIONS  (PRN):  acetaminophen   Tablet .. 650 milliGRAM(s) Oral every 6 hours PRN Mild Pain (1 - 3), Moderate Pain (4 - 6)    ___________  Active diet:  Diet, Regular:   DASH/TLC Sodium & Cholesterol Restricted (DASH)  ___________________    ==================>> VITAL SIGNS <<==================    T(C): 36.7 (06-16-20 @ 12:59), Max: 36.9 (06-15-20 @ 21:08)  HR: 70 (06-16-20 @ 12:59) (61 - 70)  BP: 110/60 (06-16-20 @ 12:59) (96/55 - 120/86)  RR: 16 (06-16-20 @ 12:59) (16 - 16)  SpO2: 100% (06-16-20 @ 12:59) (100% - 100%)      ==================>> LAB AND IMAGING <<==================                        10.1   4.60  )-----------( 178      ( 16 Jun 2020 06:30 )             32.7        06-16    140  |  106  |  7   ----------------------------<  83  3.7   |  21<L>  |  0.72    Ca    9.7      16 Jun 2020 06:30  Phos  2.7     06-16  Mg     1.6     06-16         TSH:      4.05   (06-13-20)       ,     7.35   (06-11-20)           Lipid profile:  (06-12-20)     Total: 110     LDL  : 43     HDL  :68     TG   :44     HgA1C:   WBC count:   4.60 <<== ,  5.11 <<== ,  5.83 <<== ,  4.82 <<== ,  3.78 <<==   Hemoglobin:   10.1 <<==,  9.1 <<==,  9.2 <<==,  9.5 <<==,  6.7 <<==  platelets:  178 <==, 186 <==, 158 <==, 185 <==, 157 <==, 172 <==, 191 <==    Creatinine:  0.72  <<==, 0.75  <<==, 0.71  <<==, 0.66  <<==, 0.65  <<==, 0.72  <<==  Sodium:   140  <==, 146  <==, 143  <==, 140  <==, 143  <==, 143  <==, 143  <==    < from: Upper Endoscopy (06.15.20 @ 10:06) >  Impression:          - Normal esophagus.                   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                       - No specimens collected.  Recommendation:      - Return patient to [facility] for [facility] care.                       - Advance diet as tolerated.                       -Consider other etiologies for anemia                       -Restart AC as per primary team    < end of copied text >        < from: Colonoscopy (06.15.20 @ 10:06) >  Impression:          - Sub-epithelial nodule in the recto-sigmoid colon.                       -The examination was otherwise normal.                       - The examined portion of the ileum was normal.                       - No specimens collected.  Recommendation:      - Return patient to [facility] for ongoing care.   - Advance diet as tolerated.                       - Perform a lower endoscopic ultrasound (LEUS) as an                        outpatient                       -EGD today       < end of copied text >
_________________________________________________________________________________________  ========>>  M E D I C A L   A T T E N D I N G    F O L L O W  U P  N O T E  <<=========  -----------------------------------------------------------------------------------------------------    - Patient seen and examined by me earlier today.   - In summary,  MARYCARMEN CAMPOVERDE is a 80y year old woman who originally presented with kaitlynn, SOB, near syncope  - Patient today overall doing ok, comfortable, eating OK.      pt noted to be anemic this morning> PRBC ordered by overnight PA and already given, H/H improved, no bleeding reported     ==================>> REVIEW OF SYSTEM <<=================    GEN: no fever, no chills, no pain  RESP: no SOB, no cough, no sputum  CVS: no chest pain, no palpitations, no edema  GI: no abdominal pain, no nausea, no constipation, no diarrhea  : no dysuria, no frequency, no hematuria  Neuro: no headache, no dizziness  Derm : no itching, no rash    ==================>> PHYSICAL EXAM <<=================    GEN: A&O X 2-3 , NAD , comfortable, forgetful  HEENT: NCAT, PERRL, MMM, hearing intact  Neck: supple , no JVD appreciated  CVS: S1S2 , regular , No M/R/G appreciated  PULM: CTA B/L,  no W/R/R appreciated  ABD.: soft. non tender, non distended,  bowel sounds present  Extrem: intact pulses , no edema   PSYCH : normal mood,  not anxious      ==================>> MEDICATIONS <<====================    MEDICATIONS  (STANDING):  amLODIPine   Tablet 5 milliGRAM(s) Oral daily  aspirin enteric coated 81 milliGRAM(s) Oral daily  atorvastatin 20 milliGRAM(s) Oral at bedtime  heparin   Injectable 5000 Unit(s) SubCutaneous every 8 hours  hydrochlorothiazide 25 milliGRAM(s) Oral daily  iron sucrose IVPB 100 milliGRAM(s) IV Intermittent every 24 hours  isosorbide   mononitrate ER Tablet (IMDUR) 60 milliGRAM(s) Oral daily  levothyroxine 75 MICROGram(s) Oral daily    MEDICATIONS  (PRN):  acetaminophen   Tablet .. 650 milliGRAM(s) Oral every 6 hours PRN Mild Pain (1 - 3), Moderate Pain (4 - 6)    ___________  Active diet:  Diet, DASH/TLC:   Sodium & Cholesterol Restricted  Regular  No Caffeine  ___________________    ==================>> VITAL SIGNS <<==================    T(C): 36.8 (20 @ 08:50), Max: 37 (20 @ 06:32)  HR: 58 (20 @ 11:20) (54 - 66)  BP: 98/52 (20 @ 11:20) (95/45 - 106/46)  BP(mean): --  RR: 17 (20 @ 08:50) (17 - 17)  SpO2: 100% (20 @ 08:50) (99% - 100%)   CAPILLARY BLOOD GLUCOSE      I&O's Summary    2020 07:01  -  2020 12:38  --------------------------------------------------------  IN: 240 mL / OUT: 0 mL / NET: 240 mL       ==================>> LAB AND IMAGING <<==================                        9.5    4.82  )-----------( 185      ( 2020 10:26 )             32.0        06-12    143  |  111<H>  |  17  ----------------------------<  85  3.4<L>   |  21<L>  |  0.72    Ca    9.0      2020 05:00  Phos  2.5       Mg     2.2         TPro  7.2  /  Alb  4.2  /  TBili  0.3  /  DBili  x   /  AST  62<H>  /  ALT  40<H>  /  AlkPhos  81  -11    PT/INR - ( 2020 11:40 )   PT: 13.1 SEC;   INR: 1.14     PTT - ( 2020 11:40 )  PTT:25.5 SEC              ~~ High Sensitivity Troponin  ~~  27  <<--, 37  <<--     Urinalysis Basic - ( 2020 05:42 )  Color: LIGHT YELLOW / Appearance: CLEAR / S.017 / pH: 6.5  Gluc: NEGATIVE / Ketone: NEGATIVE  / Bili: NEGATIVE / Urobili: NORMAL   Blood: NEGATIVE / Protein: 30 / Nitrite: NEGATIVE   Leuk Esterase: MODERATE / RBC: 0-2 / WBC 11-25   Sq Epi: FEW / Non Sq Epi: x / Bacteria: NEGATIVE    TSH:      7.35   (20)           Lipid profile:  (20)     Total: 110     LDL  : 43     HDL  :68     TG   :44     reviewed V/Q and CTA  reports > no PE   ___________________________________________________________________________________  ===============>>  A S S E S S M E N T   A N D   P L A N <<===============  ------------------------------------------------------------------------------------------    · Assessment		   80 y.o. F w/ PMHx of HTN, CAD, Cardiac Stents x2 (2019, ruptured peptic ulcer that required surgery, HLD p/w dizziness, lightheadedness, feeling like passing out since this morning, followed by constant chest pain in ED, had an episode of bradycardia 30's-40's, found to have worsening anemia, admitted to tele for symptomatic bradycardia, r/o ACS, and symptomatic anemia.    +Symptomatic Bradycardia  +Symptomatic Anemia  + UTI    Problem/Plan - 1:  ·  Problem: Symptomatic bradycardia.> improved  tele monitor   thought to be due to high vagal tone   Coreg held  -obtain TTE  - cardio appreciated     Problem/Plan - 2:  ·  Problem: Symptomatic anemia > worsened today  Iron deficiency>> getting supplements  pt post PRBC X1 this morning   monitor CBC   GI consulted for further workup    Problem/Plan - 3:  ·  Problem: UTI   finish 3 doses of Ceftriaxone and DC   -F/u UCx.     Problem/Plan - 4:  ·  Problem: Hypercholesterolemia.    Lipitor.     Problem/Plan - 5:  ·  Problem: Hypothyroidism.    -c/w synthroid.   repeat TSH as elevated >> may need increased dose of synthroid     Problem/Plan - 6:  Problem: Need for prophylactic measure. Plan: Heparin 5000 units SC Q8h.    --------------------------------------------  Case discussed with pt, cardio, GI   Education given on findings and plan of care  ___________________________  H. PROSPER Nuñez.  Pager: [phone]
_________________________________________________________________________________________  ========>>  M E D I C A L   A T T E N D I N G    F O L L O W  U P  N O T E  <<=========  -----------------------------------------------------------------------------------------------------    - Patient seen and examined by me earlier today.   - In summary,  MARYCARMEN CAMPOVERDE is a 80y year old woman who originally presented with kaitlynn, SOB, near syncope  - Patient today overall doing ok, comfortable, eating OK.     ==================>> REVIEW OF SYSTEM <<=================    GEN: no fever, no chills, no pain  RESP: no SOB, no cough, no sputum  CVS: no chest pain, no palpitations, no edema  GI: no abdominal pain, no nausea, no constipation, no diarrhea  : no dysuria, no frequency, no hematuria  Neuro: no headache, no dizziness  Derm : no itching, no rash    ==================>> PHYSICAL EXAM <<=================    GEN: A&O X 2-3 , NAD , comfortable, forgetful  HEENT: NCAT, PERRL, MMM, hearing intact  Neck: supple , no JVD appreciated  CVS: S1S2 , regular , No M/R/G appreciated  PULM: CTA B/L,  no W/R/R appreciated  ABD.: soft. non tender, non distended,  bowel sounds present  Extrem: intact pulses , no edema   PSYCH : normal mood,  not anxious      ==================>> MEDICATIONS <<====================    amLODIPine   Tablet 5 milliGRAM(s) Oral daily  aspirin enteric coated 81 milliGRAM(s) Oral daily  atorvastatin 20 milliGRAM(s) Oral at bedtime  bisacodyl 10 milliGRAM(s) Oral every 4 hours  heparin   Injectable 5000 Unit(s) SubCutaneous every 8 hours  hydrochlorothiazide 25 milliGRAM(s) Oral daily  isosorbide   mononitrate ER Tablet (IMDUR) 60 milliGRAM(s) Oral daily  levothyroxine 75 MICROGram(s) Oral daily  polyethylene glycol/electrolyte Solution 1000 milliLiter(s) Oral every 4 hours    MEDICATIONS  (PRN):  acetaminophen   Tablet .. 650 milliGRAM(s) Oral every 6 hours PRN Mild Pain (1 - 3), Moderate Pain (4 - 6)    ___________  Active diet:  Diet, Clear Liquid  Diet, NPO after Midnight:      NPO Start Date: 14-Jun-2020,   NPO Start Time: 23:59  ___________________    ==================>> VITAL SIGNS <<==================    Height (cm): 154.9  Vital Signs Last 24 HrsT(C): 36.4 (06-14-20 @ 12:10)  T(F): 97.6 (06-14-20 @ 12:10), Max: 98 (06-14-20 @ 05:40)  HR: 58 (06-14-20 @ 12:10) (56 - 64)  BP: 115/87 (06-14-20 @ 12:10)  RR: 17 (06-14-20 @ 12:10) (17 - 17)  SpO2: 100% (06-14-20 @ 12:10) (100% - 100%)       ==================>> LAB AND IMAGING <<==================                        9.1    5.11  )-----------( 186      ( 14 Jun 2020 05:50 )             30.2        06-14    143  |  110<H>  |  9   ----------------------------<  82  4.0   |  23  |  0.71    Ca    9.5      14 Jun 2020 05:50  Phos  1.9     06-13  Mg     2.0     06-14    WBC count:   5.11 <<== ,  5.83 <<== ,  4.82 <<== ,  3.78 <<== ,  4.34 <<== ,  4.91 <<==   Hemoglobin:   9.1 <<==,  9.2 <<==,  9.5 <<==,  6.7 <<==,  8.2 <<==,  8.4 <<==  platelets:  186 <==, 158 <==, 185 <==, 157 <==, 172 <==, 191 <==    Creatinine:  0.71  <<==, 0.66  <<==, 0.65  <<==, 0.72  <<==, 0.86  <<==  Sodium:   143  <==, 140  <==, 143  <==, 143  <==, 143  <==    ___________________________________________________________________________________  ===============>>  A S S E S S M E N T   A N D   P L A N <<===============  ------------------------------------------------------------------------------------------    · Assessment		   80 y.o. F w/ PMHx of HTN, CAD, Cardiac Stents x2 (2019, ruptured peptic ulcer that required surgery, HLD p/w dizziness, lightheadedness, feeling like passing out since this morning, followed by constant chest pain in ED, had an episode of bradycardia 30's-40's, found to have worsening anemia, admitted to tele for symptomatic bradycardia, r/o ACS, and symptomatic anemia.    Problem/Plan - 1:  ·  Problem: Symptomatic bradycardia.> improved  tele monitor   thought to be due to high vagal tone   Coreg held  - cardio following    Problem/Plan - 2:  ·  Problem: Symptomatic anemia   Iron deficiency>> post supplements  monitor CBC   GI following and appreciated >> for EGD / colonoscopy tomorrow    Problem/Plan - 3:  ·  Problem: UTI   post abx  monitor     Problem/Plan - 4:  ·  Problem: Hypercholesterolemia.    Lipitor.     Problem/Plan - 5:  ·  Problem: Hypothyroidism.    -c/w synthroid.   repeat TSH as elevated >> may need increased dose of synthroid     Problem/Plan - 6:  Problem: Need for prophylactic measure. Plan: Heparin 5000 units SC Q8h.    --------------------------------------------  Case discussed with pt,  Education given on findings and plan of care  ___________________________  H. PROSPER Nuñez.  Pager: [phone]
_________________________________________________________________________________________  ========>>  M E D I C A L   A T T E N D I N G    F O L L O W  U P  N O T E  <<=========  -----------------------------------------------------------------------------------------------------    - Patient seen and examined by me earlier today.   - In summary,  MARYCARMEN CAMPOVERDE is a 80y year old woman who originally presented with kaitlynn, SOB, near syncope  - Patient today overall doing ok, comfortable, just post scoping     ==================>> REVIEW OF SYSTEM <<=================    limited ROS post anesthesia     ==================>> PHYSICAL EXAM <<=================    GEN: A&O  , NAD , comfortable  HEENT: NCAT, PERRL, MMM, hearing intact  Neck: supple , no JVD appreciated  CVS: S1S2 , regular , No M/R/G appreciated  PULM: CTA B/L,  no W/R/R appreciated  ABD.: soft. non tender, non distended,  bowel sounds present  Extrem: intact pulses , no edema   PSYCH : normal mood,  not anxious      ==================>> MEDICATIONS <<====================    amLODIPine   Tablet 5 milliGRAM(s) Oral daily  aspirin enteric coated 81 milliGRAM(s) Oral daily  atorvastatin 20 milliGRAM(s) Oral at bedtime  heparin   Injectable 5000 Unit(s) SubCutaneous every 8 hours  hydrochlorothiazide 25 milliGRAM(s) Oral daily  isosorbide   mononitrate ER Tablet (IMDUR) 60 milliGRAM(s) Oral daily  levothyroxine 75 MICROGram(s) Oral daily    MEDICATIONS  (PRN):  acetaminophen   Tablet .. 650 milliGRAM(s) Oral every 6 hours PRN Mild Pain (1 - 3), Moderate Pain (4 - 6)    ___________  Active diet:  Diet, Clear Liquid  Diet, NPO after Midnight:      NPO Start Date: 14-Jun-2020,   NPO Start Time: 23:59  ___________________    ==================>> VITAL SIGNS <<==================  Height (cm): 154.9  Weight (kg): 77.6  BMI (kg/m2): 32.3  Vital Signs Last 24 HrsT(C): 36.4 (06-15-20 @ 10:55)  T(F): 97.6 (06-15-20 @ 10:55), Max: 98.8 (06-14-20 @ 21:58)  HR: 73 (06-15-20 @ 11:30) (55 - 76)  BP: 96/55 (06-15-20 @ 11:30)  RR: 22 (06-15-20 @ 11:30) (17 - 26)  SpO2: 99% (06-15-20 @ 11:30) (98% - 100%)       ==================>> LAB AND IMAGING <<==================                        9.1    5.11  )-----------( 186      ( 14 Jun 2020 05:50 )             30.2        06-15    146<H>  |  112<H>  |  9   ----------------------------<  80  3.9   |  20<L>  |  0.75    Ca    10.3      15 Boris 2020 06:00  Phos  1.9     06-13  Mg     2.0     06-15      WBC count:   5.11 <<== ,  5.83 <<== ,  4.82 <<== ,  3.78 <<== ,  4.34 <<== ,  4.91 <<==   Hemoglobin:   9.1 <<==,  9.2 <<==,  9.5 <<==,  6.7 <<==,  8.2 <<==,  8.4 <<==  platelets:  186 <==, 158 <==, 185 <==, 157 <==, 172 <==, 191 <==    Creatinine:  0.75  <<==, 0.71  <<==, 0.66  <<==, 0.65  <<==, 0.72  <<==, 0.86  <<==  Sodium:   146  <==, 143  <==, 140  <==, 143  <==, 143  <==, 143  <==      ___________________________________________________________________________________  ===============>>  A S S E S S M E N T   A N D   P L A N <<===============  ------------------------------------------------------------------------------------------    · Assessment		   80 y.o. F w/ PMHx of HTN, CAD, Cardiac Stents x2 (2019, ruptured peptic ulcer that required surgery, HLD p/w dizziness, lightheadedness, feeling like passing out since this morning, followed by constant chest pain in ED, had an episode of bradycardia 30's-40's, found to have worsening anemia, admitted to tele for symptomatic bradycardia, r/o ACS, and symptomatic anemia.    Problem/Plan - 1:  ·  Problem: Symptomatic bradycardia.> resolved, tele reviewed, no more episodes reported   thought to be due to high vagal tone   Coreg held  - cardio following    Problem/Plan - 2:  ·  Problem: Symptomatic anemia   Iron deficiency>> post supplements  monitor CBC   GI following and appreciated >> for EGD / colonoscopy today >  follow official results     Problem/Plan - 3:  ·  Problem: UTI   post abx  monitor     Problem/Plan - 4:  ·  Problem: Hypercholesterolemia.    Lipitor.     Problem/Plan - 5:  ·  Problem: Hypothyroidism.    -c/w synthroid.   repeat TSH as elevated >> may need increased dose of synthroid     Problem/Plan - 6:  Problem: Need for prophylactic measure. Plan: Heparin 5000 units SC Q8h.    --------------------------------------------  Case discussed with pt, PA  Education given on findings and plan of care  ___________________________  H. PROSPER Nuñez.  Pager: [phone]
79yo F w/ PMHx of HTN, CAD, Cardiac Stents x2 (2019, ruptured peptic ulcer that required surgery, Prediabetes, HLD p/w sudden-onset lightheadedness and four episodes of nbnb vomiting since 1am.  GI consulted for anemia.      Occult (+) Anemia   new iron deficiency anemia r/o malignancy vs slow gib  good response to PRBC  trend h/h and transfuse PRN to keep Hgb >8  Protonix 40mg BID  maalox prn   keep stools soft  plans for EGD/Colonoscopy Monday  clear liquid diet Sunday, NPO p MN on Sunday   please check Covid Swab Saturday or Sunday for procedure    HTN  cont management per cardiology and medicine recs    Advanced care planning was discussed with patient and family.  Advanced care planning forms were reviewed and discussed.  Risks, benefits and alternatives of gastroenterologic procedures were discussed in detail and all questions were answered.  30 minutes spent.
79yo F w/ PMHx of HTN, CAD, Cardiac Stents x2 (2019, ruptured peptic ulcer that required surgery, Prediabetes, HLD p/w sudden-onset lightheadedness and four episodes of nbnb vomiting since 1am.  GI consulted for anemia.      Occult (+) Anemia   new iron deficiency anemia r/o malignancy vs slow gib  good response to PRBC  trend h/h and transfuse PRN to keep Hgb >8  Protonix 40mg BID  maalox prn   keep stools soft  plans for EGD/Colonoscopy Monday 6/15  clear liquid diet today  bowel prep today as ordered  NPO p MN on tonight      HTN  cont management per cardiology and medicine recs    Advanced care planning was discussed with patient and family.  Advanced care planning forms were reviewed and discussed.  Risks, benefits and alternatives of gastroenterologic procedures were discussed in detail and all questions were answered.  30 minutes spent.
79yo F w/ PMHx of HTN, CAD, Cardiac Stents x2 (2019, ruptured peptic ulcer that required surgery, Prediabetes, HLD p/w sudden-onset lightheadedness and four episodes of nbnb vomiting since 1am.  GI consulted for anemia.    Occult (+) Anemia   s/p EGD w/patent Bilroth 2; normal esophagus  s/p C-scope w/sub-epithelial nodule at recto-sig colon; outpt LEUS   no findings to explain anemia, consider other etiologies   trend h/h and transfuse PRN to keep Hgb >8  Protonix 40mg QD; no gi objection to a/c if needed   keep stools soft  high fiber diet     HTN  cont management per cardiology and medicine recs    Advanced care planning was discussed with patient and family.  Advanced care planning forms were reviewed and discussed.  Risks, benefits and alternatives of gastroenterologic procedures were discussed in detail and all questions were answered.  30 minutes spent.
80 y.o. F w/ PMHx of HTN, CAD, Cardiac Stents x2 (2019, ruptured peptic ulcer that required surgery, HLD p/w dizziness, lightheadedness, feeling like passing out since this morning, followed by constant chest pain in ED, had an episode of bradycardia 30's-40's, found to have worsening anemia, admitted to tele for symptomatic bradycardia, r/o ACS, and symptomatic anemia.    +Symptomatic Bradycardia  +Symptomatic Anemia  + UTI     Problem/Plan - 1:  ·  Problem: Symptomatic bradycardia.> improved  tele monitor   thought to be due to high vagal tone   Coreg held  -obtain TTE  - cardio appreciated      Problem/Plan - 2:  ·  Problem: Symptomatic anemia . Stool occult positive . HH stable as S/P PRBC . Awaiting EGD and Colonoscopy as on Monday.   Iron deficiency>> getting supplements  pt post PRBC X1 this morning   monitor CBC   GI helping.      Problem/Plan - 3:  ·  Problem: UTI   finish 3 doses of Ceftriaxone and DC   -F/u UCx.      Problem/Plan - 4:  ·  Problem: Hypercholesterolemia.    Lipitor.      Problem/Plan - 5:  ·  Problem: Hypothyroidism.    -c/w synthroid.   repeat TSH as elevated >> may need increased dose of synthroid      Problem/Plan - 6:  Problem: Need for prophylactic measure. Plan: Heparin 5000 units SC Q8h.

## 2020-06-16 NOTE — DISCHARGE NOTE PROVIDER - NSDCCAREPROVSEEN_GEN_ALL_CORE_FT
[facility] Medicine ACP, Team  Debra Nuñez Manny Ross  User ADM  Elian Stapleton  Hoorbod Delshadfar  Hoorbod Delshadfar  Hoorbod Delshadfar  Team [facility] Medicine ACP  Dhiraj Gray  Not Available Doctor  Chriss Arroyo  Ordering Physician  Lisa Warner  Theologia Rodrickagiorgis

## 2020-06-16 NOTE — DISCHARGE NOTE PROVIDER - NSDCCPCAREPLAN_GEN_ALL_CORE_FT
PRINCIPAL DISCHARGE DIAGNOSIS  Diagnosis: Bradycardia  Assessment and Plan of Treatment: Your heart rate was low when you came to the ER ___      SECONDARY DISCHARGE DIAGNOSES  Diagnosis: Symptomatic anemia  Assessment and Plan of Treatment: Your hemoglobin was low and there was evidence of blood in your stools. Your endoscopy was normal. Your colonoscopy showed ______ It is recommended you follow up with GI for ______ . Please make an appointment with  ____ within 2 weeks of discharge.    Diagnosis: UTI (urinary tract infection)  Assessment and Plan of Treatment: You were foudn to havbe a UTI for which you were treated with IV antibiotics. You have improved. Report fever, chills, pain with urination or fried in urinary habits to your PCP.    Diagnosis: Hypothyroidism  Assessment and Plan of Treatment: Continue with synthroid as prescribed. Follow up routinely with your PCP and endocrinologist.    Diagnosis: Chest pain  Assessment and Plan of Treatment: You complained of chest mo nwhich has since resolved. Your cardiac enzymes and EKG were normal. Follow up with Dr. Dempsey within 2 week from discharge. PRINCIPAL DISCHARGE DIAGNOSIS  Diagnosis: Bradycardia  Assessment and Plan of Treatment: Your heart rate was low when you came to the ER. You were evaluated by your cardiologist Dr. Dempsey. No further work up is required. Your heart rate is now normal. Follow up with your PCP within one week of discharge. Your medication carvedilol (coreg) was stopped due to your heart rate.      SECONDARY DISCHARGE DIAGNOSES  Diagnosis: Symptomatic anemia  Assessment and Plan of Treatment: Your hemoglobin was low and there was evidence of blood in your stools. YOur received a blood transfusion to increase your hemoglobin which is now stable. Your endoscopy was normal. Your colonoscopy showed a sub-epithelial nodule in the recto-sigmoid colon. It is recommended you follow up with GI for a lower endoscopic ultrasound as an outpatient. Please make an appointment with Dr. Tapia within 2 weeks of discharge.    Diagnosis: UTI (urinary tract infection)  Assessment and Plan of Treatment: You were foudn to havbe a UTI for which you were treated with IV antibiotics. You have improved. Report fever, chills, pain with urination or fried in urinary habits to your PCP.    Diagnosis: Hypothyroidism  Assessment and Plan of Treatment: Continue with synthroid as prescribed. Follow up routinely with your PCP and endocrinologist.    Diagnosis: Chest pain  Assessment and Plan of Treatment: You complained of chest mo nwhich has since resolved. Your cardiac enzymes and EKG were normal. Follow up with Dr. Dempsey within 2 weeks from discharge.

## 2020-06-16 NOTE — DISCHARGE NOTE PROVIDER - NSDCMRMEDTOKEN_GEN_ALL_CORE_FT
amLODIPine 5 mg oral tablet: 1 tab(s) orally once a day  aspirin 81 mg oral delayed release tablet: 1 tab(s) orally once a day  atorvastatin 20 mg oral tablet: 1 tab(s) orally once a day (at bedtime)  carvedilol 6.25 mg oral tablet: 1 tab(s) orally every 12 hours  hydroCHLOROthiazide 25 mg oral tablet: 1 tab(s) orally once a day  isosorbide mononitrate 60 mg oral tablet, extended release: 1 tab(s) orally once a day  levothyroxine 75 mcg (0.075 mg) oral tablet: 1 tab(s) orally once a day amLODIPine 5 mg oral tablet: 1 tab(s) orally once a day  aspirin 81 mg oral delayed release tablet: 1 tab(s) orally once a day  atorvastatin 20 mg oral tablet: 1 tab(s) orally once a day (at bedtime)  hydroCHLOROthiazide 25 mg oral tablet: 1 tab(s) orally once a day  isosorbide mononitrate 60 mg oral tablet, extended release: 1 tab(s) orally once a day  levothyroxine 75 mcg (0.075 mg) oral tablet: 1 tab(s) orally once a day

## 2020-08-02 PROBLEM — Z87.898 PERSONAL HISTORY OF OTHER SPECIFIED CONDITIONS: Chronic | Status: ACTIVE | Noted: 2020-01-01

## 2020-08-02 NOTE — ED PROVIDER NOTE - PSH
H/O total knee replacement  R  H/O: hysterectomy  due to Uterine cancer  PUD (peptic ulcer disease)  treated surgically in 1980  Rectal cyst  treated surgically  Stented coronary artery

## 2020-08-02 NOTE — ED ADULT NURSE REASSESSMENT NOTE - NS ED NURSE REASSESS COMMENT FT1
upon IVP med administration, pt c/o pain w/ both IVs, on further eval pt fore slightly cold to touch, IVs d/c'd, MD Chang/Gt @ bedside for eval and US IV placement, heparin paused @ this time until paten IV can be established.

## 2020-08-02 NOTE — H&P ADULT - ASSESSMENT
80 F with PMH of CAD with stents, last cath in 2017, hypothyroid, HLD, anemia, HGB 6.7 in June s/p EGD,presents with NSTEMI and symptomatic bradycardia

## 2020-08-02 NOTE — H&P ADULT - NSHPPHYSICALEXAM_GEN_ALL_CORE
PHYSICAL EXAM:    GENERAL: NAD, well-developed  HEAD:  Atraumatic, Normocephalic  EYES: EOMI, PERRLA, conjunctiva and sclera clear  NECK: Supple, No JVD  CHEST/LUNG: Clear to auscultation bilaterally; No rhonchis, rales, or wheezing  HEART: irregular rate and rhythm no murmur  ABDOMEN: Soft, Nontender, Nondistended; Normoactive bowel sounds  EXTREMITIES:  2+ Peripheral Pulses, No clubbing, cyanosis, or edema  PSYCH: A&Ox3  NEUROLOGY: non-focal  SKIN: No rashes or lesions

## 2020-08-02 NOTE — ED PROVIDER NOTE - ATTENDING CONTRIBUTION TO CARE
81 y/o F with h/o HTN, hypothyroidism, CAD s/p stent BIB EMS for chest pain.  Pt reports she woke up at 1am to use the bathroom but while at rest had an episode of left sided chest pain.  Pain was nonradiating, associated with dizziness, and resolved on own after a few minutes.  Upon EMS arrival, pt was bradycardic to mid 30s and hypotensive to SBP 70s.  Pt received 0.5mg atropine with improvement in HR and BP, transported to ED.  Pt is currently asymptomatic, no complaints.  Denies fever chills, cough, sob, back pain, n/v, leg pain or swelling.  Of note, pt with recent hospitalization in June 2020 for bradycardia, presumed to be 2/2 beta blocker usage and carvedilol discontinued at the time.  Pt is unsure if she is still taking it or not.  During that hospitalization, pt also found to be anemic requiring transfusion.  She had a pos fecal occult, but unremarkable EGD and colonoscopy.  She denies any further bleeding, dark or bloody stools.  Well appearing, lying comfortably in stretcher, awake and alert, nontoxic.  AF/bradycardia, borderline hypotension.  Lungs cta bl.  Cards nl S1/S2, bradycardic, regular, no MRG.  Abd soft ntnd.  No pedal edema or calf tenderness.  No focal neuro deficits.  Plan for ekg, labs incl cardiacs, cxr, zoll at bedside, admit - r/o underlying anemia vs metabolic disturbance vs primary cardiac pathology.

## 2020-08-02 NOTE — ED PROVIDER NOTE - PHYSICAL EXAMINATION
GENERAL: Awake, alert, NAD  HEENT: NC/AT, moist mucous membranes  LUNGS: CTAB, no wheezes or crackles   CARDIAC: bradycardic, regular rhythm, no m/r/g  ABDOMEN: Soft, normal BS, non tender, non distended, no rebound, no guarding  BACK: No midline spinal tenderness, no CVA tenderness  EXT: No edema, no calf tenderness, 2+ DP pulses bilaterally, no deformities.  NEURO: A&Ox3. Moving all extremities.  SKIN: Warm and dry. No rash.  PSYCH: Normal affect.

## 2020-08-02 NOTE — ED ADULT NURSE REASSESSMENT NOTE - GENERAL PATIENT STATE
comfortable appearance/Pt is axox4, pleasant affect. presently denies cp
comfortable appearance
comfortable appearance/improvement verbalized
comfortable appearance/cooperative

## 2020-08-02 NOTE — ED ADULT NURSE NOTE - OBJECTIVE STATEMENT
A&Ox4 ambulatory p/w dizziness, placed on Zoll, cardiac monitoring, HR in high 30's, BP soft, IV L hand placed by EMS in place, US IV in progress by MD. Pt asymptomatic at this time

## 2020-08-02 NOTE — ED PROVIDER NOTE - OBJECTIVE STATEMENT
81 y/o female with hx of CAD, HLD, hypothyroidism, anemia presents to the ED c/o CP, lightheadedness, bradycardia. Reports she felt some chest pain tonight and last night around 0100 underneath her left breast that lasted for a few minutes. When she got up around 0300 tonight to use the bathroom she felt very lightheaded and called EMS. She was found to be bradycardic to the 30s and hypotensive to 70/40 and given .5 of atropine. Denies SOB, fever, chills, nausea, vomiting, diarrhea, bloody stools.

## 2020-08-02 NOTE — H&P ADULT - NSICDXPASTMEDICALHX_GEN_ALL_CORE_FT
PAST MEDICAL HISTORY:  Anemia     Arthritis     CAD (coronary artery disease) OM 2 100%, RCA 75    Carotid artery stenosis L    History of prediabetes     Hypercholesterolemia     Hypothyroidism     MVP (mitral valve prolapse)     PUD (peptic ulcer disease)     Uterine cancer treated surgically

## 2020-08-02 NOTE — ED ADULT TRIAGE NOTE - CHIEF COMPLAINT QUOTE
"Tonight chest pain woke me up, found by EMS to be bradycardic hypotensive HR 30s BP 70/40. was given .5 atropine. " patient took 1 baby aspirin at home

## 2020-08-02 NOTE — CONSULT NOTE ADULT - ASSESSMENT
80 F with PMH of CAD with stents, last cath in 2017, hypothyroid, HLD, anemia, HGB 6.7 in June s/p EGD, presents with NSTEMI, symptomatic bradycardia.    PLAN:  ACS:  Admit to CCU for NSTEMI, continuous tele monitoring  -ACS protocol initiated with hep gtt, asa load and plavix load  -serial cardiac enzymes  -repeat 12 lead EKG with chest pain and daily  -admission labs to further include TSH, HGBA1c, lipid profile, serum pro BNP, CMP with mag and phos, CBC  -will likely need [facility] tomorrow  Plan discussed with Dr. Menendez and Dr. Dempsey.

## 2020-08-02 NOTE — ED PROCEDURE NOTE - PROCEDURE ADDITIONAL DETAILS
long 20G R basilic long 20G R basilic.  Peripheral IV access in the Emergency Department obtained under dynamic ultrasound guidance with dark nonpulsatile blood return.  Catheter was flushed afterwards without any resistance or resulting extravasation.  IV catheter confirmed in compressible vein after insertion.

## 2020-08-02 NOTE — CONSULT NOTE ADULT - ASSESSMENT
80 F with PMH of CAD with stents, last cath in 2017, hypothyroid, HLD, anemia, HGB 6.7 in June s/p EGD,presents with NSTEMI and symptomatic bradycardia.    PLAN:  -Hold AVNodal blockers  -Thyroid function panel  -recommend PCI of RCA  -will continue to follow for potential pacemaker  -zoll pads  -atropine  -dopamine gtt if symptomatic  -plan discussed with Dr. Dallas

## 2020-08-02 NOTE — CONSULT NOTE ADULT - SUBJECTIVE AND OBJECTIVE BOX
Date of Admission:  8/2/20  CHIEF COMPLAINT:  dizzy and lightheaded  HISTORY OF PRESENT ILLNESS:      Patient is an 80 F with PMH of CAD with stents, last cath in 2017, hypothyroid, HLD, anemia, HGB 6.7 in June s/p EGD, presents with dizziness and lightheadedness, reports she has been having intermittent chest pain x 2 days. In ER, EKG with sinus bradycardia and nonspecific TWI. Tele reveals sinus bradycardia and episodes of junctional rhythm. Atropine given by EMS. BP is labile, 80s/50s. Patient with elevated troponin of 666->728 and elevated cardiac enzymes of 394/21.83/5.5. CCU consulted for ACS. EP consulted for symptomatic bradycardia.  Allergies    latex (Rash)  penicillin (Faint)    Intolerances    	    MEDICATIONS:  aspirin enteric coated 81 milliGRAM(s) Oral daily  heparin  Infusion.  Unit(s)/Hr IV Continuous <Continuous>          aluminum hydroxide/magnesium hydroxide/simethicone Suspension 30 milliLiter(s) Oral every 4 hours PRN  pantoprazole    Tablet 40 milliGRAM(s) Oral before breakfast    atorvastatin 80 milliGRAM(s) Oral at bedtime  levothyroxine 75 MICROGram(s) Oral daily        PAST MEDICAL & SURGICAL HISTORY:  History of prediabetes  CAD (coronary artery disease): OM 2 100%, RCA 75  PUD (peptic ulcer disease)  Anemia  Arthritis  Uterine cancer: treated surgically  Carotid artery stenosis: L  MVP (mitral valve prolapse)  Hypercholesterolemia  Hypothyroidism  Stented coronary artery  Rectal cyst: treated surgically  PUD (peptic ulcer disease): treated surgically in 1980  H/O: hysterectomy: due to Uterine cancer  H/O total knee replacement: R      FAMILY HISTORY:      SOCIAL HISTORY:    [ ] Non-smoker  [ ] Smoker  [ ] Alcohol      REVIEW OF SYSTEMS:  See HPI. Otherwise, 10 point ROS done and otherwise negative.      T(C): 36.7 (08-02-20 @ 09:20), Max: 36.9 (08-02-20 @ 04:04)  HR: 40 (08-02-20 @ 10:42) (33 - 50)  BP: 109/72 (08-02-20 @ 10:42) (79/55 - 150/90)  RR: 20 (08-02-20 @ 10:42) (16 - 22)  SpO2: 100% (08-02-20 @ 10:42) (95% - 100%)  Wt(kg): --  I&O's Summary      Physical Exam:  General: NAD  Cardiovascular: irregular rate and rhythm  Respiratory: Lungs clear to auscultation	  Gastrointestinal:  Soft, Non-tender, + BS	  Skin: warm and dry, No rashes, No ecchymoses, No cyanosis	  Extremities:  No clubbing, cyanosis or edema  Vascular: Peripheral pulses palpable 2+ bilaterally    LABS:	   	    CBC Full  -  ( 02 Aug 2020 04:37 )  WBC Count : 5.08 K/uL  Hemoglobin : 10.8 g/dL  Hematocrit : 36.1 %  Platelet Count - Automated : 104 K/uL  Mean Cell Volume : 79.9 fL  Mean Cell Hemoglobin : 23.9 pg  Mean Cell Hemoglobin Concentration : 29.9 %  Auto Neutrophil # : 3.38 K/uL  Auto Lymphocyte # : 1.12 K/uL  Auto Monocyte # : 0.51 K/uL  Auto Eosinophil # : 0.04 K/uL  Auto Basophil # : 0.02 K/uL  Auto Neutrophil % : 66.6 %  Auto Lymphocyte % : 22.0 %  Auto Monocyte % : 10.0 %  Auto Eosinophil % : 0.8 %  Auto Basophil % : 0.4 %    08-02    142  |  107  |  41<H>  ----------------------------<  106<H>  3.5   |  19<L>  |  1.18    Ca    9.7      02 Aug 2020 04:37    TPro  7.2  /  Alb  3.7  /  TBili  0.2  /  DBili  x   /  AST  118<H>  /  ALT  58<H>  /  AlkPhos  72  08-02      proBNP:   Lipid Profile:   HgA1c:   TSH: Thyroid Stimulating Hormone, Serum: 4.70 uIU/mL (08-02 @ 04:37)

## 2020-08-02 NOTE — ED PROVIDER NOTE - CLINICAL SUMMARY MEDICAL DECISION MAKING FREE TEXT BOX
81 y/o female with hx of CAD, HLD, hypothyroidism, anemia presents to the ED c/o CP, lightheadedness, bradycardia. Patient bradycardic on exam, blood pressure stable. EKG without ischemic findings. Plan to check labs and CXR. Reassess. Velasquez Villa DO

## 2020-08-02 NOTE — ED PROVIDER NOTE - CRITICAL CARE PROVIDED
consultation with other physicians/telephone consultation with the patient's family/additional history taking/interpretation of diagnostic studies

## 2020-08-02 NOTE — CONSULT NOTE ADULT - SUBJECTIVE AND OBJECTIVE BOX
---___---___---___---___---___---___ ---___---___---___---___---___---___---___---___---                  M E D I C A L   A T T E N D I N G    C O N S U L T A T I O N   N O T E  ---___---___---___---___---___---___ ---___---___---___---___---___---___---___---___---       Pt seen and examined by me approximately thirty minutes ago.  pt known to me from recent admission, was called By ED for admission >> CCU consulted: pt now in CCU  ++CHIEF COMPLAINT:   Patient is a 80y old  Female who presents with a chief complaint of dizziness (02 Aug 2020 12:39)    ++  HPI:  Patient is an 81 y/o woman with PMH of CAD with stents, last cath in 2017, hypothyroid, HLD, anemia, HGB 6.7 in June s/p EGD, presents with dizziness and lightheadedness, nausea, reports she has been having intermittent chest pain x 2 days ( but started about a week ago with aching under the left breast remaining): overnight pt had dizziness and nause trying to go to bathroom and decided to come to ED.   additionally pt states she had swelling of her ankles for several days   In ER, EKG with sinus bradycardia and nonspecific TWI. Tele reveals sinus bradycardia and episodes of junctional rhythm. Atropine given by EMS.   BP is labile, 80s/50s .   Patient with elevated troponin of 666->728 and elevated CPK  CCU consulted for ACS  pt currently in CCU, improved, chest pain free, no SOB, no dizziness , no nausea, had breakfast   pt on heparin drip    ---___---___---___---___---___---  PAST MEDICAL & SURGICAL HISTORY:  History of prediabetes  CAD (coronary artery disease): OM 2 100%, RCA 75  PUD (peptic ulcer disease)      post Bilroth II  Anemia  Arthritis  Uterine cancer: treated surgically  Carotid artery stenosis: L  MVP (mitral valve endoprolapse)  Hypercholesterolemia  Hypothyroidism  Stented coronary artery  Rectal cyst: treated surgically  PUD (peptic ulcer disease): treated surgically in 1980  H/O: hysterectomy: due to Uterine cancer  H/O total knee replacement: R    ---___---___---___---___---___---   FAMILY HISTORY:  NC    ---___---___---___---___---___---  SOCIAL HISTORY:  Alcohol: None reported  Smoking: None reported    ---___---___---___---___---___---  ALLERGIES:     latex (Rash)  penicillin (Faint)    ---___---___---___---___---___---  MEDICATIONS:  MEDICATIONS  (STANDING):  aspirin enteric coated 81 milliGRAM(s) Oral daily  atorvastatin 80 milliGRAM(s) Oral at bedtime  heparin  Infusion 1100 Unit(s)/Hr (11 mL/Hr) IV Continuous <Continuous>  levothyroxine 75 MICROGram(s) Oral daily  pantoprazole    Tablet 40 milliGRAM(s) Oral before breakfast    MEDICATIONS  (PRN):  aluminum hydroxide/magnesium hydroxide/simethicone Suspension 30 milliLiter(s) Oral every 4 hours PRN Dyspepsia    ___________  Active diet:  Diet, Regular:   Low Fat (LOWFAT)     Special Instructions for Nursing:  Low Fat (LOWFAT)  ___________________    ---___---___---___---___---___---  REVIEW OF SYSTEM:    GEN: no fever, no chills, no pain  RESP: no SOB, no cough, no sputum  CVS: no chest pain, no palpitations, no edema  GI: no abdominal pain, no nausea, no vomiting, no constipation, no diarrhea  : no dysuria, no frequency, no hematuria  Neuro: no headache, no dizziness  PSYCH: no anxiety, no depression  Derm : no itching, no rash    ---___---___---___---___---___---  VITAL SIGNS:  T(C): 36.5 (08-02-20 @ 13:00), Max: 36.9 (08-02-20 @ 04:04)  HR: 44 (08-02-20 @ 13:00) (33 - 50)  BP: 105/87 (08-02-20 @ 13:00) (79/55 - 150/90)  BP(mean): 94 (08-02-20 @ 13:00)  RR: 19 (08-02-20 @ 13:00) (16 - 22)  SpO2: 100% (08-02-20 @ 13:00) (95% - 100%)     ---___---___---___---___---___---  PHYSICAL EXAM:    GEN: A&O X 3 , NAD , comfortable  HEENT: NCAT, PERRL, MMM, hearing intact  Neck: supple , no JVD  CVS: S1S2 , Irregular , kaitlynn  PULM: CTA B/L,  no W/R/R appreciated  ABD.: soft. non tender, non distended,  bowel sounds present  Extrem: intact pulses , trace ankle edema   Derm: No rash , no ecchymoses  PSYCH : normal mood,  no delusion not anxious     ---___---___---___---___---___---            LAB AND IMAGING:                        10.7   4.50  )-----------( 107      ( 02 Aug 2020 13:00 )             35.2        08-02    142  |  107  |  41<H>  ----------------------------<  106<H>  3.5   |  19<L>  |  1.18    Ca    9.7      02 Aug 2020 04:37    TPro  7.2  /  Alb  3.7  /  TBili  0.2  /  DBili  x   /  AST  118<H>  /  ALT  58<H>  /  AlkPhos  72  08-02    PT/INR - ( 02 Aug 2020 04:37 )   PT: 11.4 SEC;   INR: 0.99     PTT - ( 02 Aug 2020 13:00 )  PTT:38.2 SEC              ~~ High Sensitivity Troponin  ~~  813  <<--, 728  <<--, 666  <<--     TSH:      4.70   (08-02-20)       ,     4.05   (06-13-20)           A1C with Estimated Average Glucose: 5.7  Echo just done pending results      ---___---___---___---___---___---___ ---___---___---___---___---                         A S S E S S M E N T   A N D   P L A N :    NSTEMI (non-ST elevated myocardial infarction)  likely acute ischemic cardiomyopathy / CHF with bradycardia and hypotension   Hypothyroidism  Hypercholesterolemia  Anemia: chronic  CAD   History of prediabetes  HLD  Sinus bradycardia  hypokalemia    =======>>>    appreciate CCU care and mgmt  pt on heparin drip  monitor vitals closely  follow Echo  cardio to follow  likely needs cath   supplement hypokalemia / hypomagnesemia as needed  keep K~4, Mag ~2  TSH ok, continue synthroid  Continue Current medications otherwise and monitor.  supportive care    -GI/DVT Prophylaxis per protocol.    --------------------------------------------  Case discussed with pt, CCU team  Education given on findings and plan of care.  ___________________________  Will follow with you.  Thank you,	  CLAUDINE Nuñez D.O.  Pager: [phone]

## 2020-08-02 NOTE — ED ADULT NURSE REASSESSMENT NOTE - NS ED NURSE REASSESS COMMENT FT1
Hepgtt started at 9cc/9oounits. (bolus given 4400units) RN x2 verification . call bell in reach. Ptt to follow 6 hours @1pm.

## 2020-08-02 NOTE — ED ADULT NURSE REASSESSMENT NOTE - NS ED NURSE REASSESS COMMENT FT1
Franny RN note: Pt ON zoll/pads pt bradycardic hr 40's, pt hypotensive. Resp even & unlabored. Pt c/0 feeling cold but denies chest pain/shortness of breath.  NS liter infusing via left 20gauge wrist access. 2nd access inprogress via ultrasound. see flow sheet charting.

## 2020-08-02 NOTE — ED PROVIDER NOTE - PROGRESS NOTE DETAILS
Patient's trop 666. Still bradycardic with hypotension. Plan to heparinize and consult CCU. - TWIN Villa DO Spoke with Dr. Dempsey - [facility] cardiology. Aware of patient, discussed trop 666, no EKG changes; started on heparin. initial presentation of hypotension / bradycardia, s/p EMS atropine. Will have his team evaluate. Pt had been admitted to Dr. Nuñez in June for similar presentation. Dr. Nuñez aware, will admit under his service pending cards c/s. CCU NP at bedside. BP ranging from 87/43 - 100/50s. Will admit to CCU. Plavix load + aspirin / heparin

## 2020-08-02 NOTE — H&P ADULT - PROBLEM SELECTOR PLAN 2
-atropine at bedside  -no need for Transcutaneous pacing, would keep zoll pads on   -no need for urgent transvenous pacing wire  -continue to closely monitor  -consider dopamine gtt if needed

## 2020-08-02 NOTE — ED ADULT NURSE REASSESSMENT NOTE - COMFORT CARE
plan of care explained/warm blanket provided/repositioned/side rails up
plan of care explained/repositioned/warm blanket provided/side rails up
plan of care explained/side rails up/warm blanket provided/repositioned

## 2020-08-02 NOTE — H&P ADULT - NSHPREVIEWOFSYSTEMS_GEN_ALL_CORE
REVIEW OF SYSTEMS:    CONSTITUTIONAL: endorses weakness and fatigue and lightheadedness and dizziness  EYES/ENT: No visual changes;  No vertigo or throat pain   NECK: No pain or stiffness  RESPIRATORY: No cough, wheezing, hemoptysis; No shortness of breath  CARDIOVASCULAR: endorses chest pain  GASTROINTESTINAL: No abdominal or epigastric pain. No nausea, vomiting, or hematemesis; No diarrhea or constipation. No melena or hematochezia.  GENITOURINARY: No dysuria, frequency or hematuria  NEUROLOGICAL: No numbness or weakness  SKIN: No itching, rashes

## 2020-08-02 NOTE — ED ADULT NURSE REASSESSMENT NOTE - NS ED NURSE REASSESS COMMENT FT1
US IV placed by MD Chang/Gt GARCIA upper arm 20g, heparin infusing w/o issue, awaiting CCU bed, remains on CM monitor, will continue to monitor.

## 2020-08-02 NOTE — ED PROVIDER NOTE - PMH
Anemia    Arthritis    CAD (coronary artery disease)  OM 2 100%, RCA 75  Carotid artery stenosis  L  History of prediabetes    Hypercholesterolemia    Hypothyroidism    MVP (mitral valve prolapse)    PUD (peptic ulcer disease)    Uterine cancer  treated surgically

## 2020-08-02 NOTE — CHART NOTE - NSCHARTNOTEFT_GEN_A_CORE
CCU Fellow Brief Note    Briefly, 80F w/ PMH of HLD, hypoTH, CAD s/p PCI, and anemia/GIB c/o dizziness/LH found to have sig bradycardia. Pt also c/o CP 2D ago w/ elev hs-cTn concern for NSTEMI.    #CV  Vessels - CAD s/p PCI. Pt states she had L sided chest pain 2D ago and has elev hs-cTn concerning for NSTEMI. Load w/ ASA/clopidogrel and c/w maintenance dosing. C/w hep gtt. C/w atorva 80. Check A1c, lipids. Plan for LHC on Mon.  Pump - Check TTE  Valves - PRATIMA  Rhythm - Pt w/ sig bradycardia in context of symptoms of dizziness/LH. ECG/tele reveals sinus kaitlynn. Verify home meds, as pt states she is taking BB carvedilol. Hold all neg chronotropes incl BB. Check TFTs. C/w tele monitoring. If remains bradycardic/symptomatic, would c/s EP for consideration of PPM.    #Neuro - PRATIMA  #Resp - PRATIMA  #GI - Heart healthy diet  #Endo - PRATIMA  #Renal - PRATIMA  #ID - PRATIMA  #Hem - Full dose a/c, as above.  #Ethics - FC. Admit to CCU.    Marino Verma MD  Cardiology Fellow  [phone]  All Cardiology service information can be found 24/7 on [email or web address], password: [email or web address]

## 2020-08-02 NOTE — H&P ADULT - HISTORY OF PRESENT ILLNESS
Patient is an 80 F with PMH of CAD with stents, last cath in 2017, hypothyroid, HLD, anemia, HGB 6.7 in June s/p EGD, presents with dizziness and lightheadedness, reports she has been having intermittent chest pain x 2 days. In ER, EKG with sinus bradycardia and nonspecific TWI. Tele reveals sinus bradycardia and episodes of junctional rhythm. Atropine given by EMS. BP is labile, 80s/50s. Patient with elevated troponin of 666->728 and elevated cardiac enzymes of 394/21.83/5.5. CCU consulted for ACS. Of note, recent EGD on last admission in June 2020.

## 2020-08-02 NOTE — H&P ADULT - ATTENDING COMMENTS
ACS in pt with known CAD  symptomatic sinus bradycardia with junctional escape and PAC's, otherwise BP stable and maintaining mentation    DAPT  hep gtt  statin  hold bblocker  revascularization  if bradycardia persists despite revascularization, will need eval for PPM

## 2020-08-02 NOTE — CONSULT NOTE ADULT - SUBJECTIVE AND OBJECTIVE BOX
Date of Admission:  8/2/20  CHIEF COMPLAINT:  chest pain and dizzy and nauseated  HISTORY OF PRESENT ILLNESS:  Patient is an 80 F with PMH of CAD with stents, last cath in 2017, hypothyroid, HLD, anemia, HGB 6.7 in June s/p EGD, presents with dizziness and lightheadedness, reports she has been having intermittent chest pain x 2 days. In ER, EKG with sinus bradycardia and nonspecific TWI. Tele reveals sinus bradycardia and episodes of junctional rhythm. Atropine given by EMS. BP is labile, 80s/50s. Patient with elevated troponin of 666->728 and elevated cardiac enzymes of 394/21.83/5.5. CCU consulted for ACS.     Allergies    latex (Rash)  penicillin (Faint)    Intolerances    	    MEDICATIONS:  heparin   Injectable 4400 Unit(s) IV Push every 6 hours PRN  heparin  Infusion.  Unit(s)/Hr IV Continuous <Continuous>  pantoprazole    Tablet 40 milliGRAM(s) Oral before breakfast      PAST MEDICAL & SURGICAL HISTORY:  History of prediabetes  CAD (coronary artery disease): OM 2 100%, RCA 75  PUD (peptic ulcer disease)  Anemia  Arthritis  Uterine cancer: treated surgically  Carotid artery stenosis: L  MVP (mitral valve prolapse)  Hypercholesterolemia  Hypothyroidism  Stented coronary artery  Rectal cyst: treated surgically  PUD (peptic ulcer disease): treated surgically in 1980  H/O: hysterectomy: due to Uterine cancer  H/O total knee replacement: R      FAMILY HISTORY:  No pertinent family history in first degree relatives      SOCIAL HISTORY:    [ ] Non-smoker  [ ] Smoker  [ ] Alcohol      REVIEW OF SYSTEMS:  See HPI. Otherwise, 10 point ROS done and otherwise negative.      T(C): 36.4 (08-02-20 @ 07:00), Max: 36.9 (08-02-20 @ 04:04)  HR: 45 (08-02-20 @ 07:00) (41 - 47)  BP: 150/90 (08-02-20 @ 07:00) (79/55 - 150/90)  RR: 20 (08-02-20 @ 07:00) (16 - 22)  SpO2: 100% (08-02-20 @ 07:00) (95% - 100%)  Wt(kg): --  I&O's Summary      Physical Exam:  General: NAD  Cardiovascular: Normal S1 S2, No JVD, No murmurs, No edema  Respiratory: Lungs clear to auscultation	  Gastrointestinal:  Soft, Non-tender, + BS	  Skin: warm and dry, No rashes, No ecchymoses, No cyanosis	  Extremities:  No clubbing, cyanosis or edema  Vascular: Peripheral pulses palpable 2+ bilaterally    LABS:	   	    CBC Full  -  ( 02 Aug 2020 04:37 )  WBC Count : 5.08 K/uL  Hemoglobin : 10.8 g/dL  Hematocrit : 36.1 %  Platelet Count - Automated : 104 K/uL  Mean Cell Volume : 79.9 fL  Mean Cell Hemoglobin : 23.9 pg  Mean Cell Hemoglobin Concentration : 29.9 %  Auto Neutrophil # : 3.38 K/uL  Auto Lymphocyte # : 1.12 K/uL  Auto Monocyte # : 0.51 K/uL  Auto Eosinophil # : 0.04 K/uL  Auto Basophil # : 0.02 K/uL  Auto Neutrophil % : 66.6 %  Auto Lymphocyte % : 22.0 %  Auto Monocyte % : 10.0 %  Auto Eosinophil % : 0.8 %  Auto Basophil % : 0.4 %    08-02    142  |  107  |  41<H>  ----------------------------<  106<H>  3.5   |  19<L>  |  1.18    Ca    9.7      02 Aug 2020 04:37    TPro  7.2  /  Alb  3.7  /  TBili  0.2  /  DBili  x   /  AST  118<H>  /  ALT  58<H>  /  AlkPhos  72  08-02      TSH: Thyroid Stimulating Hormone, Serum: 4.70 uIU/mL (08-02 @ 04:37)        CARDIAC MARKERS:    TROPONIN 666 ->728  CKMB: 21.83 ng/mL (08-02 @ 06:30)    CKMB Relative Index: 5.5 (08-02 @ 06:30)      TELEMETRY: 	  sinus bradycardia  ECG:  	sinus bradycardia  RADIOLOGY: CXR: clear lungs  	    PREVIOUS DIAGNOSTIC TESTING:    [ ] Echocardiogram:  < from: Transthoracic Echocardiogram (06.21.18 @ 12:01) >    Patient name: MARYCARMEN CAMPOVERDE  YOB: 1940   Age: 78 (F)   MR#: [id number]  Study Date: 6/21/2018  Location: [id number]: iSlas Gu RDCS  Study quality: Technically good  Referring Physician: Jim Hamilton MD  Blood Pressure: 128/84 mmHg  Height: 155 cm  Weight: 72 kg  BSA: 1.7 m2  ------------------------------------------------------------------------  PROCEDURE: Transthoracic echocardiogram with 2-D, M-Mode  and complete spectral and color flow Doppler.  INDICATION: Abnormal electrocardiogram (ECG) (EKG) (R94.31)  ------------------------------------------------------------------------  DIMENSIONS:  Dimensions:     Normal Values:  LA:     3.6 cm    2.0 - 4.0 cm  Ao:     2.9 cm    2.0 - 3.8 cm  SEPTUM: 0.9 cm    0.6 - 1.2 cm  PWT:    1.0 cm    0.6 - 1.1 cm  LVIDd:  4.6 cm    3.0 - 5.6 cm  LVIDs:  2.8 cm    1.8 - 4.0 cm  Derived Variables:  LVMI: 87 g/m2  RWT: 0.43  Fractional short: 39 %  Ejection Fraction (Teicholtz): 70 %  ------------------------------------------------------------------------  OBSERVATIONS:  Mitral Valve: Mitral annular calcification, otherwise  normal mitral valve. Minimal mitral regurgitation.  Aortic Root: Normal aortic root.  Aortic Valve: Normal trileaflet aortic valve.  Left Atrium: Normal left atrium.  LA volume index = 9  cc/m2.  Left Ventricle: Normal left ventricular systolic function.  No segmental wall motion abnormalities. Increased relative  wall thickness with normal left ventricular mass index,  consistent with concentric left ventricular remodeling.  Right Heart: Normal right atrium. The right ventricle is  not well visualized. Normal tricuspid valve. Mild tricuspid  regurgitation. Normal pulmonic valve.  Pericardium/PleuraNormal pericardium with no pericardial  effusion.  Hemodynamic: Estimated right ventricular systolic pressure  equals 58 mm Hg, assuming right atrial pressure equals 10  mm Hg, consistent with moderate pulmonary hypertension.  ------------------------------------------------------------------------  CONCLUSIONS:  1. Mitral annular calcification, otherwise normal mitral  valve.  2. Increased relative wall thickness with normal left  ventricular mass index, consistent with concentric left  ventricular remodeling.  3. Normal left ventricular systolic function. No segmental  wall motion abnormalities.  4. The right ventricle is not well visualized.  5. Estimated pulmonary artery systolic pressure equals 58  mm Hg, assuming right atrial pressure equals 10  mm Hg,  consistent with moderate pulmonary hypertension.  ------------------------------------------------------------------------  Confirmed on  6/21/2018 - 15:12:10 by Jaylen Brown M.D. RPVI  ------------------------------------------------------------------------    < end of copied text >  [ ]  Catheterization:< from: Cardiac Cath Lab - Adult (08.03.17 @ 11:55) >    Left heart catheterization with ventriculography.  --  Left coronary angiography.  --  Right coronary angiography.  TECHNIQUE: The risks and alternatives of the procedures and conscious sedation  were explained to the patient and informed consent was obtained. Cardiac  catheterization performed electively.  Local anesthetic given. Right femoral artery access. Left heart  catheterization. Ventriculography was performed. Left coronary artery  angiography. The vessel was injected utilizing a catheter. Right coronary  artery angiography. The vessel was injected utilizing a catheter. RADIATION  EXPOSURE: 8.8 min.  CONTRAST GIVEN: 90 ml Optiray.  MEDICATIONS GIVEN: Midazolam, 1 mg, IV. Fentanyl, 25 mcg, IV. 2% Lidocaine, 10  ml, subcutaneously. 0.9% Normal saline, 20 ml, IV.  VENTRICLES: There were no left ventricular global or regional wall motion  abnormalities. hyperdynamic LV EF estimated was 75 %.  VALVES: MITRAL VALVE: The mitral valve exhibited no regurgitation.  CORONARY VESSELS: The coronary circulation is right dominant.  LM:   --  LM: Normal.  LAD:   --  LAD: Angiography showed minor luminal irregularities with no flow  limiting lesions.  CX:   --  OM2: There was a 100 % stenosis. small caliber vessel  RCA:   --  RCA: Angiography showed minor luminal irregularities with no flow  limiting lesions. Moderate diffuse disease througout with osital 60% and mid  90% that has progressed with left to right collaterals from the :CX to PL branch  --  Ostial RCA: There was a 60 % stenosis. damping unchanged from 1 year ago  --  Mid RCA: There was a 90 % stenosis. The lesion was eccentric.  COMPLICATIONS: No complications occurred during the cath lab visit.  DIAGNOSTIC IMPRESSIONS: Patient has hyperdynamic LV with systemic hypertension  in aorta, severe disease of mid RCA and moderate ostial RCA disease. The mid  RCA disease has progressed with left to right collaterals to PL branch  DIAGNOSTIC RECOMMENDATIONS: As patient is asymptomatic with preserved LV  function and patient prefers medical therapy. would continue medical therapy  and risk factor modification  Prepared and signed by  Chriss Dempsey M.D.  Signed 08/03/2017 13:04:43    < end of copied text >

## 2020-08-02 NOTE — PATIENT PROFILE ADULT - NSPROMUTANXFEARFT_GEN_A_NUR
3-13  Still pending clinical review  Contacted [facility] Roca ref   To getting fax number to send dr Joe Priest clinical notes to review for upcoming  3-27
hospitalization

## 2020-08-02 NOTE — ED ADULT NURSE REASSESSMENT NOTE - NS ED NURSE REASSESS COMMENT FT1
rpt from Silvana SANTOS, pt appears in NAD @ this time, denies pain however c/o "heartburn" 2/2 plavix PO from prior shift, Pauly Russell NP @ bedside for eval, aware of "burning," awaiting orders, pt ADM CCU, awaiting bed assignment, heparin infusing w/o difficulty @7ml/hr, SL x 2 placed by previous shift L wrist/hand, pt remains on CM/Zoll for further monitoring, bed in lowest position, side rails up for safety, pt made aware of bleeding risk of heparin, aware pt cannot get out of bed on own, must request assistance, will continue to monitor.  rpt from Silvana SANTOS, pt appears in NAD @ this time, denies pain however c/o "heartburn" 2/2 plavix PO from prior shift, Pauly Russell NP @ bedside for eval, aware of "burning," awaiting orders, pt ADM CCU, awaiting bed assignment, heparin infusing w/o difficulty @ 9ml/hr, SL x 2 placed by previous shift L wrist/hand, pt remains on CM/Zoll for further monitoring, bed in lowest position, side rails up for safety, pt made aware of bleeding risk of heparin, aware pt cannot get out of bed on own, must request assistance, will continue to monitor.

## 2020-08-02 NOTE — ED ADULT NURSE REASSESSMENT NOTE - REASSESS COMMUNICATION
MD Gregory and resident at bedside. ULTRASOUND access in progress/ED physician notified
Pauly Russell CCU NP @ bedside
MD Gregory at bedside notifed.

## 2020-08-03 NOTE — DISCHARGE NOTE PROVIDER - NSDCHHNEEDSERVICE_GEN_ALL_CORE
Medication teaching and assessment Observation and assessment/Medication teaching and assessment/Teaching and training

## 2020-08-03 NOTE — PROGRESS NOTE ADULT - ASSESSMENT
· Assessment		   80 F with PMH of CAD with stents, last cath in 2017, hypothyroid, HLD, anemia, HGB 6.7 in June s/p EGD,presents with NSTEMI and symptomatic bradycardia      Neuro:         Pulm:        Cardio   NSTEMI (non-ST elevated myocardial infarction)  -ECHO today  -Serial cardiac enzymes  -ACS protocol with hep gtt, asa, plavix, lipitor  -hold antihypertensives  -LHC in am.     Problem: Sinus bradycardia.  Plan: -atropine at bedside  -no need for Transcutaneous pacing, would keep zoll pads on   -no need for urgent transvenous pacing wire  -continue to closely monitor  -consider dopamine gtt if needed.   Problem: CAD (coronary artery disease).  Plan: -continue DAPT and hep gtt  -risk factor profile labs-HGBA1c, TSH, lipid panel, pro BNP, with next lab draw.   Problem: Hypercholesterolemia.  Plan: continue lipitor 80.         Heme:  Problem: Anemia.  Plan: HGB is stable at this time. · Assessment		   80 F with PMH of CAD with stents, last cath in 2017, hypothyroid, HLD, anemia, HGB 6.7 in June s/p EGD,presents with NSTEMI and symptomatic bradycardia      Neuro:   -AO x3        Pulm:  -Saturating appropriately on RA      Cardio   NSTEMI (non-ST elevated myocardial infarction)  -ECHO today  -Serial cardiac enzymes  -ACS protocol with hep gtt, asa, plavix, lipitor  -hold antihypertensives  -LHC in am.     Problem: Sinus bradycardia.  Plan: -atropine at bedside  -no need for Transcutaneous pacing, would keep zoll pads on   -no need for urgent transvenous pacing wire  -continue to closely monitor  -consider dopamine gtt if needed.   Problem: CAD (coronary artery disease).  Plan: -continue DAPT and hep gtt  -risk factor profile labs-HGBA1c, TSH, lipid panel, pro BNP, with next lab draw.   Problem: Hypercholesterolemia.  Plan: continue lipitor 80.         Heme:  Problem: Anemia.  Plan: HGB is stable at this time. · Assessment		   80 F with PMH of CAD with stents, last cath in 2017, hypothyroid, HLD, anemia, HGB 6.7 in June s/p EGD,presents with NSTEMI and symptomatic bradycardia. Patient currently asymptomatic and hemodynamically stable. Plan for left heart cath.      Neuro:   -AO x3        Pulm:  -Saturating appropriately on RA      Cardio   NSTEMI (non-ST elevated myocardial infarction)  -f/u echo  -Serial cardiac enzymes  -ACS protocol with hep gtt, asa, plavix, lipitor  -holding coreg  -[facility] pending anemia work up    Problem: Sinus bradycardia.   -no need for Transcutaneous pacing,   -continue to closely monitor    Problem: CAD (coronary artery disease).   -continue DAPT and hep gtt  -risk factor profile labs-HGBA1   -continue lipitor 80.           GI:  Low fat diet         Renal:  PRATIMA      Heme:  Anemia 9.2  -concern for hematologic process

## 2020-08-03 NOTE — DISCHARGE NOTE PROVIDER - CARE PROVIDERS DIRECT ADDRESSES
,DirectAddress_Unknown,[email or web address]

## 2020-08-03 NOTE — CONSULT NOTE ADULT - SUBJECTIVE AND OBJECTIVE BOX
REASON FOR CONSULTATION:    HPI:    REVIEW OF SYSTEMS:    CONSTITUTIONAL: +fatigue, +lightheadedness, +dizziness  EYES/ENT: No visual changes;  No vertigo or throat pain   NECK: No pain or stiffness  RESPIRATORY: No cough, wheezing, hemoptysis; No shortness of breath  CARDIOVASCULAR: +chest pain  GASTROINTESTINAL: No abdominal or epigastric pain. No nausea, vomiting, or hematemesis; No diarrhea or constipation. No melena or hematochezia.  GENITOURINARY: No dysuria, frequency or hematuria  NEUROLOGICAL: No numbness or weakness  SKIN: No itching, burning, rashes, or lesions   All other review of systems is negative unless indicated above.    Allergies    latex (Rash)  penicillin (Faint)    Intolerances        MEDICATIONS  (STANDING):  aspirin enteric coated 81 milliGRAM(s) Oral daily  atorvastatin 80 milliGRAM(s) Oral at bedtime  clopidogrel Tablet 75 milliGRAM(s) Oral daily  heparin  Infusion 1100 Unit(s)/Hr (11 mL/Hr) IV Continuous <Continuous>  levothyroxine 75 MICROGram(s) Oral daily  pantoprazole    Tablet 40 milliGRAM(s) Oral before breakfast    MEDICATIONS  (PRN):  aluminum hydroxide/magnesium hydroxide/simethicone Suspension 30 milliLiter(s) Oral every 4 hours PRN Dyspepsia      Vital Signs Last 24 Hrs  T(C): 35.9 (03 Aug 2020 12:00), Max: 37 (02 Aug 2020 20:00)  T(F): 96.7 (03 Aug 2020 12:00), Max: 98.6 (02 Aug 2020 20:00)  HR: 53 (03 Aug 2020 13:00) (40 - 60)  BP: 99/85 (03 Aug 2020 13:00) (84/60 - 121/76)  BP(mean): 91 (03 Aug 2020 13:00) (63 - 95)  RR: 21 (03 Aug 2020 13:00) (13 - 24)  SpO2: 100% (03 Aug 2020 13:00) (94% - 100%)    PHYSICAL EXAM:      LABS:                        9.5    5.19  )-----------( 121      ( 03 Aug 2020 12:00 )             30.5     08-03    140  |  110<H>  |  31<H>  ----------------------------<  121<H>  4.1   |  19<L>  |  0.94    Ca    9.4      03 Aug 2020 01:00  Phos  2.2     08-03  Mg     2.2     08-03    TPro  6.5  /  Alb  3.2<L>  /  TBili  0.2  /  DBili  x   /  AST  51<H>  /  ALT  36<H>  /  AlkPhos  63  08-03    PT/INR - ( 03 Aug 2020 12:00 )   PT: 13.5 SEC;   INR: 1.19          PTT - ( 03 Aug 2020 12:00 )  PTT:> 200.0 SEC          RADIOLOGY & ADDITIONAL STUDIES:      PATHOLOGY: REASON FOR CONSULTATION: Anemia and thrombocytopenia     HPI: 80 F with PMH of iron def anemia (Currently off iron supplements), CAD with stents, last cath in 2017, hypothyroid, HLD, anemia, HGB 6.7 in June s/p EGD and colonoscopy, presents with dizziness and lightheadedness, reports she has been having intermittent chest pain x 2 days. In ER, EKG with sinus bradycardia and nonspecific TWI. Tele reveals sinus bradycardia and episodes of junctional rhythm. Atropine given by EMS. BP is labile, 80s/50s. Patient with elevated troponin of 666->728 and elevated cardiac enzymes of 394/21.83/5.5. CCU consulted for ACS now being treated with NSTEMI and planned for cath. Of note, recent EGD and colonoscopy on last admission in June 2020.    REVIEW OF SYSTEMS:    CONSTITUTIONAL: +fatigue, +lightheadedness, +dizziness  EYES/ENT: No visual changes;  No vertigo or throat pain   NECK: No pain or stiffness  RESPIRATORY: No cough, wheezing, hemoptysis; No shortness of breath  CARDIOVASCULAR: +chest pain  GASTROINTESTINAL: No abdominal or epigastric pain. No nausea, vomiting, or hematemesis; No diarrhea or constipation. No melena or hematochezia.  GENITOURINARY: No dysuria, frequency or hematuria  NEUROLOGICAL: No numbness or weakness  SKIN: No itching, burning, rashes, or lesions   All other review of systems is negative unless indicated above.    Allergies    latex (Rash)  penicillin (Faint)    Intolerances        MEDICATIONS  (STANDING):  aspirin enteric coated 81 milliGRAM(s) Oral daily  atorvastatin 80 milliGRAM(s) Oral at bedtime  clopidogrel Tablet 75 milliGRAM(s) Oral daily  heparin  Infusion 1100 Unit(s)/Hr (11 mL/Hr) IV Continuous <Continuous>  levothyroxine 75 MICROGram(s) Oral daily  pantoprazole    Tablet 40 milliGRAM(s) Oral before breakfast    MEDICATIONS  (PRN):  aluminum hydroxide/magnesium hydroxide/simethicone Suspension 30 milliLiter(s) Oral every 4 hours PRN Dyspepsia      Vital Signs Last 24 Hrs  T(C): 35.9 (03 Aug 2020 12:00), Max: 37 (02 Aug 2020 20:00)  T(F): 96.7 (03 Aug 2020 12:00), Max: 98.6 (02 Aug 2020 20:00)  HR: 53 (03 Aug 2020 13:00) (40 - 60)  BP: 99/85 (03 Aug 2020 13:00) (84/60 - 121/76)  BP(mean): 91 (03 Aug 2020 13:00) (63 - 95)  RR: 21 (03 Aug 2020 13:00) (13 - 24)  SpO2: 100% (03 Aug 2020 13:00) (94% - 100%)    PHYSICAL EXAM:  GENERAL: NAD, well-groomed, well-developed  CHEST/LUNG: Clear to percussion bilaterally; No rales, rhonchi, wheezing, or rubs  HEART: Bradycardic; No murmurs, rubs, or gallops  ABDOMEN: Soft, Nontender, Nondistended; Bowel sounds present  NERVOUS SYSTEM:  Alert & Oriented X3      LABS:                        9.5    5.19  )-----------( 121      ( 03 Aug 2020 12:00 )             30.5     08-03    140  |  110<H>  |  31<H>  ----------------------------<  121<H>  4.1   |  19<L>  |  0.94    Ca    9.4      03 Aug 2020 01:00  Phos  2.2     08-03  Mg     2.2     08-03    TPro  6.5  /  Alb  3.2<L>  /  TBili  0.2  /  DBili  x   /  AST  51<H>  /  ALT  36<H>  /  AlkPhos  63  08-03    PT/INR - ( 03 Aug 2020 12:00 )   PT: 13.5 SEC;   INR: 1.19          PTT - ( 03 Aug 2020 12:00 )  PTT:> 200.0 SEC          RADIOLOGY & ADDITIONAL STUDIES:      PATHOLOGY:

## 2020-08-03 NOTE — DISCHARGE NOTE PROVIDER - NSDCCPCAREPLAN_GEN_ALL_CORE_FT
PRINCIPAL DISCHARGE DIAGNOSIS  Diagnosis: Non-ST elevation MI (NSTEMI)  Assessment and Plan of Treatment:       SECONDARY DISCHARGE DIAGNOSES  Diagnosis: Sinus bradycardia  Assessment and Plan of Treatment:

## 2020-08-03 NOTE — PROGRESS NOTE ADULT - ASSESSMENT
_________________________________________________________________________________________  ========>>  M E D I C A L   A T T E N D I N G    F O L L O W  U P  N O T E  <<=========  -----------------------------------------------------------------------------------------------------    - Patient seen and examined by me earlier today.   - In summary,  MARYCARMEN CAMPOVERDE is a 80y year old woman who originally presented with dizziness >> NSTEMI  - Patient today overall doing ok, comfortable, eating OK. no dizziness, no CP, no SOB    ==================>> REVIEW OF SYSTEM <<=================    GEN: no fever, no chills, no pain  RESP: no SOB, no cough, no sputum  CVS: no chest pain, no palpitations, no edema  GI: no abdominal pain, no nausea, no constipation, no diarrhea  : no dysuria, no frequency, no hematuria  Neuro: no headache, no dizziness  Derm : no itching, no rash    ==================>> PHYSICAL EXAM <<=================    GEN: A&O X 3 , NAD , comfortable  HEENT: NCAT, PERRL, MMM, hearing intact  Neck: supple , no JVD appreciated  CVS: S1S2 , regular , No M/R/G appreciated  PULM: CTA B/L,  no W/R/R appreciated  ABD.: soft. non tender, non distended,  bowel sounds present  Extrem: intact pulses , no edema   PSYCH : normal mood,  not anxious      ==================>> MEDICATIONS <<====================    MEDICATIONS  (STANDING):  aspirin enteric coated 81 milliGRAM(s) Oral daily  atorvastatin 80 milliGRAM(s) Oral at bedtime  clopidogrel Tablet 75 milliGRAM(s) Oral daily  heparin  Infusion 1100 Unit(s)/Hr (11 mL/Hr) IV Continuous <Continuous>  levothyroxine 75 MICROGram(s) Oral daily  pantoprazole    Tablet 40 milliGRAM(s) Oral before breakfast  potassium phosphate IVPB 15 milliMole(s) IV Intermittent once    MEDICATIONS  (PRN):  aluminum hydroxide/magnesium hydroxide/simethicone Suspension 30 milliLiter(s) Oral every 4 hours PRN Dyspepsia    ___________  Active diet:  Diet, Regular:   Low Fat (LOWFAT)     Special Instructions for Nursing:  Low Fat (LOWFAT)  ___________________    ==================>> VITAL SIGNS <<==================    T(C): 37 (08-03-20 @ 08:00), Max: 37 (08-02-20 @ 20:00)  HR: 53 (08-03-20 @ 09:00) (40 - 60)  BP: 110/43 (08-03-20 @ 09:00) (84/60 - 117/72)  BP(mean): 63 (08-03-20 @ 09:00)  RR: 23 (08-03-20 @ 09:00) (13 - 24)  SpO2: 99% (08-03-20 @ 09:00) (94% - 100%)     I&O's Summary    02 Aug 2020 07:01  -  03 Aug 2020 07:00  --------------------------------------------------------  IN: 670 mL / OUT: 1250 mL / NET: -580 mL     ==================>> LAB AND IMAGING <<==================                        9.2    5.23  )-----------( 101      ( 03 Aug 2020 01:00 )             29.4        140  |  110<H>  |  31<H>  ----------------------------<  121<H>  4.1   |  19<L>  |  0.94    Ca    9.4      03 Aug 2020 01:00  Phos  2.2     08-03  Mg     2.2     08-03    TPro  6.5  /  Alb  3.2<L>  /  TBili  0.2  /  DBili  x   /  AST  51<H>  /  ALT  36<H>  /  AlkPhos  63  08-03    PT/INR - ( 03 Aug 2020 01:00 )   PT: 12.7 SEC;   INR: 1.12     PTT - ( 03 Aug 2020 01:00 )  PTT:46.0 SEC              ~~ High Sensitivity Troponin  ~~  654  <<--, 813  <<--, 728  <<--, 666  <<--     TSH:      4.70   (08-02-20)       ,     4.05   (06-13-20)           Lipid profile:  (08-03-20)     Total: 133     LDL  : 51     HDL  :81     TG   :44    < from: Transthoracic Echocardiogram (08.02.20 @ 13:55) >  CONCLUSIONS:  1. Normal mitral valve. Moderate mitral regurgitation.  2. Normal trileaflet aortic valve.  3. Severely dilated left atrium.  LA volume index = 50 cc/m2.  4. Normal left ventricular systolic function. No segmental  wall motion abnormalities.  5. Normal right ventricular size and function.  6. Normal tricuspid valve.   Moderate tricuspid regurgitation.  7. Estimated pulmonary artery systolic pressure equals 55  mm Hg, assuming right atrial pressure equals 10  mm Hg,  consistent with moderate pulmonary hypertension.  ------------------------------------------------------------------------  Confirmed on  8/2/2020 - 15:21:18 by Tanja Quick MD  < end of copied text >    ___________________________________________________________________________________  ===============>>  A S S E S S M E N T   A N D   P L A N <<===============  ------------------------------------------------------------------------------------------    NSTEMI (non-ST elevated myocardial infarction)       no CHF seen on Echo   bradycardia and hypotension likely related to NSTEMI   Hypothyroidism  Hypercholesterolemia  Anemia: chronic  CAD   History of prediabetes  HLD  hypokalemia improved     =======>>>    appreciate CCU care and mgmt  pt on heparin drip  monitor vitals closely  Echo as above  cardio / CCu follow up and mgmt appreciated   plan for Left heart cath   keep K~4, Mag ~2  TSH ok, continue synthroid  Continue Current medications otherwise and monitor.  supportive care    -GI/DVT Prophylaxis per protocol.    --------------------------------------------  Case discussed with pt  Education given on findings and plan of care.  ___________________________  Will follow with you.  Thank you,	  RODRIGUE ShipleyO.  Pager: [phone]

## 2020-08-03 NOTE — DISCHARGE NOTE PROVIDER - HOSPITAL COURSE
Patient is an 80 F with PMH of CAD with stents, last cath in 2017, hypothyroid, HLD, anemia, HGB 6.7 in June s/p EGD, presented with dizziness and lightheadedness, reports she has been having intermittent chest pain x 2 days. In ER, EKG with sinus bradycardia and nonspecific TWI. Tele reveals sinus bradycardia and episodes of junctional rhythm. Atropine given by EMS. BP is labile, 80s/50s. Patient with elevated troponin of 666->728 and elevated cardiac enzymes of 394/21.83/5.5. CCU consulted for ACS. Of note, recent EGD on last admission in June 2020. Patient admitted to the CCU and placed on a heparin gtt and continued with DAPT. Patient is awaiting hem/onc evaluation for recent history of anemia HGBof 6.5 in June. The further plan is for ischemic eval and possible PPM if sinus node dysfunction does not resolve post cath. Patient is an 80 F with PMH of CAD with stents, last cath in 2017, hypothyroid, HLD, anemia, HGB 6.7 in June s/p EGD, presented with dizziness and lightheadedness, reports she has been having intermittent chest pain x 2 days. In ER, EKG with sinus bradycardia and nonspecific TWI. Tele reveals sinus bradycardia and episodes of junctional rhythm. Atropine given by EMS. BP is labile, 80s/50s. Patient with elevated troponin of 666->728 and elevated cardiac enzymes of 394/21.83/5.5. CCU consulted for ACS. Of note, recent EGD on last admission in June 2020. Patient admitted to the CCU and placed on a heparin gtt and continued with DAPT. Patient is awaiting hem/onc evaluation for recent history of anemia HGBof 6.5 in June. S/p cath with PCI to RCA.  Heart rate improved, now HR in the low 60.  Also, hold Norvasc and HCTZ. Will hold off PPM and start beta blocker as an out patient with Dr. Dempsey.

## 2020-08-03 NOTE — DISCHARGE NOTE PROVIDER - CARE PROVIDER_API CALL
Chriss Dempsey)  Cardiovascular Disease; Internal Medicine; Interventional Cardiology  [address] [place] [address]  Phone: [phone]  Fax: [phone]  Follow Up Time:     Ashish Dallas  CARDIAC ELECTROPHYSIOLOGY  [id number] [place] [address]  Phone: [phone]  Fax: [phone]  Follow Up Time:

## 2020-08-03 NOTE — PROGRESS NOTE ADULT - SUBJECTIVE AND OBJECTIVE BOX
PATIENT:  MARYCARMEN PAGE  [id number]    CHIEF COMPLAINT:  Patient is a 80y old  Female who presents with a chief complaint of NSTEMI (02 Aug 2020 13:52)      INTERVAL HISTORY/OVERNIGHT EVENTS:          MEDICATIONS:  MEDICATIONS  (STANDING):  aspirin enteric coated 81 milliGRAM(s) Oral daily  atorvastatin 80 milliGRAM(s) Oral at bedtime  clopidogrel Tablet 75 milliGRAM(s) Oral daily  heparin  Infusion 1100 Unit(s)/Hr (11 mL/Hr) IV Continuous <Continuous>  levothyroxine 75 MICROGram(s) Oral daily  pantoprazole    Tablet 40 milliGRAM(s) Oral before breakfast    MEDICATIONS  (PRN):  aluminum hydroxide/magnesium hydroxide/simethicone Suspension 30 milliLiter(s) Oral every 4 hours PRN Dyspepsia      ALLERGIES:  Allergies    latex (Rash)  penicillin (Faint)    Intolerances        OBJECTIVE:  ICU Vital Signs Last 24 Hrs  T(C): 36.8 (03 Aug 2020 04:00), Max: 37 (02 Aug 2020 20:00)  T(F): 98.3 (03 Aug 2020 04:00), Max: 98.6 (02 Aug 2020 20:00)  HR: 52 (03 Aug 2020 06:00) (33 - 60)  BP: 91/72 (03 Aug 2020 06:00) (83/50 - 127/99)  BP(mean): 80 (03 Aug 2020 06:00) (58 - 109)  ABP: --  ABP(mean): --  RR: 20 (03 Aug 2020 06:00) (13 - 24)  SpO2: 100% (03 Aug 2020 06:00) (94% - 100%)      Adult Advanced Hemodynamics Last 24 Hrs  CVP(mm Hg): --  CVP(cm H2O): --  CO: --  CI: --  PA: --  PA(mean): --  PCWP: --  SVR: --  SVRI: --  PVR: --  PVRI: --  CAPILLARY BLOOD GLUCOSE        CAPILLARY BLOOD GLUCOSE        I&O's Summary    02 Aug 2020 07:01  -  03 Aug 2020 07:00  --------------------------------------------------------  IN: 670 mL / OUT: 1250 mL / NET: -580 mL      Daily Height in cm: 154.94 (02 Aug 2020 09:20)    Daily Weight in k.3 (03 Aug 2020 05:00)    PHYSICAL EXAMINATION:  General: WN/WD NAD  HEENT: PERRLA, EOMI, moist mucous membranes  Neurology: A&Ox3, nonfocal, HART x 4  Respiratory: CTA B/L, normal respiratory effort, no wheezes, crackles, rales  CV: RRR, S1S2, no murmurs, rubs or gallops  Abdominal: Soft, NT, ND +BS, Last BM  Extremities: No edema, + peripheral pulses  Incisions:   Tubes:    LABS:                          9.2    5.23  )-----------( 101      ( 03 Aug 2020 01:00 )             29.4         140  |  110<H>  |  31<H>  ----------------------------<  121<H>  4.1   |  19<L>  |  0.94    Ca    9.4      03 Aug 2020 01:00  Phos  2.2       Mg     2.2         TPro  6.5  /  Alb  3.2<L>  /  TBili  0.2  /  DBili  x   /  AST  51<H>  /  ALT  36<H>  /  AlkPhos  63      LIVER FUNCTIONS - ( 03 Aug 2020 01:00 )  Alb: 3.2 g/dL / Pro: 6.5 g/dL / ALK PHOS: 63 u/L / ALT: 36 u/L / AST: 51 u/L / GGT: x           PT/INR - ( 03 Aug 2020 01:00 )   PT: 12.7 SEC;   INR: 1.12          PTT - ( 03 Aug 2020 01:00 )  PTT:46.0 SEC  Creatine Kinase, Serum: 181 u/L ( @ 01:00)  CKMB: 8.40 ng/mL ( @ 01:00)  CKMB Relative Index: 4.6 ( @ 01:00)  CKMB: 16.00 ng/mL ( @ 13:00)  Creatine Kinase, Serum: 281 u/L ( @ 13:00)    CARDIAC MARKERS ( 03 Aug 2020 01:00 )  x     / x     / 181 u/L / 8.40 ng/mL / x      CARDIAC MARKERS ( 02 Aug 2020 13:00 )  x     / x     / 281 u/L / 16.00 ng/mL / x      CARDIAC MARKERS ( 02 Aug 2020 06:30 )  x     / x     / 394 u/L / 21.83 ng/mL / x              TELEMETRY:     EKG:     IMAGING: PATIENT:  MARYCARMEN PAGE  [id number]    CHIEF COMPLAINT:  Patient is a 80y old  Female who presents with a chief complaint of NSTEMI (02 Aug 2020 13:52)      INTERVAL HISTORY/OVERNIGHT EVENTS:  No CP or dizziness this AM. Pt denies fever, fever, chills, abdominal pain.         MEDICATIONS:  MEDICATIONS  (STANDING):  aspirin enteric coated 81 milliGRAM(s) Oral daily  atorvastatin 80 milliGRAM(s) Oral at bedtime  clopidogrel Tablet 75 milliGRAM(s) Oral daily  heparin  Infusion 1100 Unit(s)/Hr (11 mL/Hr) IV Continuous <Continuous>  levothyroxine 75 MICROGram(s) Oral daily  pantoprazole    Tablet 40 milliGRAM(s) Oral before breakfast    MEDICATIONS  (PRN):  aluminum hydroxide/magnesium hydroxide/simethicone Suspension 30 milliLiter(s) Oral every 4 hours PRN Dyspepsia      ALLERGIES:  Allergies    latex (Rash)  penicillin (Faint)    Intolerances        OBJECTIVE:  ICU Vital Signs Last 24 Hrs  T(C): 36.8 (03 Aug 2020 04:00), Max: 37 (02 Aug 2020 20:00)  T(F): 98.3 (03 Aug 2020 04:00), Max: 98.6 (02 Aug 2020 20:00)  HR: 52 (03 Aug 2020 06:00) (33 - 60)  BP: 91/72 (03 Aug 2020 06:00) (83/50 - 127/99)  BP(mean): 80 (03 Aug 2020 06:00) (58 - 109)  ABP: --  ABP(mean): --  RR: 20 (03 Aug 2020 06:00) (13 - 24)  SpO2: 100% (03 Aug 2020 06:00) (94% - 100%)      Adult Advanced Hemodynamics Last 24 Hrs  CVP(mm Hg): --  CVP(cm H2O): --  CO: --  CI: --  PA: --  PA(mean): --  PCWP: --  SVR: --  SVRI: --  PVR: --  PVRI: --  CAPILLARY BLOOD GLUCOSE        CAPILLARY BLOOD GLUCOSE        I&O's Summary    02 Aug 2020 07:01  -  03 Aug 2020 07:00  --------------------------------------------------------  IN: 670 mL / OUT: 1250 mL / NET: -580 mL      Daily Height in cm: 154.94 (02 Aug 2020 09:20)    Daily Weight in k.3 (03 Aug 2020 05:00)    PHYSICAL EXAMINATION:  General: WN/WD NAD  HEENT: PERRLA, EOMI, moist mucous membranes  Neurology: A&Ox3, nonfocal, HART x 4  Respiratory: CTA B/L, normal respiratory effort, no wheezes, crackles, rales  CV: RRR, S1S2, no murmurs, rubs or gallops  Abdominal: Soft, NT, ND +BS, Last BM  Extremities: No edema, + peripheral pulses  Incisions:   Tubes:    LABS:                          9.2    5.23  )-----------( 101      ( 03 Aug 2020 01:00 )             29.4     08-    140  |  110<H>  |  31<H>  ----------------------------<  121<H>  4.1   |  19<L>  |  0.94    Ca    9.4      03 Aug 2020 01:00  Phos  2.2     -  Mg     2.2         TPro  6.5  /  Alb  3.2<L>  /  TBili  0.2  /  DBili  x   /  AST  51<H>  /  ALT  36<H>  /  AlkPhos  63  08-03    LIVER FUNCTIONS - ( 03 Aug 2020 01:00 )  Alb: 3.2 g/dL / Pro: 6.5 g/dL / ALK PHOS: 63 u/L / ALT: 36 u/L / AST: 51 u/L / GGT: x           PT/INR - ( 03 Aug 2020 01:00 )   PT: 12.7 SEC;   INR: 1.12          PTT - ( 03 Aug 2020 01:00 )  PTT:46.0 SEC  Creatine Kinase, Serum: 181 u/L ( @ 01:00)  CKMB: 8.40 ng/mL ( @ 01:00)  CKMB Relative Index: 4.6 ( @ 01:00)  CKMB: 16.00 ng/mL ( @ 13:00)  Creatine Kinase, Serum: 281 u/L ( @ 13:00)    CARDIAC MARKERS ( 03 Aug 2020 01:00 )  x     / x     / 181 u/L / 8.40 ng/mL / x      CARDIAC MARKERS ( 02 Aug 2020 13:00 )  x     / x     / 281 u/L / 16.00 ng/mL / x      CARDIAC MARKERS ( 02 Aug 2020 06:30 )  x     / x     / 394 u/L / 21.83 ng/mL / x              TELEMETRY:     EKG:     IMAGING: PATIENT:  MARYCARMEN PAGE  [id number]    CHIEF COMPLAINT:  Patient is a 80y old  Female who presents with a chief complaint of NSTEMI (02 Aug 2020 13:52)      INTERVAL HISTORY/OVERNIGHT EVENTS:  No CP or dizziness this AM. Pt denies fever, fever, chills, abdominal pain. Hgb 9.2.        MEDICATIONS:  MEDICATIONS  (STANDING):  aspirin enteric coated 81 milliGRAM(s) Oral daily  atorvastatin 80 milliGRAM(s) Oral at bedtime  clopidogrel Tablet 75 milliGRAM(s) Oral daily  heparin  Infusion 1100 Unit(s)/Hr (11 mL/Hr) IV Continuous <Continuous>  levothyroxine 75 MICROGram(s) Oral daily  pantoprazole    Tablet 40 milliGRAM(s) Oral before breakfast    MEDICATIONS  (PRN):  aluminum hydroxide/magnesium hydroxide/simethicone Suspension 30 milliLiter(s) Oral every 4 hours PRN Dyspepsia      ALLERGIES:  Allergies    latex (Rash)  penicillin (Faint)    Intolerances        OBJECTIVE:  ICU Vital Signs Last 24 Hrs  T(C): 36.8 (03 Aug 2020 04:00), Max: 37 (02 Aug 2020 20:00)  T(F): 98.3 (03 Aug 2020 04:00), Max: 98.6 (02 Aug 2020 20:00)  HR: 52 (03 Aug 2020 06:00) (33 - 60)  BP: 91/72 (03 Aug 2020 06:00) (83/50 - 127/99)  BP(mean): 80 (03 Aug 2020 06:00) (58 - 109)  ABP: --  ABP(mean): --  RR: 20 (03 Aug 2020 06:00) (13 - 24)  SpO2: 100% (03 Aug 2020 06:00) (94% - 100%)      Adult Advanced Hemodynamics Last 24 Hrs  CVP(mm Hg): --  CVP(cm H2O): --  CO: --  CI: --  PA: --  PA(mean): --  PCWP: --  SVR: --  SVRI: --  PVR: --  PVRI: --  CAPILLARY BLOOD GLUCOSE        CAPILLARY BLOOD GLUCOSE        I&O's Summary    02 Aug 2020 07:01  -  03 Aug 2020 07:00  --------------------------------------------------------  IN: 670 mL / OUT: 1250 mL / NET: -580 mL      Daily Height in cm: 154.94 (02 Aug 2020 09:20)    Daily Weight in k.3 (03 Aug 2020 05:00)    PHYSICAL EXAMINATION:  General: WN/WD NAD  HEENT: PERRLA, EOMI, moist mucous membranes  Neurology: A&Ox3, nonfocal, HART x 4  Respiratory: CTA B/L, normal respiratory effort, no wheezes, crackles, rales  CV: RRR, S1S2, no murmurs, rubs or gallops  Abdominal: Soft, NT, ND +BS, Last BM  Extremities: No edema, + peripheral pulses  Incisions:   Tubes:    LABS:                          9.2    5.23  )-----------( 101      ( 03 Aug 2020 01:00 )             29.4     08-03    140  |  110<H>  |  31<H>  ----------------------------<  121<H>  4.1   |  19<L>  |  0.94    Ca    9.4      03 Aug 2020 01:00  Phos  2.2     08-  Mg     2.2     -    TPro  6.5  /  Alb  3.2<L>  /  TBili  0.2  /  DBili  x   /  AST  51<H>  /  ALT  36<H>  /  AlkPhos  63  08-03    LIVER FUNCTIONS - ( 03 Aug 2020 01:00 )  Alb: 3.2 g/dL / Pro: 6.5 g/dL / ALK PHOS: 63 u/L / ALT: 36 u/L / AST: 51 u/L / GGT: x           PT/INR - ( 03 Aug 2020 01:00 )   PT: 12.7 SEC;   INR: 1.12          PTT - ( 03 Aug 2020 01:00 )  PTT:46.0 SEC  Creatine Kinase, Serum: 181 u/L ( @ 01:00)  CKMB: 8.40 ng/mL ( @ 01:00)  CKMB Relative Index: 4.6 ( @ 01:00)  CKMB: 16.00 ng/mL ( @ 13:00)  Creatine Kinase, Serum: 281 u/L ( @ 13:00)    CARDIAC MARKERS ( 03 Aug 2020 01:00 )  x     / x     / 181 u/L / 8.40 ng/mL / x      CARDIAC MARKERS ( 02 Aug 2020 13:00 )  x     / x     / 281 u/L / 16.00 ng/mL / x      CARDIAC MARKERS ( 02 Aug 2020 06:30 )  x     / x     / 394 u/L / 21.83 ng/mL / x              TELEMETRY:     EKG:     IMAGING:

## 2020-08-03 NOTE — DISCHARGE NOTE PROVIDER - NSDCMRMEDTOKEN_GEN_ALL_CORE_FT
amLODIPine 5 mg oral tablet: 1 tab(s) orally once a day  aspirin 81 mg oral delayed release tablet: 1 tab(s) orally once a day  atorvastatin 20 mg oral tablet: 1 tab(s) orally once a day (at bedtime)  hydroCHLOROthiazide 25 mg oral tablet: 1 tab(s) orally once a day  isosorbide mononitrate 60 mg oral tablet, extended release: 1 tab(s) orally once a day  levothyroxine 75 mcg (0.075 mg) oral tablet: 1 tab(s) orally once a day aspirin 81 mg oral delayed release tablet: 1 tab(s) orally once a day  atorvastatin 80 mg oral tablet: 1 tab(s) orally once a day (at bedtime)  clopidogrel 75 mg oral tablet: 1 tab(s) orally once a day  levothyroxine 75 mcg (0.075 mg) oral tablet: 1 tab(s) orally once a day  pantoprazole 40 mg oral delayed release tablet: 1 tab(s) orally once a day (before a meal)  senna oral tablet: 1 tab(s) orally once a day

## 2020-08-03 NOTE — PROGRESS NOTE ADULT - SUBJECTIVE AND OBJECTIVE BOX
Patient is a 80y old  Female who presents with a chief complaint of NSTEMI (03 Aug 2020 07:46)  Denies dizziness or CP or nausea/abdominal discomfort.  Telemetry: SB 30-40's seen on 8/2 lasting for hours.  Currently SB in 50's.      PAST MEDICAL & SURGICAL HISTORY:  History of prediabetes  CAD (coronary artery disease): OM 2 100%, RCA 75  PUD (peptic ulcer disease)  DM2 (diabetes mellitus, type 2): diet controlled  Anemia  Arthritis  Uterine cancer: treated surgically  Carotid artery stenosis: L  MVP (mitral valve prolapse)  Hypercholesterolemia  Hypothyroidism  Stented coronary artery  Rectal cyst: treated surgically  PUD (peptic ulcer disease): treated surgically in 1980  H/O: hysterectomy: due to Uterine cancer  H/O total knee replacement: R      MEDICATIONS  (STANDING):  aspirin enteric coated 81 milliGRAM(s) Oral daily  atorvastatin 80 milliGRAM(s) Oral at bedtime  clopidogrel Tablet 75 milliGRAM(s) Oral daily  heparin  Infusion 1100 Unit(s)/Hr (11 mL/Hr) IV Continuous <Continuous>  levothyroxine 75 MICROGram(s) Oral daily  pantoprazole    Tablet 40 milliGRAM(s) Oral before breakfast  potassium phosphate IVPB 15 milliMole(s) IV Intermittent once    MEDICATIONS  (PRN):  aluminum hydroxide/magnesium hydroxide/simethicone Suspension 30 milliLiter(s) Oral every 4 hours PRN Dyspepsia            Vital Signs Last 24 Hrs  T(C): 37 (03 Aug 2020 08:00), Max: 37 (02 Aug 2020 20:00)  T(F): 98.6 (03 Aug 2020 08:00), Max: 98.6 (02 Aug 2020 20:00)  HR: 53 (03 Aug 2020 09:00) (35 - 60)  BP: 110/43 (03 Aug 2020 09:00) (83/50 - 117/72)  BP(mean): 63 (03 Aug 2020 09:00) (58 - 95)  RR: 23 (03 Aug 2020 09:00) (13 - 24)  SpO2: 99% (03 Aug 2020 09:00) (94% - 100%)            INTERPRETATION OF TELEMETRY: SR in 50's with frequent episodes of SB down to 30's t0 40's seen on 8/2.    ECG:  SR with APC        LABS:                        9.2    5.23  )-----------( 101      ( 03 Aug 2020 01:00 )             29.4     08-03    140  |  110<H>  |  31<H>  ----------------------------<  121<H>  4.1   |  19<L>  |  0.94    Ca    9.4      03 Aug 2020 01:00  Phos  2.2     08-03  Mg     2.2     08-03    TPro  6.5  /  Alb  3.2<L>  /  TBili  0.2  /  DBili  x   /  AST  51<H>  /  ALT  36<H>  /  AlkPhos  63  08-03    CARDIAC MARKERS ( 03 Aug 2020 01:00 )  x     / x     / 181 u/L / 8.40 ng/mL / x      CARDIAC MARKERS ( 02 Aug 2020 13:00 )  x     / x     / 281 u/L / 16.00 ng/mL / x      CARDIAC MARKERS ( 02 Aug 2020 06:30 )  x     / x     / 394 u/L / 21.83 ng/mL / x          PT/INR - ( 03 Aug 2020 01:00 )   PT: 12.7 SEC;   INR: 1.12          PTT - ( 03 Aug 2020 01:00 )  PTT:46.0 SEC      BNPSerum Pro-Brain Natriuretic Peptide: 1334 pg/mL (08-02 @ 13:00)    RADIOLOGY & ADDITIONAL STUDIES:  VENTRICLES: There were no left ventricular global or regional wall motion  abnormalities. hyperdynamic LV EF estimated was 75 %.  VALVES: MITRAL VALVE: The mitral valve exhibited no regurgitation.  CORONARY VESSELS: The coronary circulation is right dominant.  LM:   --  LM: Normal.  LAD:   --  LAD: Angiography showed minor luminal irregularities with no flow  limiting lesions.  CX:   --  OM2: There was a 100 % stenosis. small caliber vessel  RCA:   --  RCA: Angiography showed minor luminal irregularities with no flow  limiting lesions. Moderate diffuse disease througout with osital 60% and mid  90% that has progressed with left to right collaterals from the :CX to PL branch  --  Ostial RCA: There was a 60 % stenosis. damping unchanged from 1 year ago  --  Mid RCA: There was a 90 % stenosis. The lesion was eccentric.  COMPLICATIONS: No complications occurred during the cath lab visit.  DIAGNOSTIC IMPRESSIONS: Patient has hyperdynamic LV with systemic hypertension  in aorta, severe disease of mid RCA and moderate ostial RCA disease. The mid  RCA disease has progressed with left to right collaterals to PL branch  DIAGNOSTIC RECOMMENDATIONS: As patient is asymptomatic with preserved LV  function and patient prefers medical therapy. would continue medical therapy  and risk factor modification  Prepared and signed by  Chriss Dempsey M.D.  Signed 08/03/2017 13:04:43      ------------------------------------------------------------------------  CONCLUSIONS:  1. Normal mitral valve. Moderate mitral regurgitation.  2. Normal trileaflet aortic valve.  3. Severely dilated left atrium.  LA volume index = 50  cc/m2.  4. Normal left ventricular systolic function. No segmental  wall motion abnormalities.  5. Normal right ventricular size and function.  6. Normal tricuspid valve.   Moderate tricuspid  regurgitation.  7. Estimated pulmonary artery systolic pressure equals 55  mm Hg, assuming right atrial pressure equals 10  mm Hg,  consistent with moderate pulmonary hypertension.  ------------------------------------------------------------------------  Confirmed on  8/2/2020 - 15:21:18 by Tanja Quick MD  ------------------------------------------------------------------------      PHYSICAL EXAM:    GENERAL: In no apparent distress, well nourished, and hydrated.  NECK: Supple and normal thyroid.  No JVD or carotid bruit.  Carotid pulse is 2+ bilaterally.  HEART: S1S2 kaitlynn; 2/6 systolic  murmurs, no rubs, or gallops.  PULMONARY: Clear to auscultation and perfusion.  No rales, wheezing, or rhonchi bilaterally.  ABDOMEN: Soft, Nontender, Nondistended; Bowel sounds present  EXTREMITIES:  2+ Peripheral Pulses, No clubbing, cyanosis, or edema  NEUROLOGICAL: Grossly nonfocal

## 2020-08-03 NOTE — CONSULT NOTE ADULT - ATTENDING COMMENTS
80 F with PMH of CAD with stents, last cath in 2017, hypothyroid, HLD, anemia, HGB 6.7 in June s/p EGD, presents with NSTEMI and symptomatic bradycardia.   Recommend cor angio for RCA dz. Follow on telel for bradycardia and PPM eval.
Anemia and Thrombocytopenia  Pt states she has a known history of iron deficiency anemia and was on iron supplementation in the past but stopped years ago  Recent iron studies performed June 2020 showed total iron serum of 16 and ferritin of 7, however pt was not started on iron supplementation at that time  EGD and colonoscopy in June was negative for GI bleed  Please have GI eval pt for possible capsule endoscopy as slow GI bleed is a likely cause of her iron def anemia (outpatient followup is reasonable as well)  Please send total serum iron, TIBC, ferritin, reticulocyte count, LDH, haptoglobin, B12 and folate levels   Will calculate iron deficit to assess how much iron repletion pt will need   Once iron is repleted can check hemoglobin electrophoresis to r/o thalassemia   Peripheral smear reviewed- overall poor quality smear, however elliptocytes seen, very rare schistocytes (0-1), normal appearing WBC morphology  Platelet count was normal in June, now mildly decreased however they are currently uptrending  No objection for cath   Will f/u anemia workup and trend plt count with daily CBC  Low suspicion for underlying bone marrow disorder at this time

## 2020-08-03 NOTE — CONSULT NOTE ADULT - ASSESSMENT
80 F with PMH of iron def anemia (Currently off iron supplements), CAD with stents, last cath in 2017, hypothyroid, HLD, anemia, HGB 6.7 in June s/p EGD and colonoscopy, presents with dizziness and lightheadedness, found to have NSTEMI on ACS protocol.     #Anemia and Thrombocytopenia  Pt states she has a known history of iron deficiency anemia and was on iron supplementation in the past but stopped years ago  Recent iron studies performed June 2020 showed total iron serum of 16 and ferritin of 7, however pt was not started on iron supplementation at that time  EGD and colonoscopy in June was negative for GI bleed  Please have GI eval pt for possible capsule endoscopy as slow GI bleed is a likely cause of her iron def anemia (outpatient followup is reasonable as well)  Please send total serum iron, TIBC, ferritin, reticulocyte count, LDH, haptoglobin, B12 and folate levels   Will calculate iron deficit to assess how much iron repletion pt will need   Once iron is repleted can check hemoglobin electrophoresis to r/o thalassemia   Will review peripheral smear  Platelet count was normal in June, now mildly decreased however they are currently uptrending  No objection for cath   Will f/u anemia workup and trend plt count with daily CBC  Low suspicion for underlying bone marrow disorder at this time    Rachel Jasmine MD  Hematology Oncology Fellow, PGY-5  [facility] Pager: [phone]/ [facility] Pager: [phone] 80 F with PMH of iron def anemia (Currently off iron supplements), CAD with stents, last cath in 2017, hypothyroid, HLD, anemia, HGB 6.7 in June s/p EGD and colonoscopy, presents with dizziness and lightheadedness, found to have NSTEMI on ACS protocol.     #Anemia and Thrombocytopenia  Pt states she has a known history of iron deficiency anemia and was on iron supplementation in the past but stopped years ago  Recent iron studies performed June 2020 showed total iron serum of 16 and ferritin of 7, however pt was not started on iron supplementation at that time  EGD and colonoscopy in June was negative for GI bleed  Please have GI eval pt for possible capsule endoscopy as slow GI bleed is a likely cause of her iron def anemia (outpatient followup is reasonable as well)  Please send total serum iron, TIBC, ferritin, reticulocyte count, LDH, haptoglobin, B12 and folate levels   Will calculate iron deficit to assess how much iron repletion pt will need   Once iron is repleted can check hemoglobin electrophoresis to r/o thalassemia   Peripheral smear reviewed- overall poor quality smear, however elliptocytes seen, very rare schistocytes (0-1), normal appearing WBC morphology  Platelet count was normal in June, now mildly decreased however they are currently uptrending  No objection for cath   Will f/u anemia workup and trend plt count with daily CBC  Low suspicion for underlying bone marrow disorder at this time    Rachel Jasmine MD  Hematology Oncology Fellow, PGY-5  [facility] Pager: [phone]/ [facility] Pager: [phone]

## 2020-08-03 NOTE — PROGRESS NOTE ADULT - ASSESSMENT
80 F with PMH of CAD with stents, last cath in 8/2017 which showed severe mRCA 90% stenosis (no PCI performed), hypothyroid, HLD, anemia, HGB 6.7 in June s/p EGD, presents with NSTEMI and symptomatic bradycardia (had 2 episodes of dizziness w/o syncope at home ) found to have sinus node dysfunction evidenced by sinus bradycardia down to 32-40's persistently for hours.  Echo from 8/2 showed normal LVEF.  Patient is scheduled for LHC/PCI today.       -Hold AV Pablo blockers  -Will closely follow up for pacing indication after potential PCI of RCA as her bradyarrhythmia possibly reversible   --zoll pads and Atropine at bedside  -Closely monitor for recurrent symptomatic kaitlynn  -dopamine gtt if symptomatic  -discussed with Dr. Dallas

## 2020-08-04 NOTE — PROGRESS NOTE ADULT - SUBJECTIVE AND OBJECTIVE BOX
Patient is a 80y old  Female who presents with a chief complaint of NSTEMI (04 Aug 2020 07:41)  Patient denies CP, SOB or palpitations or dizziness.  s/p RCA stents x2.  HR in 50-60 bpm now.     PAST MEDICAL & SURGICAL HISTORY:  History of prediabetes  CAD (coronary artery disease): OM 2 100%, RCA 75  PUD (peptic ulcer disease)  DM2 (diabetes mellitus, type 2): diet controlled  Anemia  Arthritis  Uterine cancer: treated surgically  Carotid artery stenosis: L  MVP (mitral valve prolapse)  Hypercholesterolemia  Hypothyroidism  Stented coronary artery  Rectal cyst: treated surgically  PUD (peptic ulcer disease): treated surgically in 1980  H/O: hysterectomy: due to Uterine cancer  H/O total knee replacement: R      MEDICATIONS  (STANDING):  aspirin enteric coated 81 milliGRAM(s) Oral daily  atorvastatin 80 milliGRAM(s) Oral at bedtime  clopidogrel Tablet 75 milliGRAM(s) Oral daily  heparin   Injectable 5000 Unit(s) SubCutaneous every 8 hours  levothyroxine 75 MICROGram(s) Oral daily  pantoprazole    Tablet 40 milliGRAM(s) Oral before breakfast  senna 1 Tablet(s) Oral daily    MEDICATIONS  (PRN):  aluminum hydroxide/magnesium hydroxide/simethicone Suspension 30 milliLiter(s) Oral every 4 hours PRN Dyspepsia            Vital Signs Last 24 Hrs  T(C): 37.2 (04 Aug 2020 07:40), Max: 37.2 (04 Aug 2020 07:40)  T(F): 99 (04 Aug 2020 07:40), Max: 99 (04 Aug 2020 07:40)  HR: 59 (04 Aug 2020 11:00) (42 - 69)  BP: 135/51 (04 Aug 2020 11:00) (78/58 - 141/58)  BP(mean): 70 (04 Aug 2020 11:00) (60 - 116)  RR: 17 (04 Aug 2020 11:00) (17 - 33)  SpO2: 100% (04 Aug 2020 11:00) (96% - 100%)            INTERPRETATION OF TELEMETRY:  SR 60 with occasional SB down to 50's bpm.  No significant kaitlynn <40 bpm seen overnight.      ECG:        LABS:                        9.7    4.72  )-----------( 102      ( 04 Aug 2020 06:08 )             32.0     08-04    142  |  109<H>  |  12  ----------------------------<  92  3.8   |  23  |  0.73    Ca    9.3      04 Aug 2020 06:08  Phos  2.5     08-04  Mg     1.9     08-04    TPro  6.9  /  Alb  3.5  /  TBili  0.5  /  DBili  x   /  AST  33<H>  /  ALT  35<H>  /  AlkPhos  65  08-04    CARDIAC MARKERS ( 03 Aug 2020 01:00 )  x     / x     / 181 u/L / 8.40 ng/mL / x      CARDIAC MARKERS ( 02 Aug 2020 13:00 )  x     / x     / 281 u/L / 16.00 ng/mL / x          PT/INR - ( 03 Aug 2020 12:00 )   PT: 13.5 SEC;   INR: 1.19          PTT - ( 03 Aug 2020 12:00 )  PTT:> 200.0 SEC      BNP  RADIOLOGY & ADDITIONAL STUDIES:        PHYSICAL EXAM:    GENERAL: In no apparent distress, well nourished, and hydrated.  NECK: Supple and normal thyroid.  No JVD or carotid bruit.  Carotid pulse is 2+ bilaterally.  HEART: Regular rate and rhythm; 2/6 murmurs, no rubs, or gallops.  PULMONARY: Clear to auscultation and perfusion.  No rales, wheezing, or rhonchi bilaterally.  ABDOMEN: Soft, Nontender, Nondistended; Bowel sounds present  EXTREMITIES:  2+ Peripheral Pulses, No clubbing, cyanosis, or edema  NEUROLOGICAL: Grossly nonfocal

## 2020-08-04 NOTE — CHART NOTE - NSCHARTNOTEFT_GEN_A_CORE
Chart and labs requested reviewed. Iron studies appeared to have improved from labs drawn in June 2020. B12 and folate levels are WNL and hemolysis labs are WNL. Recommend checking hemoglobin electrophoresis (ordered for AM) to r/o thalassemia given microcytic anemia, and pt still needs GI eval (can be outpatient) for possible capsule endoscopy. Peripheral smear was unremarkable.     Rachel Jasmine MD  Hematology Oncology Fellow, PGY-5  [facility] Pager: [phone]/ [facility] Pager: [phone] Chart and labs requested reviewed. Iron studies appeared to have improved from labs drawn in June 2020. B12 and folate levels are WNL and hemolysis labs are WNL. Recommend checking hemoglobin electrophoresis (ordered for AM) to r/o thalassemia given microcytic anemia, and pt still needs GI eval (can be outpatient) for possible capsule endoscopy. Peripheral smear was unremarkable. Thrombocytopenia is stable and may be consumptive in setting of NSTEMI. We will send a referral for pt to follow up with a hematologist at [facility] upon discharge.      Rachel Jasmine MD  Hematology Oncology Fellow, PGY-5  [facility] Pager: [phone]/ [facility] Pager: [phone]

## 2020-08-04 NOTE — PROGRESS NOTE ADULT - ASSESSMENT
80 F with PMH of CAD with stents, last cath in 8/2017 which showed severe mRCA 90% stenosis (no PCI performed), hypothyroid, HLD, anemia, HGB 6.7 in June s/p EGD, presents with NSTEMI and symptomatic bradycardia (had 2 episodes of dizziness w/o syncope at home ) found to have sinus node dysfunction evidenced by sinus bradycardia down to 32-40's persistently for hours.  Echo from 8/2 showed normal LVEF.  Patient underwent LHC/PCI s/p RCA stents to pRCA and dRCA on 8/3.  HR improved now t6o 50-60's bpm.        -Hold AV Pablo blockers  -No acute pacing indication as HR now improved post PCI   -Continue to monitor for symptomatic bradycardia

## 2020-08-04 NOTE — PROGRESS NOTE ADULT - ASSESSMENT
_________________________________________________________________________________________  ========>>  M E D I C A L   A T T E N D I N G    F O L L O W  U P  N O T E  <<=========  -----------------------------------------------------------------------------------------------------    - Patient seen and examined by me earlier today.   - In summary,  MARYCARMEN CAMPOVERDE is a 80y year old woman who originally presented with dizziness >> NSTEMI   - Patient today overall doing ok, comfortable, eating OK. no dizziness, no CP, no SOB    ==================>> REVIEW OF SYSTEM <<=================    GEN: no fever, no chills, no pain  RESP: no SOB, no cough, no sputum  CVS: no chest pain, no palpitations, no edema  GI: no abdominal pain, no nausea, no constipation, no diarrhea  : no dysuria, no frequency, no hematuria  Neuro: no headache, no dizziness  Derm : no itching, no rash    ==================>> PHYSICAL EXAM <<=================    GEN: A&O X 3 , NAD , comfortable  HEENT: NCAT, PERRL, MMM, hearing intact  Neck: supple , no JVD appreciated  CVS: S1S2 , regular , No M/R/G appreciated  PULM: CTA B/L,  no W/R/R appreciated  ABD.: soft. non tender, non distended,  bowel sounds present  Extrem: intact pulses , no edema   PSYCH : normal mood,  not anxious      ==================>> MEDICATIONS <<====================    aspirin enteric coated 81 milliGRAM(s) Oral daily  atorvastatin 80 milliGRAM(s) Oral at bedtime  chlorhexidine 4% Liquid 1 Application(s) Topical daily  clopidogrel Tablet 75 milliGRAM(s) Oral daily  heparin   Injectable 5000 Unit(s) SubCutaneous every 8 hours  levothyroxine 75 MICROGram(s) Oral daily  pantoprazole    Tablet 40 milliGRAM(s) Oral before breakfast  senna 1 Tablet(s) Oral daily    MEDICATIONS  (PRN):  aluminum hydroxide/magnesium hydroxide/simethicone Suspension 30 milliLiter(s) Oral every 4 hours PRN Dyspepsia    ___________  Active diet:  Diet, Regular:   Low Fat (LOWFAT)     Special Instructions for Nursing:  Low Fat (LOWFAT)  ___________________    ==================>> VITAL SIGNS <<==================  Height (cm): 154.9  Weight (kg): 74.6  BMI (kg/m2): 31.1  Vital Signs Last 24 HrsT(C): 37.2 (08-04-20 @ 15:57)  T(F): 98.9 (08-04-20 @ 15:57), Max: 99 (08-04-20 @ 07:40)  HR: 61 (08-04-20 @ 15:00) (55 - 69)  BP: 93/75 (08-04-20 @ 15:00)  RR: 14 (08-04-20 @ 15:00) (14 - 33)  SpO2: 100% (08-04-20 @ 15:00) (96% - 100%)    CAPILLARY BLOOD GLUCOSE         ==================>> LAB AND IMAGING <<==================                        9.7    4.72  )-----------( 102      ( 04 Aug 2020 06:08 )             32.0        08-04    142  |  109<H>  |  12  ----------------------------<  92  3.8   |  23  |  0.73    Ca    9.3      04 Aug 2020 06:08  Phos  2.5     08-04  Mg     1.9     08-04    TPro  6.9  /  Alb  3.5  /  TBili  0.5  /  DBili  x   /  AST  33<H>  /  ALT  35<H>  /  AlkPhos  65  08-04    WBC count:   4.72 <<== ,  5.19 <<== ,  5.23 <<== ,  4.50 <<== ,  5.08 <<==   Hemoglobin:   9.7 <<==,  9.5 <<==,  9.2 <<==,  10.7 <<==,  10.8 <<==  platelets:  102 <==, 121 <==, 101 <==, 107 <==, 104 <==    Creatinine:  0.73  <<==, 0.94  <<==, 1.18  <<==  Sodium:   142  <==, 140  <==, 142  <==       AST:          33 <== , 51 <== , 118 <==      ALT:        35  <== , 36  <== , 58  <==      AP:        65  <=, 63  <=, 72  <=     Bili:        0.5  <=, 0.2  <=, 0.2  <=    < from: Cardiac Cath Lab - Adult (08.03.17 @ 11:55) >  CORONARY VESSELS: The coronary circulation is right dominant.  LM:   --  LM: Normal.  LAD:   --  LAD: Angiography showed minor luminal irregularities with no flow  limiting lesions.  CX:   --  OM2: There was a 100 % stenosis. small caliber vessel  RCA:   --  RCA: Angiography showed minor luminal irregularities with no flow  limiting lesions. Moderate diffuse disease througout with osital 60% and mid  90% that has progressed with left to right collaterals from the :CX to PL branch  --  Ostial RCA: There was a 60 % stenosis. damping unchanged from 1 year ago  --  Mid RCA: There was a 90 % stenosis. The lesion was eccentric.  COMPLICATIONS: No complications occurred during the cath lab visit.  DIAGNOSTIC IMPRESSIONS: Patient has hyperdynamic LV with systemic hypertension  in aorta, severe disease of mid RCA and moderate ostial RCA disease. The mid  RCA disease has progressed with left to right collaterals to PL branch  DIAGNOSTIC RECOMMENDATIONS: As patient is asymptomatic with preserved LV  function and patient prefers medical therapy. would continue medical therapy  and risk factor modification  Prepared and signed by  Chriss Dempsey M.D.  < end of copied text >    < from: Transthoracic Echocardiogram (08.02.20 @ 13:55) >  CONCLUSIONS:  1. Normal mitral valve. Moderate mitral regurgitation.  2. Normal trileaflet aortic valve.  3. Severely dilated left atrium.  LA volume index = 50 cc/m2.  4. Normal left ventricular systolic function. No segmental  wall motion abnormalities.  5. Normal right ventricular size and function.  6. Normal tricuspid valve.   Moderate tricuspid regurgitation.  7. Estimated pulmonary artery systolic pressure equals 55  mm Hg, assuming right atrial pressure equals 10  mm Hg,  consistent with moderate pulmonary hypertension.  ------------------------------------------------------------------------  Confirmed on  8/2/2020 - 15:21:18 by Tanja Quick MD  < end of copied text >    ___________________________________________________________________________________  ===============>>  A S S E S S M E N T   A N D   P L A N <<===============  ------------------------------------------------------------------------------------------    NSTEMI (non-ST elevated myocardial infarction) in pt with significant CAD       no CHF seen on Echo   bradycardia and hypotension likely related to NSTEMI  >>improved   Hypothyroidism  Hypercholesterolemia  Anemia: chronic  CAD  post PCI as above   History of prediabetes  HLD  hypokalemia improved     =======>>>    appreciate CCU care and mgmt  appreciated cardio and EP   post PCI as above   monitor vitals closely; tele   medical adjustment / optimization   keep K~4, Mag ~2  TSH ok, continue synthroid  Continue Current medications otherwise and monitor.  supportive care    -GI/DVT Prophylaxis per protocol.    --------------------------------------------  Case discussed with pt  Education given on findings and plan of care.  ___________________________  Will follow with you.  Thank you,	  CLAUDINE Nuñez D.O.  Pager: [phone]

## 2020-08-04 NOTE — PHYSICAL THERAPY INITIAL EVALUATION ADULT - PERTINENT HX OF CURRENT PROBLEM, REHAB EVAL
patient presents with NSTEMI, s/p RCA sent x2. PMH includes  CAD with stents, last cath in 8/2017 which showed severe mRCA 90% stenosis (no PCI performed), hypothyroid, HLD, anemia, HGB 6.7 in June s/p EGD

## 2020-08-04 NOTE — PROGRESS NOTE ADULT - ASSESSMENT
· Assessment		   80 F with PMH of CAD with stents, last cath in 2017, hypothyroid, HLD, anemia, HGB 6.7 in June s/p EGD,presents with NSTEMI and symptomatic bradycardia. Patient currently asymptomatic and hemodynamically stable. L. heart cath and 2 stents placed in RCA       Neuro:   -AO x3        Pulm:  -Saturating appropriately on RA      Cardio   NSTEMI (non-ST elevated myocardial infarction)  -Serial cardiac enzymes  -ACS protocol with asa, plavix, lipitor  -holding coreg  -LHC performed on 8/3, 2 stents placed in RCA    Problem: Sinus bradycardia.   -no need for Transcutaneous pacing,   -continue to closely monitor    Problem: CAD (coronary artery disease).   -continue DAPT and hep gtt  -risk factor profile labs-HGBA1   -continue lipitor 80.           GI:  -Low fat diet  -GI evaluation for anemia          Renal:  PRATIMA      Heme:  Anemia 9.2  -iron supplementation  -LDH, haptoglobin, ferritin, TSH, T4 wnl · Assessment		   80 F with PMH of CAD with stents, last cath in 2017, hypothyroid, HLD, anemia, HGB 6.7 in June s/p EGD,presents with NSTEMI and symptomatic bradycardia. Patient currently asymptomatic and hemodynamically stable. L. heart cath and 2 stents placed in RCA       Neuro:   -AO x3        Pulm:  -Saturating appropriately on RA      Cardio   NSTEMI (non-ST elevated myocardial infarction)  -Serial cardiac enzymes  -ACS protocol with asa, plavix, lipitor  -holding coreg  -LHC performed on 8/3, 2 stents placed in RCA    Problem: Sinus bradycardia.   -no need for Transcutaneous pacing,   -continue to closely monitor    Problem: CAD (coronary artery disease).   -continue DAPT   -risk factor profile labs-HGBA1   -continue lipitor 80.           GI:  -Low fat diet  -GI evaluation for anemia          Renal:  PRATIMA      Heme:  Anemia 9.2  -iron supplementation  -LDH, haptoglobin, ferritin, TSH, T4 wnl · Assessment		   80 F with PMH of CAD with stents, last cath in 2017, hypothyroid, HLD, anemia, HGB 6.7 in June s/p EGD,presents with NSTEMI and symptomatic bradycardia. Patient currently asymptomatic and hemodynamically stable. L. heart cath and 2 stents placed in RCA       Neuro:   -AO x3        Pulm:  -Saturating appropriately on RA      Cardio   NSTEMI (non-ST elevated myocardial infarction)  -Serial cardiac enzymes  -ACS protocol with asa, plavix, lipitor  -holding coreg  -LHC performed on 8/3, 2 stents placed in RCA  -f/u with EP about starting beta blocker    Problem: Sinus bradycardia.   -no need for Transcutaneous pacing,   -continue to closely monitor    Problem: CAD (coronary artery disease).   -continue DAPT   -risk factor profile labs-HGBA1   -continue lipitor 80.           GI:  -Low fat diet  -GI evaluation for anemia          Renal:  PRATIMA      Heme:  Anemia 9.2  -iron supplementation  -LDH, haptoglobin, ferritin, TSH, T4 wnl

## 2020-08-04 NOTE — PROGRESS NOTE ADULT - SUBJECTIVE AND OBJECTIVE BOX
PATIENT:  MARYCARMEN PAGE  [id number]    CHIEF COMPLAINT:  Patient is a 80y old  Female who presents with a chief complaint of NSTEMI (03 Aug 2020 13:09)      INTERVAL HISTORY/OVERNIGHT EVENTS:  S/p L. heart cath and 2 stents placed in RCA.            MEDICATIONS:  MEDICATIONS  (STANDING):  aspirin enteric coated 81 milliGRAM(s) Oral daily  atorvastatin 80 milliGRAM(s) Oral at bedtime  clopidogrel Tablet 75 milliGRAM(s) Oral daily  levothyroxine 75 MICROGram(s) Oral daily  pantoprazole    Tablet 40 milliGRAM(s) Oral before breakfast  senna 1 Tablet(s) Oral daily    MEDICATIONS  (PRN):  aluminum hydroxide/magnesium hydroxide/simethicone Suspension 30 milliLiter(s) Oral every 4 hours PRN Dyspepsia      ALLERGIES:  Allergies    latex (Rash)  penicillin (Faint)    Intolerances        OBJECTIVE:  ICU Vital Signs Last 24 Hrs  T(C): 37.2 (04 Aug 2020 07:40), Max: 37.2 (04 Aug 2020 07:40)  T(F): 99 (04 Aug 2020 07:40), Max: 99 (04 Aug 2020 07:40)  HR: 64 (04 Aug 2020 06:00) (42 - 64)  BP: 98/85 (04 Aug 2020 06:00) (78/58 - 141/58)  BP(mean): 91 (04 Aug 2020 06:00) (60 - 116)  ABP: --  ABP(mean): --  RR: 20 (04 Aug 2020 06:00) (17 - 33)  SpO2: 100% (04 Aug 2020 06:00) (99% - 100%)      Adult Advanced Hemodynamics Last 24 Hrs  CVP(mm Hg): --  CVP(cm H2O): --  CO: --  CI: --  PA: --  PA(mean): --  PCWP: --  SVR: --  SVRI: --  PVR: --  PVRI: --  CAPILLARY BLOOD GLUCOSE        CAPILLARY BLOOD GLUCOSE        I&O's Summary    03 Aug 2020 07:01  -  04 Aug 2020 07:00  --------------------------------------------------------  IN: 0 mL / OUT: 1450 mL / NET: -1450 mL      Daily     Daily Weight in k.9 (04 Aug 2020 05:00)    PHYSICAL EXAMINATION:  General: WN/WD NAD  HEENT: PERRLA, EOMI, moist mucous membranes  Neurology: A&Ox3, nonfocal, HART x 4  Respiratory: CTA B/L, normal respiratory effort, no wheezes, crackles, rales  CV: RRR, S1S2, no murmurs, rubs or gallops  Abdominal: Soft, NT, ND +BS, Last BM  Extremities: No edema, + peripheral pulses  Incisions:   Tubes:    LABS:                          9.7    4.72  )-----------( 102      ( 04 Aug 2020 06:08 )             32.0     08-04    142  |  109<H>  |  12  ----------------------------<  92  3.8   |  23  |  0.73    Ca    9.3      04 Aug 2020 06:08  Phos  2.5     08-04  Mg     1.9     08-04    TPro  6.9  /  Alb  3.5  /  TBili  0.5  /  DBili  x   /  AST  33<H>  /  ALT  35<H>  /  AlkPhos  65  08-04    LIVER FUNCTIONS - ( 04 Aug 2020 06:08 )  Alb: 3.5 g/dL / Pro: 6.9 g/dL / ALK PHOS: 65 u/L / ALT: 35 u/L / AST: 33 u/L / GGT: x           PT/INR - ( 03 Aug 2020 12:00 )   PT: 13.5 SEC;   INR: 1.19          PTT - ( 03 Aug 2020 12:00 )  PTT:> 200.0 SEC    CARDIAC MARKERS ( 03 Aug 2020 01:00 )  x     / x     / 181 u/L / 8.40 ng/mL / x      CARDIAC MARKERS ( 02 Aug 2020 13:00 )  x     / x     / 281 u/L / 16.00 ng/mL / x              TELEMETRY:     EKG:     IMAGING: PATIENT:  MARYCARMEN PAGE  [id number]    CHIEF COMPLAINT:  Patient is a 80y old  Female who presents with a chief complaint of NSTEMI (03 Aug 2020 13:09)      INTERVAL HISTORY/OVERNIGHT EVENTS:  S/p L. heart cath and 2 stents placed in RCA. Patient feeling well this AM. Denies fever, chills, chest pain, pain the right groin, SOB, or N/V        MEDICATIONS:  MEDICATIONS  (STANDING):  aspirin enteric coated 81 milliGRAM(s) Oral daily  atorvastatin 80 milliGRAM(s) Oral at bedtime  clopidogrel Tablet 75 milliGRAM(s) Oral daily  levothyroxine 75 MICROGram(s) Oral daily  pantoprazole    Tablet 40 milliGRAM(s) Oral before breakfast  senna 1 Tablet(s) Oral daily    MEDICATIONS  (PRN):  aluminum hydroxide/magnesium hydroxide/simethicone Suspension 30 milliLiter(s) Oral every 4 hours PRN Dyspepsia      ALLERGIES:  Allergies    latex (Rash)  penicillin (Faint)    Intolerances        OBJECTIVE:  ICU Vital Signs Last 24 Hrs  T(C): 37.2 (04 Aug 2020 07:40), Max: 37.2 (04 Aug 2020 07:40)  T(F): 99 (04 Aug 2020 07:40), Max: 99 (04 Aug 2020 07:40)  HR: 64 (04 Aug 2020 06:00) (42 - 64)  BP: 98/85 (04 Aug 2020 06:00) (78/58 - 141/58)  BP(mean): 91 (04 Aug 2020 06:00) (60 - 116)  ABP: --  ABP(mean): --  RR: 20 (04 Aug 2020 06:00) (17 - 33)  SpO2: 100% (04 Aug 2020 06:00) (99% - 100%)      Adult Advanced Hemodynamics Last 24 Hrs  CVP(mm Hg): --  CVP(cm H2O): --  CO: --  CI: --  PA: --  PA(mean): --  PCWP: --  SVR: --  SVRI: --  PVR: --  PVRI: --  CAPILLARY BLOOD GLUCOSE        CAPILLARY BLOOD GLUCOSE        I&O's Summary    03 Aug 2020 07:01  -  04 Aug 2020 07:00  --------------------------------------------------------  IN: 0 mL / OUT: 1450 mL / NET: -1450 mL      Daily     Daily Weight in k.9 (04 Aug 2020 05:00)    PHYSICAL EXAMINATION:  General: WN/WD NAD  Neurology: A&Ox3, nonfocal, HART x 4  Respiratory: CTA B/L, normal respiratory effort, no wheezes, crackles, rales  CV: RRR, S1S2, no murmurs, rubs or gallops  Abdominal: Soft, NT, ND +BS, Last BM  Extremities: No edema, + peripheral pulses  Incisions: R. groin C/D/I, no active bleeding, no hematoma, non-tender   Tubes:    LABS:                          9.7    4.72  )-----------( 102      ( 04 Aug 2020 06:08 )             32.0     08-04    142  |  109<H>  |  12  ----------------------------<  92  3.8   |  23  |  0.73    Ca    9.3      04 Aug 2020 06:08  Phos  2.5     08-04  Mg     1.9     08-04    TPro  6.9  /  Alb  3.5  /  TBili  0.5  /  DBili  x   /  AST  33<H>  /  ALT  35<H>  /  AlkPhos  65  08-04    LIVER FUNCTIONS - ( 04 Aug 2020 06:08 )  Alb: 3.5 g/dL / Pro: 6.9 g/dL / ALK PHOS: 65 u/L / ALT: 35 u/L / AST: 33 u/L / GGT: x           PT/INR - ( 03 Aug 2020 12:00 )   PT: 13.5 SEC;   INR: 1.19          PTT - ( 03 Aug 2020 12:00 )  PTT:> 200.0 SEC    CARDIAC MARKERS ( 03 Aug 2020 01:00 )  x     / x     / 181 u/L / 8.40 ng/mL / x      CARDIAC MARKERS ( 02 Aug 2020 13:00 )  x     / x     / 281 u/L / 16.00 ng/mL / x              TELEMETRY:     EKG:     IMAGING:

## 2020-08-05 NOTE — DIETITIAN INITIAL EVALUATION ADULT. - PERTINENT MEDS FT
aluminum hydroxide/magnesium hydroxide/simethicone Suspension PRN  aspirin enteric coated  atorvastatin  chlorhexidine 4% Liquid  clopidogrel Tablet  heparin   Injectable  levothyroxine  pantoprazole    Tablet  senna

## 2020-08-05 NOTE — DIETITIAN INITIAL EVALUATION ADULT. - RD TO REMAIN AVAILABLE
1. Recommend DASH/TLC (cholesterol and sodium restricted) diet. 2. Please Encourage po intake, assist with meals and menu selections, provide alternatives PRN. 3. Heart Healthy diet education.

## 2020-08-05 NOTE — PROGRESS NOTE ADULT - ASSESSMENT
_________________________________________________________________________________________  ========>>  M E D I C A L   A T T E N D I N G    F O L L O W  U P  N O T E  <<=========  -----------------------------------------------------------------------------------------------------    - Patient seen and examined by me earlier today.   - In summary,  MARYCARMEN CAMPOVERDE is a 80y year old woman who originally presented with dizziness >> NSTEMI   - Patient today overall doing ok, comfortable, eating OK. no dizziness, no CP, no SOB    ==================>> REVIEW OF SYSTEM <<=================    GEN: no fever, no chills, no pain  RESP: no SOB, no cough, no sputum  CVS: no chest pain, no palpitations, no edema  GI: no abdominal pain, no nausea, no constipation, no diarrhea  : no dysuria, no frequency, no hematuria  Neuro: no headache, no dizziness  Derm : no itching, no rash    ==================>> PHYSICAL EXAM <<=================    GEN: A&O X 3 , NAD , comfortable  HEENT: NCAT, PERRL, MMM, hearing intact  Neck: supple , no JVD appreciated  CVS: S1S2 , regular , No M/R/G appreciated  PULM: CTA B/L,  no W/R/R appreciated  ABD.: soft. non tender, non distended,  bowel sounds present  Extrem: intact pulses , no edema   PSYCH : normal mood,  not anxious      ==================>> MEDICATIONS <<====================    aspirin enteric coated 81 milliGRAM(s) Oral daily  atorvastatin 80 milliGRAM(s) Oral at bedtime  chlorhexidine 4% Liquid 1 Application(s) Topical daily  clopidogrel Tablet 75 milliGRAM(s) Oral daily  heparin   Injectable 5000 Unit(s) SubCutaneous every 8 hours  levothyroxine 75 MICROGram(s) Oral daily  pantoprazole    Tablet 40 milliGRAM(s) Oral before breakfast  senna 1 Tablet(s) Oral daily    MEDICATIONS  (PRN):  aluminum hydroxide/magnesium hydroxide/simethicone Suspension 30 milliLiter(s) Oral every 4 hours PRN Dyspepsia    ___________  Active diet:  Diet, Regular:   Low Fat (LOWFAT)     Special Instructions for Nursing:  Low Fat (LOWFAT)  ___________________    ==================>> VITAL SIGNS <<==================    Vital Signs Last 24 HrsT(C): 37.3 (08-05-20 @ 12:00)  T(F): 99.2 (08-05-20 @ 12:00), Max: 99.3 (08-04-20 @ 20:00)  HR: 83 (08-05-20 @ 13:00) (61 - 93)  BP: 130/61 (08-05-20 @ 13:00)  RR: 24 (08-05-20 @ 13:00) (14 - 100)  SpO2: 98% (08-05-20 @ 13:00) (98% - 100%)       ==================>> LAB AND IMAGING <<==================                        10.5   4.75  )-----------( 125      ( 05 Aug 2020 06:53 )             34.7        08-05    142  |  106  |  11  ----------------------------<  91  4.4   |  24  |  0.77    Ca    9.5      05 Aug 2020 06:53  Phos  2.7     08-05  Mg     1.8     08-05    TPro  6.9  /  Alb  3.6  /  TBili  0.5  /  DBili  x   /  AST  24  /  ALT  25  /  AlkPhos  65  08-05    WBC count:   4.75 <<== ,  4.72 <<== ,  5.19 <<== ,  5.23 <<== ,  4.50 <<== ,  5.08 <<==   Hemoglobin:   10.5 <<==,  9.7 <<==,  9.5 <<==,  9.2 <<==,  10.7 <<==,  10.8 <<==  platelets:  125 <==, 102 <==, 121 <==, 101 <==, 107 <==, 104 <==    Creatinine:  0.77  <<==, 0.73  <<==, 0.94  <<==, 1.18  <<==  Sodium:   142  <==, 142  <==, 140  <==, 142  <==       AST:          24 <== , 33 <== , 51 <== , 118 <==      ALT:        25  <== , 35  <== , 36  <== , 58  <==      AP:        65  <=, 65  <=, 63  <=, 72  <=     Bili:        0.5  <=, 0.5  <=, 0.2  <=, 0.2  <=    < from: Cardiac Cath Lab - Adult (08.03.17 @ 11:55) >  CORONARY VESSELS: The coronary circulation is right dominant.  LM:   --  LM: Normal.  LAD:   --  LAD: Angiography showed minor luminal irregularities with no flow  limiting lesions.  CX:   --  OM2: There was a 100 % stenosis. small caliber vessel  RCA:   --  RCA: Angiography showed minor luminal irregularities with no flow  limiting lesions. Moderate diffuse disease througout with osital 60% and mid  90% that has progressed with left to right collaterals from the :CX to PL branch  --  Ostial RCA: There was a 60 % stenosis. damping unchanged from 1 year ago  --  Mid RCA: There was a 90 % stenosis. The lesion was eccentric.  COMPLICATIONS: No complications occurred during the cath lab visit.  DIAGNOSTIC IMPRESSIONS: Patient has hyperdynamic LV with systemic hypertension  in aorta, severe disease of mid RCA and moderate ostial RCA disease. The mid  RCA disease has progressed with left to right collaterals to PL branch  DIAGNOSTIC RECOMMENDATIONS: As patient is asymptomatic with preserved LV  function and patient prefers medical therapy. would continue medical therapy  and risk factor modification  Prepared and signed by  Chriss Dempsey M.D.  < end of copied text >    < from: Transthoracic Echocardiogram (08.02.20 @ 13:55) >  CONCLUSIONS:  1. Normal mitral valve. Moderate mitral regurgitation.  2. Normal trileaflet aortic valve.  3. Severely dilated left atrium.  LA volume index = 50 cc/m2.  4. Normal left ventricular systolic function. No segmental  wall motion abnormalities.  5. Normal right ventricular size and function.  6. Normal tricuspid valve.   Moderate tricuspid regurgitation.  7. Estimated pulmonary artery systolic pressure equals 55  mm Hg, assuming right atrial pressure equals 10  mm Hg,  consistent with moderate pulmonary hypertension.  ------------------------------------------------------------------------  Confirmed on  8/2/2020 - 15:21:18 by Tanja Quick MD  < end of copied text >    ___________________________________________________________________________________  ===============>>  A S S E S S M E N T   A N D   P L A N <<===============  ------------------------------------------------------------------------------------------    NSTEMI (non-ST elevated myocardial infarction) in pt with significant CAD       no CHF seen on Echo   bradycardia and hypotension likely related to NSTEMI  >>improved   Hypothyroidism  Hypercholesterolemia  Anemia: chronic  CAD  post PCI as above   History of prediabetes  HLD  hypokalemia improved     =======>>>    appreciate CCU care and mgmt  appreciated cardio and EP   post PCI as above   monitor vitals closely; tele   medical adjustment / optimization   keep K~4, Mag ~2  TSH ok, continue synthroid  Continue Current medications otherwise and monitor.  supportive care  plan for rehab now..     -GI/DVT Prophylaxis per protocol.    --------------------------------------------  Case discussed with pt  Education given on findings and plan of care.  ___________________________  Will follow with you.  Thank you,	  CLAUDINE Nuñez D.O.  Pager: [phone]

## 2020-08-05 NOTE — PROGRESS NOTE ADULT - SUBJECTIVE AND OBJECTIVE BOX
Juan M Fragoso NP  CCU Service  Cardiology   Spect: [id number] ([facility])     PATIENT: MARYCARMEN CAMPOVERDE, MRN: [id number]    CHIEF COMPLAINT: Patient is a 80y old  Female who presents with a chief complaint of NSTEMI (04 Aug 2020 16:00)      INTERVAL HISTORY/OVERNIGHT EVENTS: No overnight events. Denies chest pain or SOB, cough. Oriented to person, place, and time. Breathing comfortably on room air.    REVIEW OF SYSTEMS:    Constitutional:     [ ] negative [ ] fevers [ ] chills [ ] weight loss [ ] weight gain  HEENT:                  [ ] negative [ ] dry eyes [ ] eye irritation [ ] postnasal drip [ ] nasal congestion  CV:                         [ ] negative  [ ] chest pain [ ] orthopnea [ ] palpitations [ ] murmur  Resp:                     [ ] negative [ ] cough [ ] shortness of breath [ ] dyspnea [ ] wheezing [ ] sputum [ ] hemoptysis  GI:                          [ ] negative [ ] nausea [ ] vomiting [ ] diarrhea [ ] constipation [ ] abd pain [ ] dysphagia   :                        [ ] negative [ ] dysuria [ ] nocturia [ ] hematuria [ ] increased urinary frequency  Musculoskeletal: [ ] negative [ ] back pain [ ] myalgias [ ] arthralgias [ ] fracture  Skin:                       [ ] negative [ ] rash [ ] itch  Neurological:        [ ] negative [ ] headache [ ] dizziness [ ] syncope [ ] weakness [ ] numbness  Psychiatric:           [ ] negative [ ] anxiety [ ] depression  Endocrine:            [ ] negative [ ] diabetes [ ] thyroid problem  Heme/Lymph:      [ ] negative [ ] anemia [ ] bleeding problem  Allergic/Immune: [ ] negative [ ] itchy eyes [ ] nasal discharge [ ] hives [ ] angioedema    [x] All other systems negative  [ ] Unable to assess ROS because ________.    MEDICATIONS:  MEDICATIONS  (STANDING):  aspirin enteric coated 81 milliGRAM(s) Oral daily  atorvastatin 80 milliGRAM(s) Oral at bedtime  clopidogrel Tablet 75 milliGRAM(s) Oral daily  heparin   Injectable 5000 Unit(s) SubCutaneous every 8 hours  levothyroxine 75 MICROGram(s) Oral daily  pantoprazole    Tablet 40 milliGRAM(s) Oral before breakfast  senna 1 Tablet(s) Oral daily    MEDICATIONS  (PRN):  aluminum hydroxide/magnesium hydroxide/simethicone Suspension 30 milliLiter(s) Oral every 4 hours PRN Dyspepsia      ALLERGIES: Allergies    latex (Rash)  penicillin (Faint)    Intolerances        OBJECTIVE:  ICU Vital Signs Last 24 Hrs  T(C): 36.9 (05 Aug 2020 08:05), Max: 37.4 (04 Aug 2020 20:00)  T(F): 98.4 (05 Aug 2020 08:05), Max: 99.3 (04 Aug 2020 20:00)  HR: 76 (05 Aug 2020 08:00) (59 - 80)  BP: 102/79 (05 Aug 2020 08:00) (83/73 - 144/67)  BP(mean): 85 (05 Aug 2020 08:00) (63 - 107)  RR: 15 (05 Aug 2020 08:00) (14 - 100)  SpO2: 100% (05 Aug 2020 08:00) (98% - 100%)    CAPILLARY BLOOD GLUCOSE        I&O's Summary    04 Aug 2020 07:01  -  05 Aug 2020 07:00  --------------------------------------------------------  IN: 1400 mL / OUT: 2700 mL / NET: -1300 mL      Daily     Daily Weight in k.8 (05 Aug 2020 06:00)    PHYSICAL EXAMINATION:  General: Comfortable, no acute distress, cooperative with exam.  Respiratory: CTAB, normal respiratory effort, no coughing, wheezes, crackles, or rales.  CV: RRR, S1S2, no murmurs, rubs or gallops. No JVD. Distal pulses intact.  Abdominal: Soft, nontender, nondistended, no rebound or guarding, normal bowel sounds.  Neurology: AOx3, no focal neuro defects, HART x 4.  Extremities: No pitting edema  Incisions:   Tubes:    LABS:                          10.5   4.75  )-----------( 125      ( 05 Aug 2020 06:53 )             34.7     08-05    142  |  106  |  11  ----------------------------<  91  4.4   |  24  |  0.77    Ca    9.5      05 Aug 2020 06:53  Phos  2.7     08-05  Mg     1.8     08-05    TPro  6.9  /  Alb  3.6  /  TBili  0.5  /  DBili  x   /  AST  24  /  ALT  25  /  AlkPhos  65  08-05    LIVER FUNCTIONS - ( 05 Aug 2020 06:53 )  Alb: 3.6 g/dL / Pro: 6.9 g/dL / ALK PHOS: 65 u/L / ALT: 25 u/L / AST: 24 u/L / GGT: x           PT/INR - ( 03 Aug 2020 12:00 )   PT: 13.5 SEC;   INR: 1.19          PTT - ( 03 Aug 2020 12:00 )  PTT:> 200.0 SEC    TELEMETRY: NSR    EKG:     IMAGING:   Transthoracic Echocardiogram (20 @ 13:55)  CONCLUSIONS:  1. Normal mitral valve. Moderate mitral regurgitation.  2. Normal trileaflet aortic valve.  3. Severely dilated left atrium.  LA volume index = 50  cc/m2.  4. Normal left ventricular systolic function. No segmental  wall motion abnormalities.  5. Normal right ventricular size and function.  6. Normal tricuspid valve.   Moderate tricuspid  regurgitation.  7. Estimated pulmonary artery systolic pressure equals 55  mm Hg, assuming right atrial pressure equals 10  mm Hg,  consistent with moderate pulmonary hypertension.

## 2020-08-05 NOTE — CHART NOTE - NSCHARTNOTEFT_GEN_A_CORE
Telemetry demonstrated SR 60-80's bpm without bradycardia seen.  Patient remained stable.  No PPM indicated.

## 2020-08-05 NOTE — PROGRESS NOTE ADULT - ASSESSMENT
· Assessment		   80 F with PMH of CAD with stents, last cath in 2017, hypothyroid, HLD, anemia, HGB 6.7 in June s/p EGD,presents with NSTEMI and symptomatic bradycardia. Patient currently asymptomatic and hemodynamically stable. L. heart cath and 2 stents placed in RCA       Neuro:   -AO x3        Pulm:  -Saturating appropriately on RA      Cardio   NSTEMI (non-ST elevated myocardial infarction)  -Serial cardiac enzymes  -ACS protocol with asa, plavix, lipitor  -holding coreg  -LHC performed on 8/3, 2 stents placed in RCA  -f/u with EP about starting beta blocker    Sinus bradycardia.   -continue to closely monitor    GI:  -Low fat diet  -GI evaluation for anemia          Renal:  PRATIMA    Heme:  Anemia 9.2  -iron supplementation  -LDH, haptoglobin, ferritin, TSH, T4 wnl    Dispo   -PT eval

## 2020-08-05 NOTE — DISCHARGE NOTE NURSING/CASE MANAGEMENT/SOCIAL WORK - PATIENT PORTAL LINK FT
You can access the FollowMyHealth Patient Portal offered by [facility] by registering at the following website: [email or web address]. By joining [employer]’s FollowMyHealth portal, you will also be able to view your health information using other applications (apps) compatible with our system.

## 2020-08-05 NOTE — DIETITIAN INITIAL EVALUATION ADULT. - PERTINENT LABORATORY DATA
08-05 Na 142 mmol/L Glu 91 mg/dL K+ 4.4 mmol/L Cr 0.77 mg/dL BUN 11 mg/dL Phos 2.7 mg/dL Alb 3.6 g/dL PAB n/a   Hgb 10.5 g/dL<L> Hct 34.7 % HgA1C n/a    Glucose, Serum: 91 mg/dL

## 2020-08-05 NOTE — PROGRESS NOTE ADULT - ATTENDING COMMENTS
80 F with PMH of CAD with stents, last cath in 8/2017 which showed severe mRCA 90% stenosis (no PCI performed), hypothyroid, HLD, anemia, HGB 6.7 in June s/p EGD, presents with NSTEMI and symptomatic bradycardia (had 2 episodes of dizziness w/o syncope at home ) found to have sinus node dysfunction evidenced by sinus bradycardia down to 32-40's persistently for hours.  Echo from 8/2 showed normal LVEF.  Patient is scheduled for LHC/PCI today. Will follow.
80 F with PMH of CAD with stents, last cath in 8/2017 which showed severe mRCA 90% stenosis (no PCI performed), hypothyroid, HLD, anemia, HGB 6.7 in June s/p EGD, presents with NSTEMI and symptomatic bradycardia (had 2 episodes of dizziness w/o syncope at home ) found to have sinus node dysfunction evidenced by sinus bradycardia down to 32-40's persistently for hours.  Echo from 8/2 showed normal LVEF.  Patient underwent LHC/PCI s/p RCA stents to pRCA and dRCA on 8/3.  HR improved now t6o 50-60's bpm. Will follow.
80 year old woman with known CAD and anemia from unlcear source admitted with junctional bradycardia and NSTEMI    #CV  Last [facility] in 2017-->diffuse disease that was medically managed  Patient with NSTEMI with elevated CE and Tn; also with junctional rhythm with low BP at times  Currently being medically managed with DAPT and heparin  Concern for hematologic process given previous admission for severe anemia and GI workup negative with low platelets  Normal LV function on TTE 8/2/2020  EP followup appreciated  #Heme- will get heme consult given above  #Pulm- no active issues  #Renal- no active issues  #DVT ppx- cont heparin gtt for now
80 year old woman with known CAD and anemia admitted with junctional bradycardia and NSTEMI    #CV  Last [facility] in 2017-->diffuse disease that was medically managed  Patient with NSTEMI with elevated CE and Tn; also with junctional rhythm with low BP at times  Patient sp PCI x 2 to RCA; on ASA and Plavix, lipitor  Normal LV function on TTE 8/2/2020  EP followup appreciated regarding junctional bradycardia, given CAD she does have indication for beta blocker but currently on hold. Will watch for now  #Heme- heme consult appreciated; continue to monitor  #GI- had EGD/Colonoscopy in June; can reassess as outpatient   #Pulm- no active issues  #Renal- no active issues  #DVT ppx- subq heparin
80 year old woman with known CAD and anemia admitted with junctional bradycardia and NSTEMI    #CV  Last [facility] in 2017-->diffuse disease that was medically managed  Patient with NSTEMI with elevated CE and Tn; also with junctional rhythm with low BP at times  Patient sp PCI x 2 to RCA; on ASA and Plavix, lipitor  Normal LV function on TTE 8/2/2020  Hold off on beta blocker - can initiate on dc  #Heme- heme consult appreciated; continue to monitor  #GI- had EGD/Colonoscopy in June; can reassess as outpatient   #Pulm- no active issues  #Renal- no active issues  #DVT ppx- subq heparin

## 2020-08-05 NOTE — DIETITIAN INITIAL EVALUATION ADULT. - OTHER INFO
Per chart review patient with medical history of CAD with stents, hypothyroid, HLD, anemia, presents with NSTEMI and symptomatic bradycardia. S/p  L. heart cath and 2 stents placed in RCA. Patient reports good appetite/PO intake at home. NKFA. Reports avoiding fried foods and cooks at home. Normally consumes 3 meals daily. Endorses occasional constipation which she reports resolves after consuming fiber rich foods. No nausea/vomiting reported. Endorses relatively stable weight, UBW 72.7kg. Admit weight 74.6kg - weight difference possibly 2/2 scale discrepancies. Patient with fair appetite/PO intake in-house mainly 2/2 dislike for institutional foods. No chewing/swallowing difficulties. Provided diet education regarding Heart Healthy nutrition therapy - patient declined printed diet education as she reported understanding of diet recommendations.

## 2020-09-03 PROBLEM — D69.6 THROMBOCYTOPENIA: Status: ACTIVE | Noted: 2020-01-01

## 2020-09-03 PROBLEM — D64.9 ANEMIA: Status: ACTIVE | Noted: 2020-01-01

## 2020-09-03 NOTE — ASSESSMENT
[FreeTextEntry1] : 80 year-old Ms. CAMPOVERDE is evaluated for anemia and thrombocytopenia. She received 2 units of PRBC during her recent hospitalization. The patient is asymptomatic now. Will do the anemia and thrombocytopenia w/u. \par \par # Anemia and thrombocytopenia (Nutritional deficiency anemia vs AOCD vs early MDS, ITP vs early MDS) \par - CBC, Retic, PBS, CMP, LDH, Iron studies, Ferritin, TSH, PT/PTT\par - Hepatitis panel, HIV \par - May need a bone marrow in the future\par - RTC in a [facility]

## 2020-09-03 NOTE — HISTORY OF PRESENT ILLNESS
[de-identified] : 80 year-old Ms. CAMPOVERDE is evaluated for anemia and thrombocytopenia. The patient was recently admitted to [facility] for some cardiac issues (per patient). She was feeling dizzy and was found to be anemic and thrombocytopenic.She received 2 units of PRBC while inpatient. She was asked to follow up as outpatient after discharge. The patient patient does not have any symptoms or complaints.

## 2020-09-03 NOTE — RESULTS/DATA
[FreeTextEntry1] : Available lab data reviewed. Mild to moderate anemia and mild thrombocytopenia noted.

## 2020-09-30 PROBLEM — Z95.5 STATUS POST CORONARY ARTERY STENT PLACEMENT: Status: ACTIVE | Noted: 2020-01-01

## 2020-09-30 PROBLEM — I77.1 SUBCLAVIAN ARTERIAL STENOSIS: Status: ACTIVE | Noted: 2017-10-25

## 2021-01-01 ENCOUNTER — TRANSCRIPTION ENCOUNTER (OUTPATIENT)
Age: 81
End: 2021-01-01

## 2021-01-01 ENCOUNTER — INPATIENT (INPATIENT)
Facility: HOSPITAL | Age: 81
LOS: 13 days | End: 2021-05-15
Attending: NEUROLOGICAL SURGERY | Admitting: NEUROLOGICAL SURGERY
Payer: MEDICARE

## 2021-01-01 VITALS
HEART RATE: 84 BPM | SYSTOLIC BLOOD PRESSURE: 153 MMHG | TEMPERATURE: 98 F | DIASTOLIC BLOOD PRESSURE: 56 MMHG | RESPIRATION RATE: 18 BRPM | HEIGHT: 61 IN | OXYGEN SATURATION: 100 %

## 2021-01-01 DIAGNOSIS — I25.10 ATHEROSCLEROTIC HEART DISEASE OF NATIVE CORONARY ARTERY WITHOUT ANGINA PECTORIS: ICD-10-CM

## 2021-01-01 DIAGNOSIS — I61.9 NONTRAUMATIC INTRACEREBRAL HEMORRHAGE, UNSPECIFIED: ICD-10-CM

## 2021-01-01 DIAGNOSIS — K62.89 OTHER SPECIFIED DISEASES OF ANUS AND RECTUM: Chronic | ICD-10-CM

## 2021-01-01 DIAGNOSIS — R53.81 OTHER MALAISE: ICD-10-CM

## 2021-01-01 DIAGNOSIS — E78.00 PURE HYPERCHOLESTEROLEMIA, UNSPECIFIED: ICD-10-CM

## 2021-01-01 DIAGNOSIS — Z51.5 ENCOUNTER FOR PALLIATIVE CARE: ICD-10-CM

## 2021-01-01 DIAGNOSIS — E03.9 HYPOTHYROIDISM, UNSPECIFIED: ICD-10-CM

## 2021-01-01 DIAGNOSIS — Z95.5 PRESENCE OF CORONARY ANGIOPLASTY IMPLANT AND GRAFT: Chronic | ICD-10-CM

## 2021-01-01 DIAGNOSIS — K27.9 PEPTIC ULCER, SITE UNSPECIFIED, UNSPECIFIED AS ACUTE OR CHRONIC, WITHOUT HEMORRHAGE OR PERFORATION: Chronic | ICD-10-CM

## 2021-01-01 DIAGNOSIS — Z90.710 ACQUIRED ABSENCE OF BOTH CERVIX AND UTERUS: Chronic | ICD-10-CM

## 2021-01-01 DIAGNOSIS — Z96.659 PRESENCE OF UNSPECIFIED ARTIFICIAL KNEE JOINT: Chronic | ICD-10-CM

## 2021-01-01 DIAGNOSIS — J96.02 ACUTE RESPIRATORY FAILURE WITH HYPERCAPNIA: ICD-10-CM

## 2021-01-01 DIAGNOSIS — I61.4 NONTRAUMATIC INTRACEREBRAL HEMORRHAGE IN CEREBELLUM: ICD-10-CM

## 2021-01-01 DIAGNOSIS — S06.340A TRAUMATIC HEMORRHAGE OF RIGHT CEREBRUM WITHOUT LOSS OF CONSCIOUSNESS, INITIAL ENCOUNTER: ICD-10-CM

## 2021-01-01 DIAGNOSIS — K27.9 PEPTIC ULCER, SITE UNSPECIFIED, UNSPECIFIED AS ACUTE OR CHRONIC, WITHOUT HEMORRHAGE OR PERFORATION: ICD-10-CM

## 2021-01-01 LAB
ALBUMIN SERPL ELPH-MCNC: 3.9 G/DL — SIGNIFICANT CHANGE UP (ref 3.3–5)
ALBUMIN SERPL ELPH-MCNC: 4.1 G/DL — SIGNIFICANT CHANGE UP (ref 3.3–5)
ALP SERPL-CCNC: 77 U/L — SIGNIFICANT CHANGE UP (ref 40–120)
ALP SERPL-CCNC: 85 U/L — SIGNIFICANT CHANGE UP (ref 40–120)
ALT FLD-CCNC: 16 U/L — SIGNIFICANT CHANGE UP (ref 4–33)
ALT FLD-CCNC: 9 U/L — SIGNIFICANT CHANGE UP (ref 4–33)
ANION GAP SERPL CALC-SCNC: 10 MMOL/L — SIGNIFICANT CHANGE UP (ref 7–14)
ANION GAP SERPL CALC-SCNC: 11 MMOL/L — SIGNIFICANT CHANGE UP (ref 7–14)
ANION GAP SERPL CALC-SCNC: 14 MMOL/L — SIGNIFICANT CHANGE UP (ref 7–14)
ANION GAP SERPL CALC-SCNC: 16 MMOL/L — HIGH (ref 7–14)
ANION GAP SERPL CALC-SCNC: 6 MMOL/L — LOW (ref 7–14)
ANION GAP SERPL CALC-SCNC: 6 MMOL/L — LOW (ref 7–14)
ANION GAP SERPL CALC-SCNC: 7 MMOL/L — SIGNIFICANT CHANGE UP (ref 7–14)
ANION GAP SERPL CALC-SCNC: 8 MMOL/L — SIGNIFICANT CHANGE UP (ref 7–14)
ANION GAP SERPL CALC-SCNC: 9 MMOL/L — SIGNIFICANT CHANGE UP (ref 7–14)
APPEARANCE UR: CLEAR — SIGNIFICANT CHANGE UP
APTT BLD: 26 SEC — LOW (ref 27–36.3)
AST SERPL-CCNC: 23 U/L — SIGNIFICANT CHANGE UP (ref 4–32)
AST SERPL-CCNC: 29 U/L — SIGNIFICANT CHANGE UP (ref 4–32)
BACTERIA # UR AUTO: NEGATIVE — SIGNIFICANT CHANGE UP
BASE EXCESS BLDA CALC-SCNC: 3.2 MMOL/L — HIGH (ref -2–2)
BASOPHILS # BLD AUTO: 0.04 K/UL — SIGNIFICANT CHANGE UP (ref 0–0.2)
BASOPHILS NFR BLD AUTO: 0.8 % — SIGNIFICANT CHANGE UP (ref 0–2)
BILIRUB SERPL-MCNC: 0.3 MG/DL — SIGNIFICANT CHANGE UP (ref 0.2–1.2)
BILIRUB SERPL-MCNC: 0.3 MG/DL — SIGNIFICANT CHANGE UP (ref 0.2–1.2)
BILIRUB UR-MCNC: NEGATIVE — SIGNIFICANT CHANGE UP
BLD GP AB SCN SERPL QL: NEGATIVE — SIGNIFICANT CHANGE UP
BLOOD GAS ARTERIAL - LYTES,HGB,ICA,LACT RESULT: SIGNIFICANT CHANGE UP
BLOOD GAS ARTERIAL COMPREHENSIVE RESULT: SIGNIFICANT CHANGE UP
BUN SERPL-MCNC: 14 MG/DL — SIGNIFICANT CHANGE UP (ref 7–23)
BUN SERPL-MCNC: 15 MG/DL — SIGNIFICANT CHANGE UP (ref 7–23)
BUN SERPL-MCNC: 15 MG/DL — SIGNIFICANT CHANGE UP (ref 7–23)
BUN SERPL-MCNC: 16 MG/DL — SIGNIFICANT CHANGE UP (ref 7–23)
BUN SERPL-MCNC: 16 MG/DL — SIGNIFICANT CHANGE UP (ref 7–23)
BUN SERPL-MCNC: 17 MG/DL — SIGNIFICANT CHANGE UP (ref 7–23)
BUN SERPL-MCNC: 19 MG/DL — SIGNIFICANT CHANGE UP (ref 7–23)
BUN SERPL-MCNC: 22 MG/DL — SIGNIFICANT CHANGE UP (ref 7–23)
BUN SERPL-MCNC: 23 MG/DL — SIGNIFICANT CHANGE UP (ref 7–23)
BUN SERPL-MCNC: 24 MG/DL — HIGH (ref 7–23)
BUN SERPL-MCNC: 26 MG/DL — HIGH (ref 7–23)
CA-I BLD-SCNC: 1.21 MMOL/L — SIGNIFICANT CHANGE UP (ref 1.15–1.29)
CALCIUM SERPL-MCNC: 8.6 MG/DL — SIGNIFICANT CHANGE UP (ref 8.4–10.5)
CALCIUM SERPL-MCNC: 8.7 MG/DL — SIGNIFICANT CHANGE UP (ref 8.4–10.5)
CALCIUM SERPL-MCNC: 8.7 MG/DL — SIGNIFICANT CHANGE UP (ref 8.4–10.5)
CALCIUM SERPL-MCNC: 8.8 MG/DL — SIGNIFICANT CHANGE UP (ref 8.4–10.5)
CALCIUM SERPL-MCNC: 8.9 MG/DL — SIGNIFICANT CHANGE UP (ref 8.4–10.5)
CALCIUM SERPL-MCNC: 8.9 MG/DL — SIGNIFICANT CHANGE UP (ref 8.4–10.5)
CALCIUM SERPL-MCNC: 9 MG/DL — SIGNIFICANT CHANGE UP (ref 8.4–10.5)
CALCIUM SERPL-MCNC: 9.1 MG/DL — SIGNIFICANT CHANGE UP (ref 8.4–10.5)
CALCIUM SERPL-MCNC: 9.1 MG/DL — SIGNIFICANT CHANGE UP (ref 8.4–10.5)
CALCIUM SERPL-MCNC: 9.2 MG/DL — SIGNIFICANT CHANGE UP (ref 8.4–10.5)
CALCIUM SERPL-MCNC: 9.2 MG/DL — SIGNIFICANT CHANGE UP (ref 8.4–10.5)
CALCIUM SERPL-MCNC: 9.4 MG/DL — SIGNIFICANT CHANGE UP (ref 8.4–10.5)
CALCIUM SERPL-MCNC: 9.4 MG/DL — SIGNIFICANT CHANGE UP (ref 8.4–10.5)
CALCIUM SERPL-MCNC: 9.8 MG/DL — SIGNIFICANT CHANGE UP (ref 8.4–10.5)
CHLORIDE SERPL-SCNC: 105 MMOL/L — SIGNIFICANT CHANGE UP (ref 98–107)
CHLORIDE SERPL-SCNC: 105 MMOL/L — SIGNIFICANT CHANGE UP (ref 98–107)
CHLORIDE SERPL-SCNC: 106 MMOL/L — SIGNIFICANT CHANGE UP (ref 98–107)
CHLORIDE SERPL-SCNC: 109 MMOL/L — HIGH (ref 98–107)
CHLORIDE SERPL-SCNC: 111 MMOL/L — HIGH (ref 98–107)
CHLORIDE SERPL-SCNC: 113 MMOL/L — HIGH (ref 98–107)
CHLORIDE SERPL-SCNC: 114 MMOL/L — HIGH (ref 98–107)
CHLORIDE SERPL-SCNC: 115 MMOL/L — HIGH (ref 98–107)
CHLORIDE SERPL-SCNC: 116 MMOL/L — HIGH (ref 98–107)
CHLORIDE SERPL-SCNC: 118 MMOL/L — HIGH (ref 98–107)
CHLORIDE SERPL-SCNC: 120 MMOL/L — HIGH (ref 98–107)
CO2 SERPL-SCNC: 18 MMOL/L — LOW (ref 22–31)
CO2 SERPL-SCNC: 19 MMOL/L — LOW (ref 22–31)
CO2 SERPL-SCNC: 20 MMOL/L — LOW (ref 22–31)
CO2 SERPL-SCNC: 21 MMOL/L — LOW (ref 22–31)
CO2 SERPL-SCNC: 22 MMOL/L — SIGNIFICANT CHANGE UP (ref 22–31)
CO2 SERPL-SCNC: 22 MMOL/L — SIGNIFICANT CHANGE UP (ref 22–31)
CO2 SERPL-SCNC: 23 MMOL/L — SIGNIFICANT CHANGE UP (ref 22–31)
CO2 SERPL-SCNC: 24 MMOL/L — SIGNIFICANT CHANGE UP (ref 22–31)
CO2 SERPL-SCNC: 25 MMOL/L — SIGNIFICANT CHANGE UP (ref 22–31)
CO2 SERPL-SCNC: 26 MMOL/L — SIGNIFICANT CHANGE UP (ref 22–31)
COLOR SPEC: SIGNIFICANT CHANGE UP
COVID-19 SPIKE DOMAIN AB INTERP: POSITIVE
COVID-19 SPIKE DOMAIN ANTIBODY RESULT: >250 U/ML — HIGH
CREAT SERPL-MCNC: 0.49 MG/DL — LOW (ref 0.5–1.3)
CREAT SERPL-MCNC: 0.52 MG/DL — SIGNIFICANT CHANGE UP (ref 0.5–1.3)
CREAT SERPL-MCNC: 0.52 MG/DL — SIGNIFICANT CHANGE UP (ref 0.5–1.3)
CREAT SERPL-MCNC: 0.53 MG/DL — SIGNIFICANT CHANGE UP (ref 0.5–1.3)
CREAT SERPL-MCNC: 0.54 MG/DL — SIGNIFICANT CHANGE UP (ref 0.5–1.3)
CREAT SERPL-MCNC: 0.54 MG/DL — SIGNIFICANT CHANGE UP (ref 0.5–1.3)
CREAT SERPL-MCNC: 0.55 MG/DL — SIGNIFICANT CHANGE UP (ref 0.5–1.3)
CREAT SERPL-MCNC: 0.56 MG/DL — SIGNIFICANT CHANGE UP (ref 0.5–1.3)
CREAT SERPL-MCNC: 0.57 MG/DL — SIGNIFICANT CHANGE UP (ref 0.5–1.3)
CREAT SERPL-MCNC: 0.58 MG/DL — SIGNIFICANT CHANGE UP (ref 0.5–1.3)
CREAT SERPL-MCNC: 0.58 MG/DL — SIGNIFICANT CHANGE UP (ref 0.5–1.3)
CREAT SERPL-MCNC: 0.6 MG/DL — SIGNIFICANT CHANGE UP (ref 0.5–1.3)
CREAT SERPL-MCNC: 0.64 MG/DL — SIGNIFICANT CHANGE UP (ref 0.5–1.3)
CREAT SERPL-MCNC: 0.76 MG/DL — SIGNIFICANT CHANGE UP (ref 0.5–1.3)
CREAT SERPL-MCNC: 0.8 MG/DL — SIGNIFICANT CHANGE UP (ref 0.5–1.3)
CULTURE RESULTS: SIGNIFICANT CHANGE UP
DIFF PNL FLD: NEGATIVE — SIGNIFICANT CHANGE UP
EOSINOPHIL # BLD AUTO: 0.06 K/UL — SIGNIFICANT CHANGE UP (ref 0–0.5)
EOSINOPHIL NFR BLD AUTO: 1.2 % — SIGNIFICANT CHANGE UP (ref 0–6)
EPI CELLS # UR: 0 /HPF — SIGNIFICANT CHANGE UP (ref 0–5)
ETHANOL SERPL-MCNC: <10 MG/DL — SIGNIFICANT CHANGE UP
GAS PNL BLDA: SIGNIFICANT CHANGE UP
GAS PNL BLDA: SIGNIFICANT CHANGE UP
GLUCOSE SERPL-MCNC: 111 MG/DL — HIGH (ref 70–99)
GLUCOSE SERPL-MCNC: 114 MG/DL — HIGH (ref 70–99)
GLUCOSE SERPL-MCNC: 118 MG/DL — HIGH (ref 70–99)
GLUCOSE SERPL-MCNC: 120 MG/DL — HIGH (ref 70–99)
GLUCOSE SERPL-MCNC: 133 MG/DL — HIGH (ref 70–99)
GLUCOSE SERPL-MCNC: 133 MG/DL — HIGH (ref 70–99)
GLUCOSE SERPL-MCNC: 136 MG/DL — HIGH (ref 70–99)
GLUCOSE SERPL-MCNC: 137 MG/DL — HIGH (ref 70–99)
GLUCOSE SERPL-MCNC: 139 MG/DL — HIGH (ref 70–99)
GLUCOSE SERPL-MCNC: 140 MG/DL — HIGH (ref 70–99)
GLUCOSE SERPL-MCNC: 141 MG/DL — HIGH (ref 70–99)
GLUCOSE SERPL-MCNC: 146 MG/DL — HIGH (ref 70–99)
GLUCOSE SERPL-MCNC: 149 MG/DL — HIGH (ref 70–99)
GLUCOSE SERPL-MCNC: 149 MG/DL — HIGH (ref 70–99)
GLUCOSE SERPL-MCNC: 150 MG/DL — HIGH (ref 70–99)
GLUCOSE SERPL-MCNC: 154 MG/DL — HIGH (ref 70–99)
GLUCOSE SERPL-MCNC: 161 MG/DL — HIGH (ref 70–99)
GLUCOSE SERPL-MCNC: 178 MG/DL — HIGH (ref 70–99)
GLUCOSE SERPL-MCNC: 179 MG/DL — HIGH (ref 70–99)
GLUCOSE SERPL-MCNC: 223 MG/DL — HIGH (ref 70–99)
GLUCOSE UR QL: NEGATIVE — SIGNIFICANT CHANGE UP
HCO3 BLDA-SCNC: 28 MMOL/L — HIGH (ref 22–26)
HCT VFR BLD CALC: 22.1 % — LOW (ref 34.5–45)
HCT VFR BLD CALC: 24.9 % — LOW (ref 34.5–45)
HCT VFR BLD CALC: 26.1 % — LOW (ref 34.5–45)
HCT VFR BLD CALC: 26.9 % — LOW (ref 34.5–45)
HCT VFR BLD CALC: 26.9 % — LOW (ref 34.5–45)
HCT VFR BLD CALC: 27.1 % — LOW (ref 34.5–45)
HCT VFR BLD CALC: 27.3 % — LOW (ref 34.5–45)
HCT VFR BLD CALC: 27.4 % — LOW (ref 34.5–45)
HCT VFR BLD CALC: 27.8 % — LOW (ref 34.5–45)
HCT VFR BLD CALC: 28.4 % — LOW (ref 34.5–45)
HCT VFR BLD CALC: 29 % — LOW (ref 34.5–45)
HCT VFR BLD CALC: 29.1 % — LOW (ref 34.5–45)
HCT VFR BLD CALC: 29.3 % — LOW (ref 34.5–45)
HCT VFR BLD CALC: 30.5 % — LOW (ref 34.5–45)
HCT VFR BLD CALC: 31.1 % — LOW (ref 34.5–45)
HCT VFR BLD CALC: 34 % — LOW (ref 34.5–45)
HCT VFR BLD CALC: 37.5 % — SIGNIFICANT CHANGE UP (ref 34.5–45)
HGB BLD-MCNC: 10.1 G/DL — LOW (ref 11.5–15.5)
HGB BLD-MCNC: 11.2 G/DL — LOW (ref 11.5–15.5)
HGB BLD-MCNC: 6.7 G/DL — CRITICAL LOW (ref 11.5–15.5)
HGB BLD-MCNC: 7.7 G/DL — LOW (ref 11.5–15.5)
HGB BLD-MCNC: 8 G/DL — LOW (ref 11.5–15.5)
HGB BLD-MCNC: 8 G/DL — LOW (ref 11.5–15.5)
HGB BLD-MCNC: 8.1 G/DL — LOW (ref 11.5–15.5)
HGB BLD-MCNC: 8.1 G/DL — LOW (ref 11.5–15.5)
HGB BLD-MCNC: 8.2 G/DL — LOW (ref 11.5–15.5)
HGB BLD-MCNC: 8.4 G/DL — LOW (ref 11.5–15.5)
HGB BLD-MCNC: 8.6 G/DL — LOW (ref 11.5–15.5)
HGB BLD-MCNC: 8.7 G/DL — LOW (ref 11.5–15.5)
HGB BLD-MCNC: 8.7 G/DL — LOW (ref 11.5–15.5)
HGB BLD-MCNC: 8.8 G/DL — LOW (ref 11.5–15.5)
HGB BLD-MCNC: 8.8 G/DL — LOW (ref 11.5–15.5)
HGB BLD-MCNC: 8.9 G/DL — LOW (ref 11.5–15.5)
HGB BLD-MCNC: 9 G/DL — LOW (ref 11.5–15.5)
HGB BLD-MCNC: 9.5 G/DL — LOW (ref 11.5–15.5)
HGB BLD-MCNC: 9.7 G/DL — LOW (ref 11.5–15.5)
IANC: 2.6 K/UL — SIGNIFICANT CHANGE UP (ref 1.5–8.5)
IMM GRANULOCYTES NFR BLD AUTO: 0.8 % — SIGNIFICANT CHANGE UP (ref 0–1.5)
INR BLD: 1.23 RATIO — HIGH (ref 0.88–1.16)
KETONES UR-MCNC: ABNORMAL
LEUKOCYTE ESTERASE UR-ACNC: NEGATIVE — SIGNIFICANT CHANGE UP
LYMPHOCYTES # BLD AUTO: 1.89 K/UL — SIGNIFICANT CHANGE UP (ref 1–3.3)
LYMPHOCYTES # BLD AUTO: 37.3 % — SIGNIFICANT CHANGE UP (ref 13–44)
MAGNESIUM SERPL-MCNC: 1.8 MG/DL — SIGNIFICANT CHANGE UP (ref 1.6–2.6)
MAGNESIUM SERPL-MCNC: 1.8 MG/DL — SIGNIFICANT CHANGE UP (ref 1.6–2.6)
MAGNESIUM SERPL-MCNC: 2 MG/DL — SIGNIFICANT CHANGE UP (ref 1.6–2.6)
MAGNESIUM SERPL-MCNC: 2.1 MG/DL — SIGNIFICANT CHANGE UP (ref 1.6–2.6)
MAGNESIUM SERPL-MCNC: 2.2 MG/DL — SIGNIFICANT CHANGE UP (ref 1.6–2.6)
MAGNESIUM SERPL-MCNC: 2.3 MG/DL — SIGNIFICANT CHANGE UP (ref 1.6–2.6)
MAGNESIUM SERPL-MCNC: 2.4 MG/DL — SIGNIFICANT CHANGE UP (ref 1.6–2.6)
MAGNESIUM SERPL-MCNC: 2.4 MG/DL — SIGNIFICANT CHANGE UP (ref 1.6–2.6)
MCHC RBC-ENTMCNC: 22.3 PG — LOW (ref 27–34)
MCHC RBC-ENTMCNC: 22.6 PG — LOW (ref 27–34)
MCHC RBC-ENTMCNC: 22.6 PG — LOW (ref 27–34)
MCHC RBC-ENTMCNC: 22.7 PG — LOW (ref 27–34)
MCHC RBC-ENTMCNC: 22.7 PG — LOW (ref 27–34)
MCHC RBC-ENTMCNC: 22.8 PG — LOW (ref 27–34)
MCHC RBC-ENTMCNC: 22.9 PG — LOW (ref 27–34)
MCHC RBC-ENTMCNC: 23 PG — LOW (ref 27–34)
MCHC RBC-ENTMCNC: 23.1 PG — LOW (ref 27–34)
MCHC RBC-ENTMCNC: 23.1 PG — LOW (ref 27–34)
MCHC RBC-ENTMCNC: 23.2 PG — LOW (ref 27–34)
MCHC RBC-ENTMCNC: 23.2 PG — LOW (ref 27–34)
MCHC RBC-ENTMCNC: 29.3 GM/DL — LOW (ref 32–36)
MCHC RBC-ENTMCNC: 29.6 GM/DL — LOW (ref 32–36)
MCHC RBC-ENTMCNC: 29.6 GM/DL — LOW (ref 32–36)
MCHC RBC-ENTMCNC: 29.7 GM/DL — LOW (ref 32–36)
MCHC RBC-ENTMCNC: 29.7 GM/DL — LOW (ref 32–36)
MCHC RBC-ENTMCNC: 29.9 GM/DL — LOW (ref 32–36)
MCHC RBC-ENTMCNC: 30.3 GM/DL — LOW (ref 32–36)
MCHC RBC-ENTMCNC: 30.3 GM/DL — LOW (ref 32–36)
MCHC RBC-ENTMCNC: 30.4 GM/DL — LOW (ref 32–36)
MCHC RBC-ENTMCNC: 30.5 GM/DL — LOW (ref 32–36)
MCHC RBC-ENTMCNC: 30.6 GM/DL — LOW (ref 32–36)
MCHC RBC-ENTMCNC: 30.9 GM/DL — LOW (ref 32–36)
MCHC RBC-ENTMCNC: 31 GM/DL — LOW (ref 32–36)
MCHC RBC-ENTMCNC: 31.1 GM/DL — LOW (ref 32–36)
MCHC RBC-ENTMCNC: 31.2 GM/DL — LOW (ref 32–36)
MCHC RBC-ENTMCNC: 31.3 GM/DL — LOW (ref 32–36)
MCHC RBC-ENTMCNC: 31.7 GM/DL — LOW (ref 32–36)
MCV RBC AUTO: 72.8 FL — LOW (ref 80–100)
MCV RBC AUTO: 73.3 FL — LOW (ref 80–100)
MCV RBC AUTO: 73.4 FL — LOW (ref 80–100)
MCV RBC AUTO: 74 FL — LOW (ref 80–100)
MCV RBC AUTO: 74.1 FL — LOW (ref 80–100)
MCV RBC AUTO: 74.4 FL — LOW (ref 80–100)
MCV RBC AUTO: 74.4 FL — LOW (ref 80–100)
MCV RBC AUTO: 74.5 FL — LOW (ref 80–100)
MCV RBC AUTO: 74.7 FL — LOW (ref 80–100)
MCV RBC AUTO: 74.8 FL — LOW (ref 80–100)
MCV RBC AUTO: 74.9 FL — LOW (ref 80–100)
MCV RBC AUTO: 75.1 FL — LOW (ref 80–100)
MCV RBC AUTO: 75.4 FL — LOW (ref 80–100)
MCV RBC AUTO: 75.8 FL — LOW (ref 80–100)
MCV RBC AUTO: 76.3 FL — LOW (ref 80–100)
MCV RBC AUTO: 76.4 FL — LOW (ref 80–100)
MCV RBC AUTO: 76.7 FL — LOW (ref 80–100)
MCV RBC AUTO: 76.8 FL — LOW (ref 80–100)
MCV RBC AUTO: 77 FL — LOW (ref 80–100)
MONOCYTES # BLD AUTO: 0.44 K/UL — SIGNIFICANT CHANGE UP (ref 0–0.9)
MONOCYTES NFR BLD AUTO: 8.7 % — SIGNIFICANT CHANGE UP (ref 2–14)
NEUTROPHILS # BLD AUTO: 2.6 K/UL — SIGNIFICANT CHANGE UP (ref 1.8–7.4)
NEUTROPHILS NFR BLD AUTO: 51.2 % — SIGNIFICANT CHANGE UP (ref 43–77)
NITRITE UR-MCNC: NEGATIVE — SIGNIFICANT CHANGE UP
NRBC # BLD: 0 /100 WBCS — SIGNIFICANT CHANGE UP
NRBC # FLD: 0 K/UL — SIGNIFICANT CHANGE UP
PCO2 BLDA: 34 MMHG — SIGNIFICANT CHANGE UP (ref 32–48)
PH BLDA: 7.5 — HIGH (ref 7.35–7.45)
PH UR: 7 — SIGNIFICANT CHANGE UP (ref 5–8)
PHOSPHATE SERPL-MCNC: 1.7 MG/DL — LOW (ref 2.5–4.5)
PHOSPHATE SERPL-MCNC: 1.9 MG/DL — LOW (ref 2.5–4.5)
PHOSPHATE SERPL-MCNC: 2.1 MG/DL — LOW (ref 2.5–4.5)
PHOSPHATE SERPL-MCNC: 2.3 MG/DL — LOW (ref 2.5–4.5)
PHOSPHATE SERPL-MCNC: 2.4 MG/DL — LOW (ref 2.5–4.5)
PHOSPHATE SERPL-MCNC: 2.5 MG/DL — SIGNIFICANT CHANGE UP (ref 2.5–4.5)
PHOSPHATE SERPL-MCNC: 2.6 MG/DL — SIGNIFICANT CHANGE UP (ref 2.5–4.5)
PHOSPHATE SERPL-MCNC: 2.6 MG/DL — SIGNIFICANT CHANGE UP (ref 2.5–4.5)
PHOSPHATE SERPL-MCNC: 2.7 MG/DL — SIGNIFICANT CHANGE UP (ref 2.5–4.5)
PHOSPHATE SERPL-MCNC: 2.8 MG/DL — SIGNIFICANT CHANGE UP (ref 2.5–4.5)
PHOSPHATE SERPL-MCNC: 3.1 MG/DL — SIGNIFICANT CHANGE UP (ref 2.5–4.5)
PHOSPHATE SERPL-MCNC: 3.8 MG/DL — SIGNIFICANT CHANGE UP (ref 2.5–4.5)
PHOSPHATE SERPL-MCNC: 4.9 MG/DL — HIGH (ref 2.5–4.5)
PLATELET # BLD AUTO: 123 K/UL — LOW (ref 150–400)
PLATELET # BLD AUTO: 131 K/UL — LOW (ref 150–400)
PLATELET # BLD AUTO: 132 K/UL — LOW (ref 150–400)
PLATELET # BLD AUTO: 135 K/UL — LOW (ref 150–400)
PLATELET # BLD AUTO: 135 K/UL — LOW (ref 150–400)
PLATELET # BLD AUTO: 141 K/UL — LOW (ref 150–400)
PLATELET # BLD AUTO: 143 K/UL — LOW (ref 150–400)
PLATELET # BLD AUTO: 155 K/UL — SIGNIFICANT CHANGE UP (ref 150–400)
PLATELET # BLD AUTO: 156 K/UL — SIGNIFICANT CHANGE UP (ref 150–400)
PLATELET # BLD AUTO: 164 K/UL — SIGNIFICANT CHANGE UP (ref 150–400)
PLATELET # BLD AUTO: 174 K/UL — SIGNIFICANT CHANGE UP (ref 150–400)
PLATELET # BLD AUTO: 192 K/UL — SIGNIFICANT CHANGE UP (ref 150–400)
PLATELET # BLD AUTO: 217 K/UL — SIGNIFICANT CHANGE UP (ref 150–400)
PLATELET # BLD AUTO: 218 K/UL — SIGNIFICANT CHANGE UP (ref 150–400)
PLATELET # BLD AUTO: 225 K/UL — SIGNIFICANT CHANGE UP (ref 150–400)
PLATELET # BLD AUTO: 289 K/UL — SIGNIFICANT CHANGE UP (ref 150–400)
PLATELET # BLD AUTO: 309 K/UL — SIGNIFICANT CHANGE UP (ref 150–400)
PLATELET # BLD AUTO: 342 K/UL — SIGNIFICANT CHANGE UP (ref 150–400)
PLATELET # BLD AUTO: 359 K/UL — SIGNIFICANT CHANGE UP (ref 150–400)
PO2 BLDA: 133 MMHG — HIGH (ref 83–108)
POTASSIUM SERPL-MCNC: 3.6 MMOL/L — SIGNIFICANT CHANGE UP (ref 3.5–5.3)
POTASSIUM SERPL-MCNC: 3.6 MMOL/L — SIGNIFICANT CHANGE UP (ref 3.5–5.3)
POTASSIUM SERPL-MCNC: 3.7 MMOL/L — SIGNIFICANT CHANGE UP (ref 3.5–5.3)
POTASSIUM SERPL-MCNC: 3.8 MMOL/L — SIGNIFICANT CHANGE UP (ref 3.5–5.3)
POTASSIUM SERPL-MCNC: 3.8 MMOL/L — SIGNIFICANT CHANGE UP (ref 3.5–5.3)
POTASSIUM SERPL-MCNC: 3.9 MMOL/L — SIGNIFICANT CHANGE UP (ref 3.5–5.3)
POTASSIUM SERPL-MCNC: 4 MMOL/L — SIGNIFICANT CHANGE UP (ref 3.5–5.3)
POTASSIUM SERPL-MCNC: 4 MMOL/L — SIGNIFICANT CHANGE UP (ref 3.5–5.3)
POTASSIUM SERPL-MCNC: 4.1 MMOL/L — SIGNIFICANT CHANGE UP (ref 3.5–5.3)
POTASSIUM SERPL-MCNC: 4.3 MMOL/L — SIGNIFICANT CHANGE UP (ref 3.5–5.3)
POTASSIUM SERPL-MCNC: 4.3 MMOL/L — SIGNIFICANT CHANGE UP (ref 3.5–5.3)
POTASSIUM SERPL-MCNC: 4.7 MMOL/L — SIGNIFICANT CHANGE UP (ref 3.5–5.3)
POTASSIUM SERPL-SCNC: 3.6 MMOL/L — SIGNIFICANT CHANGE UP (ref 3.5–5.3)
POTASSIUM SERPL-SCNC: 3.6 MMOL/L — SIGNIFICANT CHANGE UP (ref 3.5–5.3)
POTASSIUM SERPL-SCNC: 3.7 MMOL/L — SIGNIFICANT CHANGE UP (ref 3.5–5.3)
POTASSIUM SERPL-SCNC: 3.8 MMOL/L — SIGNIFICANT CHANGE UP (ref 3.5–5.3)
POTASSIUM SERPL-SCNC: 3.8 MMOL/L — SIGNIFICANT CHANGE UP (ref 3.5–5.3)
POTASSIUM SERPL-SCNC: 3.9 MMOL/L — SIGNIFICANT CHANGE UP (ref 3.5–5.3)
POTASSIUM SERPL-SCNC: 4 MMOL/L — SIGNIFICANT CHANGE UP (ref 3.5–5.3)
POTASSIUM SERPL-SCNC: 4 MMOL/L — SIGNIFICANT CHANGE UP (ref 3.5–5.3)
POTASSIUM SERPL-SCNC: 4.1 MMOL/L — SIGNIFICANT CHANGE UP (ref 3.5–5.3)
POTASSIUM SERPL-SCNC: 4.3 MMOL/L — SIGNIFICANT CHANGE UP (ref 3.5–5.3)
POTASSIUM SERPL-SCNC: 4.3 MMOL/L — SIGNIFICANT CHANGE UP (ref 3.5–5.3)
POTASSIUM SERPL-SCNC: 4.7 MMOL/L — SIGNIFICANT CHANGE UP (ref 3.5–5.3)
PROT SERPL-MCNC: 7.5 G/DL — SIGNIFICANT CHANGE UP (ref 6–8.3)
PROT SERPL-MCNC: 7.9 G/DL — SIGNIFICANT CHANGE UP (ref 6–8.3)
PROT UR-MCNC: ABNORMAL
PROTHROM AB SERPL-ACNC: 14 SEC — HIGH (ref 10.6–13.6)
RBC # BLD: 2.93 M/UL — LOW (ref 3.8–5.2)
RBC # BLD: 3.36 M/UL — LOW (ref 3.8–5.2)
RBC # BLD: 3.49 M/UL — LOW (ref 3.8–5.2)
RBC # BLD: 3.52 M/UL — LOW (ref 3.8–5.2)
RBC # BLD: 3.56 M/UL — LOW (ref 3.8–5.2)
RBC # BLD: 3.58 M/UL — LOW (ref 3.8–5.2)
RBC # BLD: 3.59 M/UL — LOW (ref 3.8–5.2)
RBC # BLD: 3.63 M/UL — LOW (ref 3.8–5.2)
RBC # BLD: 3.79 M/UL — LOW (ref 3.8–5.2)
RBC # BLD: 3.79 M/UL — LOW (ref 3.8–5.2)
RBC # BLD: 3.81 M/UL — SIGNIFICANT CHANGE UP (ref 3.8–5.2)
RBC # BLD: 3.84 M/UL — SIGNIFICANT CHANGE UP (ref 3.8–5.2)
RBC # BLD: 3.9 M/UL — SIGNIFICANT CHANGE UP (ref 3.8–5.2)
RBC # BLD: 4.1 M/UL — SIGNIFICANT CHANGE UP (ref 3.8–5.2)
RBC # BLD: 4.24 M/UL — SIGNIFICANT CHANGE UP (ref 3.8–5.2)
RBC # BLD: 4.43 M/UL — SIGNIFICANT CHANGE UP (ref 3.8–5.2)
RBC # BLD: 4.95 M/UL — SIGNIFICANT CHANGE UP (ref 3.8–5.2)
RBC # FLD: 17.6 % — HIGH (ref 10.3–14.5)
RBC # FLD: 17.7 % — HIGH (ref 10.3–14.5)
RBC # FLD: 17.8 % — HIGH (ref 10.3–14.5)
RBC # FLD: 17.8 % — HIGH (ref 10.3–14.5)
RBC # FLD: 17.9 % — HIGH (ref 10.3–14.5)
RBC # FLD: 18 % — HIGH (ref 10.3–14.5)
RBC # FLD: 18 % — HIGH (ref 10.3–14.5)
RBC # FLD: 18.1 % — HIGH (ref 10.3–14.5)
RBC # FLD: 18.1 % — HIGH (ref 10.3–14.5)
RBC # FLD: 18.3 % — HIGH (ref 10.3–14.5)
RBC # FLD: 18.4 % — HIGH (ref 10.3–14.5)
RBC # FLD: 18.6 % — HIGH (ref 10.3–14.5)
RBC # FLD: 18.7 % — HIGH (ref 10.3–14.5)
RBC CASTS # UR COMP ASSIST: 1 /HPF — SIGNIFICANT CHANGE UP (ref 0–4)
RH IG SCN BLD-IMP: POSITIVE — SIGNIFICANT CHANGE UP
SAO2 % BLDA: 99 % — SIGNIFICANT CHANGE UP (ref 95–99)
SARS-COV-2 IGG+IGM SERPL QL IA: >250 U/ML — HIGH
SARS-COV-2 IGG+IGM SERPL QL IA: POSITIVE
SARS-COV-2 RNA SPEC QL NAA+PROBE: SIGNIFICANT CHANGE UP
SODIUM SERPL-SCNC: 134 MMOL/L — LOW (ref 135–145)
SODIUM SERPL-SCNC: 140 MMOL/L — SIGNIFICANT CHANGE UP (ref 135–145)
SODIUM SERPL-SCNC: 141 MMOL/L — SIGNIFICANT CHANGE UP (ref 135–145)
SODIUM SERPL-SCNC: 142 MMOL/L — SIGNIFICANT CHANGE UP (ref 135–145)
SODIUM SERPL-SCNC: 143 MMOL/L — SIGNIFICANT CHANGE UP (ref 135–145)
SODIUM SERPL-SCNC: 144 MMOL/L — SIGNIFICANT CHANGE UP (ref 135–145)
SODIUM SERPL-SCNC: 145 MMOL/L — SIGNIFICANT CHANGE UP (ref 135–145)
SODIUM SERPL-SCNC: 146 MMOL/L — HIGH (ref 135–145)
SODIUM SERPL-SCNC: 147 MMOL/L — HIGH (ref 135–145)
SODIUM SERPL-SCNC: 147 MMOL/L — HIGH (ref 135–145)
SODIUM SERPL-SCNC: 151 MMOL/L — HIGH (ref 135–145)
SP GR SPEC: 1.04 — HIGH (ref 1.01–1.02)
SPECIMEN SOURCE: SIGNIFICANT CHANGE UP
TROPONIN T, HIGH SENSITIVITY RESULT: 23 NG/L — SIGNIFICANT CHANGE UP
UROBILINOGEN FLD QL: SIGNIFICANT CHANGE UP
WBC # BLD: 10.28 K/UL — SIGNIFICANT CHANGE UP (ref 3.8–10.5)
WBC # BLD: 12.52 K/UL — HIGH (ref 3.8–10.5)
WBC # BLD: 12.94 K/UL — HIGH (ref 3.8–10.5)
WBC # BLD: 16.18 K/UL — HIGH (ref 3.8–10.5)
WBC # BLD: 18.28 K/UL — HIGH (ref 3.8–10.5)
WBC # BLD: 5.07 K/UL — SIGNIFICANT CHANGE UP (ref 3.8–10.5)
WBC # BLD: 6.14 K/UL — SIGNIFICANT CHANGE UP (ref 3.8–10.5)
WBC # BLD: 7.15 K/UL — SIGNIFICANT CHANGE UP (ref 3.8–10.5)
WBC # BLD: 7.96 K/UL — SIGNIFICANT CHANGE UP (ref 3.8–10.5)
WBC # BLD: 7.98 K/UL — SIGNIFICANT CHANGE UP (ref 3.8–10.5)
WBC # BLD: 8.23 K/UL — SIGNIFICANT CHANGE UP (ref 3.8–10.5)
WBC # BLD: 8.34 K/UL — SIGNIFICANT CHANGE UP (ref 3.8–10.5)
WBC # BLD: 8.72 K/UL — SIGNIFICANT CHANGE UP (ref 3.8–10.5)
WBC # BLD: 8.82 K/UL — SIGNIFICANT CHANGE UP (ref 3.8–10.5)
WBC # BLD: 9.08 K/UL — SIGNIFICANT CHANGE UP (ref 3.8–10.5)
WBC # BLD: 9.08 K/UL — SIGNIFICANT CHANGE UP (ref 3.8–10.5)
WBC # BLD: 9.41 K/UL — SIGNIFICANT CHANGE UP (ref 3.8–10.5)
WBC # BLD: 9.54 K/UL — SIGNIFICANT CHANGE UP (ref 3.8–10.5)
WBC # BLD: 9.73 K/UL — SIGNIFICANT CHANGE UP (ref 3.8–10.5)
WBC # FLD AUTO: 10.28 K/UL — SIGNIFICANT CHANGE UP (ref 3.8–10.5)
WBC # FLD AUTO: 12.52 K/UL — HIGH (ref 3.8–10.5)
WBC # FLD AUTO: 12.94 K/UL — HIGH (ref 3.8–10.5)
WBC # FLD AUTO: 16.18 K/UL — HIGH (ref 3.8–10.5)
WBC # FLD AUTO: 18.28 K/UL — HIGH (ref 3.8–10.5)
WBC # FLD AUTO: 5.07 K/UL — SIGNIFICANT CHANGE UP (ref 3.8–10.5)
WBC # FLD AUTO: 6.14 K/UL — SIGNIFICANT CHANGE UP (ref 3.8–10.5)
WBC # FLD AUTO: 7.15 K/UL — SIGNIFICANT CHANGE UP (ref 3.8–10.5)
WBC # FLD AUTO: 7.96 K/UL — SIGNIFICANT CHANGE UP (ref 3.8–10.5)
WBC # FLD AUTO: 7.98 K/UL — SIGNIFICANT CHANGE UP (ref 3.8–10.5)
WBC # FLD AUTO: 8.23 K/UL — SIGNIFICANT CHANGE UP (ref 3.8–10.5)
WBC # FLD AUTO: 8.34 K/UL — SIGNIFICANT CHANGE UP (ref 3.8–10.5)
WBC # FLD AUTO: 8.72 K/UL — SIGNIFICANT CHANGE UP (ref 3.8–10.5)
WBC # FLD AUTO: 8.82 K/UL — SIGNIFICANT CHANGE UP (ref 3.8–10.5)
WBC # FLD AUTO: 9.08 K/UL — SIGNIFICANT CHANGE UP (ref 3.8–10.5)
WBC # FLD AUTO: 9.08 K/UL — SIGNIFICANT CHANGE UP (ref 3.8–10.5)
WBC # FLD AUTO: 9.41 K/UL — SIGNIFICANT CHANGE UP (ref 3.8–10.5)
WBC # FLD AUTO: 9.54 K/UL — SIGNIFICANT CHANGE UP (ref 3.8–10.5)
WBC # FLD AUTO: 9.73 K/UL — SIGNIFICANT CHANGE UP (ref 3.8–10.5)
WBC UR QL: 2 /HPF — SIGNIFICANT CHANGE UP (ref 0–5)

## 2021-01-01 PROCEDURE — 99291 CRITICAL CARE FIRST HOUR: CPT

## 2021-01-01 PROCEDURE — 99232 SBSQ HOSP IP/OBS MODERATE 35: CPT

## 2021-01-01 PROCEDURE — 71045 X-RAY EXAM CHEST 1 VIEW: CPT | Mod: 26

## 2021-01-01 PROCEDURE — 70450 CT HEAD/BRAIN W/O DYE: CPT | Mod: 26

## 2021-01-01 PROCEDURE — 99223 1ST HOSP IP/OBS HIGH 75: CPT

## 2021-01-01 PROCEDURE — 99233 SBSQ HOSP IP/OBS HIGH 50: CPT

## 2021-01-01 PROCEDURE — 70486 CT MAXILLOFACIAL W/O DYE: CPT | Mod: 26

## 2021-01-01 PROCEDURE — 99291 CRITICAL CARE FIRST HOUR: CPT | Mod: 25

## 2021-01-01 PROCEDURE — 99232 SBSQ HOSP IP/OBS MODERATE 35: CPT | Mod: GC

## 2021-01-01 PROCEDURE — 0042T: CPT

## 2021-01-01 PROCEDURE — 70496 CT ANGIOGRAPHY HEAD: CPT | Mod: 26

## 2021-01-01 PROCEDURE — 70450 CT HEAD/BRAIN W/O DYE: CPT | Mod: 26,59

## 2021-01-01 PROCEDURE — 31600 PLANNED TRACHEOSTOMY: CPT | Mod: GC

## 2021-01-01 PROCEDURE — 43235 EGD DIAGNOSTIC BRUSH WASH: CPT | Mod: GC

## 2021-01-01 PROCEDURE — 99223 1ST HOSP IP/OBS HIGH 75: CPT | Mod: GC

## 2021-01-01 PROCEDURE — 72125 CT NECK SPINE W/O DYE: CPT | Mod: 26

## 2021-01-01 PROCEDURE — 44300 OPEN BOWEL TO SKIN: CPT

## 2021-01-01 PROCEDURE — 74176 CT ABD & PELVIS W/O CONTRAST: CPT | Mod: 26

## 2021-01-01 PROCEDURE — 99285 EMERGENCY DEPT VISIT HI MDM: CPT | Mod: CS,GC

## 2021-01-01 PROCEDURE — 70552 MRI BRAIN STEM W/DYE: CPT | Mod: 26

## 2021-01-01 PROCEDURE — 70498 CT ANGIOGRAPHY NECK: CPT | Mod: 26

## 2021-01-01 PROCEDURE — 93010 ELECTROCARDIOGRAM REPORT: CPT

## 2021-01-01 RX ORDER — ACETAMINOPHEN 500 MG
650 TABLET ORAL EVERY 6 HOURS
Refills: 0 | Status: DISCONTINUED | OUTPATIENT
Start: 2021-01-01 | End: 2021-01-01

## 2021-01-01 RX ORDER — PANTOPRAZOLE SODIUM 20 MG/1
40 TABLET, DELAYED RELEASE ORAL DAILY
Refills: 0 | Status: DISCONTINUED | OUTPATIENT
Start: 2021-01-01 | End: 2021-01-01

## 2021-01-01 RX ORDER — HEPARIN SODIUM 5000 [USP'U]/ML
5000 INJECTION INTRAVENOUS; SUBCUTANEOUS EVERY 8 HOURS
Refills: 0 | Status: DISCONTINUED | OUTPATIENT
Start: 2021-01-01 | End: 2021-01-01

## 2021-01-01 RX ORDER — SUCCINYLCHOLINE CHLORIDE 100 MG/5ML
100 SYRINGE (ML) INTRAVENOUS ONCE
Refills: 0 | Status: COMPLETED | OUTPATIENT
Start: 2021-01-01 | End: 2021-01-01

## 2021-01-01 RX ORDER — SENNA PLUS 8.6 MG/1
1 TABLET ORAL DAILY
Refills: 0 | Status: DISCONTINUED | OUTPATIENT
Start: 2021-01-01 | End: 2021-01-01

## 2021-01-01 RX ORDER — MAGNESIUM SULFATE 500 MG/ML
2 VIAL (ML) INJECTION ONCE
Refills: 0 | Status: COMPLETED | OUTPATIENT
Start: 2021-01-01 | End: 2021-01-01

## 2021-01-01 RX ORDER — ATORVASTATIN CALCIUM 80 MG/1
80 TABLET, FILM COATED ORAL AT BEDTIME
Refills: 0 | Status: DISCONTINUED | OUTPATIENT
Start: 2021-01-01 | End: 2021-01-01

## 2021-01-01 RX ORDER — SODIUM CHLORIDE 9 MG/ML
500 INJECTION INTRAMUSCULAR; INTRAVENOUS; SUBCUTANEOUS ONCE
Refills: 0 | Status: COMPLETED | OUTPATIENT
Start: 2021-01-01 | End: 2021-01-01

## 2021-01-01 RX ORDER — ETOMIDATE 2 MG/ML
14 INJECTION INTRAVENOUS ONCE
Refills: 0 | Status: COMPLETED | OUTPATIENT
Start: 2021-01-01 | End: 2021-01-01

## 2021-01-01 RX ORDER — LEVETIRACETAM 250 MG/1
1500 TABLET, FILM COATED ORAL ONCE
Refills: 0 | Status: COMPLETED | OUTPATIENT
Start: 2021-01-01 | End: 2021-01-01

## 2021-01-01 RX ORDER — SODIUM CHLORIDE 9 MG/ML
2 INJECTION INTRAMUSCULAR; INTRAVENOUS; SUBCUTANEOUS EVERY 8 HOURS
Refills: 0 | Status: DISCONTINUED | OUTPATIENT
Start: 2021-01-01 | End: 2021-01-01

## 2021-01-01 RX ORDER — CHLORHEXIDINE GLUCONATE 213 G/1000ML
15 SOLUTION TOPICAL EVERY 12 HOURS
Refills: 0 | Status: DISCONTINUED | OUTPATIENT
Start: 2021-01-01 | End: 2021-01-01

## 2021-01-01 RX ORDER — LEVETIRACETAM 250 MG/1
500 TABLET, FILM COATED ORAL EVERY 12 HOURS
Refills: 0 | Status: DISCONTINUED | OUTPATIENT
Start: 2021-01-01 | End: 2021-01-01

## 2021-01-01 RX ORDER — POTASSIUM PHOSPHATE, MONOBASIC POTASSIUM PHOSPHATE, DIBASIC 236; 224 MG/ML; MG/ML
15 INJECTION, SOLUTION INTRAVENOUS ONCE
Refills: 0 | Status: DISCONTINUED | OUTPATIENT
Start: 2021-01-01 | End: 2021-01-01

## 2021-01-01 RX ORDER — LEVETIRACETAM 250 MG/1
500 TABLET, FILM COATED ORAL
Refills: 0 | Status: DISCONTINUED | OUTPATIENT
Start: 2021-01-01 | End: 2021-01-01

## 2021-01-01 RX ORDER — SENNA PLUS 8.6 MG/1
15 TABLET ORAL AT BEDTIME
Refills: 0 | Status: DISCONTINUED | OUTPATIENT
Start: 2021-01-01 | End: 2021-01-01

## 2021-01-01 RX ORDER — METOPROLOL TARTRATE 50 MG
5 TABLET ORAL EVERY 6 HOURS
Refills: 0 | Status: DISCONTINUED | OUTPATIENT
Start: 2021-01-01 | End: 2021-01-01

## 2021-01-01 RX ORDER — SODIUM,POTASSIUM PHOSPHATES 278-250MG
2 POWDER IN PACKET (EA) ORAL THREE TIMES A DAY
Refills: 0 | Status: COMPLETED | OUTPATIENT
Start: 2021-01-01 | End: 2021-01-01

## 2021-01-01 RX ORDER — ENOXAPARIN SODIUM 100 MG/ML
40 INJECTION SUBCUTANEOUS DAILY
Refills: 0 | Status: DISCONTINUED | OUTPATIENT
Start: 2021-01-01 | End: 2021-01-01

## 2021-01-01 RX ORDER — POTASSIUM PHOSPHATE, MONOBASIC POTASSIUM PHOSPHATE, DIBASIC 236; 224 MG/ML; MG/ML
15 INJECTION, SOLUTION INTRAVENOUS ONCE
Refills: 0 | Status: COMPLETED | OUTPATIENT
Start: 2021-01-01 | End: 2021-01-01

## 2021-01-01 RX ORDER — LEVETIRACETAM 250 MG/1
500 TABLET, FILM COATED ORAL ONCE
Refills: 0 | Status: COMPLETED | OUTPATIENT
Start: 2021-01-01 | End: 2021-01-01

## 2021-01-01 RX ORDER — ACETAMINOPHEN 500 MG
1000 TABLET ORAL ONCE
Refills: 0 | Status: COMPLETED | OUTPATIENT
Start: 2021-01-01 | End: 2021-01-01

## 2021-01-01 RX ORDER — SODIUM CHLORIDE 9 MG/ML
2 INJECTION INTRAMUSCULAR; INTRAVENOUS; SUBCUTANEOUS EVERY 6 HOURS
Refills: 0 | Status: DISCONTINUED | OUTPATIENT
Start: 2021-01-01 | End: 2021-01-01

## 2021-01-01 RX ORDER — ONDANSETRON 8 MG/1
4 TABLET, FILM COATED ORAL ONCE
Refills: 0 | Status: COMPLETED | OUTPATIENT
Start: 2021-01-01 | End: 2021-01-01

## 2021-01-01 RX ORDER — SODIUM,POTASSIUM PHOSPHATES 278-250MG
1 POWDER IN PACKET (EA) ORAL
Refills: 0 | Status: COMPLETED | OUTPATIENT
Start: 2021-01-01 | End: 2021-01-01

## 2021-01-01 RX ORDER — POTASSIUM CHLORIDE 20 MEQ
20 PACKET (EA) ORAL ONCE
Refills: 0 | Status: COMPLETED | OUTPATIENT
Start: 2021-01-01 | End: 2021-01-01

## 2021-01-01 RX ORDER — SODIUM CHLORIDE 9 MG/ML
1000 INJECTION INTRAMUSCULAR; INTRAVENOUS; SUBCUTANEOUS
Refills: 0 | Status: DISCONTINUED | OUTPATIENT
Start: 2021-01-01 | End: 2021-01-01

## 2021-01-01 RX ORDER — LEVOTHYROXINE SODIUM 125 MCG
75 TABLET ORAL DAILY
Refills: 0 | Status: DISCONTINUED | OUTPATIENT
Start: 2021-01-01 | End: 2021-01-01

## 2021-01-01 RX ORDER — SUCCINYLCHOLINE CHLORIDE 100 MG/5ML
7 SYRINGE (ML) INTRAVENOUS ONCE
Refills: 0 | Status: DISCONTINUED | OUTPATIENT
Start: 2021-01-01 | End: 2021-01-01

## 2021-01-01 RX ORDER — PROPOFOL 10 MG/ML
30 INJECTION, EMULSION INTRAVENOUS
Qty: 1000 | Refills: 0 | Status: DISCONTINUED | OUTPATIENT
Start: 2021-01-01 | End: 2021-01-01

## 2021-01-01 RX ORDER — PANTOPRAZOLE SODIUM 20 MG/1
40 TABLET, DELAYED RELEASE ORAL
Refills: 0 | Status: DISCONTINUED | OUTPATIENT
Start: 2021-01-01 | End: 2021-01-01

## 2021-01-01 RX ORDER — SODIUM CHLORIDE 5 G/100ML
500 INJECTION, SOLUTION INTRAVENOUS
Refills: 0 | Status: DISCONTINUED | OUTPATIENT
Start: 2021-01-01 | End: 2021-01-01

## 2021-01-01 RX ORDER — LEVETIRACETAM 250 MG/1
1000 TABLET, FILM COATED ORAL ONCE
Refills: 0 | Status: DISCONTINUED | OUTPATIENT
Start: 2021-01-01 | End: 2021-01-01

## 2021-01-01 RX ORDER — POTASSIUM CHLORIDE 20 MEQ
40 PACKET (EA) ORAL ONCE
Refills: 0 | Status: COMPLETED | OUTPATIENT
Start: 2021-01-01 | End: 2021-01-01

## 2021-01-01 RX ORDER — POTASSIUM CHLORIDE 20 MEQ
10 PACKET (EA) ORAL
Refills: 0 | Status: COMPLETED | OUTPATIENT
Start: 2021-01-01 | End: 2021-01-01

## 2021-01-01 RX ORDER — PHENYLEPHRINE HYDROCHLORIDE 10 MG/ML
0.1 INJECTION INTRAVENOUS
Qty: 40 | Refills: 0 | Status: DISCONTINUED | OUTPATIENT
Start: 2021-01-01 | End: 2021-01-01

## 2021-01-01 RX ORDER — SODIUM CHLORIDE 9 MG/ML
1000 INJECTION, SOLUTION INTRAVENOUS
Refills: 0 | Status: DISCONTINUED | OUTPATIENT
Start: 2021-01-01 | End: 2021-01-01

## 2021-01-01 RX ORDER — HYDROMORPHONE HYDROCHLORIDE 2 MG/ML
0.5 INJECTION INTRAMUSCULAR; INTRAVENOUS; SUBCUTANEOUS EVERY 6 HOURS
Refills: 0 | Status: DISCONTINUED | OUTPATIENT
Start: 2021-01-01 | End: 2021-01-01

## 2021-01-01 RX ORDER — SODIUM,POTASSIUM PHOSPHATES 278-250MG
1 POWDER IN PACKET (EA) ORAL
Refills: 0 | Status: DISCONTINUED | OUTPATIENT
Start: 2021-01-01 | End: 2021-01-01

## 2021-01-01 RX ORDER — SODIUM CHLORIDE 9 MG/ML
500 INJECTION, SOLUTION INTRAVENOUS ONCE
Refills: 0 | Status: COMPLETED | OUTPATIENT
Start: 2021-01-01 | End: 2021-01-01

## 2021-01-01 RX ORDER — ACETAMINOPHEN 500 MG
650 TABLET ORAL ONCE
Refills: 0 | Status: COMPLETED | OUTPATIENT
Start: 2021-01-01 | End: 2021-01-01

## 2021-01-01 RX ORDER — ETOPOSIDE 20 MG/ML
5 VIAL (ML) INTRAVENOUS ONCE
Refills: 0 | Status: DISCONTINUED | OUTPATIENT
Start: 2021-01-01 | End: 2021-01-01

## 2021-01-01 RX ORDER — DESMOPRESSIN ACETATE 0.1 MG/1
30 TABLET ORAL ONCE
Refills: 0 | Status: COMPLETED | OUTPATIENT
Start: 2021-01-01 | End: 2021-01-01

## 2021-01-01 RX ORDER — FENTANYL CITRATE 50 UG/ML
0.5 INJECTION INTRAVENOUS
Qty: 2500 | Refills: 0 | Status: DISCONTINUED | OUTPATIENT
Start: 2021-01-01 | End: 2021-01-01

## 2021-01-01 RX ORDER — ACETAMINOPHEN 500 MG
975 TABLET ORAL ONCE
Refills: 0 | Status: DISCONTINUED | OUTPATIENT
Start: 2021-01-01 | End: 2021-01-01

## 2021-01-01 RX ORDER — POTASSIUM CHLORIDE 20 MEQ
10 PACKET (EA) ORAL ONCE
Refills: 0 | Status: COMPLETED | OUTPATIENT
Start: 2021-01-01 | End: 2021-01-01

## 2021-01-01 RX ORDER — ACETAMINOPHEN 500 MG
1000 TABLET ORAL ONCE
Refills: 0 | Status: DISCONTINUED | OUTPATIENT
Start: 2021-01-01 | End: 2021-01-01

## 2021-01-01 RX ADMIN — SODIUM CHLORIDE 500 MILLILITER(S): 9 INJECTION, SOLUTION INTRAVENOUS at 10:45

## 2021-01-01 RX ADMIN — LEVETIRACETAM 400 MILLIGRAM(S): 250 TABLET, FILM COATED ORAL at 17:15

## 2021-01-01 RX ADMIN — CHLORHEXIDINE GLUCONATE 15 MILLILITER(S): 213 SOLUTION TOPICAL at 05:44

## 2021-01-01 RX ADMIN — ATORVASTATIN CALCIUM 80 MILLIGRAM(S): 80 TABLET, FILM COATED ORAL at 23:36

## 2021-01-01 RX ADMIN — SODIUM CHLORIDE 2 GRAM(S): 9 INJECTION INTRAMUSCULAR; INTRAVENOUS; SUBCUTANEOUS at 13:58

## 2021-01-01 RX ADMIN — Medication 75 MICROGRAM(S): at 05:37

## 2021-01-01 RX ADMIN — PANTOPRAZOLE SODIUM 40 MILLIGRAM(S): 20 TABLET, DELAYED RELEASE ORAL at 11:07

## 2021-01-01 RX ADMIN — HEPARIN SODIUM 5000 UNIT(S): 5000 INJECTION INTRAVENOUS; SUBCUTANEOUS at 14:52

## 2021-01-01 RX ADMIN — Medication 20 MILLIEQUIVALENT(S): at 05:36

## 2021-01-01 RX ADMIN — SODIUM CHLORIDE 2 GRAM(S): 9 INJECTION INTRAMUSCULAR; INTRAVENOUS; SUBCUTANEOUS at 05:20

## 2021-01-01 RX ADMIN — Medication 1 PACKET(S): at 19:44

## 2021-01-01 RX ADMIN — SODIUM CHLORIDE 2 GRAM(S): 9 INJECTION INTRAMUSCULAR; INTRAVENOUS; SUBCUTANEOUS at 05:03

## 2021-01-01 RX ADMIN — PANTOPRAZOLE SODIUM 40 MILLIGRAM(S): 20 TABLET, DELAYED RELEASE ORAL at 11:49

## 2021-01-01 RX ADMIN — SODIUM CHLORIDE 100 MILLILITER(S): 9 INJECTION, SOLUTION INTRAVENOUS at 05:25

## 2021-01-01 RX ADMIN — POTASSIUM PHOSPHATE, MONOBASIC POTASSIUM PHOSPHATE, DIBASIC 62.5 MILLIMOLE(S): 236; 224 INJECTION, SOLUTION INTRAVENOUS at 06:35

## 2021-01-01 RX ADMIN — Medication 50 GRAM(S): at 07:05

## 2021-01-01 RX ADMIN — Medication 40 MILLIEQUIVALENT(S): at 19:44

## 2021-01-01 RX ADMIN — Medication 75 MICROGRAM(S): at 05:18

## 2021-01-01 RX ADMIN — Medication 100 MILLIEQUIVALENT(S): at 04:12

## 2021-01-01 RX ADMIN — LEVETIRACETAM 500 MILLIGRAM(S): 250 TABLET, FILM COATED ORAL at 06:35

## 2021-01-01 RX ADMIN — CHLORHEXIDINE GLUCONATE 15 MILLILITER(S): 213 SOLUTION TOPICAL at 18:33

## 2021-01-01 RX ADMIN — LEVETIRACETAM 500 MILLIGRAM(S): 250 TABLET, FILM COATED ORAL at 17:39

## 2021-01-01 RX ADMIN — SENNA PLUS 15 MILLILITER(S): 8.6 TABLET ORAL at 22:11

## 2021-01-01 RX ADMIN — ATORVASTATIN CALCIUM 80 MILLIGRAM(S): 80 TABLET, FILM COATED ORAL at 21:57

## 2021-01-01 RX ADMIN — SODIUM CHLORIDE 2 GRAM(S): 9 INJECTION INTRAMUSCULAR; INTRAVENOUS; SUBCUTANEOUS at 11:47

## 2021-01-01 RX ADMIN — PANTOPRAZOLE SODIUM 40 MILLIGRAM(S): 20 TABLET, DELAYED RELEASE ORAL at 12:36

## 2021-01-01 RX ADMIN — LEVETIRACETAM 400 MILLIGRAM(S): 250 TABLET, FILM COATED ORAL at 05:21

## 2021-01-01 RX ADMIN — SODIUM CHLORIDE 2 GRAM(S): 9 INJECTION INTRAMUSCULAR; INTRAVENOUS; SUBCUTANEOUS at 17:39

## 2021-01-01 RX ADMIN — Medication 2 PACKET(S): at 05:20

## 2021-01-01 RX ADMIN — SODIUM CHLORIDE 2 GRAM(S): 9 INJECTION INTRAMUSCULAR; INTRAVENOUS; SUBCUTANEOUS at 12:32

## 2021-01-01 RX ADMIN — ATORVASTATIN CALCIUM 80 MILLIGRAM(S): 80 TABLET, FILM COATED ORAL at 01:07

## 2021-01-01 RX ADMIN — CHLORHEXIDINE GLUCONATE 15 MILLILITER(S): 213 SOLUTION TOPICAL at 05:40

## 2021-01-01 RX ADMIN — SENNA PLUS 15 MILLILITER(S): 8.6 TABLET ORAL at 21:51

## 2021-01-01 RX ADMIN — CHLORHEXIDINE GLUCONATE 15 MILLILITER(S): 213 SOLUTION TOPICAL at 05:17

## 2021-01-01 RX ADMIN — Medication 1000 MILLIGRAM(S): at 06:45

## 2021-01-01 RX ADMIN — FENTANYL CITRATE 3.74 MICROGRAM(S)/KG/HR: 50 INJECTION INTRAVENOUS at 01:05

## 2021-01-01 RX ADMIN — ENOXAPARIN SODIUM 40 MILLIGRAM(S): 100 INJECTION SUBCUTANEOUS at 12:01

## 2021-01-01 RX ADMIN — LEVETIRACETAM 400 MILLIGRAM(S): 250 TABLET, FILM COATED ORAL at 05:18

## 2021-01-01 RX ADMIN — SODIUM CHLORIDE 125 MILLILITER(S): 5 INJECTION, SOLUTION INTRAVENOUS at 10:00

## 2021-01-01 RX ADMIN — LEVETIRACETAM 400 MILLIGRAM(S): 250 TABLET, FILM COATED ORAL at 05:09

## 2021-01-01 RX ADMIN — Medication 100 MILLIGRAM(S): at 01:03

## 2021-01-01 RX ADMIN — SODIUM CHLORIDE 75 MILLILITER(S): 5 INJECTION, SOLUTION INTRAVENOUS at 01:07

## 2021-01-01 RX ADMIN — SODIUM CHLORIDE 2 GRAM(S): 9 INJECTION INTRAMUSCULAR; INTRAVENOUS; SUBCUTANEOUS at 18:37

## 2021-01-01 RX ADMIN — CHLORHEXIDINE GLUCONATE 15 MILLILITER(S): 213 SOLUTION TOPICAL at 05:10

## 2021-01-01 RX ADMIN — LEVETIRACETAM 400 MILLIGRAM(S): 250 TABLET, FILM COATED ORAL at 16:39

## 2021-01-01 RX ADMIN — Medication 63.75 MILLIMOLE(S): at 05:09

## 2021-01-01 RX ADMIN — SODIUM CHLORIDE 125 MILLILITER(S): 5 INJECTION, SOLUTION INTRAVENOUS at 19:44

## 2021-01-01 RX ADMIN — Medication 20 MILLIEQUIVALENT(S): at 05:10

## 2021-01-01 RX ADMIN — Medication 2 PACKET(S): at 23:35

## 2021-01-01 RX ADMIN — SENNA PLUS 15 MILLILITER(S): 8.6 TABLET ORAL at 21:30

## 2021-01-01 RX ADMIN — ATORVASTATIN CALCIUM 80 MILLIGRAM(S): 80 TABLET, FILM COATED ORAL at 21:30

## 2021-01-01 RX ADMIN — DESMOPRESSIN ACETATE 230 MICROGRAM(S): 0.1 TABLET ORAL at 14:49

## 2021-01-01 RX ADMIN — SODIUM CHLORIDE 125 MILLILITER(S): 5 INJECTION, SOLUTION INTRAVENOUS at 18:13

## 2021-01-01 RX ADMIN — SODIUM CHLORIDE 2 GRAM(S): 9 INJECTION INTRAMUSCULAR; INTRAVENOUS; SUBCUTANEOUS at 17:04

## 2021-01-01 RX ADMIN — Medication 400 MILLIGRAM(S): at 14:27

## 2021-01-01 RX ADMIN — CHLORHEXIDINE GLUCONATE 15 MILLILITER(S): 213 SOLUTION TOPICAL at 05:49

## 2021-01-01 RX ADMIN — LEVETIRACETAM 500 MILLIGRAM(S): 250 TABLET, FILM COATED ORAL at 05:21

## 2021-01-01 RX ADMIN — LEVETIRACETAM 500 MILLIGRAM(S): 250 TABLET, FILM COATED ORAL at 05:37

## 2021-01-01 RX ADMIN — ATORVASTATIN CALCIUM 80 MILLIGRAM(S): 80 TABLET, FILM COATED ORAL at 23:35

## 2021-01-01 RX ADMIN — Medication 75 MICROGRAM(S): at 05:48

## 2021-01-01 RX ADMIN — Medication 2 MILLIGRAM(S): at 01:03

## 2021-01-01 RX ADMIN — ATORVASTATIN CALCIUM 80 MILLIGRAM(S): 80 TABLET, FILM COATED ORAL at 22:59

## 2021-01-01 RX ADMIN — SODIUM CHLORIDE 2 GRAM(S): 9 INJECTION INTRAMUSCULAR; INTRAVENOUS; SUBCUTANEOUS at 06:18

## 2021-01-01 RX ADMIN — CHLORHEXIDINE GLUCONATE 15 MILLILITER(S): 213 SOLUTION TOPICAL at 16:39

## 2021-01-01 RX ADMIN — SODIUM CHLORIDE 2 GRAM(S): 9 INJECTION INTRAMUSCULAR; INTRAVENOUS; SUBCUTANEOUS at 23:30

## 2021-01-01 RX ADMIN — CHLORHEXIDINE GLUCONATE 15 MILLILITER(S): 213 SOLUTION TOPICAL at 05:04

## 2021-01-01 RX ADMIN — PANTOPRAZOLE SODIUM 40 MILLIGRAM(S): 20 TABLET, DELAYED RELEASE ORAL at 06:04

## 2021-01-01 RX ADMIN — SODIUM CHLORIDE 2 GRAM(S): 9 INJECTION INTRAMUSCULAR; INTRAVENOUS; SUBCUTANEOUS at 11:45

## 2021-01-01 RX ADMIN — Medication 75 MICROGRAM(S): at 05:10

## 2021-01-01 RX ADMIN — SODIUM CHLORIDE 2 GRAM(S): 9 INJECTION INTRAMUSCULAR; INTRAVENOUS; SUBCUTANEOUS at 05:10

## 2021-01-01 RX ADMIN — LEVETIRACETAM 400 MILLIGRAM(S): 250 TABLET, FILM COATED ORAL at 18:13

## 2021-01-01 RX ADMIN — Medication 100 MILLIEQUIVALENT(S): at 23:51

## 2021-01-01 RX ADMIN — LEVETIRACETAM 500 MILLIGRAM(S): 250 TABLET, FILM COATED ORAL at 17:42

## 2021-01-01 RX ADMIN — LEVETIRACETAM 400 MILLIGRAM(S): 250 TABLET, FILM COATED ORAL at 17:08

## 2021-01-01 RX ADMIN — Medication 100 MILLIEQUIVALENT(S): at 05:45

## 2021-01-01 RX ADMIN — CHLORHEXIDINE GLUCONATE 15 MILLILITER(S): 213 SOLUTION TOPICAL at 18:38

## 2021-01-01 RX ADMIN — SENNA PLUS 15 MILLILITER(S): 8.6 TABLET ORAL at 23:03

## 2021-01-01 RX ADMIN — CHLORHEXIDINE GLUCONATE 15 MILLILITER(S): 213 SOLUTION TOPICAL at 17:42

## 2021-01-01 RX ADMIN — SODIUM CHLORIDE 2 GRAM(S): 9 INJECTION INTRAMUSCULAR; INTRAVENOUS; SUBCUTANEOUS at 05:37

## 2021-01-01 RX ADMIN — CHLORHEXIDINE GLUCONATE 15 MILLILITER(S): 213 SOLUTION TOPICAL at 17:39

## 2021-01-01 RX ADMIN — POTASSIUM PHOSPHATE, MONOBASIC POTASSIUM PHOSPHATE, DIBASIC 62.5 MILLIMOLE(S): 236; 224 INJECTION, SOLUTION INTRAVENOUS at 06:03

## 2021-01-01 RX ADMIN — Medication 85 MILLIMOLE(S): at 05:17

## 2021-01-01 RX ADMIN — SODIUM CHLORIDE 2 GRAM(S): 9 INJECTION INTRAMUSCULAR; INTRAVENOUS; SUBCUTANEOUS at 05:48

## 2021-01-01 RX ADMIN — Medication 1000 MILLIGRAM(S): at 02:02

## 2021-01-01 RX ADMIN — ENOXAPARIN SODIUM 40 MILLIGRAM(S): 100 INJECTION SUBCUTANEOUS at 12:28

## 2021-01-01 RX ADMIN — Medication 75 MICROGRAM(S): at 05:40

## 2021-01-01 RX ADMIN — Medication 650 MILLIGRAM(S): at 00:15

## 2021-01-01 RX ADMIN — Medication 400 MILLIGRAM(S): at 00:01

## 2021-01-01 RX ADMIN — SODIUM CHLORIDE 2 GRAM(S): 9 INJECTION INTRAMUSCULAR; INTRAVENOUS; SUBCUTANEOUS at 12:36

## 2021-01-01 RX ADMIN — PANTOPRAZOLE SODIUM 40 MILLIGRAM(S): 20 TABLET, DELAYED RELEASE ORAL at 11:47

## 2021-01-01 RX ADMIN — ENOXAPARIN SODIUM 40 MILLIGRAM(S): 100 INJECTION SUBCUTANEOUS at 11:46

## 2021-01-01 RX ADMIN — SODIUM CHLORIDE 2 GRAM(S): 9 INJECTION INTRAMUSCULAR; INTRAVENOUS; SUBCUTANEOUS at 18:33

## 2021-01-01 RX ADMIN — Medication 400 MILLIGRAM(S): at 01:32

## 2021-01-01 RX ADMIN — HYDROMORPHONE HYDROCHLORIDE 0.5 MILLIGRAM(S): 2 INJECTION INTRAMUSCULAR; INTRAVENOUS; SUBCUTANEOUS at 00:15

## 2021-01-01 RX ADMIN — CHLORHEXIDINE GLUCONATE 15 MILLILITER(S): 213 SOLUTION TOPICAL at 06:35

## 2021-01-01 RX ADMIN — HEPARIN SODIUM 5000 UNIT(S): 5000 INJECTION INTRAVENOUS; SUBCUTANEOUS at 05:10

## 2021-01-01 RX ADMIN — CHLORHEXIDINE GLUCONATE 15 MILLILITER(S): 213 SOLUTION TOPICAL at 17:16

## 2021-01-01 RX ADMIN — PANTOPRAZOLE SODIUM 40 MILLIGRAM(S): 20 TABLET, DELAYED RELEASE ORAL at 12:32

## 2021-01-01 RX ADMIN — SODIUM CHLORIDE 1000 MILLILITER(S): 9 INJECTION INTRAMUSCULAR; INTRAVENOUS; SUBCUTANEOUS at 15:29

## 2021-01-01 RX ADMIN — ENOXAPARIN SODIUM 40 MILLIGRAM(S): 100 INJECTION SUBCUTANEOUS at 11:49

## 2021-01-01 RX ADMIN — CHLORHEXIDINE GLUCONATE 15 MILLILITER(S): 213 SOLUTION TOPICAL at 05:23

## 2021-01-01 RX ADMIN — LEVETIRACETAM 500 MILLIGRAM(S): 250 TABLET, FILM COATED ORAL at 17:35

## 2021-01-01 RX ADMIN — SENNA PLUS 15 MILLILITER(S): 8.6 TABLET ORAL at 23:35

## 2021-01-01 RX ADMIN — PROPOFOL 13.5 MICROGRAM(S)/KG/MIN: 10 INJECTION, EMULSION INTRAVENOUS at 01:05

## 2021-01-01 RX ADMIN — PANTOPRAZOLE SODIUM 40 MILLIGRAM(S): 20 TABLET, DELAYED RELEASE ORAL at 14:53

## 2021-01-01 RX ADMIN — LEVETIRACETAM 400 MILLIGRAM(S): 250 TABLET, FILM COATED ORAL at 14:41

## 2021-01-01 RX ADMIN — LEVETIRACETAM 500 MILLIGRAM(S): 250 TABLET, FILM COATED ORAL at 18:17

## 2021-01-01 RX ADMIN — ATORVASTATIN CALCIUM 80 MILLIGRAM(S): 80 TABLET, FILM COATED ORAL at 01:32

## 2021-01-01 RX ADMIN — SODIUM CHLORIDE 30 MILLILITER(S): 5 INJECTION, SOLUTION INTRAVENOUS at 21:57

## 2021-01-01 RX ADMIN — Medication 2 PACKET(S): at 05:09

## 2021-01-01 RX ADMIN — Medication 650 MILLIGRAM(S): at 02:50

## 2021-01-01 RX ADMIN — LEVETIRACETAM 500 MILLIGRAM(S): 250 TABLET, FILM COATED ORAL at 05:03

## 2021-01-01 RX ADMIN — Medication 75 MICROGRAM(S): at 05:11

## 2021-01-01 RX ADMIN — Medication 100 MILLIEQUIVALENT(S): at 02:55

## 2021-01-01 RX ADMIN — SENNA PLUS 15 MILLILITER(S): 8.6 TABLET ORAL at 21:22

## 2021-01-01 RX ADMIN — SENNA PLUS 15 MILLILITER(S): 8.6 TABLET ORAL at 22:42

## 2021-01-01 RX ADMIN — SODIUM CHLORIDE 2 GRAM(S): 9 INJECTION INTRAMUSCULAR; INTRAVENOUS; SUBCUTANEOUS at 14:52

## 2021-01-01 RX ADMIN — ENOXAPARIN SODIUM 40 MILLIGRAM(S): 100 INJECTION SUBCUTANEOUS at 11:45

## 2021-01-01 RX ADMIN — CHLORHEXIDINE GLUCONATE 15 MILLILITER(S): 213 SOLUTION TOPICAL at 18:13

## 2021-01-01 RX ADMIN — SODIUM CHLORIDE 2 GRAM(S): 9 INJECTION INTRAMUSCULAR; INTRAVENOUS; SUBCUTANEOUS at 21:55

## 2021-01-01 RX ADMIN — POTASSIUM PHOSPHATE, MONOBASIC POTASSIUM PHOSPHATE, DIBASIC 62.5 MILLIMOLE(S): 236; 224 INJECTION, SOLUTION INTRAVENOUS at 05:37

## 2021-01-01 RX ADMIN — CHLORHEXIDINE GLUCONATE 15 MILLILITER(S): 213 SOLUTION TOPICAL at 06:18

## 2021-01-01 RX ADMIN — Medication 2 PACKET(S): at 14:52

## 2021-01-01 RX ADMIN — SODIUM CHLORIDE 500 MILLILITER(S): 9 INJECTION INTRAMUSCULAR; INTRAVENOUS; SUBCUTANEOUS at 05:17

## 2021-01-01 RX ADMIN — Medication 5 MILLIGRAM(S): at 02:29

## 2021-01-01 RX ADMIN — LEVETIRACETAM 400 MILLIGRAM(S): 250 TABLET, FILM COATED ORAL at 05:44

## 2021-01-01 RX ADMIN — ATORVASTATIN CALCIUM 80 MILLIGRAM(S): 80 TABLET, FILM COATED ORAL at 21:51

## 2021-01-01 RX ADMIN — SODIUM CHLORIDE 2 GRAM(S): 9 INJECTION INTRAMUSCULAR; INTRAVENOUS; SUBCUTANEOUS at 00:36

## 2021-01-01 RX ADMIN — PANTOPRAZOLE SODIUM 40 MILLIGRAM(S): 20 TABLET, DELAYED RELEASE ORAL at 12:01

## 2021-01-01 RX ADMIN — CHLORHEXIDINE GLUCONATE 15 MILLILITER(S): 213 SOLUTION TOPICAL at 05:25

## 2021-01-01 RX ADMIN — SODIUM CHLORIDE 2 GRAM(S): 9 INJECTION INTRAMUSCULAR; INTRAVENOUS; SUBCUTANEOUS at 06:35

## 2021-01-01 RX ADMIN — PANTOPRAZOLE SODIUM 40 MILLIGRAM(S): 20 TABLET, DELAYED RELEASE ORAL at 11:45

## 2021-01-01 RX ADMIN — SODIUM CHLORIDE 2 GRAM(S): 9 INJECTION INTRAMUSCULAR; INTRAVENOUS; SUBCUTANEOUS at 11:49

## 2021-01-01 RX ADMIN — SODIUM CHLORIDE 2 GRAM(S): 9 INJECTION INTRAMUSCULAR; INTRAVENOUS; SUBCUTANEOUS at 21:30

## 2021-01-01 RX ADMIN — CHLORHEXIDINE GLUCONATE 15 MILLILITER(S): 213 SOLUTION TOPICAL at 17:04

## 2021-01-01 RX ADMIN — LEVETIRACETAM 500 MILLIGRAM(S): 250 TABLET, FILM COATED ORAL at 06:18

## 2021-01-01 RX ADMIN — SODIUM CHLORIDE 2 GRAM(S): 9 INJECTION INTRAMUSCULAR; INTRAVENOUS; SUBCUTANEOUS at 23:03

## 2021-01-01 RX ADMIN — ENOXAPARIN SODIUM 40 MILLIGRAM(S): 100 INJECTION SUBCUTANEOUS at 12:36

## 2021-01-01 RX ADMIN — Medication 75 MICROGRAM(S): at 05:55

## 2021-01-01 RX ADMIN — Medication 63.75 MILLIMOLE(S): at 04:44

## 2021-01-01 RX ADMIN — Medication 650 MILLIGRAM(S): at 21:30

## 2021-01-01 RX ADMIN — SODIUM CHLORIDE 2 GRAM(S): 9 INJECTION INTRAMUSCULAR; INTRAVENOUS; SUBCUTANEOUS at 05:36

## 2021-01-01 RX ADMIN — SODIUM CHLORIDE 2 GRAM(S): 9 INJECTION INTRAMUSCULAR; INTRAVENOUS; SUBCUTANEOUS at 17:38

## 2021-01-01 RX ADMIN — CHLORHEXIDINE GLUCONATE 15 MILLILITER(S): 213 SOLUTION TOPICAL at 17:08

## 2021-01-01 RX ADMIN — SODIUM CHLORIDE 2 GRAM(S): 9 INJECTION INTRAMUSCULAR; INTRAVENOUS; SUBCUTANEOUS at 17:36

## 2021-01-01 RX ADMIN — LEVETIRACETAM 400 MILLIGRAM(S): 250 TABLET, FILM COATED ORAL at 22:42

## 2021-01-01 RX ADMIN — ATORVASTATIN CALCIUM 80 MILLIGRAM(S): 80 TABLET, FILM COATED ORAL at 22:11

## 2021-01-01 RX ADMIN — SENNA PLUS 1 TABLET(S): 8.6 TABLET ORAL at 13:24

## 2021-01-01 RX ADMIN — ENOXAPARIN SODIUM 40 MILLIGRAM(S): 100 INJECTION SUBCUTANEOUS at 12:06

## 2021-01-01 RX ADMIN — Medication 75 MICROGRAM(S): at 05:03

## 2021-01-01 RX ADMIN — Medication 1000 MILLIGRAM(S): at 15:51

## 2021-01-01 RX ADMIN — SODIUM CHLORIDE 2 GRAM(S): 9 INJECTION INTRAMUSCULAR; INTRAVENOUS; SUBCUTANEOUS at 12:06

## 2021-01-01 RX ADMIN — CHLORHEXIDINE GLUCONATE 15 MILLILITER(S): 213 SOLUTION TOPICAL at 05:37

## 2021-01-01 RX ADMIN — SODIUM CHLORIDE 2 GRAM(S): 9 INJECTION INTRAMUSCULAR; INTRAVENOUS; SUBCUTANEOUS at 12:01

## 2021-01-01 RX ADMIN — LEVETIRACETAM 500 MILLIGRAM(S): 250 TABLET, FILM COATED ORAL at 17:38

## 2021-01-01 RX ADMIN — SODIUM CHLORIDE 2 GRAM(S): 9 INJECTION INTRAMUSCULAR; INTRAVENOUS; SUBCUTANEOUS at 12:00

## 2021-01-01 RX ADMIN — ENOXAPARIN SODIUM 40 MILLIGRAM(S): 100 INJECTION SUBCUTANEOUS at 12:00

## 2021-01-01 RX ADMIN — Medication 75 MICROGRAM(S): at 05:25

## 2021-01-01 RX ADMIN — SODIUM CHLORIDE 2 GRAM(S): 9 INJECTION INTRAMUSCULAR; INTRAVENOUS; SUBCUTANEOUS at 22:10

## 2021-01-01 RX ADMIN — LEVETIRACETAM 500 MILLIGRAM(S): 250 TABLET, FILM COATED ORAL at 18:32

## 2021-01-01 RX ADMIN — HYDROMORPHONE HYDROCHLORIDE 0.5 MILLIGRAM(S): 2 INJECTION INTRAMUSCULAR; INTRAVENOUS; SUBCUTANEOUS at 23:57

## 2021-01-01 RX ADMIN — SODIUM CHLORIDE 2 GRAM(S): 9 INJECTION INTRAMUSCULAR; INTRAVENOUS; SUBCUTANEOUS at 13:06

## 2021-01-01 RX ADMIN — SODIUM CHLORIDE 2 GRAM(S): 9 INJECTION INTRAMUSCULAR; INTRAVENOUS; SUBCUTANEOUS at 22:59

## 2021-01-01 RX ADMIN — SODIUM CHLORIDE 2 GRAM(S): 9 INJECTION INTRAMUSCULAR; INTRAVENOUS; SUBCUTANEOUS at 23:35

## 2021-01-01 RX ADMIN — LEVETIRACETAM 500 MILLIGRAM(S): 250 TABLET, FILM COATED ORAL at 05:11

## 2021-01-01 RX ADMIN — ONDANSETRON 4 MILLIGRAM(S): 8 TABLET, FILM COATED ORAL at 14:22

## 2021-01-01 RX ADMIN — SODIUM CHLORIDE 2 GRAM(S): 9 INJECTION INTRAMUSCULAR; INTRAVENOUS; SUBCUTANEOUS at 00:47

## 2021-01-01 RX ADMIN — PANTOPRAZOLE SODIUM 40 MILLIGRAM(S): 20 TABLET, DELAYED RELEASE ORAL at 12:06

## 2021-01-01 RX ADMIN — ATORVASTATIN CALCIUM 80 MILLIGRAM(S): 80 TABLET, FILM COATED ORAL at 22:42

## 2021-01-01 RX ADMIN — LEVETIRACETAM 500 MILLIGRAM(S): 250 TABLET, FILM COATED ORAL at 05:36

## 2021-01-01 RX ADMIN — Medication 650 MILLIGRAM(S): at 20:30

## 2021-01-01 RX ADMIN — SODIUM CHLORIDE 2 GRAM(S): 9 INJECTION INTRAMUSCULAR; INTRAVENOUS; SUBCUTANEOUS at 05:21

## 2021-01-01 RX ADMIN — HEPARIN SODIUM 5000 UNIT(S): 5000 INJECTION INTRAVENOUS; SUBCUTANEOUS at 21:55

## 2021-01-01 RX ADMIN — Medication 1000 MILLIGRAM(S): at 00:15

## 2021-01-01 RX ADMIN — Medication 400 MILLIGRAM(S): at 03:49

## 2021-01-01 RX ADMIN — CHLORHEXIDINE GLUCONATE 15 MILLILITER(S): 213 SOLUTION TOPICAL at 17:36

## 2021-01-01 RX ADMIN — SODIUM CHLORIDE 2 GRAM(S): 9 INJECTION INTRAMUSCULAR; INTRAVENOUS; SUBCUTANEOUS at 17:42

## 2021-01-01 RX ADMIN — SODIUM CHLORIDE 2 GRAM(S): 9 INJECTION INTRAMUSCULAR; INTRAVENOUS; SUBCUTANEOUS at 01:08

## 2021-01-01 RX ADMIN — ATORVASTATIN CALCIUM 80 MILLIGRAM(S): 80 TABLET, FILM COATED ORAL at 23:03

## 2021-01-01 RX ADMIN — CHLORHEXIDINE GLUCONATE 15 MILLILITER(S): 213 SOLUTION TOPICAL at 05:21

## 2021-01-01 RX ADMIN — ATORVASTATIN CALCIUM 80 MILLIGRAM(S): 80 TABLET, FILM COATED ORAL at 21:22

## 2021-01-01 RX ADMIN — Medication 75 MICROGRAM(S): at 05:23

## 2021-01-01 RX ADMIN — PHENYLEPHRINE HYDROCHLORIDE 2.81 MICROGRAM(S)/KG/MIN: 10 INJECTION INTRAVENOUS at 01:38

## 2021-01-01 RX ADMIN — SENNA PLUS 15 MILLILITER(S): 8.6 TABLET ORAL at 21:54

## 2021-01-01 RX ADMIN — Medication 75 MICROGRAM(S): at 05:22

## 2021-01-01 RX ADMIN — CHLORHEXIDINE GLUCONATE 15 MILLILITER(S): 213 SOLUTION TOPICAL at 05:11

## 2021-01-01 RX ADMIN — CHLORHEXIDINE GLUCONATE 15 MILLILITER(S): 213 SOLUTION TOPICAL at 17:38

## 2021-01-01 RX ADMIN — ETOMIDATE 14 MILLIGRAM(S): 2 INJECTION INTRAVENOUS at 01:02

## 2021-01-01 RX ADMIN — SODIUM CHLORIDE 2 GRAM(S): 9 INJECTION INTRAMUSCULAR; INTRAVENOUS; SUBCUTANEOUS at 01:32

## 2021-01-01 RX ADMIN — SODIUM CHLORIDE 500 MILLILITER(S): 9 INJECTION INTRAMUSCULAR; INTRAVENOUS; SUBCUTANEOUS at 17:48

## 2021-01-01 RX ADMIN — Medication 75 MICROGRAM(S): at 06:18

## 2021-01-01 RX ADMIN — SENNA PLUS 15 MILLILITER(S): 8.6 TABLET ORAL at 01:08

## 2021-01-01 RX ADMIN — ATORVASTATIN CALCIUM 80 MILLIGRAM(S): 80 TABLET, FILM COATED ORAL at 21:54

## 2021-01-01 RX ADMIN — PANTOPRAZOLE SODIUM 40 MILLIGRAM(S): 20 TABLET, DELAYED RELEASE ORAL at 11:50

## 2021-01-01 RX ADMIN — CHLORHEXIDINE GLUCONATE 15 MILLILITER(S): 213 SOLUTION TOPICAL at 18:17

## 2021-01-01 RX ADMIN — SODIUM CHLORIDE 500 MILLILITER(S): 9 INJECTION INTRAMUSCULAR; INTRAVENOUS; SUBCUTANEOUS at 13:06

## 2021-01-01 RX ADMIN — SODIUM CHLORIDE 75 MILLILITER(S): 9 INJECTION INTRAMUSCULAR; INTRAVENOUS; SUBCUTANEOUS at 15:50

## 2021-01-01 RX ADMIN — Medication 75 MICROGRAM(S): at 06:35

## 2021-01-01 RX ADMIN — Medication 1000 MILLIGRAM(S): at 05:21

## 2021-01-01 RX ADMIN — ONDANSETRON 4 MILLIGRAM(S): 8 TABLET, FILM COATED ORAL at 15:01

## 2021-01-01 RX ADMIN — SODIUM CHLORIDE 30 MILLILITER(S): 5 INJECTION, SOLUTION INTRAVENOUS at 08:36

## 2021-01-01 RX ADMIN — Medication 75 MICROGRAM(S): at 05:44

## 2021-01-01 RX ADMIN — Medication 1 PACKET(S): at 05:18

## 2021-01-01 RX ADMIN — ATORVASTATIN CALCIUM 80 MILLIGRAM(S): 80 TABLET, FILM COATED ORAL at 19:53

## 2021-01-01 RX ADMIN — LEVETIRACETAM 500 MILLIGRAM(S): 250 TABLET, FILM COATED ORAL at 17:04

## 2021-01-01 RX ADMIN — Medication 400 MILLIGRAM(S): at 06:18

## 2021-01-01 RX ADMIN — LEVETIRACETAM 400 MILLIGRAM(S): 250 TABLET, FILM COATED ORAL at 05:48

## 2021-01-01 RX ADMIN — LEVETIRACETAM 500 MILLIGRAM(S): 250 TABLET, FILM COATED ORAL at 18:37

## 2021-01-01 RX ADMIN — ENOXAPARIN SODIUM 40 MILLIGRAM(S): 100 INJECTION SUBCUTANEOUS at 12:37

## 2021-01-01 RX ADMIN — SODIUM CHLORIDE 2 GRAM(S): 9 INJECTION INTRAMUSCULAR; INTRAVENOUS; SUBCUTANEOUS at 05:11

## 2021-05-01 NOTE — ED ADULT NURSE REASSESSMENT NOTE - BEFAST EYES
[facility] - Outpatient Follow-up Note  Russell Haynes 64 y o  female MRN: [id number]  Encounter: [id number]          ASSESSMENT AND PLAN:      1  Gastroesophageal reflux disease with esophagitis without hemorrhage  Patient is taking Pepcid 20 mg with good relief of symptoms  Does not have any nausea or vomiting  Denies any dysphagia or odynophagia  I have reviewed the findings of endoscopy with her  She does have esophagitis  She denies any chest pain, cough or wheezing at this time  2  Essential hypertension    Patient has history of hypertension  Usually the blood pressure goes up when she goes to see doctors according to her  She is following with her primary care in a few weeks  Patient has history of a lung nodule  She will discuss that with her primary care regarding this for repeat CT scan or a pulmonary follow-up  Patient's sister  of pulmonary cancer recently  3  Seizure ([address] )    Stable    4  Diverticulosis   history of significant diverticulosis with tortuosity of the colon  Currently patient advised to eat high-fiber diet  5  Grade II hemorrhoids    Patient will continue high-fiber diet and hemorrhoidal cream   If needed hemorrhoidal banding will be done  - hydrocortisone (ANUSOL-HC) 2 5 % rectal cream; Apply topically 2 (two) times a day  Dispense: 1 Tube; Refill: 2    ______________________________________________________________________    SUBJECTIVE:    Hemorrhoids flare of off and on  Patient recommended high-fiber diet  If needed hemorrhoidal banding will be done  REVIEW OF SYSTEMS IS OTHERWISE NEGATIVE        Historical Information   Past Medical History:   Diagnosis Date    Hyperlipidemia     Hypertension     Seizure [facility])      Past Surgical History:   Procedure Laterality Date    APPENDECTOMY      BREAST BIOPSY       SECTION       Social History   Social History     Substance and Sexual Activity   Alcohol Use Yes    Frequency: Monthly or less    Drinks per session: 1 or 2     Social History     Substance and Sexual Activity   Drug Use Never     Social History     Tobacco Use   Smoking Status Never Smoker   Smokeless Tobacco Never Used     Family History   Problem Relation Age of Onset    Cancer Mother     Heart attack Father     Heart disease Father     Stroke Father     Seizures Father     Breast cancer additional onset Sister    [de-identified] COPD Sister     Stroke Brother     Breast cancer additional onset Daughter     Seizures Daughter     Hypertension Daughter     Diabetes Maternal Grandmother     Heart attack Maternal Grandfather     Diabetes Maternal Aunt     Heart attack Maternal Uncle        Meds/Allergies       Current Outpatient Medications:     aspirin 81 mg chewable tablet    calcium carbonate (OS-CHANTELL) 600 MG tablet    cholecalciferol (VITAMIN D3) 1,000 units tablet    levETIRAcetam (KEPPRA) 500 mg tablet    lisinopril (ZESTRIL) 20 mg tablet    Misc Natural Products (LUTEIN 20) CAPS    simvastatin (ZOCOR) 80 mg tablet    [employer] CITRATE OF MAGNESIA oral solution    hydrocortisone (ANUSOL-HC) 2 5 % rectal cream    No Known Allergies        Objective     Blood pressure 166/81, pulse 83, height 5' 4 5" (1 638 m), weight 75 3 kg (166 lb)  Body mass index is 28 05 kg/m²  PHYSICAL EXAM:      General Appearance:   Alert, cooperative, no distress   HEENT:   Normocephalic, atraumatic, anicteric      Neck:  Supple, symmetrical, trachea midline   Lungs:   Clear to auscultation bilaterally; no rales, rhonchi or wheezing; respirations unlabored    Heart[de-identified]   Regular rate and rhythm; no murmur, rub, or gallop     Abdomen:   Soft, non-tender, non-distended; normal bowel sounds; no masses, no organomegaly    Genitalia:   Deferred    Rectal:   Deferred    Extremities:  No cyanosis, clubbing or edema    Pulses:  2+ and symmetric    Skin:  No jaundice, rashes, or lesions    Lymph nodes:  No palpable cervical lymphadenopathy        Lab Results:   No visits with results within 1 Day(s) from this visit  Latest known visit with results is:   Appointment on 08/24/2020   Component Date Value    Levetiracetam Lvl 08/24/2020 28 9     Cholesterol 08/24/2020 205*    Triglycerides 08/24/2020 95     HDL, Direct 08/24/2020 57     LDL Calculated 08/24/2020 129*    Non-HDL-Chol (CHOL-HDL) 08/24/2020 148          Radiology Results:   No results found

## 2021-05-01 NOTE — CONSULT NOTE ADULT - ASSESSMENT
INCOMPLETE  Recommendations:  [] BP < 160/90  [] CBC, PT/PTT, Fibrinogen, CMP, Type and Screen, Urine Toxicology Screen   [] Chest X-ray   [] MRI brain w/wo contrast when stable  [] CTH 4H  [] ICH Score: 2  [] If neurological examination deteriorates, obtain repeat head CT scan  [] Antiplatelet reversal as per NSGY  [] Keep T< 38.0° C (100.1° F) with acetaminophen and/or cooling blanket  [] BG  ml/dL  [] Target serum sodium 140-150mEq/L  [] Elevate head of bed to 30º  [] SS    Case to be seen and discussed with neurology attending Dr. Schoenberg. 82yo woman with PMH of CAD (s/p stents), L carotid artery stenosis, HLD, anemia, MVP, PUD, and uterine cancer presents to the ED after a fall with head trauma. LKN was today at approximately 1200h. Code stroke was activated. Physical exam remarkable for LHH, L-hemiparesis, extinction on double stimulation, and L upgoing Babinski. NIHSS of 9. Pre-MRS is unclear. CTH shows an acute R intraparenchymal hemorrhage with sulcal effacement, with no midline shift or hydrocephalus. CTA shows chronic occlusions of the R subclavian, L subclavian, and L common carotid arteries. CTP shows a core of 34ml and a penumbra of 41ml. Patient was not a candidate for tPA or thrombectomy due to IPH with chronic vessel occlusions.    Impression: LHH and L-hemiparesis due to R hemispheric dysfunction from traumatic IPH.    Recommendations:  [] BP < 160/90  [] CBC, PT/PTT, Fibrinogen, CMP, Type and Screen, Urine Toxicology Screen   [] Chest X-ray   [] MRI brain w/wo contrast when stable  [] CTH 4H  [] ICH Score: 2  [] If neurological examination deteriorates, obtain repeat head CT scan  [] Antiplatelet reversal as per NSGY  [] Keep T< 38.0° C (100.1° F) with acetaminophen and/or cooling blanket  [] BG  ml/dL  [] Target serum sodium 140-150mEq/L  [] Elevate head of bed to 30º  [] Mechanical DVT ppx  [] Neuro checks q1h  [] SS    Case to be seen and discussed with neurology attending Dr. Schoenberg.

## 2021-05-01 NOTE — ED ADULT NURSE NOTE - NSIMPLEMENTINTERV_GEN_ALL_ED
Implemented All Fall with Harm Risk Interventions:  [place] to call system. Call bell, personal items and telephone within reach. Instruct patient to call for assistance. Room bathroom lighting operational. Non-slip footwear when patient is off stretcher. Physically safe environment: no spills, clutter or unnecessary equipment. Stretcher in lowest position, wheels locked, appropriate side rails in place. Provide visual cue, wrist band, yellow gown, etc. Monitor gait and stability. Monitor for mental status changes and reorient to person, place, and time. Review medications for side effects contributing to fall risk. Reinforce activity limits and safety measures with patient and family. Provide visual clues: red socks.

## 2021-05-01 NOTE — CONSULT NOTE ADULT - ATTENDING COMMENTS
Code stroke was activated. NIHSS of 9. Pre-MRS is unclear. CTH shows an acute R intraparenchymal hemorrhage with sulcal effacement, with no midline shift or hydrocephalus. CTA shows chronic occlusions of the R subclavian, L subclavian, and L common carotid arteries. CTP shows a core of 34ml and a penumbra of 41ml. Patient was not a candidate for tPA or thrombectomy due to IPH with chronic vessel occlusions.     mri brain    if worsens CT brain   monitor blood pressure

## 2021-05-01 NOTE — ED ADULT TRIAGE NOTE - CHIEF COMPLAINT QUOTE
tripped and fell on curb oriented x 1-2 c/o HA after fall  had positive LOC EMS arrived pt was talking and to c/o of pain

## 2021-05-01 NOTE — ED PROVIDER NOTE - OBJECTIVE STATEMENT
80yo F hx CAD w/ stents on DAP, hld, hypothyroid, anemia s/p fall. Per EMS reported trip and fall witnessed by bystanders w/ LOC and was also described by pt and AAOx3 at that time of initial eval. Now AAOx1 and unable to recall how she fell. Now c/o nausea and frontal head pain.

## 2021-05-01 NOTE — ED PROVIDER NOTE - PHYSICAL EXAMINATION
Gen: NAD, GCS 14 (eyes open to voice)  Head: NCAT  HEENT: PERRL, MMM, normal conjunctiva, anicteric, neck supple  Lung: CTAB, no adventitious sounds  CV: RRR, no murmurs  Abd: soft, NTND, no rebound or guarding, no CVAT  MSK: No edema, no visible deformities, no midline ttp or bony ttp  Neuro: CN II-XII intact, dec strength LUE and dec  strength  Skin: Warm and dry, no evidence of rash

## 2021-05-01 NOTE — STROKE CODE NOTE - MRS SCORE
(2) Slight disablitiy.  Able to look after own affairs without assistance, but unable to carry out all previous activities.

## 2021-05-01 NOTE — ED ADULT TRIAGE NOTE - CCCP TRG CHIEF CMPLNT
left wrist right knee may be on anti coagulation cannot recall medication pt had fell and hit head on concrete/head injury

## 2021-05-01 NOTE — CONSULT NOTE ADULT - SUBJECTIVE AND OBJECTIVE BOX
SICU CONSULT NOTE    HPI  81y Female CAD w/ stents on ASA/Plavix, HLD, hypothyroid, anemia s/p trip and fall outside the bank today. Per EMS reported trip and fall witnessed by bystanders w/ LOC. Per EMS, AAOx3 at that time of initial eval. Now AAOx2 and unable to recall how she fell. Now c/o nausea and frontal head pain.    Interval Events: Seen and examined in ED, patient remains AAOx2, which is stable per ED team. Patient recalls being at the bank where she fell but remains unsure how exactly she fell. At this time denies LOC, however LOC witnessed by bystanders. C collar in place. She denies HA, dizziness, chest pain, abdominal pain, altered vision at this time. GCS 15 on exam.     SICU consulted for hemodynamic monitoring, q1 hour neurochecks, and BP monitoring.     PAST MEDICAL HISTORY: Hypothyroidism    Hypercholesterolemia    MVP (mitral valve prolapse)    Carotid artery stenosis    Uterine cancer    Arthritis    Anemia    DM2 (diabetes mellitus, type 2)    PUD (peptic ulcer disease)    CAD (coronary artery disease)    History of prediabetes        PAST SURGICAL HISTORY: H/O total knee replacement    H/O: hysterectomy    PUD (peptic ulcer disease)    Rectal cyst    Stented coronary artery        FAMILY HISTORY: Family history of stroke    Family history of hypertension    Family history of throat cancer    Family history of heart disease    Family history of multiple myeloma    Family history of breast cancer    No pertinent family history in first degree relatives    MEDICATIONS  (STANDING):  atorvastatin 80 milliGRAM(s) Oral at bedtime  levETIRAcetam  IVPB 500 milliGRAM(s) IV Intermittent every 12 hours  levothyroxine 75 MICROGram(s) Oral daily  pantoprazole    Tablet 40 milliGRAM(s) Oral before breakfast  senna 1 Tablet(s) Oral daily  sodium chloride 0.9%. 1000 milliLiter(s) (75 mL/Hr) IV Continuous <Continuous>      SOCIAL HISTORY: lives at home with grandsons     CODE STATUS: Full Code       ALLERGIES: latex (Rash)  penicillin (Faint)      VITAL SIGNS:  ICU Vital Signs Last 24 Hrs  T(C): 36.7 (01 May 2021 15:17), Max: 36.8 (01 May 2021 13:03)  T(F): 98.1 (01 May 2021 15:17), Max: 98.3 (01 May 2021 13:03)  HR: 75 (01 May 2021 15:17) (75 - 84)  BP: 105/71 (01 May 2021 15:24) (92/53 - 153/56)  BP(mean): --  ABP: --  ABP(mean): --  RR: 20 (01 May 2021 15:17) (18 - 20)  SpO2: 100% (01 May 2021 15:17) (100% - 100%)      NEURO  Exam: oriented to person, time of year, but not place. loses track of conversation readily.   acetaminophen   Tablet .. 650 milliGRAM(s) Oral every 6 hours PRN Temp greater or equal to 38C (100.4F), Mild Pain (1 - 3)  levETIRAcetam  IVPB 500 milliGRAM(s) IV Intermittent every 12 hours      RESPIRATORY      Exam: unlabored respirations, scar over anterior neck suspicious for previous tracheostomy site      CARDIOVASCULAR    Exam: NSR        GI/NUTRITION  Exam: Soft, NTND  Diet: NPO  pantoprazole    Tablet 40 milliGRAM(s) Oral before breakfast  senna 1 Tablet(s) Oral daily      GENITOURINARY/RENAL  sodium chloride 0.9%. 1000 milliLiter(s) IV Continuous <Continuous>      Weight (kg): 74.8 (05-01 @ 14:04)  05-01    141  |  106  |  22  ----------------------------<  118<H>  3.7   |  21<L>  |  0.76    Ca    9.8      01 May 2021 13:49    TPro  7.9  /  Alb  4.1  /  TBili  0.3  /  DBili  x   /  AST  23  /  ALT  9   /  AlkPhos  85  05-01    [ ] Donaldson catheter, indication: urine output monitoring in critically ill patient    HEMATOLOGIC  [ ] VTE Prophylaxis: hold DVT Chemo ppx, SCDs only at this time 2/2 intracranial hemorrhage                          11.2   5.07  )-----------( 132      ( 01 May 2021 13:49 )             37.5       Transfusion: [ ] PRBC	[x] Platelets	[ ] FFP	[ ] Cryoprecipitate      INFECTIOUS DISEASES  No acute issues     ENDOCRINE  atorvastatin 80 milliGRAM(s) Oral at bedtime  levothyroxine 75 MICROGram(s) Oral daily    CAPILLARY BLOOD GLUCOSE  83 (01 May 2021 13:52)      POCT Blood Glucose.: 83 mg/dL (01 May 2021 13:12)      PATIENT CARE ACCESS DEVICES:  [x] Peripheral IV  [ ] Central Venous Line	[ ] R	[ ] L	[ ] IJ	[ ] Fem	[ ] SC	Placed:   [ ] Arterial Line		[ ] R	[ ] L	[ ] Fem	[ ] Rad	[ ] Ax	Placed:   [ ] PICC:					[ ] Mediport  [ ] Urinary Catheter, Date Placed:   [x] Necessity of urinary, arterial, and venous catheters discussed    OTHER MEDICATIONS:     IMAGING STUDIES:    < from: CT Brain Stroke Protocol (05.01.21 @ 14:03) >    EXAM:  CT BRAIN STROKE PROTOCOL        PROCEDURE DATE:  May  1 2021         INTERPRETATION:  HISTORY: Code Stroke, fall, vision loss    Technique: CT of the head was performed    Multiple contiguous axial images were acquired from the skullbase to the vertex without the administration of intravenous contrast.  Coronal and sagittal reformations were made.    COMPARISON: CT head 6/11/2020    FINDINGS:    4.1 x 4.0 cm acute right frontal intraparenchymal hemorrhage with associated sulcal effacement. There is no midline shift. There is no hydrocephalus.    The brain demonstrates periventricular hypoattenuation, nonspecific in etiology but likely representing chronic microvascular ischemic changes.  No abnormal extra-axial fluid collections are present.    The ventricles and sulci are within normal limits for patient's age.  The basal cisterns are patent.    The orbits are unremarkable. The paranasal sinuses are clear. The mastoid air cells are clear. The calvarium is intact.    IMPRESSION:  Acute right frontal intraparenchymal hemorrhage with associated sulcal effacement. There is no midline shift or hydrocephalus.    Findings were discussed with Dr. Villeda on 5/1/2021 1:50 PM with read back.            RACHANA HIGHTOWER MD; Resident Radiology  This document has been electronically signed.  YADY WILLIS MD; Attending Radiologist  This document has been electronically signed. May  1 2021  1:58PM    < end of copied text >

## 2021-05-01 NOTE — CONSULT NOTE ADULT - ASSESSMENT
ASSESSMENT:  MARYCARMEN CAMPOVERDE is a 81y Female with history of NSTEMI, PCI, HLD, hypothyroidism, presents s/p fall found to have R IPH.     PLAN:    NEURO:  Q1H neurochecks  C Collar in place - C spine precautions until clinically cleared by Neurosurgery   Repeat CTH @ 18:00 per neurosurgery     RESPIRATORY:   maintain SpO2 above 92%    CARDIOVASCULAR:  restart home medications   maintain SBP below 140     GI/NUTRITION:  NPO   IVF    GENITOURINARY/RENAL:  Monitor I&O  Monitor Lytes and replete as necessary     HEMATOLOGIC:  Monitor H/H  Received 1 platelet in ED  holding DVT chemo prophylaxis given bleed  SCDs at all times     INFECTIOUS DISEASE:  No acute issues   Monitor for fevers or elevation in WBC   COVID PCR prior to transfer to SICU     ENDOCRINE:  home levothyroxine dosage     DISPO: SICU     Diagnoses: Intraparenchymal hemorrhage        ASSESSMENT:  MARYCARMEN CAMPOVERDE is a 81y Female with history of NSTEMI, PCI, HLD, hypothyroidism, presents s/p fall found to have R IPH.     PLAN:    NEURO:  Q1H neurochecks  C Collar in place - C spine precautions until clinically cleared by Neurosurgery   Repeat CTH @ 18:00 per neurosurgery     RESPIRATORY:   maintain SpO2 above 92%    CARDIOVASCULAR:  restart home medications   maintain SBP below 140     GI/NUTRITION:  NPO   IVF    GENITOURINARY/RENAL:  Monitor I&O  Monitor Lytes and replete as necessary     HEMATOLOGIC:  Monitor H/H  Received 1 platelet in ED  holding DVT chemo prophylaxis given bleed  SCDs at all times     INFECTIOUS DISEASE:  No acute issues   Monitor for fevers or elevation in WBC   COVID PCR prior to transfer to SICU     ENDOCRINE:  home levothyroxine dosage     DISPO: SICU     Diagnoses: Intraparenchymal hemorrhage     Plan D/w Dr. Viveros

## 2021-05-01 NOTE — ED PROVIDER NOTE - NS ED ROS FT
CONSTITUTIONAL: No fevers, no chills, no lightheadedness, no dizziness  EYES: no visual changes, no eye pain  EARS: no ear drainage, no ear pain, no change in hearing  NOSE: no nasal congestion  MOUTH/THROAT: no sore throat  CV: No chest pain, no palpitations  RESP: No SOB, no cough  GI: No v/d, no abd pain  : no dysuria, no hematuria, no flank pain  MSK: no back pain, no extremity pain  SKIN: no rashes  NEURO: no focal weakness, no decreased sensation/paresthesias

## 2021-05-01 NOTE — ED PROVIDER NOTE - ATTENDING CONTRIBUTION TO CARE
agree with resident note    "80yo F hx CAD w/ stents on DAP, hld, hypothyroid, anemia s/p fall. Per EMS reported trip and fall witnessed by bystanders w/ LOC and was also described by pt and AAOx3 at that time of initial eval. Now AAOx1 and unable to recall how she fell. Now c/o nausea and frontal head pain."    PE: A+O x 2 (knows name and place, not date (March 1983 she states); b/l post surgical pupils; NCAT; CTAB/L; s1 s2 no m/r/g abd soft/NT/ND ext: no edema Neuro: 5/5 motor RUE 4/5 LUE 5/5 RLE 5/5 LLE    Imp: concern for ICH  taken to CT as code stroke; showed acute bleed  keppra, platelets, DDAVP, NSG consult, BP control, admit to SICU

## 2021-05-01 NOTE — H&P ADULT - HISTORY OF PRESENT ILLNESS
HPI: 81y Female CAD w/ stents on ASA/Plavix, HLD, hypothyroid, anemia s/p trip and fall outside the bank today. Per EMS reported trip and fall witnessed by bystanders w/ LOC. Per EMS, AAOx3 at that time of initial eval. Now AAOx2 and unable to recall how she fell. Now c/o nausea and frontal head pain.    RADIOLOGY:   < from: CT Brain Stroke Protocol (05.01.21 @ 14:03) >  FINDINGS:    4.1 x 4.0 cm acute right frontal intraparenchymal hemorrhage with associated sulcal effacement. There is no midline shift. There is no hydrocephalus.    The brain demonstrates periventricular hypoattenuation, nonspecific in etiology but likely representing chronic microvascular ischemic changes.  No abnormal extra-axial fluid collections are present.    The ventricles and sulci are within normal limits for patient's age.  The basal cisterns are patent.    The orbits are unremarkable. The paranasal sinuses are clear. The mastoid air cells are clear. The calvarium is intact.    IMPRESSION:  Acute right frontal intraparenchymal hemorrhage with associated sulcal effacement. There is no midline shift or hydrocephalus.    MEDS:  desmopressin IVPB 30 MICROGram(s) IV Intermittent Once  levETIRAcetam  IVPB 1500 milliGRAM(s) IV Intermittent once      PHYSICAL EXAM:  Vital Signs Last 24 Hrs  T(C): 36.8 (01 May 2021 13:03), Max: 36.8 (01 May 2021 13:03)  T(F): 98.3 (01 May 2021 13:03), Max: 98.3 (01 May 2021 13:03)  HR: 84 (01 May 2021 13:03) (84 - 84)  BP: 153/56 (01 May 2021 13:03) (153/56 - 153/56)  BP(mean): --  RR: 18 (01 May 2021 13:03) (18 - 18)  SpO2: 100% (01 May 2021 13:03) (100% - 100%)    Oriented to self and place, not year, confused when asked how she fell, but remembers that she fell   Follows simple commands   PERRL, EOMI, face symmetrical   HART x 4- LUE weaker than right, antigravity   Sensation intact to light touch   No pronator drift   in C collar     LABS:                        11.2   5.07  )-----------( 132      ( 01 May 2021 13:49 )             37.5     05-01    141  |  106  |  22  ----------------------------<  118<H>  3.7   |  21<L>  |  0.76    Ca    9.8      01 May 2021 13:49    TPro  7.9  /  Alb  4.1  /  TBili  0.3  /  DBili  x   /  AST  23  /  ALT  9   /  AlkPhos  85  05-01

## 2021-05-01 NOTE — ED ADULT NURSE NOTE - OBJECTIVE STATEMENT
Pt. A&Ox1-2, brought to rm 12 for fall on anticoagulants. Pt. states she was leaving the bank when she fell. Does not recall falling but remembers waking up with people around her. Pt. c/o pain to forehead. No lacerations noted. Able to move all extremities but appears to be weaker on left side. Also c/o nausea and had episode of vomiting in room. #20g IV placed to right wrist, labs sent. Stroke code called and pt. brought straight to CT. h/o cardiac stent but unable to tell me what medication she is on. Will continue to monitor.

## 2021-05-01 NOTE — H&P ADULT - PROBLEM SELECTOR PLAN 1
- admit to SICU under neurosurgery Dr. Hill  - Q1 hr neuro checks   - CTH at 6pm   - repeat CTH sooner if mental status changes  - neurology  - keppra 500 bid   - NPO for now   - if CT Cspine read as negative can clinically clear collar   - reverse AC with ddavp/platelets  - draw pp2y12 level     Case discussed with attending neurosurgeon. - admit to SICU under neurosurgery Dr. Hill  - Q1 hr neuro checks   - CTH at 6pm   - repeat CTH sooner if mental status changes  - neurology  - keppra 500 bid   - NPO for now   - if CT Cspine read as negative can clinically clear collar   - reverse AC with ddavp/platelets  - draw pp2y12 level   - SBP <140     Case discussed with attending neurosurgeon.

## 2021-05-01 NOTE — ED ADULT NURSE REASSESSMENT NOTE - NS ED NURSE REASSESS COMMENT FT1
Lab contacted ED, platelet test clotted. Attempted to contact SICU MD May to notify, awaiting call back
Pt AOX2 in Rm 12 (confused about time); BP decreased 79/52 MD notified, 500mL bolus up; platelets running as per MD; BP to 92/53. Pt reports headache is less intense, describes it as a "mild ache."
Pt dysphagia screening performed as per MD orders:  PASSED.
Pt is stable at this time, responsive to verbal and pain stimuli. Sleepy but arousable. Float RN at bedside, labs being drawn. Awaiting covid results prior to SICU. Will continue to alvaro
Pt's C-collar removed as per MD; Pt resting in Rm 12, awaiting SICU bed.
Report given to SICU BONITA Pulido, transported by EDT AND RN. Pt stable. RN made aware about clotted specimen, to be re-ordered by SICU MD, no callbck
VSS as noted, breathing even and non-labored. Continues to be sleepy but arousable to verbal commands and pain stimuli. Pending covid results for SICU
Pt care and positioning provided; skin dry warm and intact.

## 2021-05-01 NOTE — ED PROVIDER NOTE - CLINICAL SUMMARY MEDICAL DECISION MAKING FREE TEXT BOX
s/p fall unclear mechanical w/ clear mental status change and LUE weakness. Code stroke unclear if ischemic or potential ICH.

## 2021-05-01 NOTE — CONSULT NOTE ADULT - SUBJECTIVE AND OBJECTIVE BOX
Neurology  Consult Note  05-01-21    Name:  MARYCARMEN CAMPOVERDE; 81y (1940)    Reason for Admission: Nontraumatic intracerebral hemorrhage    HPI: INCOMPLETE    Review of Systems:  As states in HPI.    latex (Rash)  penicillin (Faint)      PMHx:   Hypothyroidism    Hypercholesterolemia    MVP (mitral valve prolapse)    Carotid artery stenosis    Uterine cancer    Arthritis    Anemia    DM2 (diabetes mellitus, type 2)    PUD (peptic ulcer disease)    CAD (coronary artery disease)    History of prediabetes      PFHx:   Family history of stroke    Family history of hypertension    Family history of throat cancer    Family history of heart disease    Family history of multiple myeloma    Family history of breast cancer    No pertinent family history in first degree relatives      PSuHx:   H/O total knee replacement    H/O: hysterectomy    PUD (peptic ulcer disease)    Rectal cyst    Stented coronary artery        Medications:  MEDICATIONS  (STANDING):  atorvastatin 80 milliGRAM(s) Oral at bedtime  levETIRAcetam  IVPB 500 milliGRAM(s) IV Intermittent every 12 hours  levothyroxine 75 MICROGram(s) Oral daily  pantoprazole    Tablet 40 milliGRAM(s) Oral before breakfast  senna 1 Tablet(s) Oral daily  sodium chloride 0.9%. 1000 milliLiter(s) (75 mL/Hr) IV Continuous <Continuous>    MEDICATIONS  (PRN):  acetaminophen   Tablet .. 650 milliGRAM(s) Oral every 6 hours PRN Temp greater or equal to 38C (100.4F), Mild Pain (1 - 3)      Vitals:  T(C): 36.7 (05-01-21 @ 15:17), Max: 36.8 (05-01-21 @ 13:03)  HR: 75 (05-01-21 @ 15:17) (75 - 84)  BP: 105/71 (05-01-21 @ 15:24) (92/53 - 153/56)  RR: 20 (05-01-21 @ 15:17) (18 - 20)  SpO2: 100% (05-01-21 @ 15:17) (100% - 100%)    Physical Examination:  General: Lying with c-collar in place, intermittently nauseous and spitting up  Neurologic:  - Mental Status: Alert, awake, oriented to person and place but not time; Speech is fluent with intact naming, repetition, and comprehension. Follows cross commands.   - Cranial Nerves: LHH; Pupils are equal, round, and non-reactive to light (?hx of cataract surgery). Extraocular movements show R gaze preference which can be overcome and cross midline. Facial sensation is intact in the V1-V3 distribution bilaterally. Face is symmetric with normal eye closure and smile. Hearing is intact to finger rub.  - Motor: Drift in LUE and LLE, 5/5 throughout R extremities  - Reflexes: 2+ at the L brachioradialis, unable to elicit at the knees. Plantar responses down in the R and up in the L.  - Sensory: Intact throughout to light touch, pin prick, vibration, and joint-position.  - Coordination: Finger-nose-finger intact without dysmetria, unable to adequately assess in the L hand due to weakness.  - Gait: Deferred    Labs:                        11.2   5.07  )-----------( 132      ( 01 May 2021 13:49 )             37.5     05-01    141  |  106  |  22  ----------------------------<  118<H>  3.7   |  21<L>  |  0.76    Ca    9.8      01 May 2021 13:49    TPro  7.9  /  Alb  4.1  /  TBili  0.3  /  DBili  x   /  AST  23  /  ALT  9   /  AlkPhos  85  05-01    CAPILLARY BLOOD GLUCOSE  83 (01 May 2021 13:52)      POCT Blood Glucose.: 83 mg/dL (01 May 2021 13:12)    LIVER FUNCTIONS - ( 01 May 2021 13:49 )  Alb: 4.1 g/dL / Pro: 7.9 g/dL / ALK PHOS: 85 U/L / ALT: 9 U/L / AST: 23 U/L / GGT: x               Radiology:  CT Brain Stroke Protocol (05.01.21 @ 14:03)  IMPRESSION:  Acute right frontal intraparenchymal hemorrhage with associated sulcal effacement. There is no midline shift or hydrocephalus.    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 There is distal reconstitution of the left common carotid artery, internal and external carotid artery likely due to collaterals from the vertebral artery. There is also reconstitution of the left vertebral artery which may be due to subclavian steal phenomenon. The right common carotid and internal carotid arteries are patent.    CT angiography brain: Decreased size and number of left middle cerebral arteries compared with the right.    CT perfusion: Perfusion imaging predicted core infarct of 34 ml. Based on the perfusion mismatch, there is a predicted volume of 7 mL of critically hypoperfused tissue at risk.   Neurology  Consult Note  05-01-21    Name:  MARYCARMEN CAMPOVERDE; 81y (1940)    Reason for Admission: Nontraumatic intracerebral hemorrhage    HPI: 80yo woman with PMH of CAD (s/p stents), L carotid artery stenosis, HLD, anemia, MVP, PUD, and uterine cancer presents to the ED after a fall with head trauma. LKN was today at approximately 1200h. Patient reports she was going to the bank when she suddenly fell. She does not recall tripping or how she fell. Patient at this time reports headache, nausea, and vomiting. She reportedly takes ASA regularly but does not recall Plavix. EMS noted patient was AAOx3 however her mental status changed to AAOx1.    Code stroke was activated. NIHSS of 9. Pre-MRS is unclear. CTH shows an acute R intraparenchymal hemorrhage with sulcal effacement, with no midline shift or hydrocephalus. CTA shows chronic occlusions of the R subclavian, L subclavian, and L common carotid arteries. CTP shows a core of 34ml and a penumbra of 41ml. Patient was not a candidate for tPA or thrombectomy due to IPH with chronic vessel occlusions.    Review of Systems:  As states in HPI.    latex (Rash)  penicillin (Faint)      PMHx:   Hypothyroidism    Hypercholesterolemia    MVP (mitral valve prolapse)    Carotid artery stenosis    Uterine cancer    Arthritis    Anemia    DM2 (diabetes mellitus, type 2)    PUD (peptic ulcer disease)    CAD (coronary artery disease)    History of prediabetes      PFHx:   Family history of stroke    Family history of hypertension    Family history of throat cancer    Family history of heart disease    Family history of multiple myeloma    Family history of breast cancer    No pertinent family history in first degree relatives      PSuHx:   H/O total knee replacement    H/O: hysterectomy    PUD (peptic ulcer disease)    Rectal cyst    Stented coronary artery        Medications:  MEDICATIONS  (STANDING):  atorvastatin 80 milliGRAM(s) Oral at bedtime  levETIRAcetam  IVPB 500 milliGRAM(s) IV Intermittent every 12 hours  levothyroxine 75 MICROGram(s) Oral daily  pantoprazole    Tablet 40 milliGRAM(s) Oral before breakfast  senna 1 Tablet(s) Oral daily  sodium chloride 0.9%. 1000 milliLiter(s) (75 mL/Hr) IV Continuous <Continuous>    MEDICATIONS  (PRN):  acetaminophen   Tablet .. 650 milliGRAM(s) Oral every 6 hours PRN Temp greater or equal to 38C (100.4F), Mild Pain (1 - 3)      Vitals:  T(C): 36.7 (05-01-21 @ 15:17), Max: 36.8 (05-01-21 @ 13:03)  HR: 75 (05-01-21 @ 15:17) (75 - 84)  BP: 105/71 (05-01-21 @ 15:24) (92/53 - 153/56)  RR: 20 (05-01-21 @ 15:17) (18 - 20)  SpO2: 100% (05-01-21 @ 15:17) (100% - 100%)    Physical Examination:  General: Lying with c-collar in place, intermittently nauseous and spitting up  Neurologic:  - Mental Status: Alert, awake, oriented to person and place but not time; Speech is fluent with intact naming, repetition, and comprehension. Follows cross commands.   - Cranial Nerves: LHH; Pupils are equal, round, and non-reactive to light (?hx of cataract surgery). Extraocular movements show R gaze preference which can be overcome and cross midline. Facial sensation is intact in the V1-V3 distribution bilaterally. Face is symmetric with normal eye closure and smile. Hearing is intact to finger rub.  - Motor: Drift in LUE and LLE, 5/5 throughout R extremities  - Reflexes: 2+ at the L brachioradialis, unable to elicit at the knees. Plantar responses down in the R and up in the L.  - Sensory: Intact throughout to light touch, pin prick, vibration, and joint-position.  - Coordination: Finger-nose-finger intact without dysmetria, unable to adequately assess in the L hand due to weakness.  - Gait: Deferred    Labs:                        11.2   5.07  )-----------( 132      ( 01 May 2021 13:49 )             37.5     05-01    141  |  106  |  22  ----------------------------<  118<H>  3.7   |  21<L>  |  0.76    Ca    9.8      01 May 2021 13:49    TPro  7.9  /  Alb  4.1  /  TBili  0.3  /  DBili  x   /  AST  23  /  ALT  9   /  AlkPhos  85  05-01    CAPILLARY BLOOD GLUCOSE  83 (01 May 2021 13:52)      POCT Blood Glucose.: 83 mg/dL (01 May 2021 13:12)    LIVER FUNCTIONS - ( 01 May 2021 13:49 )  Alb: 4.1 g/dL / Pro: 7.9 g/dL / ALK PHOS: 85 U/L / ALT: 9 U/L / AST: 23 U/L / GGT: x               Radiology:  CT Brain Stroke Protocol (05.01.21 @ 14:03)  IMPRESSION:  Acute right frontal intraparenchymal hemorrhage with associated sulcal effacement. There is no midline shift or hydrocephalus.    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 There is distal reconstitution of the left common carotid artery, internal and external carotid artery likely due to collaterals from the vertebral artery. There is also reconstitution of the left vertebral artery which may be due to subclavian steal phenomenon. The right common carotid and internal carotid arteries are patent.    CT angiography brain: Decreased size and number of left middle cerebral arteries compared with the right.    CT perfusion: Perfusion imaging predicted core infarct of 34 ml. Based on the perfusion mismatch, there is a predicted volume of 7 mL of critically hypoperfused tissue at risk.

## 2021-05-01 NOTE — CHART NOTE - NSCHARTNOTEFT_GEN_A_CORE
CAPRINI SCORE [CLOT]    AGE RELATED RISK FACTORS                                                       MOBILITY RELATED FACTORS  [ ] Age 41-60 years                                            (1 Point)                  [ ] Bed rest                                                        (1 Point)  [ ] Age: 61-74 years                                           (2 Points)                 [ ] Plaster cast                                                   (2 Points)  [x ] Age= 75 years                                              (3 Points)                 [ ] Bed bound for more than 72 hours                 (2 Points)    DISEASE RELATED RISK FACTORS                                               GENDER SPECIFIC FACTORS  [ ] Edema in the lower extremities                       (1 Point)                  [ ] Pregnancy                                                     (1 Point)  [ ] Varicose veins                                               (1 Point)                  [ ] Post-partum < 6 weeks                                   (1 Point)             [ ] BMI > 25 Kg/m2                                            (1 Point)                  [ ] Hormonal therapy  or oral contraception          (1 Point)                 [ ] Sepsis (in the previous month)                        (1 Point)                  [ ] History of pregnancy complications                 (1 point)  [ ] Pneumonia or serious lung disease                                               [ ] Unexplained or recurrent                     (1 Point)           (in the previous month)                               (1 Point)  [ ] Abnormal pulmonary function test                     (1 Point)                 SURGERY RELATED RISK FACTORS  [ ] Acute myocardial infarction                              (1 Point)                 [ ]  Section                                             (1 Point)  [ ] Congestive heart failure (in the previous month)  (1 Point)               [ ] Minor surgery                                                  (1 Point)   [ ] Inflammatory bowel disease                             (1 Point)                 [ ] Arthroscopic surgery                                        (2 Points)  [ ] Central venous access                                      (2 Points)                [ ] General surgery lasting more than 45 minutes   (2 Points)       [x ] Stroke (in the previous month)                          (5 Points)               [ ] Elective arthroplasty                                         (5 Points)                                                                                                                                               HEMATOLOGY RELATED FACTORS                                                 TRAUMA RELATED RISK FACTORS  [ ] Prior episodes of VTE                                     (3 Points)                [ ] Fracture of the hip, pelvis, or leg                       (5 Points)  [ ] Positive family history for VTE                         (3 Points)                 [ ] Acute spinal cord injury (in the previous month)  (5 Points)  [ ] Prothrombin 68916 A                                     (3 Points)                 [ ] Paralysis  (less than 1 month)                             (5 Points)  [ ] Factor V Leiden                                             (3 Points)                  [ ] Multiple Trauma within 1 month                        (5 Points)  [ ] Lupus anticoagulants                                     (3 Points)                                                           [ ] Anticardiolipin antibodies                               (3 Points)                                                       [ ] High homocysteine in the blood                      (3 Points)                                             [ ] Other congenital or acquired thrombophilia      (3 Points)                                                [ ] Heparin induced thrombocytopenia                  (3 Points)                                          Total Score [    8      ]    Caprini Score 0 - 2:  Low Risk, No VTE Prophylaxis required for most patients, encourage ambulation  Caprini Score 3 - 6:  At Risk, pharmacologic VTE prophylaxis is indicated for most patients (in the absence of a contraindication)  Caprini Score Greater than or = 7:  High Risk, pharmacologic VTE prophylaxis is indicated for most patients (in the absence of a contraindication)

## 2021-05-02 NOTE — PROGRESS NOTE ADULT - SUBJECTIVE AND OBJECTIVE BOX
Patient seen and examined at bedside.    --Anticoagulation--    T(C): 36.3 (05-01-21 @ 20:00), Max: 36.8 (05-01-21 @ 13:03)  HR: 64 (05-02-21 @ 01:00) (64 - 96)  BP: 104/56 (05-02-21 @ 01:00) (92/53 - 153/56)  RR: 14 (05-02-21 @ 01:00) (14 - 31)  SpO2: 100% (05-02-21 @ 01:00) (94% - 100%)  Wt(kg): --    Exam:  intubated and sedated.   Prior to intubation  EO to voice, fc,  Ox2,  PERRLA,   Moving R side strongly,  LUE 0/5, LLE moves to stim  Alert and  normal mood and affect. no apparent risk to self or others.

## 2021-05-02 NOTE — PROGRESS NOTE ADULT - SUBJECTIVE AND OBJECTIVE BOX
SICU Daily Progress Note  =====================================================  Interval/Overnight Events:       -Given additional Keppra 500 for episode of seizure  -Intubated for worsening mental status & bradycardia    SICU Day #    2    HPI: 81y Female CAD w/ stents on ASA/Plavix, HLD, hypothyroid, anemia s/p trip and fall outside the bank today. Per EMS reported trip and fall witnessed by bystanders w/ LOC. Per EMS, AAOx3 at that time of initial eval. Now AAOx2 and unable to recall how she fell. Now c/o nausea and frontal head pain.    Interval Events: Seen and examined in ED, patient remains AAOx2, which is stable per ED team. Patient recalls being at the bank where she fell but remains unsure how exactly she fell. At this time denies LOC, however LOC witnessed by bystanders. C collar in place. She denies HA, dizziness, chest pain, abdominal pain, altered vision at this time. GCS 15 on exam.     SICU consulted for hemodynamic monitoring, q1 hour neurochecks, and BP monitoring.     PMH:   ***    Allergies: latex (Rash)  penicillin (Faint)      MEDICATIONS:   --------------------------------------------------------------------------------------  Neurologic Medications  acetaminophen   Tablet .. 650 milliGRAM(s) Oral every 6 hours PRN Temp greater or equal to 38C (100.4F), Mild Pain (1 - 3)  fentaNYL   Infusion. 0.5 MICROgram(s)/kG/Hr IV Continuous <Continuous>  levETIRAcetam  IVPB 500 milliGRAM(s) IV Intermittent every 12 hours  propofol Infusion 30 MICROgram(s)/kG/Min IV Continuous <Continuous>    Respiratory Medications    Cardiovascular Medications    Gastrointestinal Medications  magnesium sulfate  IVPB 2 Gram(s) IV Intermittent once  pantoprazole    Tablet 40 milliGRAM(s) Oral before breakfast  potassium chloride  10 mEq/100 mL IVPB 10 milliEquivalent(s) IV Intermittent every 1 hour  potassium phosphate IVPB 15 milliMole(s) IV Intermittent once  senna 1 Tablet(s) Oral daily  sodium chloride 1.5%. 500 milliLiter(s) IV Continuous <Continuous>    Genitourinary Medications    Hematologic/Oncologic Medications    Antimicrobial/Immunologic Medications    Endocrine/Metabolic Medications  atorvastatin 80 milliGRAM(s) Oral at bedtime  levothyroxine 75 MICROGram(s) Oral daily    Topical/Other Medications  chlorhexidine 0.12% Liquid 15 milliLiter(s) Oral Mucosa every 12 hours    --------------------------------------------------------------------------------------    VITAL SIGNS, INS/OUTS (last 24 hours):  --------------------------------------------------------------------------------------  T(C): 36.3 (05-01-21 @ 20:00), Max: 36.8 (05-01-21 @ 13:03)  HR: 60 (05-02-21 @ 03:39) (43 - 96)  BP: 112/54 (05-02-21 @ 03:20) (92/53 - 153/56)  BP(mean): 68 (05-02-21 @ 03:20) (60 - 91)  ABP: --  ABP(mean): --  RR: 15 (05-02-21 @ 03:20) (14 - 31)  SpO2: 100% (05-02-21 @ 03:39) (94% - 100%)  Wt(kg): --  CVP(mm Hg): --  CI: --  CAPILLARY BLOOD GLUCOSE  83 (01 May 2021 13:52)      POCT Blood Glucose.: 83 mg/dL (01 May 2021 13:12)   N/A      05-01 @ 07:01 - 05-02 @ 04:20  --------------------------------------------------------  IN:    FentaNYL: 67.3 mL    Propofol: 11.2 mL    sodium chloride 1.5%: 425 mL  Total IN: 503.5 mL    OUT:    Indwelling Catheter - Urethral (mL): 535 mL    Voided (mL): 200 mL  Total OUT: 735 mL    Total NET: -231.5 mL        --------------------------------------------------------------------------------------    EXAM  NEUROLOGY  Exam: Sedated    HEENT  Exam: Normocephalic, atraumatic    RESPIRATORY  Exam: Intubated. Lungs clear to auscultation, Normal expansion/effort.   Mechanical Ventilation: Mode: AC/ CMV (Assist Control/ Continuous Mandatory Ventilation), RR (machine): 14, TV (machine): 400, FiO2: 40, PEEP: 5, ITime: 0.9, MAP: 11, PIP: 34    CARDIOVASCULAR  Exam: S1, S2.  Regular rate and rhythm.       GI/NUTRITION  Exam: Abdomen soft, Non-tender, Non-distended.    Current Diet:  NPO    VASCULAR  Exam: Extremities warm, well-perfused.    MUSCULOSKELETAL  Exam: No obvious deformities    SKIN  Exam: Good skin turgor, no skin breakdown.     METABOLIC/FLUIDS/ELECTROLYTES  magnesium sulfate  IVPB 2 Gram(s) IV Intermittent once  potassium chloride  10 mEq/100 mL IVPB 10 milliEquivalent(s) IV Intermittent every 1 hour  potassium phosphate IVPB 15 milliMole(s) IV Intermittent once  sodium chloride 1.5%. 500 milliLiter(s) IV Continuous <Continuous>      HEMATOLOGIC  [x] VTE Prophylaxis:   Transfusions:	[] PRBC	[] Platelets		[] FFP	[] Cryoprecipitate    INFECTIOUS DISEASE  Antimicrobials/Immunologic Medications:    Day #      of     ***    Tubes/Lines/Drains  ***  [x] Peripheral IV  [] Central Venous Line     	[] R	[] L	[] IJ	[] Fem	[] SC	Date Placed:   [] Arterial Line		[] R	[] L	[] Fem	[] Rad	[] Ax	Date Placed:   [] PICC		[] Midline		[] Mediport  [] Urinary Catheter		Date Placed:   [x] Necessity of urinary, arterial, and venous catheters discussed    LABS  --------------------------------------------------------------------------------------                                            9.0                   Neurophils% (auto):   x      (05-02 @ 01:50):    6.14 )-----------(135          Lymphocytes% (auto):  x                                             28.4                   Eosinphils% (auto):   x        Manual%: Neutrophils x    ; Lymphocytes x    ; Eosinophils x    ; Bands%: x    ; Blasts x          05-02    134<L>  |  105  |  16  ----------------------------<  161<H>  3.9   |  18<L>  |  0.52    Ca    9.2      02 May 2021 01:50  Phos  2.4     05-02  Mg     1.8     05-02    TPro  7.5  /  Alb  3.9  /  TBili  0.3  /  DBili  x   /  AST  29  /  ALT  16  /  AlkPhos  77  05-01      ABG - ( 02 May 2021 01:50 )  pH: 7.44  /  pCO2: 33    /  pO2: 171   / HCO3: 23    / Base Excess: -1.7  /  SaO2: 99.1  / Lactate: x          RECENT CULTURES:      --------------------------------------------------------------------------------------    OTHER LABORATORY:     IMAGING STUDIES:   CXR:

## 2021-05-02 NOTE — PROGRESS NOTE ADULT - ASSESSMENT
R parietal IPH increased on interval scan    - ddavp and Plts given due to ASA/Plavix  - Keppra 500 given for another seizure  - Discussed prognosis with family given age and DAPT use and increase in hemorrhage. Would not like to pursue any surgical intervention. Maximum medical management   - Given intubation and chnage in MS would recommend urgent CTH when medically stable.   - Hypertonic 1.5 % Na 145-155

## 2021-05-02 NOTE — PROGRESS NOTE ADULT - SUBJECTIVE AND OBJECTIVE BOX
Neurology Progress    MARYCARMEN [id number]    HPI:  HPI: 81y Female CAD w/ stents on ASA/Plavix, HLD, hypothyroid, anemia s/p trip and fall outside the bank today. Per EMS reported trip and fall witnessed by bystanders w/ LOC. Per EMS, AAOx3 at that time of initial eval. Now AAOx2 and unable to recall how she fell. Now c/o nausea and frontal head pain.    RADIOLOGY:   < from: CT Brain Stroke Protocol (21 @ 14:03) >  FINDINGS:    4.1 x 4.0 cm acute right frontal intraparenchymal hemorrhage with associated sulcal effacement. There is no midline shift. There is no hydrocephalus.    The brain demonstrates periventricular hypoattenuation, nonspecific in etiology but likely representing chronic microvascular ischemic changes.  No abnormal extra-axial fluid collections are present.    The ventricles and sulci are within normal limits for patient's age.  The basal cisterns are patent.    The orbits are unremarkable. The paranasal sinuses are clear. The mastoid air cells are clear. The calvarium is intact.    IMPRESSION:  Acute right frontal intraparenchymal hemorrhage with associated sulcal effacement. There is no midline shift or hydrocephalus.    MEDS:  desmopressin IVPB 30 MICROGram(s) IV Intermittent Once  levETIRAcetam  IVPB 1500 milliGRAM(s) IV Intermittent once      PHYSICAL EXAM:  Vital Signs Last 24 Hrs  T(C): 36.8 (01 May 2021 13:03), Max: 36.8 (01 May 2021 13:03)  T(F): 98.3 (01 May 2021 13:03), Max: 98.3 (01 May 2021 13:03)  HR: 84 (01 May 2021 13:03) (84 - 84)  BP: 153/56 (01 May 2021 13:03) (153/56 - 153/56)  BP(mean): --  RR: 18 (01 May 2021 13:03) (18 - 18)  SpO2: 100% (01 May 2021 13:03) (100% - 100%)    Oriented to self and place, not year, confused when asked how she fell, but remembers that she fell   Follows simple commands   PERRL, EOMI, face symmetrical   HART x 4- LUE weaker than right, antigravity   Sensation intact to light touch   No pronator drift   in C collar     LABS:                        11.2   5.07  )-----------( 132      ( 01 May 2021 13:49 )             37.5         141  |  106  |  22  ----------------------------<  118<H>  3.7   |  21<L>  |  0.76    Ca    9.8      01 May 2021 13:49    TPro  7.9  /  Alb  4.1  /  TBili  0.3  /  DBili  x   /  AST  23  /  ALT  9   /  AlkPhos  85                 (01 May 2021 14:29)      Past Medical History  Hypothyroidism    Hypercholesterolemia    MVP (mitral valve prolapse)    Carotid artery stenosis    Uterine cancer    Arthritis    Anemia    DM2 (diabetes mellitus, type 2)    PUD (peptic ulcer disease)    CAD (coronary artery disease)    History of prediabetes        Past Surgical History  H/O total knee replacement    H/O: hysterectomy    PUD (peptic ulcer disease)    Rectal cyst    Stented coronary artery        MEDICATIONS    acetaminophen   Tablet .. 650 milliGRAM(s) Oral every 6 hours PRN  atorvastatin 80 milliGRAM(s) Oral at bedtime  chlorhexidine 0.12% Liquid 15 milliLiter(s) Oral Mucosa every 12 hours  fentaNYL   Infusion. 0.5 MICROgram(s)/kG/Hr IV Continuous <Continuous>  levETIRAcetam  IVPB 500 milliGRAM(s) IV Intermittent every 12 hours  levothyroxine 75 MICROGram(s) Oral daily  pantoprazole    Tablet 40 milliGRAM(s) Oral before breakfast  senna 1 Tablet(s) Oral daily  sodium chloride 1.5%. 500 milliLiter(s) IV Continuous <Continuous>         Family history: No history of dementia, strokes, or seizures   FAMILY HISTORY:    SOCIAL HISTORY -- No history of tobacco or alcohol use     Allergies    latex (Rash)  penicillin (Faint)    Intolerances        Height (cm): 154.9 ( @ 13:03)  Weight (kg): 74.8 ( @ 17:21)  BMI (kg/m2): 31.2 ( @ 17:21)    Vital Signs Last 24 Hrs  T(C): 36.8 (02 May 2021 08:00), Max: 36.8 (01 May 2021 13:03)  T(F): 98.3 (02 May 2021 08:00), Max: 98.3 (01 May 2021 13:03)  HR: 54 (02 May 2021 08:00) (43 - 96)  BP: 101/64 (02 May 2021 08:00) (92/53 - 153/56)  BP(mean): 72 (02 May 2021 08:00) (60 - 91)  RR: 16 (02 May 2021 08:00) (14 - 31)  SpO2: 100% (02 May 2021 08:00) (94% - 100%)        On Neurological Examination:    Mental Status - Patient is intubated. .   Follows few  commands           Cranial Nerves - Extraocular muscle intact  [place] Facial symmetry Tongue midline, CnV1to V3 intact gross hearing intact      Motor Exam -   Right upper  weak   Left upper no movement  Right lower- wiggles toes  Left lower no movementt  Muscle tone - is normal all over. No asymmetry is seen.      Sensory    withdraw right only    GENERAL Exam:     Nontoxic , No Acute Distress   	  HEENT:  normocephalic, atraumatic  		  LUNGS:	Clear bilaterally  No Wheeze      VASCULAR: no carotid brui  	  HEART:	 Normal S1S2   No murmur RRR        	  MUSCULOSKELETAL: Normal Range of Motion  	   SKIN:      Normal   No Ecchymosis               LABS:  CBC Full  -  ( 02 May 2021 01:50 )  WBC Count : 6.14 K/uL  RBC Count : 3.90 M/uL  Hemoglobin : 9.0 g/dL  Hematocrit : 28.4 %  Platelet Count - Automated : 135 K/uL  Mean Cell Volume : 72.8 fL  Mean Cell Hemoglobin : 23.1 pg  Mean Cell Hemoglobin Concentration : 31.7 gm/dL  Auto Neutrophil # : x  Auto Lymphocyte # : x  Auto Monocyte # : x  Auto Eosinophil # : x  Auto Basophil # : x  Auto Neutrophil % : x  Auto Lymphocyte % : x  Auto Monocyte % : x  Auto Eosinophil % : x  Auto Basophil % : x    Urinalysis Basic - ( 01 May 2021 15:06 )    Color: Light Yellow / Appearance: Clear / S.039 / pH: x  Gluc: x / Ketone: Trace  / Bili: Negative / Urobili: <2 mg/dL   Blood: x / Protein: Trace / Nitrite: Negative   Leuk Esterase: Negative / RBC: 1 /HPF / WBC 2 /HPF   Sq Epi: x / Non Sq Epi: 0 /HPF / Bacteria: Negative          134<L>  |  105  |  16  ----------------------------<  161<H>  3.9   |  18<L>  |  0.52    Ca    9.2      02 May 2021 01:50  Phos  2.4     05-  Mg     1.8     -    TPro  7.5  /  Alb  3.9  /  TBili  0.3  /  DBili  x   /  AST  29  /  ALT  16  /  AlkPhos  77  05-    Hemoglobin A1C:     LIVER FUNCTIONS - ( 01 May 2021 22:29 )  Alb: 3.9 g/dL / Pro: 7.5 g/dL / ALK PHOS: 77 U/L / ALT: 16 U/L / AST: 29 U/L / GGT: x           Vitamin B12         RADIOLOGY    EKG      IMPRESSION  This is a  year old           schoenberg

## 2021-05-02 NOTE — PROGRESS NOTE ADULT - ASSESSMENT
81y Female CAD w/ stents on ASA/Plavix, HLD, hypothyroid, anemia s/p trip and fall outside the bank today. Per EMS reported trip and fall witnessed by bystanders w/ LOC. Per EMS, AAOx3 at that time of initial eval. Now AAOx2 and unable to recall how she fell. Now c/o nausea and frontal head pain.  Imaging show right frontal bleed    repeat CT today   Monitor blood pressure

## 2021-05-02 NOTE — PROCEDURE NOTE - NSTRACHPOSTINTU_RESP_A_CORE
Appropriate capnography/Breath sounds bilateral/Breath sounds equal/Chest excursion noted/Positive end tidal Co2 noted

## 2021-05-02 NOTE — PROGRESS NOTE ADULT - ASSESSMENT
ASSESSMENT:  MARYCARMEN CAMPOVERDE is a 81y Female with history of NSTEMI, PCI, HLD, hypothyroidism, presents s/p fall found to have R IPH, now s/p intubation for worsening mental status.    PLAN:    NEURO:  Q1H neurochecks  Repeat CTH: increased R parietal IPH, new SAH & SDH  Keppra    RESPIRATORY:   /14/5/40%    CARDIOVASCULAR:    maintain SBP below 140     GI/NUTRITION:  NPO   OGT    GENITOURINARY/RENAL:  Hypertonic 1.5% Na @125 (Goal Na 145-155)  Monitor I&O  Monitor Lytes and replete as necessary     HEMATOLOGIC:  Monitor H/H  S/p DDAVP & Platelets x1  Holding DVT chemo prophylaxis given bleed  SCDs at all times     INFECTIOUS DISEASE:  No acute issues   Monitor for fevers or elevation in WBC     ENDOCRINE:  home levothyroxine dosage     Lines/Tubes:  Donaldson 5/2  Intubated 5/2  PIVs    DISPO: SICU     Diagnoses: Intraparenchymal hemorrhage    ASSESSMENT:  MARYCARMEN CAMPOVERDE is a 81y Female with history of NSTEMI, PCI, HLD, hypothyroidism, presents s/p fall found to have R IPH, now s/p intubation for worsening mental status.    PLAN:    NEURO:  Q1H neurochecks  Repeat CTH: increased R parietal IPH, new SAH & SDH  Keppra  Wean Fentanyl gtt    RESPIRATORY:   /14/5/40%    CARDIOVASCULAR:  maintain SBP below 140  Lipitor     GI/NUTRITION:  NPO   OGT  Protonix    GENITOURINARY/RENAL:  Hypertonic 1.5% Na @125 (Goal Na 145-155)  Monitor I&O  Monitor Lytes and replete as necessary     HEMATOLOGIC:  Monitor H/H  S/p antiplatelet reversal with DDAVP & Platelets x1  Holding DVT chemo prophylaxis given bleed  SCDs at all times     INFECTIOUS DISEASE:  No acute issues   Monitor for fevers or elevation in WBC     ENDOCRINE:  home levothyroxine dosage     Lines/Tubes:  Donaldson 5/2  Intubated 5/2  PIVs    DISPO: SICU     Diagnoses: Intraparenchymal hemorrhage

## 2021-05-03 NOTE — PROGRESS NOTE ADULT - ASSESSMENT
ASSESSMENT:  81y Female with history of NSTEMI, PCI, HLD, hypothyroidism, presents s/p fall found to have R IPH, now s/p intubation for worsening mental status.    PLAN:    NEURO:    Q1H neurochecks  Follows commands on R side  GCS 8 on 5/3  Repeat CTH: increased R parietal IPH, new SAH & SDH  Keppra 500 BID  off sedation, will d/c prn dilaudid  palliative consult for GOC    RESPIRATORY:   was on /16/5/30%  CPAP on 8/5 now, will monitor MV      CARDIOVASCULAR:  goal MAP 65-70  off phenylephrine gtt  Lipitor     GI/NUTRITION:  NPO   OGT  TF@goal  Protonix    GENITOURINARY/RENAL:  salt tabs to maintain Na 145-155  Monitor I&O  Monitor Lytes and replete as necessary   Check BMP in afternoon for Phos level    HEMATOLOGIC:  Monitor H/H  S/p antiplatelet reversal with DDAVP & Platelets x1  Will start SQH as pt is >24hr s/p brain bleed    INFECTIOUS DISEASE:  No acute issues   Monitor for fevers or elevation in WBC     ENDOCRINE:  home levothyroxine dosage     Lines/Tubes:  Donaldson 5/2  Intubated 5/2  PIVs  Neurosurgery discussed prognosis with family given age and DAPT use and increase in hemorrhage would not like to pursue any surgical intervention, want maximum medical management     Diagnoses: Intraparenchymal hemorrhage

## 2021-05-03 NOTE — DIETITIAN INITIAL EVALUATION ADULT. - ADD RECOMMEND
1) Monitor weights, labs, BM's, skin integrity, tolerance to TF; 2) Adjust TF schedule around levothyroxine dose.

## 2021-05-03 NOTE — DIETITIAN INITIAL EVALUATION ADULT. - OTHER INFO
81y Female CAD w/ stents on ASA/Plavix, HLD, hypothyroid, anemia s/p trip and fall outside the bank today. Per EMS reported trip and fall witnessed by bystanders w/ LOC. Per EMS, AAOx3 at that time of initial eval. Now AAOx2 and unable to recall how she fell. Now c/o nausea and frontal head pain.  Interval Events: Seen and examined in ED, patient remains AAOx2, which is stable per ED team. Patient recalls being at the bank where she fell but remains unsure how exactly she fell. At this time denies LOC, however LOC witnessed by bystanders. C collar in place. She denies HA, dizziness, chest pain, abdominal pain, altered vision at this time. GCS 15 on exam.   SICU consulted for hemodynamic monitoring, q1 hour neurochecks, and BP monitoring.

## 2021-05-03 NOTE — DIETITIAN INITIAL EVALUATION ADULT. - ENTERAL
Adjust TF to 45mL/h x23h to provide 1242kcal w/57gm protein to meet current needs.  Hold TF 30 minutes before and after levothyroxine dose.

## 2021-05-03 NOTE — DIETITIAN INITIAL EVALUATION ADULT. - ORAL INTAKE PTA/DIET HISTORY
Pt intubated at this time, unable to obtain nutrition hx at this time.  Extensive chart review conducted to obtain nutrition related information.  Spoke w/RN - pt tolerating enteral nutrition support at this time.  Pt w/recent [facility] admission 8/2020.  Pt w/good appetite at this time.  UBW reported as 72.7kg.  Wt on admission 74.6kg.  Pt on Low Fat diet at that time.

## 2021-05-03 NOTE — PROGRESS NOTE ADULT - SUBJECTIVE AND OBJECTIVE BOX
PAST 24HR EVENTS: bradycardic 40's, 50's o/n, hypotensive SBP 70's, phenylephrine started, CTH done appears stable, neurological exam stable.    HPI: 81y Female     PHYSICAL EXAM:  intubated, OE to sternal rub, fc squeezes hand on the right  perrl  moves right side, LUE 0/5, LLE w/d to noxious stimuli    Vital Signs Last 24 Hrs  T(C): 38.3 (03 May 2021 02:50), Max: 38.3 (03 May 2021 02:50)  T(F): 101 (03 May 2021 02:50), Max: 101 (03 May 2021 02:50)  HR: 51 (03 May 2021 03:45) (48 - 77)  BP: 133/42 (03 May 2021 03:45) (68/44 - 133/42)  BP(mean): 62 (03 May 2021 03:45) (49 - 86)  RR: 16 (03 May 2021 03:45) (16 - 26)  SpO2: 100% (03 May 2021 03:45) (99% - 100%)      MEDS:   acetaminophen   Tablet .. 650 milliGRAM(s) Oral every 6 hours PRN  atorvastatin 80 milliGRAM(s) Oral at bedtime  chlorhexidine 0.12% Liquid 15 milliLiter(s) Oral Mucosa every 12 hours  HYDROmorphone  Injectable 0.5 milliGRAM(s) IV Push every 6 hours PRN  levETIRAcetam  IVPB 500 milliGRAM(s) IV Intermittent every 12 hours  levothyroxine 75 MICROGram(s) Oral daily  pantoprazole    Tablet 40 milliGRAM(s) Oral before breakfast  phenylephrine    Infusion 0.1 MICROgram(s)/kG/Min IV Continuous <Continuous>  potassium phosphate / sodium phosphate Powder (PHOS-NaK) 1 Packet(s) Oral two times a day  senna 1 Tablet(s) Oral daily  sodium chloride 0.9% Bolus 500 milliLiter(s) IV Bolus once  sodium chloride 1.5%. 500 milliLiter(s) IV Continuous <Continuous>  sodium phosphate IVPB 30 milliMole(s) IV Intermittent once      LABS:                        8.9    7.96  )-----------( 141      ( 03 May 2021 01:35 )             29.3     05-03    145  |  118<H>  |  15  ----------------------------<  150<H>  4.3   |  20<L>  |  0.64    Ca    8.8      03 May 2021 01:35  Phos  1.7     05-03  Mg     2.4     05-03    TPro  7.5  /  Alb  3.9  /  TBili  0.3  /  DBili  x   /  AST  29  /  ALT  16  /  AlkPhos  77  05-01        RADIOLOGY:   The right parietal parenchymal hemorrhage is unchanged in size. There appears to be slightly less edema associated with the hemorrhage is frontal subarachnoid hemorrhage. No change in the posterior interhemispheric right-sided subdural hemorrhage. There is no change in mass effect on the right lateral ventricle. There is no change in mass effect on the right lateral ventricle and mild midline shift to the left.    IMPRESSION: Size of right parietal parenchymal hemorrhage and right posterior interhemispheric subdural hemorrhage. Slightly decreased associated edema. No change in mass effect or midline shift to the left compared with 5/21/2021 at 6:49 PM.

## 2021-05-03 NOTE — PROGRESS NOTE ADULT - ASSESSMENT
81F s/p F with R. IPH    5/3: bradycardic 40's, 50's o/n, hypotensive SBP 70's, phenylephrine started, CTH done appears stable, neurological exam stable, on keppra, On 1.5%

## 2021-05-03 NOTE — PROGRESS NOTE ADULT - SUBJECTIVE AND OBJECTIVE BOX
SICU Daily Progress Note  =====================================================  Interval/Overnight Events:     ***    POD #          	SICU Day #    ***    HPI:  ((insert from previous note)) ***    PMH:   ***    Allergies: latex (Rash)  penicillin (Faint)      MEDICATIONS:   --------------------------------------------------------------------------------------  Neurologic Medications  acetaminophen   Tablet .. 650 milliGRAM(s) Oral every 6 hours PRN Temp greater or equal to 38C (100.4F), Mild Pain (1 - 3)  HYDROmorphone  Injectable 0.5 milliGRAM(s) IV Push every 6 hours PRN agitation  levETIRAcetam  IVPB 500 milliGRAM(s) IV Intermittent every 12 hours    Respiratory Medications    Cardiovascular Medications    Gastrointestinal Medications  pantoprazole    Tablet 40 milliGRAM(s) Oral before breakfast  potassium phosphate / sodium phosphate Powder (PHOS-NaK) 1 Packet(s) Oral two times a day  senna 1 Tablet(s) Oral daily  sodium chloride 1.5%. 500 milliLiter(s) IV Continuous <Continuous>    Genitourinary Medications    Hematologic/Oncologic Medications    Antimicrobial/Immunologic Medications    Endocrine/Metabolic Medications  atorvastatin 80 milliGRAM(s) Oral at bedtime  levothyroxine 75 MICROGram(s) Oral daily    Topical/Other Medications  chlorhexidine 0.12% Liquid 15 milliLiter(s) Oral Mucosa every 12 hours    --------------------------------------------------------------------------------------    VITAL SIGNS, INS/OUTS (last 24 hours):  --------------------------------------------------------------------------------------  ((Insert SICU Vitals/Is+Os here))***  --------------------------------------------------------------------------------------    EXAM  NEUROLOGY  RASS:   	GCS:    Exam: Normal, NAD, alert, oriented x3, no focal deficits. ***    HEENT  Exam: Normocephalic, atraumatic, EOMI.  ***    RESPIRATORY  Exam: Lungs clear to auscultation, Normal expansion/effort. ***  Mechanical Ventilation: Mode: AC/ CMV (Assist Control/ Continuous Mandatory Ventilation), RR (machine): 16, TV (machine): 300, FiO2: 30, PEEP: 5, ITime: 0.6, MAP: 9, PIP: 23    CARDIOVASCULAR  Exam: S1, S2.  Regular rate and rhythm.   ***    GI/NUTRITION  Exam: Abdomen soft, Non-tender, Non-distended.  ***  Wound:  ***  Current Diet:  NPO***    VASCULAR  Exam: Extremities warm, pink, well-perfused. ***    MUSCULOSKELETAL  Exam: All extremities moving spontaneously without limitations. ***    SKIN  Exam: Good skin turgor, no skin breakdown. ***    METABOLIC/FLUIDS/ELECTROLYTES  potassium phosphate / sodium phosphate Powder (PHOS-NaK) 1 Packet(s) Oral two times a day  sodium chloride 1.5%. 500 milliLiter(s) IV Continuous <Continuous>      HEMATOLOGIC  [x] VTE Prophylaxis:   Transfusions:	[] PRBC	[] Platelets		[] FFP	[] Cryoprecipitate    INFECTIOUS DISEASE  Antimicrobials/Immunologic Medications:    Day #      of     ***    Tubes/Lines/Drains  ***  [x] Peripheral IV  [] Central Venous Line     	[] R	[] L	[] IJ	[] Fem	[] SC	Date Placed:   [] Arterial Line		[] R	[] L	[] Fem	[] Rad	[] Ax	Date Placed:   [] PICC		[] Midline		[] Mediport  [] Urinary Catheter		Date Placed:   [x] Necessity of urinary, arterial, and venous catheters discussed    LABS  --------------------------------------------------------------------------------------  ((Insert SICU Labs here))***  --------------------------------------------------------------------------------------    OTHER LABORATORY:     IMAGING STUDIES:   CXR:     ASSESSMENT:  81y Female    ((insert from previous))***    PLAN:   ***  Neurologic:   Respiratory:   Cardiovascular:   Gastrointestinal/Nutrition:   Renal/Genitourinary:   Hematologic:   Infectious Disease:   Tubes/Lines/Drains:   Endocrine:   Disposition:     --------------------------------------------------------------------------------------    Critical Care Diagnoses: SICU Daily Progress Note  =====================================================  Interval/Overnight Events:       -Given additional Keppra 500 for episode of seizure  -Intubated for worsening mental status & bradycardia      HPI:   81y Female CAD w/ stents on ASA/Plavix, HLD, hypothyroid, anemia s/p trip and fall outside the bank today. Per EMS reported trip and fall witnessed by bystanders w/ LOC. Per EMS, AAOx3 at that time of initial eval. Now AAOx2 and unable to recall how she fell. Now c/o nausea and frontal head pain.    Interval Events: Seen and examined in ED, patient remains AAOx2, which is stable per ED team. Patient recalls being at the bank where she fell but remains unsure how exactly she fell. At this time denies LOC, however LOC witnessed by bystanders. C collar in place. She denies HA, dizziness, chest pain, abdominal pain, altered vision at this time. GCS 15 on exam.     SICU consulted for hemodynamic monitoring, q1 hour neurochecks, and BP monitoring.         Allergies: latex (Rash)  penicillin (Faint)      MEDICATIONS:   --------------------------------------------------------------------------------------  Neurologic Medications  acetaminophen   Tablet .. 650 milliGRAM(s) Oral every 6 hours PRN Temp greater or equal to 38C (100.4F), Mild Pain (1 - 3)  HYDROmorphone  Injectable 0.5 milliGRAM(s) IV Push every 6 hours PRN agitation  levETIRAcetam  IVPB 500 milliGRAM(s) IV Intermittent every 12 hours    Respiratory Medications    Cardiovascular Medications    Gastrointestinal Medications  pantoprazole    Tablet 40 milliGRAM(s) Oral before breakfast  potassium phosphate / sodium phosphate Powder (PHOS-NaK) 1 Packet(s) Oral two times a day  senna 1 Tablet(s) Oral daily  sodium chloride 1.5%. 500 milliLiter(s) IV Continuous <Continuous>    Genitourinary Medications    Hematologic/Oncologic Medications    Antimicrobial/Immunologic Medications    Endocrine/Metabolic Medications  atorvastatin 80 milliGRAM(s) Oral at bedtime  levothyroxine 75 MICROGram(s) Oral daily    Topical/Other Medications  chlorhexidine 0.12% Liquid 15 milliLiter(s) Oral Mucosa every 12 hours    --------------------------------------------------------------------------------------    VITAL SIGNS, INS/OUTS (last 24 hours):  --------------------------------------------------------------------------------------  ((Insert SICU Vitals/Is+Os here))***  --------------------------------------------------------------------------------------    EXAM  NEUROLOGY  RASS:   	GCS:    Exam: Normal, NAD, alert, oriented x3, no focal deficits. ***    HEENT  Exam: Normocephalic, atraumatic, EOMI.  ***    RESPIRATORY  Exam: Lungs clear to auscultation, Normal expansion/effort. ***  Mechanical Ventilation: Mode: AC/ CMV (Assist Control/ Continuous Mandatory Ventilation), RR (machine): 16, TV (machine): 300, FiO2: 30, PEEP: 5, ITime: 0.6, MAP: 9, PIP: 23    CARDIOVASCULAR  Exam: S1, S2.  Regular rate and rhythm.   ***    GI/NUTRITION  Exam: Abdomen soft, Non-tender, Non-distended.  ***  Wound:  ***  Current Diet:  NPO***    VASCULAR  Exam: Extremities warm, pink, well-perfused. ***    MUSCULOSKELETAL  Exam: All extremities moving spontaneously without limitations. ***    SKIN  Exam: Good skin turgor, no skin breakdown. ***    METABOLIC/FLUIDS/ELECTROLYTES  potassium phosphate / sodium phosphate Powder (PHOS-NaK) 1 Packet(s) Oral two times a day  sodium chloride 1.5%. 500 milliLiter(s) IV Continuous <Continuous>      HEMATOLOGIC  [x] VTE Prophylaxis:   Transfusions:	[] PRBC	[] Platelets		[] FFP	[] Cryoprecipitate    INFECTIOUS DISEASE  Antimicrobials/Immunologic Medications:    Day #      of     ***    Tubes/Lines/Drains  ***  [x] Peripheral IV  [] Central Venous Line     	[] R	[] L	[] IJ	[] Fem	[] SC	Date Placed:   [] Arterial Line		[] R	[] L	[] Fem	[] Rad	[] Ax	Date Placed:   [] PICC		[] Midline		[] Mediport  [] Urinary Catheter		Date Placed:   [x] Necessity of urinary, arterial, and venous catheters discussed    LABS  --------------------------------------------------------------------------------------  ((Insert SICU Labs here))***  --------------------------------------------------------------------------------------    OTHER LABORATORY:     IMAGING STUDIES:   CXR:     ASSESSMENT:  81y Female    ((insert from previous))***    PLAN:   ***  Neurologic:   Respiratory:   Cardiovascular:   Gastrointestinal/Nutrition:   Renal/Genitourinary:   Hematologic:   Infectious Disease:   Tubes/Lines/Drains:   Endocrine:   Disposition:     --------------------------------------------------------------------------------------    Critical Care Diagnoses: SICU Daily Progress Note  =====================================================  Interval/Overnight Events:       -CT Head am demonstrating slight decrease in hematoma  -weaning fentanyl with prn dilaudid  -started TF  - from 141, decreased 1.5 NS from 125 to 30 cc/hr       HPI:   81y Female CAD w/ stents on ASA/Plavix, HLD, hypothyroid, anemia s/p trip and fall outside the bank today. Per EMS reported trip and fall witnessed by bystanders w/ LOC. Per EMS, AAOx3 at that time of initial eval. Now AAOx2 and unable to recall how she fell. Now c/o nausea and frontal head pain.    Interval Events: Seen and examined in ED, patient remains AAOx2, which is stable per ED team. Patient recalls being at the bank where she fell but remains unsure how exactly she fell. At this time denies LOC, however LOC witnessed by bystanders. C collar in place. She denies HA, dizziness, chest pain, abdominal pain, altered vision at this time. GCS 15 on exam.     SICU consulted for hemodynamic monitoring, q1 hour neurochecks, and BP monitoring.         Allergies: latex (Rash)  penicillin (Faint)      MEDICATIONS:   --------------------------------------------------------------------------------------  Neurologic Medications  acetaminophen   Tablet .. 650 milliGRAM(s) Oral every 6 hours PRN Temp greater or equal to 38C (100.4F), Mild Pain (1 - 3)  HYDROmorphone  Injectable 0.5 milliGRAM(s) IV Push every 6 hours PRN agitation  levETIRAcetam  IVPB 500 milliGRAM(s) IV Intermittent every 12 hours    Respiratory Medications    Cardiovascular Medications    Gastrointestinal Medications  pantoprazole    Tablet 40 milliGRAM(s) Oral before breakfast  potassium phosphate / sodium phosphate Powder (PHOS-NaK) 1 Packet(s) Oral two times a day  senna 1 Tablet(s) Oral daily  sodium chloride 1.5%. 500 milliLiter(s) IV Continuous <Continuous>    Genitourinary Medications    Hematologic/Oncologic Medications    Antimicrobial/Immunologic Medications    Endocrine/Metabolic Medications  atorvastatin 80 milliGRAM(s) Oral at bedtime  levothyroxine 75 MICROGram(s) Oral daily    Topical/Other Medications  chlorhexidine 0.12% Liquid 15 milliLiter(s) Oral Mucosa every 12 hours    --------------------------------------------------------------------------------------    VITAL SIGNS, INS/OUTS (last 24 hours):  --------------------------------------------------------------------------------------  ICU Vital Signs Last 24 Hrs  T(C): 38.2 (03 May 2021 00:00), Max: 38.2 (03 May 2021 00:00)  T(F): 100.7 (03 May 2021 00:00), Max: 100.7 (03 May 2021 00:00)  HR: 75 (03 May 2021 00:00) (43 - 77)  BP: 101/74 (03 May 2021 00:00) (90/55 - 117/74)  BP(mean): 81 (03 May 2021 00:00) (60 - 86)  ABP: --  ABP(mean): --  RR: 26 (03 May 2021 00:00) (14 - 29)  SpO2: 99% (03 May 2021 00:00) (99% - 100%)    --------------------------------------------------------------------------------------    EXAM  NEUROLOGY  RASS:   	GCS:    Exam: sedated     HEENT  Exam: Normocephalic, atraumatic    RESPIRATORY  Exam: non labored breathing   Mechanical Ventilation: Mode: AC/ CMV (Assist Control/ Continuous Mandatory Ventilation), RR (machine): 16, TV (machine): 300, FiO2: 30, PEEP: 5, ITime: 0.6, MAP: 9, PIP: 23    CARDIOVASCULAR  Exam: S1, S2.     GI/NUTRITION  Exam: Abdomen soft, Non-tender, Non-distended.   Current Diet:  TF@ goal    VASCULAR  Exam: Extremities warm, pink, well-perfused    MUSCULOSKELETAL  Exam:     SKIN  Exam: Good skin turgor, no skin breakdown    METABOLIC/FLUIDS/ELECTROLYTES  potassium phosphate / sodium phosphate Powder (PHOS-NaK) 1 Packet(s) Oral two times a day  sodium chloride 1.5%. 500 milliLiter(s) IV Continuous <Continuous>      HEMATOLOGIC  [x] VTE Prophylaxis:   Transfusions:	[] PRBC	[] Platelets		[] FFP	[] Cryoprecipitate    INFECTIOUS DISEASE  Antimicrobials/Immunologic Medications:    Day #      of     ***    Tubes/Lines/Drains  ***  [x] Peripheral IV  [] Central Venous Line     	[] R	[] L	[] IJ	[] Fem	[] SC	Date Placed:   [] Arterial Line		[] R	[] L	[] Fem	[] Rad	[] Ax	Date Placed:   [] PICC		[] Midline		[] Mediport  [] Urinary Catheter		Date Placed:   [x] Necessity of urinary, arterial, and venous catheters discussed    LABS  --------------------------------------------------------------------------------------  CBC Full  -  ( 02 May 2021 01:50 )  WBC Count : 6.14 K/uL  RBC Count : 3.90 M/uL  Hemoglobin : 9.0 g/dL  Hematocrit : 28.4 %  Platelet Count - Automated : 135 K/uL  Mean Cell Volume : 72.8 fL  Mean Cell Hemoglobin : 23.1 pg  Mean Cell Hemoglobin Concentration : 31.7 gm/dL  Auto Neutrophil # : x  Auto Lymphocyte # : x  Auto Monocyte # : x  Auto Eosinophil # : x  Auto Basophil # : x  Auto Neutrophil % : x  Auto Lymphocyte % : x  Auto Monocyte % : x  Auto Eosinophil % : x  Auto Basophil % : x    05-    144  |  114<H>  |  14  ----------------------------<  146<H>  3.7   |  20<L>  |  0.54    Ca    8.7      02 May 2021 18:33  Phos  2.1     05-  Mg     2.2     05-    TPro  7.5  /  Alb  3.9  /  TBili  0.3  /  DBili  x   /  AST  29  /  ALT  16  /  AlkPhos  77  05-01    LIVER FUNCTIONS - ( 01 May 2021 22:29 )  Alb: 3.9 g/dL / Pro: 7.5 g/dL / ALK PHOS: 77 U/L / ALT: 16 U/L / AST: 29 U/L / GGT: x             Urinalysis Basic - ( 01 May 2021 15:06 )    Color: Light Yellow / Appearance: Clear / S.039 / pH: x  Gluc: x / Ketone: Trace  / Bili: Negative / Urobili: <2 mg/dL   Blood: x / Protein: Trace / Nitrite: Negative   Leuk Esterase: Negative / RBC: 1 /HPF / WBC 2 /HPF   Sq Epi: x / Non Sq Epi: 0 /HPF / Bacteria: Negative      --------------------------------------------------------------------------------------    OTHER LABORATORY:     IMAGING STUDIES:   CXR:     ASSESSMENT:  81y Female with history of NSTEMI, PCI, HLD, hypothyroidism, presents s/p fall found to have R IPH, now s/p intubation for worsening mental status.    PLAN:    NEURO:  Q1H neurochecks  Repeat CTH: increased R parietal IPH, new SAH & SDH  Keppra  Weaned Fentanyl gtt with prn dilaudid    RESPIRATORY:   /18/5/30%    CARDIOVASCULAR:  maintain SBP below 140  Lipitor     GI/NUTRITION:  NPO   OGT  TF@goal  Protonix    GENITOURINARY/RENAL:  Hypertonic 1.5% Na @30 (Goal Na 145-155), q6 BMP  Monitor I&O  Monitor Lytes and replete as necessary     HEMATOLOGIC:  Monitor H/H  S/p antiplatelet reversal with DDAVP & Platelets x1  Holding DVT chemo prophylaxis given bleed  SCDs at all times     INFECTIOUS DISEASE:  No acute issues   Monitor for fevers or elevation in WBC     ENDOCRINE:  home levothyroxine dosage     Lines/Tubes:  Donaldson 5/2  Intubated 5/2  PIVs    DISPO: SICU   Neurosurgery discussed prognosis with family given age and DAPT use and increase in hemorrhage would not like to pursue any surgical intervention, want maximum medical management     Diagnoses: Intraparenchymal hemorrhage  SICU Daily Progress Note  =====================================================  Interval/Overnight Events:       -CT Head am demonstrating slight decrease in hematoma  -weaning fentanyl with prn dilaudid  -started TF, increased to goal  - from 141, decreased 1.5 NS from 125 to 30 cc/hr       HPI:   81y Female CAD w/ stents on ASA/Plavix, HLD, hypothyroid, anemia s/p trip and fall outside the bank today. Per EMS reported trip and fall witnessed by bystanders w/ LOC. Per EMS, AAOx3 at that time of initial eval. Now AAOx2 and unable to recall how she fell. Now c/o nausea and frontal head pain.    Interval Events: Seen and examined in ED, patient remains AAOx2, which is stable per ED team. Patient recalls being at the bank where she fell but remains unsure how exactly she fell. At this time denies LOC, however LOC witnessed by bystanders. C collar in place. She denies HA, dizziness, chest pain, abdominal pain, altered vision at this time. GCS 15 on exam.     SICU consulted for hemodynamic monitoring, q1 hour neurochecks, and BP monitoring.         Allergies: latex (Rash)  penicillin (Faint)      MEDICATIONS:   --------------------------------------------------------------------------------------  Neurologic Medications  acetaminophen   Tablet .. 650 milliGRAM(s) Oral every 6 hours PRN Temp greater or equal to 38C (100.4F), Mild Pain (1 - 3)  HYDROmorphone  Injectable 0.5 milliGRAM(s) IV Push every 6 hours PRN agitation  levETIRAcetam  IVPB 500 milliGRAM(s) IV Intermittent every 12 hours    Respiratory Medications    Cardiovascular Medications    Gastrointestinal Medications  pantoprazole    Tablet 40 milliGRAM(s) Oral before breakfast  potassium phosphate / sodium phosphate Powder (PHOS-NaK) 1 Packet(s) Oral two times a day  senna 1 Tablet(s) Oral daily  sodium chloride 1.5%. 500 milliLiter(s) IV Continuous <Continuous>    Genitourinary Medications    Hematologic/Oncologic Medications    Antimicrobial/Immunologic Medications    Endocrine/Metabolic Medications  atorvastatin 80 milliGRAM(s) Oral at bedtime  levothyroxine 75 MICROGram(s) Oral daily    Topical/Other Medications  chlorhexidine 0.12% Liquid 15 milliLiter(s) Oral Mucosa every 12 hours    --------------------------------------------------------------------------------------    VITAL SIGNS, INS/OUTS (last 24 hours):  --------------------------------------------------------------------------------------  ICU Vital Signs Last 24 Hrs  T(C): 38.2 (03 May 2021 00:00), Max: 38.2 (03 May 2021 00:00)  T(F): 100.7 (03 May 2021 00:00), Max: 100.7 (03 May 2021 00:00)  HR: 75 (03 May 2021 00:00) (43 - 77)  BP: 101/74 (03 May 2021 00:00) (90/55 - 117/74)  BP(mean): 81 (03 May 2021 00:00) (60 - 86)  ABP: --  ABP(mean): --  RR: 26 (03 May 2021 00:00) (14 - 29)  SpO2: 99% (03 May 2021 00:00) (99% - 100%)    --------------------------------------------------------------------------------------    EXAM  NEUROLOGY  RASS:   	GCS:    Exam: sedated     HEENT  Exam: Normocephalic, atraumatic    RESPIRATORY  Exam: non labored breathing   Mechanical Ventilation: Mode: AC/ CMV (Assist Control/ Continuous Mandatory Ventilation), RR (machine): 16, TV (machine): 300, FiO2: 30, PEEP: 5, ITime: 0.6, MAP: 9, PIP: 23    CARDIOVASCULAR  Exam: S1, S2.     GI/NUTRITION  Exam: Abdomen soft, Non-tender, Non-distended.   Current Diet:  TF@ goal    VASCULAR  Exam: Extremities warm, pink, well-perfused    MUSCULOSKELETAL  Exam:     SKIN  Exam: Good skin turgor, no skin breakdown    METABOLIC/FLUIDS/ELECTROLYTES  potassium phosphate / sodium phosphate Powder (PHOS-NaK) 1 Packet(s) Oral two times a day  sodium chloride 1.5%. 500 milliLiter(s) IV Continuous <Continuous>      HEMATOLOGIC  [x] VTE Prophylaxis:   Transfusions:	[] PRBC	[] Platelets		[] FFP	[] Cryoprecipitate    INFECTIOUS DISEASE  Antimicrobials/Immunologic Medications:    Day #      of     ***    Tubes/Lines/Drains  ***  [x] Peripheral IV  [] Central Venous Line     	[] R	[] L	[] IJ	[] Fem	[] SC	Date Placed:   [] Arterial Line		[] R	[] L	[] Fem	[] Rad	[] Ax	Date Placed:   [] PICC		[] Midline		[] Mediport  [] Urinary Catheter		Date Placed:   [x] Necessity of urinary, arterial, and venous catheters discussed    LABS  --------------------------------------------------------------------------------------  CBC Full  -  ( 02 May 2021 01:50 )  WBC Count : 6.14 K/uL  RBC Count : 3.90 M/uL  Hemoglobin : 9.0 g/dL  Hematocrit : 28.4 %  Platelet Count - Automated : 135 K/uL  Mean Cell Volume : 72.8 fL  Mean Cell Hemoglobin : 23.1 pg  Mean Cell Hemoglobin Concentration : 31.7 gm/dL  Auto Neutrophil # : x  Auto Lymphocyte # : x  Auto Monocyte # : x  Auto Eosinophil # : x  Auto Basophil # : x  Auto Neutrophil % : x  Auto Lymphocyte % : x  Auto Monocyte % : x  Auto Eosinophil % : x  Auto Basophil % : x    05-    144  |  114<H>  |  14  ----------------------------<  146<H>  3.7   |  20<L>  |  0.54    Ca    8.7      02 May 2021 18:33  Phos  2.1     05-  Mg     2.2     -    TPro  7.5  /  Alb  3.9  /  TBili  0.3  /  DBili  x   /  AST  29  /  ALT  16  /  AlkPhos  77  05-    LIVER FUNCTIONS - ( 01 May 2021 22:29 )  Alb: 3.9 g/dL / Pro: 7.5 g/dL / ALK PHOS: 77 U/L / ALT: 16 U/L / AST: 29 U/L / GGT: x             Urinalysis Basic - ( 01 May 2021 15:06 )    Color: Light Yellow / Appearance: Clear / S.039 / pH: x  Gluc: x / Ketone: Trace  / Bili: Negative / Urobili: <2 mg/dL   Blood: x / Protein: Trace / Nitrite: Negative   Leuk Esterase: Negative / RBC: 1 /HPF / WBC 2 /HPF   Sq Epi: x / Non Sq Epi: 0 /HPF / Bacteria: Negative      --------------------------------------------------------------------------------------    OTHER LABORATORY:     IMAGING STUDIES:   CXR:     ASSESSMENT:  81y Female with history of NSTEMI, PCI, HLD, hypothyroidism, presents s/p fall found to have R IPH, now s/p intubation for worsening mental status.    PLAN:    NEURO:  Q1H neurochecks  Repeat CTH: increased R parietal IPH, new SAH & SDH  Keppra  Weaned Fentanyl gtt with prn dilaudid    RESPIRATORY:   /18/5/30%    CARDIOVASCULAR:  maintain SBP below 140  Lipitor     GI/NUTRITION:  NPO   OGT  TF@goal  Protonix    GENITOURINARY/RENAL:  Hypertonic 1.5% Na @30 (Goal Na 145-155), q6 BMP  Monitor I&O  Monitor Lytes and replete as necessary     HEMATOLOGIC:  Monitor H/H  S/p antiplatelet reversal with DDAVP & Platelets x1  Holding DVT chemo prophylaxis given bleed  SCDs at all times     INFECTIOUS DISEASE:  No acute issues   Monitor for fevers or elevation in WBC     ENDOCRINE:  home levothyroxine dosage     Lines/Tubes:  Donaldson 5/2  Intubated 5/2  PIVs    DISPO: SICU   Neurosurgery discussed prognosis with family given age and DAPT use and increase in hemorrhage would not like to pursue any surgical intervention, want maximum medical management     Diagnoses: Intraparenchymal hemorrhage  SICU Daily Progress Note  =====================================================  Interval/Overnight Events:       -CT Head am demonstrating slight decrease in hematoma  -weaned fentanyl gtt with prn dilaudid  -started TF, increased to goal  - from 141, decreased 1.5 NS from 125 to 30 cc/hr   -pt rec'd dose dilaudid for agitation then became hypotensive and bradycardiac, started on phenylephrine, repeat CT performed and showed       HPI:   81y Female CAD w/ stents on ASA/Plavix, HLD, hypothyroid, anemia s/p trip and fall outside the bank today. Per EMS reported trip and fall witnessed by bystanders w/ LOC. Per EMS, AAOx3 at that time of initial eval. Now AAOx2 and unable to recall how she fell. Now c/o nausea and frontal head pain.    Interval Events: Seen and examined in ED, patient remains AAOx2, which is stable per ED team. Patient recalls being at the bank where she fell but remains unsure how exactly she fell. At this time denies LOC, however LOC witnessed by bystanders. C collar in place. She denies HA, dizziness, chest pain, abdominal pain, altered vision at this time. GCS 15 on exam.     SICU consulted for hemodynamic monitoring, q1 hour neurochecks, and BP monitoring.         Allergies: latex (Rash)  penicillin (Faint)      MEDICATIONS:   --------------------------------------------------------------------------------------  Neurologic Medications  acetaminophen   Tablet .. 650 milliGRAM(s) Oral every 6 hours PRN Temp greater or equal to 38C (100.4F), Mild Pain (1 - 3)  HYDROmorphone  Injectable 0.5 milliGRAM(s) IV Push every 6 hours PRN agitation  levETIRAcetam  IVPB 500 milliGRAM(s) IV Intermittent every 12 hours    Respiratory Medications    Cardiovascular Medications    Gastrointestinal Medications  pantoprazole    Tablet 40 milliGRAM(s) Oral before breakfast  potassium phosphate / sodium phosphate Powder (PHOS-NaK) 1 Packet(s) Oral two times a day  senna 1 Tablet(s) Oral daily  sodium chloride 1.5%. 500 milliLiter(s) IV Continuous <Continuous>    Genitourinary Medications    Hematologic/Oncologic Medications    Antimicrobial/Immunologic Medications    Endocrine/Metabolic Medications  atorvastatin 80 milliGRAM(s) Oral at bedtime  levothyroxine 75 MICROGram(s) Oral daily    Topical/Other Medications  chlorhexidine 0.12% Liquid 15 milliLiter(s) Oral Mucosa every 12 hours    --------------------------------------------------------------------------------------    VITAL SIGNS, INS/OUTS (last 24 hours):  --------------------------------------------------------------------------------------  ICU Vital Signs Last 24 Hrs  T(C): 38.2 (03 May 2021 00:00), Max: 38.2 (03 May 2021 00:00)  T(F): 100.7 (03 May 2021 00:00), Max: 100.7 (03 May 2021 00:00)  HR: 75 (03 May 2021 00:00) (43 - 77)  BP: 101/74 (03 May 2021 00:00) (90/55 - 117/74)  BP(mean): 81 (03 May 2021 00:00) (60 - 86)  ABP: --  ABP(mean): --  RR: 26 (03 May 2021 00:00) (14 - 29)  SpO2: 99% (03 May 2021 00:00) (99% - 100%)    --------------------------------------------------------------------------------------    EXAM  NEUROLOGY  RASS:   	GCS:    Exam: sedated     HEENT  Exam: Normocephalic, atraumatic    RESPIRATORY  Exam: non labored breathing   Mechanical Ventilation: Mode: AC/ CMV (Assist Control/ Continuous Mandatory Ventilation), RR (machine): 16, TV (machine): 300, FiO2: 30, PEEP: 5, ITime: 0.6, MAP: 9, PIP: 23    CARDIOVASCULAR  Exam: S1, S2.     GI/NUTRITION  Exam: Abdomen soft, Non-tender, Non-distended.   Current Diet:  TF@ goal    VASCULAR  Exam: Extremities warm, pink, well-perfused    MUSCULOSKELETAL  Exam:     SKIN  Exam: Good skin turgor, no skin breakdown    METABOLIC/FLUIDS/ELECTROLYTES  potassium phosphate / sodium phosphate Powder (PHOS-NaK) 1 Packet(s) Oral two times a day  sodium chloride 1.5%. 500 milliLiter(s) IV Continuous <Continuous>      HEMATOLOGIC  [x] VTE Prophylaxis:   Transfusions:	[] PRBC	[] Platelets		[] FFP	[] Cryoprecipitate    INFECTIOUS DISEASE  Antimicrobials/Immunologic Medications:    Day #      of     ***    Tubes/Lines/Drains  ***  [x] Peripheral IV  [] Central Venous Line     	[] R	[] L	[] IJ	[] Fem	[] SC	Date Placed:   [] Arterial Line		[] R	[] L	[] Fem	[] Rad	[] Ax	Date Placed:   [] PICC		[] Midline		[] Mediport  [] Urinary Catheter		Date Placed:   [x] Necessity of urinary, arterial, and venous catheters discussed    LABS  --------------------------------------------------------------------------------------  CBC Full  -  ( 02 May 2021 01:50 )  WBC Count : 6.14 K/uL  RBC Count : 3.90 M/uL  Hemoglobin : 9.0 g/dL  Hematocrit : 28.4 %  Platelet Count - Automated : 135 K/uL  Mean Cell Volume : 72.8 fL  Mean Cell Hemoglobin : 23.1 pg  Mean Cell Hemoglobin Concentration : 31.7 gm/dL  Auto Neutrophil # : x  Auto Lymphocyte # : x  Auto Monocyte # : x  Auto Eosinophil # : x  Auto Basophil # : x  Auto Neutrophil % : x  Auto Lymphocyte % : x  Auto Monocyte % : x  Auto Eosinophil % : x  Auto Basophil % : x    05-    144  |  114<H>  |  14  ----------------------------<  146<H>  3.7   |  20<L>  |  0.54    Ca    8.7      02 May 2021 18:33  Phos  2.1     05-  Mg     2.2     05-    TPro  7.5  /  Alb  3.9  /  TBili  0.3  /  DBili  x   /  AST  29  /  ALT  16  /  AlkPhos  77  05-01    LIVER FUNCTIONS - ( 01 May 2021 22:29 )  Alb: 3.9 g/dL / Pro: 7.5 g/dL / ALK PHOS: 77 U/L / ALT: 16 U/L / AST: 29 U/L / GGT: x             Urinalysis Basic - ( 01 May 2021 15:06 )    Color: Light Yellow / Appearance: Clear / S.039 / pH: x  Gluc: x / Ketone: Trace  / Bili: Negative / Urobili: <2 mg/dL   Blood: x / Protein: Trace / Nitrite: Negative   Leuk Esterase: Negative / RBC: 1 /HPF / WBC 2 /HPF   Sq Epi: x / Non Sq Epi: 0 /HPF / Bacteria: Negative      --------------------------------------------------------------------------------------    OTHER LABORATORY:     IMAGING STUDIES:   CXR:     ASSESSMENT:  81y Female with history of NSTEMI, PCI, HLD, hypothyroidism, presents s/p fall found to have R IPH, now s/p intubation for worsening mental status.    PLAN:    NEURO:  Q1H neurochecks  Repeat CTH: increased R parietal IPH, new SAH & SDH  Keppra  Weaned Fentanyl gtt with prn dilaudid    RESPIRATORY:   /18/5/30%    CARDIOVASCULAR:  maintain SBP below 140  Lipitor     GI/NUTRITION:  NPO   OGT  TF@goal  Protonix    GENITOURINARY/RENAL:  Hypertonic 1.5% Na @30 (Goal Na 145-155), q6 BMP  Monitor I&O  Monitor Lytes and replete as necessary     HEMATOLOGIC:  Monitor H/H  S/p antiplatelet reversal with DDAVP & Platelets x1  Holding DVT chemo prophylaxis given bleed  SCDs at all times     INFECTIOUS DISEASE:  No acute issues   Monitor for fevers or elevation in WBC     ENDOCRINE:  home levothyroxine dosage     Lines/Tubes:  Donaldson 5/2  Intubated 5/2  PIVs    DISPO: SICU   Neurosurgery discussed prognosis with family given age and DAPT use and increase in hemorrhage would not like to pursue any surgical intervention, want maximum medical management     Diagnoses: Intraparenchymal hemorrhage  SICU Daily Progress Note  =====================================================  Interval/Overnight Events:       -CT Head am demonstrating slight decrease in hematoma  -weaned fentanyl gtt with prn dilaudid  -started TF, increased to goal  - from 141, decreased 1.5 NS from 125 to 30 cc/hr   -pt rec'd dose dilaudid for agitation then became hypotensive and bradycardiac, started on phenylephrine, repeat CT performed and was stable, bolused 500cc NS      HPI:   81y Female CAD w/ stents on ASA/Plavix, HLD, hypothyroid, anemia s/p trip and fall outside the bank today. Per EMS reported trip and fall witnessed by bystanders w/ LOC. Per EMS, AAOx3 at that time of initial eval. Now AAOx2 and unable to recall how she fell. Now c/o nausea and frontal head pain.    Interval Events: Seen and examined in ED, patient remains AAOx2, which is stable per ED team. Patient recalls being at the bank where she fell but remains unsure how exactly she fell. At this time denies LOC, however LOC witnessed by bystanders. C collar in place. She denies HA, dizziness, chest pain, abdominal pain, altered vision at this time. GCS 15 on exam.     SICU consulted for hemodynamic monitoring, q1 hour neurochecks, and BP monitoring.         Allergies: latex (Rash)  penicillin (Faint)      MEDICATIONS:   --------------------------------------------------------------------------------------  Neurologic Medications  acetaminophen   Tablet .. 650 milliGRAM(s) Oral every 6 hours PRN Temp greater or equal to 38C (100.4F), Mild Pain (1 - 3)  HYDROmorphone  Injectable 0.5 milliGRAM(s) IV Push every 6 hours PRN agitation  levETIRAcetam  IVPB 500 milliGRAM(s) IV Intermittent every 12 hours    Respiratory Medications    Cardiovascular Medications    Gastrointestinal Medications  pantoprazole    Tablet 40 milliGRAM(s) Oral before breakfast  potassium phosphate / sodium phosphate Powder (PHOS-NaK) 1 Packet(s) Oral two times a day  senna 1 Tablet(s) Oral daily  sodium chloride 1.5%. 500 milliLiter(s) IV Continuous <Continuous>    Genitourinary Medications    Hematologic/Oncologic Medications    Antimicrobial/Immunologic Medications    Endocrine/Metabolic Medications  atorvastatin 80 milliGRAM(s) Oral at bedtime  levothyroxine 75 MICROGram(s) Oral daily    Topical/Other Medications  chlorhexidine 0.12% Liquid 15 milliLiter(s) Oral Mucosa every 12 hours    --------------------------------------------------------------------------------------    VITAL SIGNS, INS/OUTS (last 24 hours):  --------------------------------------------------------------------------------------  ICU Vital Signs Last 24 Hrs  T(C): 38.2 (03 May 2021 00:00), Max: 38.2 (03 May 2021 00:00)  T(F): 100.7 (03 May 2021 00:00), Max: 100.7 (03 May 2021 00:00)  HR: 75 (03 May 2021 00:00) (43 - 77)  BP: 101/74 (03 May 2021 00:00) (90/55 - 117/74)  BP(mean): 81 (03 May 2021 00:00) (60 - 86)  ABP: --  ABP(mean): --  RR: 26 (03 May 2021 00:00) (14 - 29)  SpO2: 99% (03 May 2021 00:00) (99% - 100%)    --------------------------------------------------------------------------------------    EXAM  NEUROLOGY  RASS:   	GCS:    Exam: sedated     HEENT  Exam: Normocephalic, atraumatic    RESPIRATORY  Exam: non labored breathing   Mechanical Ventilation: Mode: AC/ CMV (Assist Control/ Continuous Mandatory Ventilation), RR (machine): 16, TV (machine): 300, FiO2: 30, PEEP: 5, ITime: 0.6, MAP: 9, PIP: 23    CARDIOVASCULAR  Exam: S1, S2.     GI/NUTRITION  Exam: Abdomen soft, Non-tender, Non-distended.   Current Diet:  TF@ goal    VASCULAR  Exam: Extremities warm, pink, well-perfused    MUSCULOSKELETAL  Exam:     SKIN  Exam: Good skin turgor, no skin breakdown    METABOLIC/FLUIDS/ELECTROLYTES  potassium phosphate / sodium phosphate Powder (PHOS-NaK) 1 Packet(s) Oral two times a day  sodium chloride 1.5%. 500 milliLiter(s) IV Continuous <Continuous>      HEMATOLOGIC  [x] VTE Prophylaxis:   Transfusions:	[] PRBC	[] Platelets		[] FFP	[] Cryoprecipitate    INFECTIOUS DISEASE  Antimicrobials/Immunologic Medications:    Day #      of     ***    Tubes/Lines/Drains  ***  [x] Peripheral IV  [] Central Venous Line     	[] R	[] L	[] IJ	[] Fem	[] SC	Date Placed:   [] Arterial Line		[] R	[] L	[] Fem	[] Rad	[] Ax	Date Placed:   [] PICC		[] Midline		[] Mediport  [] Urinary Catheter		Date Placed:   [x] Necessity of urinary, arterial, and venous catheters discussed    LABS  --------------------------------------------------------------------------------------  CBC Full  -  ( 02 May 2021 01:50 )  WBC Count : 6.14 K/uL  RBC Count : 3.90 M/uL  Hemoglobin : 9.0 g/dL  Hematocrit : 28.4 %  Platelet Count - Automated : 135 K/uL  Mean Cell Volume : 72.8 fL  Mean Cell Hemoglobin : 23.1 pg  Mean Cell Hemoglobin Concentration : 31.7 gm/dL  Auto Neutrophil # : x  Auto Lymphocyte # : x  Auto Monocyte # : x  Auto Eosinophil # : x  Auto Basophil # : x  Auto Neutrophil % : x  Auto Lymphocyte % : x  Auto Monocyte % : x  Auto Eosinophil % : x  Auto Basophil % : x    05-    144  |  114<H>  |  14  ----------------------------<  146<H>  3.7   |  20<L>  |  0.54    Ca    8.7      02 May 2021 18:33  Phos  2.1     05-  Mg     2.2     05-    TPro  7.5  /  Alb  3.9  /  TBili  0.3  /  DBili  x   /  AST  29  /  ALT  16  /  AlkPhos  77  05-01    LIVER FUNCTIONS - ( 01 May 2021 22:29 )  Alb: 3.9 g/dL / Pro: 7.5 g/dL / ALK PHOS: 77 U/L / ALT: 16 U/L / AST: 29 U/L / GGT: x             Urinalysis Basic - ( 01 May 2021 15:06 )    Color: Light Yellow / Appearance: Clear / S.039 / pH: x  Gluc: x / Ketone: Trace  / Bili: Negative / Urobili: <2 mg/dL   Blood: x / Protein: Trace / Nitrite: Negative   Leuk Esterase: Negative / RBC: 1 /HPF / WBC 2 /HPF   Sq Epi: x / Non Sq Epi: 0 /HPF / Bacteria: Negative      --------------------------------------------------------------------------------------    OTHER LABORATORY:     IMAGING STUDIES:   CXR:     ASSESSMENT:  81y Female with history of NSTEMI, PCI, HLD, hypothyroidism, presents s/p fall found to have R IPH, now s/p intubation for worsening mental status.    PLAN:    NEURO:  Q1H neurochecks  Repeat CTH: increased R parietal IPH, new SAH & SDH  Keppra  Weaned Fentanyl gtt with prn dilaudid    RESPIRATORY:   /18/5/30%    CARDIOVASCULAR:  maintain SBP below 140  wean phenylephrine gtt  Lipitor     GI/NUTRITION:  NPO   OGT  TF@goal  Protonix    GENITOURINARY/RENAL:  Hypertonic 1.5% Na @30 (Goal Na 145-155), q6 BMP  Monitor I&O  Monitor Lytes and replete as necessary     HEMATOLOGIC:  Monitor H/H  S/p antiplatelet reversal with DDAVP & Platelets x1  Holding DVT chemo prophylaxis given bleed  SCDs at all times     INFECTIOUS DISEASE:  No acute issues   Monitor for fevers or elevation in WBC     ENDOCRINE:  home levothyroxine dosage     Lines/Tubes:  Donaldson 5/2  Intubated 5/2  PIVs    DISPO: SICU   Neurosurgery discussed prognosis with family given age and DAPT use and increase in hemorrhage would not like to pursue any surgical intervention, want maximum medical management     Diagnoses: Intraparenchymal hemorrhage  SICU Daily Progress Note  =====================================================  Interval/Overnight Events:       -CT Head AM demonstrating slight decrease in hematoma  -weaned fentanyl gtt with prn dilaudid  -started TF, increased to goal  - from 141, decreased 1.5 NS from 125 to 30 cc/hr   -pt rec'd dose dilaudid for agitation then became hypotensive and bradycardiac, started on phenylephrine, repeat CT performed and was stable, bolused 500cc NS      HPI:   81y Female CAD w/ stents on ASA/Plavix, HLD, hypothyroid, anemia s/p trip and fall outside the bank today. Per EMS reported trip and fall witnessed by bystanders w/ LOC. Per EMS, AAOx3 at that time of initial eval. Now AAOx2 and unable to recall how she fell. Now c/o nausea and frontal head pain.    Interval Events: Seen and examined in ED, patient remains AAOx2, which is stable per ED team. Patient recalls being at the bank where she fell but remains unsure how exactly she fell. At this time denies LOC, however LOC witnessed by bystanders. C collar in place. She denies HA, dizziness, chest pain, abdominal pain, altered vision at this time. GCS 15 on exam.     SICU consulted for hemodynamic monitoring, q1 hour neurochecks, and BP monitoring.         Allergies: latex (Rash)  penicillin (Faint)      MEDICATIONS:   --------------------------------------------------------------------------------------  Neurologic Medications  acetaminophen   Tablet .. 650 milliGRAM(s) Oral every 6 hours PRN Temp greater or equal to 38C (100.4F), Mild Pain (1 - 3)  HYDROmorphone  Injectable 0.5 milliGRAM(s) IV Push every 6 hours PRN agitation  levETIRAcetam  IVPB 500 milliGRAM(s) IV Intermittent every 12 hours    Respiratory Medications    Cardiovascular Medications    Gastrointestinal Medications  pantoprazole    Tablet 40 milliGRAM(s) Oral before breakfast  potassium phosphate / sodium phosphate Powder (PHOS-NaK) 1 Packet(s) Oral two times a day  senna 1 Tablet(s) Oral daily  sodium chloride 1.5%. 500 milliLiter(s) IV Continuous <Continuous>    Genitourinary Medications    Hematologic/Oncologic Medications    Antimicrobial/Immunologic Medications    Endocrine/Metabolic Medications  atorvastatin 80 milliGRAM(s) Oral at bedtime  levothyroxine 75 MICROGram(s) Oral daily    Topical/Other Medications  chlorhexidine 0.12% Liquid 15 milliLiter(s) Oral Mucosa every 12 hours    --------------------------------------------------------------------------------------    VITAL SIGNS, INS/OUTS (last 24 hours):  --------------------------------------------------------------------------------------  ICU Vital Signs Last 24 Hrs  T(C): 38.2 (03 May 2021 00:00), Max: 38.2 (03 May 2021 00:00)  T(F): 100.7 (03 May 2021 00:00), Max: 100.7 (03 May 2021 00:00)  HR: 75 (03 May 2021 00:00) (43 - 77)  BP: 101/74 (03 May 2021 00:00) (90/55 - 117/74)  BP(mean): 81 (03 May 2021 00:00) (60 - 86)  ABP: --  ABP(mean): --  RR: 26 (03 May 2021 00:00) (14 - 29)  SpO2: 99% (03 May 2021 00:00) (99% - 100%)    --------------------------------------------------------------------------------------    EXAM  NEUROLOGY  GCS:8    Follows motor commands on R  + Babinski on L     HEENT  Exam: Normocephalic, atraumatic    RESPIRATORY  Exam: non labored breathing   Mechanical Ventilation: Mode: AC/ CMV (Assist Control/ Continuous Mandatory Ventilation), RR (machine): 16, TV (machine): 300, FiO2: 30, PEEP: 5, ITime: 0.6, MAP: 9, PIP: 23    CARDIOVASCULAR  Exam: S1, S2.     GI/NUTRITION  Exam: Abdomen soft, Non-tender, Non-distended.   Current Diet:  TF@ goal    VASCULAR  Exam: Extremities warm, pink, well-perfused    MUSCULOSKELETAL  Exam:     SKIN  Exam: Good skin turgor, no skin breakdown    METABOLIC/FLUIDS/ELECTROLYTES  potassium phosphate / sodium phosphate Powder (PHOS-NaK) 1 Packet(s) Oral two times a day  sodium chloride 1.5%. 500 milliLiter(s) IV Continuous <Continuous>      HEMATOLOGIC  [x] VTE Prophylaxis:   Transfusions:	[] PRBC	[] Platelets		[] FFP	[] Cryoprecipitate    INFECTIOUS DISEASE  Antimicrobials/Immunologic Medications:    Day #      of     ***    Tubes/Lines/Drains  ***  [x] Peripheral IV  [] Central Venous Line     	[] R	[] L	[] IJ	[] Fem	[] SC	Date Placed:   [] Arterial Line		[] R	[] L	[] Fem	[] Rad	[] Ax	Date Placed:   [] PICC		[] Midline		[] Mediport  [] Urinary Catheter		Date Placed:   [x] Necessity of urinary, arterial, and venous catheters discussed    LABS  --------------------------------------------------------------------------------------  CBC Full  -  ( 02 May 2021 01:50 )  WBC Count : 6.14 K/uL  RBC Count : 3.90 M/uL  Hemoglobin : 9.0 g/dL  Hematocrit : 28.4 %  Platelet Count - Automated : 135 K/uL  Mean Cell Volume : 72.8 fL  Mean Cell Hemoglobin : 23.1 pg  Mean Cell Hemoglobin Concentration : 31.7 gm/dL  Auto Neutrophil # : x  Auto Lymphocyte # : x  Auto Monocyte # : x  Auto Eosinophil # : x  Auto Basophil # : x  Auto Neutrophil % : x  Auto Lymphocyte % : x  Auto Monocyte % : x  Auto Eosinophil % : x  Auto Basophil % : x    05-    144  |  114<H>  |  14  ----------------------------<  146<H>  3.7   |  20<L>  |  0.54    Ca    8.7      02 May 2021 18:33  Phos  2.1     05-  Mg     2.2     05-    TPro  7.5  /  Alb  3.9  /  TBili  0.3  /  DBili  x   /  AST  29  /  ALT  16  /  AlkPhos  77  05-01    LIVER FUNCTIONS - ( 01 May 2021 22:29 )  Alb: 3.9 g/dL / Pro: 7.5 g/dL / ALK PHOS: 77 U/L / ALT: 16 U/L / AST: 29 U/L / GGT: x             Urinalysis Basic - ( 01 May 2021 15:06 )    Color: Light Yellow / Appearance: Clear / S.039 / pH: x  Gluc: x / Ketone: Trace  / Bili: Negative / Urobili: <2 mg/dL   Blood: x / Protein: Trace / Nitrite: Negative   Leuk Esterase: Negative / RBC: 1 /HPF / WBC 2 /HPF   Sq Epi: x / Non Sq Epi: 0 /HPF / Bacteria: Negative      --------------------------------------------------------------------------------------    OTHER LABORATORY:     IMAGING STUDIES:   CXR:

## 2021-05-03 NOTE — DIETITIAN INITIAL EVALUATION ADULT. - PROBLEM SELECTOR PLAN 1
- admit to SICU under neurosurgery Dr. Hill  - Q1 hr neuro checks   - CTH at 6pm   - repeat CTH sooner if mental status changes  - neurology  - keppra 500 bid   - NPO for now   - if CT Cspine read as negative can clinically clear collar   - reverse AC with ddavp/platelets  - draw [id number] level   - SBP <140     Case discussed with attending neurosurgeon.

## 2021-05-04 NOTE — PROGRESS NOTE ADULT - SUBJECTIVE AND OBJECTIVE BOX
SICU Daily Progress Note  =====================================================  Interval/Overnight Events:         Pt was on Full Vent Support 16/300/+5/21%  overnight . Tolerating Tube feeds  @ 45 ml .  Ofirmev was  given for pain control . Continues to be on salt tabs for a goal Sodium : 145 -155 . More spontaneous movement noted LLE ( 2/5 ) , LUE remains 0/5  Average Urine Output : 20-25 ml /hr . No imaging  done overnight     ==============================================================================  HPI:   81y Female CAD w/ stents on ASA/Plavix, HLD, hypothyroid, anemia s/p trip and fall outside the bank today. Per EMS reported trip and fall witnessed by bystanders w/ LOC. Per EMS, AAOx3 at that time of initial eval. Now AAOx2 and unable to recall how she fell. Now c/o nausea and frontal head pain.    Interval Events: Seen and examined in ED, patient remains AAOx2, which is stable per ED team. Patient recalls being at the bank where she fell but remains unsure how exactly she fell. At this time denies LOC, however LOC witnessed by bystanders. C collar in place. She denies HA, dizziness, chest pain, abdominal pain, altered vision at this time. GCS 15 on exam.     SICU consulted for hemodynamic monitoring, q1 hour neurochecks, and BP monitoring.         Allergies: latex (Rash)  penicillin (Faint)      MEDICATIONS:   --------------------------------------------------------------------------------------  Neurologic Medications:  levETIRAcetam  IVPB 500 milliGRAM(s) IV Intermittent every 12 hours    Respiratory Medications:    Cardiovascular Medications:    Gastrointestinal Medications:  pantoprazole   Suspension 40 milliGRAM(s) Oral daily  senna Syrup 15 milliLiter(s) Oral at bedtime  sodium chloride 2 Gram(s) Oral every 8 hours    Genitourinary Medications:    Hematologic/Oncologic Medications:  heparin   Injectable 5000 Unit(s) SubCutaneous every 8 hours    Antimicrobials/Immunologic Medications:    Endocrine/Metabolic Medications:  atorvastatin 80 milliGRAM(s) Oral at bedtime  levothyroxine 75 MICROGram(s) Oral daily    Topical/Other Medications:  chlorhexidine 0.12% Liquid 15 milliLiter(s) Oral Mucosa every 12 hours    --------------------------------------------------------------------------------------    VITAL SIGNS, INS/OUTS (last 24 hours):  --------------------------------------------------------------------------------------  T(C): 37.1 (05-04-21 @ 00:00), Max: 38.3 (05-03-21 @ 02:50)  HR: 73 (05-04-21 @ 00:00) (48 - 66)  BP: 109/58 (05-04-21 @ 00:00) (68/44 - 133/42)  BP(mean): 70 (05-04-21 @ 00:00) (49 - 89)  ABP: 79/48 (05-04-21 @ 00:00) (76/46 - 118/67)  ABP(mean): 61 (05-04-21 @ 00:00) (59 - 89)  RR: 24 (05-04-21 @ 00:00) (16 - 33)  SpO2: 99% (05-04-21 @ 00:00) (96% - 100%)  Wt(kg): --  CVP(mm Hg): --  CI: --  CAPILLARY BLOOD GLUCOSE       N/A      05-02 @ 07:01  -  05-03 @ 07:00  --------------------------------------------------------  IN:    IV PiggyBack: 150 mL    Jevity 1.2: 690 mL    Phenylephrine: 151.4 mL    Sodium Chloride 0.9% Bolus: 500 mL    sodium chloride 1.5%: 2115 mL  Total IN: 3606.4 mL    OUT:    Indwelling Catheter - Urethral (mL): 950 mL  Total OUT: 950 mL    Total NET: 2656.4 mL      05-03 @ 07:01  -  05-04 @ 00:46  --------------------------------------------------------  IN:    IV PiggyBack: 100 mL    Jevity 1.2: 702 mL    Oral Fluid: 150 mL    Sodium Chloride 0.9% Bolus: 1000 mL    sodium chloride 1.5%: 90 mL  Total IN: 2042 mL    OUT:    Indwelling Catheter - Urethral (mL): 355 mL  Total OUT: 355 mL    Total NET: 1687 mL        --------------------------------------------------------------------------------------    EXAM  NEUROLOGY  GCS: 8 T     Eyelid  opening apraxia   b/l Pupils : 3 mm equal and reactive   Follows Commands on the RIGHT Side  RUE : 3/5 ( shows 2 fingers and antigravity)   RLE: 2/5  LUE: 1/5 to deep noxious  LLE: spontaneous movt 2/5 ( not to commands )         HEENT  Exam: Normocephalic, atraumatic    RESPIRATORY  Exam: coarse breath sounds b/l , equal  chest expansion   AC Vent  Support : 16/300/+5/21%     CARDIOVASCULAR  Exam: S1, S2 heard     GI/NUTRITION  Exam: Abdomen soft, Non-tender, Non-distended.   Current Diet:  TF@ goal ( via OGT )     VASCULAR  Exam: Extremities warm, pink, well-perfused    MUSCULOSKELETAL  Exam: Weakly follows with RUE , spontaneous movt seen LLE ,   LUE 1/5 to deep noxious commands     SKIN  Exam: Good skin turgor, no skin breakdown    METABOLIC/FLUIDS/ELECTROLYTES  potassium phosphate / sodium phosphate Powder (PHOS-NaK) 1 Packet(s) Oral two times a day  sodium chloride 1.5%. 500 milliLiter(s) IV Continuous <Continuous>      HEMATOLOGIC  [x] VTE Prophylaxis:   Transfusions:	[] PRBC	[] Platelets		[] FFP	[] Cryoprecipitate    INFECTIOUS DISEASE  Antimicrobials/Immunologic Medications:    Day #      of     ***    Tubes/Lines/Drains  ***  [x] Peripheral IV  [] Central Venous Line     	[] R	[] L	[] IJ	[] Fem	[] SC	Date Placed:   [] Arterial Line		[] R	[] L	[] Fem	[] Rad	[] Ax	Date Placed:   [] PICC		[] Midline		[] Mediport  [X] Urinary Catheter		Date Placed:   [x] Necessity of urinary, arterial, and venous catheters discussed    LABS  --------------------------------------------------------------------------------------      --------------------------------------------------------------------------------------    OTHER LABORATORY:     IMAGING STUDIES:   CXR:      SICU Daily Progress Note  =====================================================  Interval/Overnight Events:         Pt was on Full Vent Support 16/300/+5/21%  overnight . Tolerating Tube feeds  @ 45 ml .  Ofirmev was  given for pain control . Continues to be on salt tabs for a goal Sodium : 145 -155 . More spontaneous movement noted LLE ( 2/5 ) , LUE remains 0/5  Average Urine Output : 20-25 ml /hr . No imaging  done overnight     ==============================================================================  HPI:   81y Female CAD w/ stents on ASA/Plavix, HLD, hypothyroid, anemia s/p trip and fall outside the bank today. Per EMS reported trip and fall witnessed by bystanders w/ LOC. Per EMS, AAOx3 at that time of initial eval. Now AAOx2 and unable to recall how she fell. Now c/o nausea and frontal head pain.    Interval Events: Seen and examined in ED, patient remains AAOx2, which is stable per ED team. Patient recalls being at the bank where she fell but remains unsure how exactly she fell. At this time denies LOC, however LOC witnessed by bystanders. C collar in place. She denies HA, dizziness, chest pain, abdominal pain, altered vision at this time. GCS 15 on exam.     SICU consulted for hemodynamic monitoring, q1 hour neurochecks, and BP monitoring.         Allergies: latex (Rash)  penicillin (Faint)      MEDICATIONS:   --------------------------------------------------------------------------------------  Neurologic Medications:  levETIRAcetam  IVPB 500 milliGRAM(s) IV Intermittent every 12 hours    Respiratory Medications:    Cardiovascular Medications:    Gastrointestinal Medications:  pantoprazole   Suspension 40 milliGRAM(s) Oral daily  senna Syrup 15 milliLiter(s) Oral at bedtime  sodium chloride 2 Gram(s) Oral every 8 hours    Genitourinary Medications:    Hematologic/Oncologic Medications:  heparin   Injectable 5000 Unit(s) SubCutaneous every 8 hours    Antimicrobials/Immunologic Medications:    Endocrine/Metabolic Medications:  atorvastatin 80 milliGRAM(s) Oral at bedtime  levothyroxine 75 MICROGram(s) Oral daily    Topical/Other Medications:  chlorhexidine 0.12% Liquid 15 milliLiter(s) Oral Mucosa every 12 hours    --------------------------------------------------------------------------------------    VITAL SIGNS, INS/OUTS (last 24 hours):  --------------------------------------------------------------------------------------  T(C): 37.1 (05-04-21 @ 00:00), Max: 38.3 (05-03-21 @ 02:50)  HR: 73 (05-04-21 @ 00:00) (48 - 66)  BP: 109/58 (05-04-21 @ 00:00) (68/44 - 133/42)  BP(mean): 70 (05-04-21 @ 00:00) (49 - 89)  ABP: 79/48 (05-04-21 @ 00:00) (76/46 - 118/67)  ABP(mean): 61 (05-04-21 @ 00:00) (59 - 89)  RR: 24 (05-04-21 @ 00:00) (16 - 33)  SpO2: 99% (05-04-21 @ 00:00) (96% - 100%)  Wt(kg): --  CVP(mm Hg): --  CI: --  CAPILLARY BLOOD GLUCOSE       N/A      05-02 @ 07:01  -  05-03 @ 07:00  --------------------------------------------------------  IN:    IV PiggyBack: 150 mL    Jevity 1.2: 690 mL    Phenylephrine: 151.4 mL    Sodium Chloride 0.9% Bolus: 500 mL    sodium chloride 1.5%: 2115 mL  Total IN: 3606.4 mL    OUT:    Indwelling Catheter - Urethral (mL): 950 mL  Total OUT: 950 mL    Total NET: 2656.4 mL      05-03 @ 07:01  -  05-04 @ 00:46  --------------------------------------------------------  IN:    IV PiggyBack: 100 mL    Jevity 1.2: 702 mL    Oral Fluid: 150 mL    Sodium Chloride 0.9% Bolus: 1000 mL    sodium chloride 1.5%: 90 mL  Total IN: 2042 mL    OUT:    Indwelling Catheter - Urethral (mL): 355 mL  Total OUT: 355 mL    Total NET: 1687 mL        --------------------------------------------------------------------------------------    EXAM  NEUROLOGY  GCS: 8 T     Eyelid  opening apraxia   b/l Pupils : 3 mm equal and reactive   Follows Commands on the RIGHT Side  RUE : 3/5 ( shows 2 fingers and antigravity)   RLE: 2/5  LUE: 1/5 to deep noxious  LLE: spontaneous movt 2/5 ( not to commands )         HEENT  Exam: Normocephalic, Right Eye orbital edema     RESPIRATORY  Exam: coarse breath sounds b/l , equal  chest expansion   AC Vent  Support : 16/300/+5/21% -> CPAP trial 10/5     CARDIOVASCULAR  Exam: S1, S2 heard     GI/NUTRITION  Exam: Abdomen soft, Non-tender, Non-distended.   Current Diet:  TF@ goal ( via OGT )     VASCULAR  Exam: Extremities warm, pink, well-perfused    MUSCULOSKELETAL  Exam: Weakly follows with RUE , spontaneous movt seen LLE ,   LUE 1/5 to deep noxious commands     SKIN  Exam: Good skin turgor, no skin breakdown    METABOLIC/FLUIDS/ELECTROLYTES  potassium phosphate / sodium phosphate Powder (PHOS-NaK) 1 Packet(s) Oral two times a day  sodium chloride 1.5%. 500 milliLiter(s) IV Continuous <Continuous>      HEMATOLOGIC  [x] VTE Prophylaxis:   Transfusions:	[] PRBC	[] Platelets		[] FFP	[] Cryoprecipitate    INFECTIOUS DISEASE  Antimicrobials/Immunologic Medications:    Day #      of     ***    Tubes/Lines/Drains  ***  [x] Peripheral IV  [] Central Venous Line     	[] R	[] L	[] IJ	[] Fem	[] SC	Date Placed:   [] Arterial Line		[] R	[] L	[] Fem	[] Rad	[] Ax	Date Placed:   [] PICC		[] Midline		[] Mediport  [X] Urinary Catheter		Date Placed:   [x] Necessity of urinary, arterial, and venous catheters discussed    LABS  --------------------------------------------------------------------------------------      --------------------------------------------------------------------------------------    OTHER LABORATORY:     IMAGING STUDIES:   CXR:

## 2021-05-04 NOTE — PROGRESS NOTE ADULT - ASSESSMENT
82 YO Female with Right parietooccipital IPH    5/3: bradycardic 40's, 50's o/n, hypotensive SBP 70's, phenylephrine started, CTH done appears stable, neurological exam stable, on keppra, On 1.5%  5/4: Stable exam. On NaCl 2gm Q8H, Keppra

## 2021-05-04 NOTE — CONSULT NOTE ADULT - PROBLEM SELECTOR RECOMMENDATION 9
IPH with SDH not a surgical candidate  family hopeful for recovery  as per daughter, pt did have underlying dementia- mild- mod- they were in process of getting evaluation

## 2021-05-04 NOTE — CONSULT NOTE ADULT - SUBJECTIVE AND OBJECTIVE BOX
HPI:  HPI: 81y Female CAD w/ stents on ASA/Plavix, HLD, hypothyroid, anemia s/p trip and fall outside the bank today. Per EMS reported trip and fall witnessed by bystanders w/ LOC. Per EMS, AAOx3 at that time of initial eval. Now AAOx2 and unable to recall how she fell. Now c/o nausea and frontal head pain.    RADIOLOGY:   < from: CT Brain Stroke Protocol (05.01.21 @ 14:03) >  FINDINGS:    4.1 x 4.0 cm acute right frontal intraparenchymal hemorrhage with associated sulcal effacement. There is no midline shift. There is no hydrocephalus.    The brain demonstrates periventricular hypoattenuation, nonspecific in etiology but likely representing chronic microvascular ischemic changes.  No abnormal extra-axial fluid collections are present.    The ventricles and sulci are within normal limits for patient's age.  The basal cisterns are patent.    The orbits are unremarkable. The paranasal sinuses are clear. The mastoid air cells are clear. The calvarium is intact.    IMPRESSION:  Acute right frontal intraparenchymal hemorrhage with associated sulcal effacement. There is no midline shift or hydrocephalus.    MEDS:  desmopressin IVPB 30 MICROGram(s) IV Intermittent Once  levETIRAcetam  IVPB 1500 milliGRAM(s) IV Intermittent once      PHYSICAL EXAM:  Vital Signs Last 24 Hrs  T(C): 36.8 (01 May 2021 13:03), Max: 36.8 (01 May 2021 13:03)  T(F): 98.3 (01 May 2021 13:03), Max: 98.3 (01 May 2021 13:03)  HR: 84 (01 May 2021 13:03) (84 - 84)  BP: 153/56 (01 May 2021 13:03) (153/56 - 153/56)  BP(mean): --  RR: 18 (01 May 2021 13:03) (18 - 18)  SpO2: 100% (01 May 2021 13:03) (100% - 100%)    Oriented to self and place, not year, confused when asked how she fell, but remembers that she fell   Follows simple commands   PERRL, EOMI, face symmetrical   HART x 4- LUE weaker than right, antigravity   Sensation intact to light touch   No pronator drift   in C collar     LABS:                        11.2   5.07  )-----------( 132      ( 01 May 2021 13:49 )             37.5     05-01    141  |  106  |  22  ----------------------------<  118<H>  3.7   |  21<L>  |  0.76    Ca    9.8      01 May 2021 13:49    TPro  7.9  /  Alb  4.1  /  TBili  0.3  /  DBili  x   /  AST  23  /  ALT  9   /  AlkPhos  85  05-01               (01 May 2021 14:29)    PERTINENT PM/SXH:   Hypothyroidism    Hypercholesterolemia    MVP (mitral valve prolapse)    Carotid artery stenosis    Uterine cancer    Arthritis    Anemia    DM2 (diabetes mellitus, type 2)    PUD (peptic ulcer disease)    CAD (coronary artery disease)    History of prediabetes      H/O total knee replacement    H/O: hysterectomy    PUD (peptic ulcer disease)    Rectal cyst    Stented coronary artery      FAMILY HISTORY:    ITEMS NOT CHECKED ARE NOT PRESENT    SOCIAL HISTORY:   Significant other/partner:  [x ]  Children:  [x ]  Sikh/Spirituality:  Substance hx:  [ ]   Tobacco hx:  [ ]   Alcohol hx: [ ]   Home Opioid hx:  [ ] I-Stop Reference No:  Living Situation: [x ]Home  [ ]Long term care  [ ]Rehab [ ]Other    ADVANCE DIRECTIVES:    DNR  MOLST  [ ]  Living Will  [ ]   DECISION MAKER(s):  [ ] Health Care Proxy(s)  [ ] Surrogate(s)  [ ] Guardian           Name(s): Phone Number(s):  Yun Dobbs [phone]    BASELINE (I)ADL(s) (prior to admission):  [place]: [x ]Total  [ ] Moderate [ ]Dependent    Allergies    latex (Rash)  penicillin (Faint)    Intolerances    MEDICATIONS  (STANDING):  atorvastatin 80 milliGRAM(s) Oral at bedtime  chlorhexidine 0.12% Liquid 15 milliLiter(s) Oral Mucosa every 12 hours  heparin   Injectable 5000 Unit(s) SubCutaneous every 8 hours  levETIRAcetam  IVPB 500 milliGRAM(s) IV Intermittent every 12 hours  levothyroxine 75 MICROGram(s) Oral daily  pantoprazole   Suspension 40 milliGRAM(s) Oral daily  potassium phosphate / sodium phosphate Powder (PHOS-NaK) 2 Packet(s) Oral three times a day  senna Syrup 15 milliLiter(s) Oral at bedtime  sodium chloride 2 Gram(s) Oral every 8 hours    MEDICATIONS  (PRN):    PRESENT SYMPTOMS: [ x]Unable to obtain due to poor mentation   Source if other than patient:  [ ]Family   [ ]Team     Pain (Impact on QOL):    Location -         Minimal acceptable level (0-10 scale):                    Aggrevating factors -  Quality -  Radiation -  Severity (0-10 scale) -    Timing -    PAIN AD Score:     [email or web address] (press ctrl +  left click to view)    Dyspnea:                           [ ]Mild [ ]Moderate [ ]Severe  Anxiety:                             [ ]Mild [ ]Moderate [ ]Severe  Fatigue:                             [ ]Mild [ ]Moderate [ ]Severe  Nausea:                             [ ]Mild [ ]Moderate [ ]Severe  Loss of appetite:              [ ]Mild [ ]Moderate [ ]Severe  Constipation:                    [ ]Mild [ ]Moderate [ ]Severe    Other Symptoms:  [ ]All other review of systems negative     Karnofsky Performance Score/Palliative Performance Status Version 2:   10      %  PHYSICAL EXAM:  Vital Signs Last 24 Hrs  T(C): 37.3 (04 May 2021 12:00), Max: 37.6 (03 May 2021 20:00)  T(F): 99.2 (04 May 2021 12:00), Max: 99.6 (03 May 2021 20:00)  HR: 95 (04 May 2021 13:00) (56 - 662)  BP: 144/744 (04 May 2021 13:00) (105/86 - 145/74)  BP(mean): 90 (04 May 2021 13:00) (68 - 97)  RR: 25 (04 May 2021 13:00) (17 - 33)  SpO2: 100% (04 May 2021 13:00) (96% - 100%) I&O's Summary    03 May 2021 07:01  -  04 May 2021 07:00  --------------------------------------------------------  IN: 2617 mL / OUT: 480 mL / NET: 2137 mL    04 May 2021 07:01  -  04 May 2021 13:21  --------------------------------------------------------  IN: 270 mL / OUT: 315 mL / NET: -45 mL    GENERAL:  [ ]Alert  [ ]Oriented x   [ ]Lethargic  [ ]Cachexia  [x ]Unarousable  [ ]Verbal  [ ]Non-Verbal  Behavioral:   [ ] Anxiety  [ ] Delirium [ ] Agitation [ ] Other  HEENT:  [ ]Normal   [ ]Dry mouth   [x ]ET Tube/Trach  [ ]Oral lesions  PULMONARY:   [ x]Clear [ ]Tachypnea  [ ]Audible excessive secretions   [ ]Rhonchi        [ ]Right [ ]Left [ ]Bilateral  [ ]Crackles        [ ]Right [ ]Left [ ]Bilateral  [ ]Wheezing     [ ]Right [ ]Left [ ]Bilateral  CARDIOVASCULAR:    [ ]Regular [ ]Irregular [x ]Tachy  [ ]Sal [ ]Murmur [ ]Other  GASTROINTESTINAL:  [x ]Soft  [ ]Distended   [x ]+BS  [ ]Non tender [ ]Tender  [ ]PEG [ ]OGT/ NGT  Last BM:   05-04-21 @ 07:01  -  05-04-21 @ 13:21  --------------------------------------------------------  OUT: 150 mL    GENITOURINARY:  [ ]Normal [ ] Incontinent   [ ]Oliguria/Anuria   [ x]Donaldson  MUSCULOSKELETAL:   [ ]Normal   [ ]Weakness  [ x]Bed/Wheelchair bound [ ]Edema  NEUROLOGIC:   [ ]No focal deficits  [ ] Cognitive impairment  [ ] Dysphagia [ ]Dysarthria [ ] Paresis [x ]Other unresponsive  SKIN:   [x ]Normal   [ ]Pressure ulcer(s)  [ ]Rash    CRITICAL CARE:  [ ] Shock Present  [ ]Septic [ ]Cardiogenic [ ]Neurologic [ ]Hypovolemic  [ ]  Vasopressors [ ]  Inotropes   [ ] Respiratory failure present  [ ] Acute  [ ] Chronic [ ] Hypoxic  [ ] Hypercarbic [ ] Other  [ ] Other organ failure     LABS:                        8.8    9.41  )-----------( 135      ( 04 May 2021 12:47 )             28.4   05-04    144  |  116<H>  |  17  ----------------------------<  140<H>  3.6   |  19<L>  |  0.56    Ca    8.8      04 May 2021 01:15  Phos  1.9     05-04  Mg     2.2     05-04          RADIOLOGY & ADDITIONAL STUDIES:    PROTEIN CALORIE MALNUTRITION:   [ ] PPSV2 < or = to 30% [ ] significant weight loss  [ ] poor nutritional intake [ ] catabolic state [ ] anasarca     Albumin, Serum: 3.9 g/dL (05-01-21 @ 22:29)  Artificial Nutrition [ ]     REFERRALS:   [ ]Chaplaincy  [ ] Hospice  [ ]Child Life  [ ]Social Work  [ ]Case management [ ]Holistic Therapy   Goals of Care Discussion Document:

## 2021-05-04 NOTE — CONSULT NOTE ADULT - ASSESSMENT
81y Female CAD w/ stents on ASA/Plavix, HLD, hypothyroid, anemia s/p trip and fall found to have IPH and SDH.  Not a surgical candidate.  Asked to see for goc

## 2021-05-04 NOTE — CONSULT NOTE ADULT - PROBLEM/RECOMMENDATION-3
DISPLAY PLAN FREE TEXT
I personally performed the service described in the documentation recorded by the scribe in my presence, and it accurately and completely records my words and actions.

## 2021-05-04 NOTE — CONSULT NOTE ADULT - ASSESSMENT
81y Female CAD w/ stents on ASA/Plavix, HLD, hypothyroid, anemia, s/p fall, now with R frontal intraparenchymal hemorrhage increased on repeat imaging s/p DDAVP/Plt. Remains intubated for poor mental status. Surgery consulted for trach/PEG.     - Family amenable to trach/PEG however would like to proceed with MRI for prognostication as offered by Neurology prior to proceeding  - Will coordinate timing of Trach/PEG with SICU    Toya Gardner, PGY2  B Team Surgery [id number]   81y Female CAD w/ stents on ASA/Plavix, HLD, hypothyroid, anemia, s/p fall, now with R frontal intraparenchymal hemorrhage increased on repeat imaging s/p DDAVP/Plt. Remains intubated for poor mental status. Surgery consulted for trach/PEG.     - Family amenable to trach/PEG however would like to proceed with MRI for prognostication prior to proceeding  - Will coordinate timing of Trach/PEG with SICU    Toya Gardner, PGY2  B Team Surgery [id number]

## 2021-05-04 NOTE — PROGRESS NOTE ADULT - ASSESSMENT
ASSESSMENT:  81y Female with history of NSTEMI, PCI, HLD, hypothyroidism, presents s/p fall found to have R IPH, now s/p intubation for worsening mental status remains on Full Vent Support       PLAN:    NEURO:  HD # 4 Non Traumatic  ICH    Q1H neuro checks  Follows commands on R side  GCS 8 on 5/4  Repeat CTH: increased R parietal IPH, new SAH & SDH  Keppra 500 BID ( seizure prophylaxis )   Off SEDATION   palliative consult for GOC    RESPIRATORY:   was on AC 16/300/+5/21%   Will Attempt Breathing trials as tolerated       CARDIOVASCULAR:  goal MAP 65-70  Not on pressors   Lipitor     GI/NUTRITION:  NPO / OGT  TF@goal  Protonix    GENITOURINARY/RENAL:  salt tabs to maintain Na 145-155  Donaldson in place   Monitor I&O  Monitor Lytes and replete as necessary   Check BMP in afternoon for Phos level    HEMATOLOGIC:  Monitor H/H  S/p antiplatelet reversal with DDAVP & Platelets x1  SQH     INFECTIOUS DISEASE:  No acute issues   Monitor for fevers or elevation in WBC     ENDOCRINE:  home levothyroxine dosage     Lines/Tubes:  Donaldson 5/2  ETT  5/2  OGT   PIVs    Neurosurgery discussed prognosis with family given age and DAPT use and increase in hemorrhage would not like to pursue any surgical intervention, want maximum medical management     Diagnoses: Intraparenchymal hemorrhage  ASSESSMENT:  81y Female with history of NSTEMI, PCI, HLD, hypothyroidism, presents s/p fall found to have R IPH, now s/p intubation for worsening mental status remains on Full Vent Support       PLAN:    NEURO:  HD # 4 Non Traumatic  ICH    Q1H neuro checks  Follows commands on R side  GCS 8 on 5/4  Repeat CTH: increased R parietal IPH, new SAH & SDH  Keppra 500 BID ( seizure prophylaxis )   Off SEDATION   palliative consult for GOC on 5/4    RESPIRATORY:   was on AC 16/300/+5/21%, Currently on CPAP 10/5  Will Attempt Breathing trials as tolerated       CARDIOVASCULAR:  goal MAP 65-70  Not on pressors   Lipitor   F/u PM CBC  GI/NUTRITION:  NPO / OGT  TF@goal  Protonix    GENITOURINARY/RENAL:  salt tabs to maintain Na 145-155  Donaldson in place   Monitor I&O  Monitor Lytes and replete as necessary   Check BMP in afternoon for Phos level    HEMATOLOGIC:  Monitor H/H  S/p antiplatelet reversal with DDAVP & Platelets x1  SQH     INFECTIOUS DISEASE:  No acute issues   Monitor for fevers or elevation in WBC     ENDOCRINE:  home levothyroxine dosage     Lines/Tubes:  Donaldson 5/2  ETT  5/2  OGT   PIVs    Neurosurgery discussed prognosis with family given age and DAPT use and increase in hemorrhage would not like to pursue any surgical intervention, want maximum medical management     Diagnoses: Intraparenchymal hemorrhage

## 2021-05-04 NOTE — CONSULT NOTE ADULT - SUBJECTIVE AND OBJECTIVE BOX
GENERAL SURGERY CONSULT NOTE    Consulting surgical team: B Team Surgery [id number]  Consulting attending: Dr. Ng    HPI:  HPI: 81y Female CAD w/ stents on ASA/Plavix, HLD, hypothyroid, anemia who presented s/p fall, +LOC, initially A&Ox2 in ED, found to have R frontal intraparenchymal hemorrhage. Received DDAVP and 1U Plt in ED. Admitted to SICU for q1h neurochecks, and was intubated for worsening mental status on HD1. On repeat CTH was found to have increased intraparenchymal hemorrhage and new SAH and SDH, with 8mm R to L midline shift. Neurosurgical intervention not pursued per family wishes.  Patient remains intubated, with poor mental status (GCS 7T).    General Surgery now consulted for trach/PEG. Family amenable to trach/PEG however would like to proceed with MRI for prognostication prior to proceeding. Of note, patient has a well-healed midline laparotomy incision and unclear surgical history. Per daughter at bedside she has a history of hysterectomy and "surgery for ulcers".     PAST MEDICAL HISTORY:  Hypothyroidism    Hypercholesterolemia    MVP (mitral valve prolapse)    Carotid artery stenosis    Uterine cancer    Arthritis    Anemia    DM2 (diabetes mellitus, type 2)    PUD (peptic ulcer disease)    CAD (coronary artery disease)    History of prediabetes        PAST SURGICAL HISTORY:  H/O total knee replacement    H/O: hysterectomy    PUD (peptic ulcer disease)    Rectal cyst    Stented coronary artery        MEDICATIONS:  atorvastatin 80 milliGRAM(s) Oral at bedtime  chlorhexidine 0.12% Liquid 15 milliLiter(s) Oral Mucosa every 12 hours  levETIRAcetam  IVPB 500 milliGRAM(s) IV Intermittent every 12 hours  levothyroxine 75 MICROGram(s) Oral daily  pantoprazole   Suspension 40 milliGRAM(s) Oral daily  potassium phosphate / sodium phosphate Powder (PHOS-NaK) 2 Packet(s) Oral three times a day  senna Syrup 15 milliLiter(s) Oral at bedtime  sodium chloride 2 Gram(s) Oral every 8 hours      ALLERGIES:  latex (Rash)  penicillin (Faint)      VITALS & I/Os:  Vital Signs Last 24 Hrs  T(C): 37.3 (04 May 2021 12:00), Max: 37.6 (03 May 2021 20:00)  T(F): 99.2 (04 May 2021 12:00), Max: 99.6 (03 May 2021 20:00)  HR: 99 (04 May 2021 15:00) (56 - 131)  BP: 108/80 (04 May 2021 15:00) (105/86 - 145/74)  BP(mean): 84 (04 May 2021 15:00) (68 - 97)  RR: 22 (04 May 2021 15:00) (17 - 33)  SpO2: 100% (04 May 2021 15:00) (96% - 100%)  Mode: AC/ CMV (Assist Control/ Continuous Mandatory Ventilation)  RR (machine): 16  TV (machine): 300  FiO2: 21  PEEP: 5  ITime: 0.6  MAP: 10  PIP: 25    I&O's Summary    03 May 2021 07:01  -  04 May 2021 07:00  --------------------------------------------------------  IN: 2617 mL / OUT: 480 mL / NET: 2137 mL    04 May 2021 07:01  -  04 May 2021 15:37  --------------------------------------------------------  IN: 360 mL / OUT: 365 mL / NET: -5 mL        PHYSICAL EXAM:  General: intubated  Neuro: does not open eyes, intermittently follows commands on RUE  Respiratory: intubated, equal chest rise, tolerating CPAP 5/5  CVS: regular rate and rhythm, extremities warm, well perfused  Abdomen: soft, nontender, nondistended, well healed midline laparotomy scar      LABS:                        8.8    9.41  )-----------( 135      ( 04 May 2021 12:47 )             28.4     05-04    144  |  116<H>  |  17  ----------------------------<  140<H>  3.6   |  19<L>  |  0.56    Ca    8.8      04 May 2021 01:15  Phos  1.9     05-04  Mg     2.2     05-04      Lactate:      ABG - ( 04 May 2021 01:15 )  pH, Arterial: 7.46  pH, Blood: x     /  pCO2: 30    /  pO2: 111   / HCO3: 23    / Base Excess: -2.1  /  SaO2: 99.0

## 2021-05-04 NOTE — CONSULT NOTE ADULT - PROBLEM SELECTOR RECOMMENDATION 4
overall prognosis is poor   for family is Yun collins's  is in rehab after amputation for diabetes  family is hopeful for recovery but does state that if she is unable to come off vent or needs to remain completely dependent in SNF they feel she would not want to live like that.  Encourage family to discuss situation but at this time remains full code with proceding with trach/peg  Thank you for consult.    Will sign off.  If goals or symptoms change, please reconsult

## 2021-05-05 NOTE — PROGRESS NOTE ADULT - SUBJECTIVE AND OBJECTIVE BOX
NEUROSURGERY NOTE  MARYCARMEN PAGE   05-05-21 @ 04:16    PAST 24HR EVENTS:  No acute events overnight, patient remains intubated     HPI: 81y Female     PHYSICAL EXAM:  Vital Signs Last 24 Hrs  T(C): 38.2 (05 May 2021 03:00), Max: 38.2 (05 May 2021 03:00)  T(F): 100.8 (05 May 2021 03:00), Max: 100.8 (05 May 2021 03:00)  HR: 83 (05 May 2021 04:00) (62 - 131)  BP: 128/60 (05 May 2021 04:00) (103/63 - 145/74)  BP(mean): 78 (05 May 2021 04:00) (64 - 97)  RR: 33 (05 May 2021 04:00) (19 - 33)  SpO2: 100% (05 May 2021 04:00) (96% - 100%)    responds to vigorous tactile stimuli  Not opening eyes  +Following commands on right  PERRL with rightward gaze deviation  RUE/RLE antigravity  LUE 0/5, withdrawing in LLE     MEDS:   atorvastatin 80 milliGRAM(s) Oral at bedtime  chlorhexidine 0.12% Liquid 15 milliLiter(s) Oral Mucosa every 12 hours  enoxaparin Injectable 40 milliGRAM(s) SubCutaneous daily  levETIRAcetam  IVPB 500 milliGRAM(s) IV Intermittent every 12 hours  levothyroxine 75 MICROGram(s) Oral daily  pantoprazole   Suspension 40 milliGRAM(s) Oral daily  potassium phosphate / sodium phosphate Powder (PHOS-NaK) 2 Packet(s) Oral three times a day  senna Syrup 15 milliLiter(s) Oral at bedtime  sodium chloride 2 Gram(s) Oral every 8 hours      LABS:                        8.8    8.72  )-----------( 155      ( 05 May 2021 01:24 )             29.0     05-05    141  |  113<H>  |  17  ----------------------------<  136<H>  3.9   |  21<L>  |  0.53    Ca    9.0      05 May 2021 01:24  Phos  2.7     05-05  Mg     2.3     05-05    RADIOLOGY:   < from: CT Head No Cont (05.03.21 @ 02:42) >  Abnormal high attenuation involving the high right posterior frontal parietal region is again identified. This is compatible with area of acute parenchymal hemorrhage. This finding measures approximately 6.9 x 4.0 cm and previously measured approximately 7.0 x 4.2 cm. Surrounding edema is again identified. Mass effect on right lateral ventricle is again seen. Right to left shift (8.0 mm) is again seen.    Acute subdural hematoma involving the right parietal-occipital region is again identified. This finding measures approximately 0.8 cm widest diameter and previously measured approximately 0.8 cm widest diameter.    Evaluation of the osseous structures with the appropriate window appears unremarkable    The visualized paranasal sinuses mastoid and middle ear regions appear clear.    IMPRESSION: Parenchymal hemorrhage and subdural hematoma again seen as described above.

## 2021-05-05 NOTE — PROGRESS NOTE ADULT - ASSESSMENT
82 YO Female with Right parietooccipital IPH    5/3: bradycardic 40's, 50's o/n, hypotensive SBP 70's, phenylephrine started, CTH done appears stable, neurological exam stable, on keppra, On 1.5%  5/4: Stable exam. On NaCl 2gm Q8H, Keppra  5/5: Stable exam. Na 141 dc'd NaCl, Continue with Keppra. Family agreeable to Trach/Peg - general surgery consulted

## 2021-05-05 NOTE — PROGRESS NOTE ADULT - ASSESSMENT
ASSESSMENT:  81y Female with history of NSTEMI, PCI, HLD, hypothyroidism, presents s/p fall found to have R IPH, now s/p intubation for worsening mental status remains on Full Vent Support     PLAN:    NEURO: R IPH, increased on repeat CTH with new SAH and SDH  - Q2H neurochecks  - Follows commands on R side, nonpurposeful movement of LLE  - GCS 7T on 5/4  - Keppra 500 BID for seizure ppx  - possible MRI to evaluated for overall prognosis per neurosurgery     RESPIRATORY: intubated due to mental status  - Continue CPAP 5/5 21%   - Plan for trach & PEG    CARDIOVASCULAR: hx CAD s/p stents (unknown date of stents, last cardiac cath 2017 per chart review)  - Goal MAP 65-70 - monitor BP cuff but will continue a-line for blood draws  - Continue home atorvastatin     GI/NUTRITION: no acute issues  - NPO w OGT  - TF@goal  - Protonix 40 daily  - PEG consult placed     GENITOURINARY/RENAL: no acute issues  - Goal Na 145-155, continue salt tabs 2q8h    HEMATOLOGIC: on ASA/Plavix s/p DDAVP & platelets x1 for ICH  - Trend CBC daily - downtrending H/H stable on repeat CBC  - VTE ppx: Lovenox    INFECTIOUS DISEASE: no acute issues  - Monitor for fevers or elevation in WBC     ENDOCRINE: no acute issues  - Continue home synthroid    Lines/Tubes:  - Donaldson 5/2  - Intubated 5/2  - PIVs   24 HOUR EVENTS:  - Nonpurposeful movements of LLE        - Neuro checks downgraded to q2  - Palliative consulted - patient remains full code and family would want to proceed with trach/PEG at this time if she is not able to be extubated    81y Female with history of NSTEMI, PCI, HLD, hypothyroidism, presents s/p fall found to have R IPH, now s/p intubation for worsening mental status.    PLAN:    NEURO: R IPH, increased on repeat CTH with new SAH and SDH  - Q2H neurochecks  - Follows commands on R side, nonpurposeful movement of LLE  - GCS 7T on 5/4  - Keppra 500 BID for seizure ppx  -MRI head w/ IV contrast     RESPIRATORY: intubated due to mental status  - Continue CPAP 5/5 21%   - Plan for trach & PEG    CARDIOVASCULAR: hx CAD s/p stents (unknown date of stents, last cardiac cath 2017 per chart review)  - Goal MAP 65-70 - monitor BP cuff but will continue a-line for blood draws  - Continue home atorvastatin     GI/NUTRITION: no acute issues  - NPO w OGT  - TF@goal  - Protonix 40 daily    GENITOURINARY/RENAL: no acute issues  - Goal Na 145-155, continue salt tabs 2q8h    HEMATOLOGIC: on ASA/Plavix s/p DDAVP & platelets x1 for ICH  - Trend CBC daily - downtrending H/H stable on repeat CBC  - VTE ppx: Lovenox    INFECTIOUS DISEASE:   -Febrile overnight ; no leukocytosis   - Monitor for fevers or elevation in WBC     ENDOCRINE: no acute issues  - Continue home synthroid    Lines/Tubes:  - Donaldson 5/2  - Intubated 5/2  - PIVs

## 2021-05-05 NOTE — PROGRESS NOTE ADULT - SUBJECTIVE AND OBJECTIVE BOX
SICU Daily Progress Note  =====================================================  Interval/Overnight Events:         - Nonpurposeful movements of LLE        - Neuro checks downgraded to q2  - Palliative consulted - patient remains full code and family would want to proceed with trach/PEG at this time if she is not able to be extubated    ==============================================================================  HPI:   81y Female CAD w/ stents on ASA/Plavix, HLD, hypothyroid, anemia s/p trip and fall outside the bank today. Per EMS reported trip and fall witnessed by bystanders w/ LOC. Per EMS, AAOx3 at that time of initial eval. Now AAOx2 and unable to recall how she fell. Now c/o nausea and frontal head pain.    Interval Events: Seen and examined in ED, patient remains AAOx2, which is stable per ED team. Patient recalls being at the bank where she fell but remains unsure how exactly she fell. At this time denies LOC, however LOC witnessed by bystanders. C collar in place. She denies HA, dizziness, chest pain, abdominal pain, altered vision at this time.     SICU consulted for hemodynamic monitoring, q1 hour neurochecks, and BP monitoring.       Allergies: latex (Rash)  penicillin (Faint)      MEDICATIONS:   --------------------------------------------------------------------------------------  Neurologic Medications:  levETIRAcetam  IVPB 500 milliGRAM(s) IV Intermittent every 12 hours    Respiratory Medications:    Cardiovascular Medications:    Gastrointestinal Medications:  pantoprazole   Suspension 40 milliGRAM(s) Oral daily  senna Syrup 15 milliLiter(s) Oral at bedtime  sodium chloride 2 Gram(s) Oral every 8 hours    Genitourinary Medications:    Hematologic/Oncologic Medications:  heparin   Injectable 5000 Unit(s) SubCutaneous every 8 hours    Antimicrobials/Immunologic Medications:    Endocrine/Metabolic Medications:  atorvastatin 80 milliGRAM(s) Oral at bedtime  levothyroxine 75 MICROGram(s) Oral daily    Topical/Other Medications:  chlorhexidine 0.12% Liquid 15 milliLiter(s) Oral Mucosa every 12 hours    --------------------------------------------------------------------------------------    VITAL SIGNS, INS/OUTS (last 24 hours):  --------------------------------------------------------------------------------------      T(C): 37.9 (05-05-21 @ 00:00), Max: 38.1 (05-04-21 @ 23:00)  T(F): 100.2 (05-05-21 @ 00:00), Max: 100.6 (05-04-21 @ 23:00)  HR: 76 (05-05-21 @ 00:00) (56 - 131)  BP: 128/60 (05-05-21 @ 00:00) (103/63 - 145/74)  RR: 23 (05-05-21 @ 00:00) (17 - 30)  SpO2: 100% (05-05-21 @ 00:00) (96% - 100%)      03 May 2021 07:01  -  04 May 2021 07:00  --------------------------------------------------------  IN:    IV PiggyBack: 450 mL    Jevity 1.2: 927 mL    Oral Fluid: 150 mL    Sodium Chloride 0.9% Bolus: 1000 mL    sodium chloride 1.5%: 90 mL  Total IN: 2617 mL    OUT:    Indwelling Catheter - Urethral (mL): 480 mL  Total OUT: 480 mL    Total NET: 2137 mL      04 May 2021 07:01  -  05 May 2021 00:12  --------------------------------------------------------  IN:    Enteral Tube Flush: 90 mL    IV PiggyBack: 100 mL    Jevity 1.2: 810 mL  Total IN: 1000 mL    OUT:    Indwelling Catheter - Urethral (mL): 580 mL    Rectal Tube (mL): 175 mL  Total OUT: 755 mL    Total NET: 245 mL          --------------------------------------------------------------------------------------    EXAM  NEUROLOGY  GCS: 8 T     Eyelid  opening apraxia   b/l Pupils : 3 mm equal and reactive   Follows Commands on the RIGHT Side - upper extremity, can raise hand on command and attempt to extend thumb, unable to follow commands of RLE during examination   RUE : 3/5 ( shows 2 fingers and antigravity)   RLE: 2/5  LUE: 1/5 to deep noxious  LLE: spontaneous movt 2/5 ( not to commands )         HEENT  Exam: Normocephalic, Right Eye orbital edema     RESPIRATORY  Exam: coarse breath sounds b/l , equal  chest expansion       CARDIOVASCULAR  Exam: S1, S2 heard     GI/NUTRITION  Exam: Abdomen soft, Non-tender, Non-distended.   Current Diet:  TF@ goal ( via OGT )     VASCULAR  Exam: Extremities warm, pink, well-perfused    MUSCULOSKELETAL  Exam: Weakly follows with RUE , spontaneous movt seen LLE sporadically ,   LUE 1/5 to deep noxious commands     SKIN  Exam: Good skin turgor, no skin breakdown    METABOLIC/FLUIDS/ELECTROLYTES  potassium phosphate / sodium phosphate Powder (PHOS-NaK) 1 Packet(s) Oral two times a day  sodium chloride 1.5%. 500 milliLiter(s) IV Continuous <Continuous>      HEMATOLOGIC  [x] VTE Prophylaxis: Lovenox 40mg SQ daily   Transfusions:	[] PRBC	[] Platelets		[] FFP	[] Cryoprecipitate    INFECTIOUS DISEASE  Antimicrobials/Immunologic Medications:  None    Tubes/Lines/Drains    [x] Peripheral IV  [] Central Venous Line     	[] R	[] L	[] IJ	[] Fem	[] SC	Date Placed:   [] Arterial Line		[] R	[] L	[] Fem	[] Rad	[] Ax	Date Placed:   [] PICC		[] Midline		[] Mediport  [X] Urinary Catheter		Date Placed:   [x] Necessity of urinary, arterial, and venous catheters discussed    LABS  --------------------------------------------------------------------------------------               8.8    9.41   )----------(  135       ( 04 May 2021 12:47 )               28.4                CAPILLARY BLOOD GLUCOSE          --------------------------------------------------------------------------------------

## 2021-05-06 NOTE — PROGRESS NOTE ADULT - SUBJECTIVE AND OBJECTIVE BOX
GENERAL SURGERY PROGRESS NOTE    SUBJECTIVE  Patient seen and examined.  intubated  AC 16/300/5/21    OBJECTIVE    PHYSICAL EXAM  General: NAD  HEENT: short neck, soft supple, no surgical scars noted  CHEST: intubated on AC,   CV: appears well perfused  Abdomen: soft, nondistended, no rebound or guarding, midline surgical scars well healed  Extremities: Grossly symmetric    T(C): 37.3 (05-06-21 @ 04:00), Max: 37.7 (05-05-21 @ 20:00)  HR: 68 (05-06-21 @ 08:00) (62 - 76)  BP: 120/64 (05-06-21 @ 08:00) (111/72 - 137/64)  RR: 19 (05-06-21 @ 08:00) (18 - 30)  SpO2: 100% (05-06-21 @ 08:00) (99% - 100%)    05-05-21 @ 07:01  -  05-06-21 @ 07:00  --------------------------------------------------------  IN: 1370 mL / OUT: 855 mL / NET: 515 mL        MEDICATIONS  atorvastatin 80 milliGRAM(s) Oral at bedtime  chlorhexidine 0.12% Liquid 15 milliLiter(s) Oral Mucosa every 12 hours  enoxaparin Injectable 40 milliGRAM(s) SubCutaneous daily  levETIRAcetam  IVPB 500 milliGRAM(s) IV Intermittent every 12 hours  levothyroxine 75 MICROGram(s) Oral daily  pantoprazole   Suspension 40 milliGRAM(s) Oral daily  senna Syrup 15 milliLiter(s) Oral at bedtime  sodium chloride 2 Gram(s) Oral every 8 hours      LABS                        8.7    8.34  )-----------( 156      ( 06 May 2021 01:49 )             27.8     05-06    142  |  111<H>  |  19  ----------------------------<  137<H>  3.7   |  22  |  0.49<L>    Ca    8.9      06 May 2021 01:49  Phos  2.8     05-06  Mg     2.0     05-06            RADIOLOGY & ADDITIONAL STUDIES

## 2021-05-06 NOTE — PROGRESS NOTE ADULT - ASSESSMENT
82 YO Female with Right parietooccipital IPH    5/3: bradycardic 40's, 50's o/n, hypotensive SBP 70's, phenylephrine started, CTH done appears stable, neurological exam stable, on keppra, On 1.5%  5/4: Stable exam. On NaCl 2gm Q8H, Keppra  5/5: Stable exam. Na 141 dc'd NaCl, Continue with Keppra. Family agreeable to Trach/Peg - general surgery consulted  5/6: Na 142, mri brain today, c/w keppra, trach and peg

## 2021-05-06 NOTE — PROGRESS NOTE ADULT - ASSESSMENT
81y Female CAD w/ stents on ASA/Plavix, HLD, hypothyroid, anemia, s/p fall, now with R frontal intraparenchymal hemorrhage increased on repeat imaging s/p DDAVP/Plt. Remains intubated for poor mental status. Surgery consulted for trach/PEG.     midline laparotomy incisions well-healed  minimal vent settings    - Family amenable to trach/PEG however would like to proceed with MRI for prognostication prior to proceeding  - NPO at midnight for OR tomorrow  - preop labs (CBC, BMP, PT/PTT/INR, Type&Screen)  - COVID test today for OR tomorrow    B Team Surgery [id number] 81y Female CAD w/ stents on ASA/Plavix, HLD, hypothyroid, anemia, s/p fall, now with R frontal intraparenchymal hemorrhage increased on repeat imaging s/p DDAVP/Plt. Remains intubated for poor mental status. Surgery consulted for trach/PEG.     midline laparotomy incisions well-healed  minimal vent settings    spoke to daughter Yun Dobbs [phone]  - Family amenable to trach/PEG however wants to wait until 5/8 to see if there is any mental status improvement and said her family would be agreeable to proceeding with trach/PEG if still indicated on 5/11 or some time next week  - refusing to consent for trach/PEG at this time    B Team Surgery [id number]

## 2021-05-06 NOTE — PROGRESS NOTE ADULT - SUBJECTIVE AND OBJECTIVE BOX
SICU Daily Progress Note  =====================================================  Interval/Overnight Events:         - Neuro checks downgraded to q4  - Palliative consulted - patient remains full code and family would want to proceed with trach/PEG at this time if she is not able to be extubated    ==============================================================================  HPI:   81y Female CAD w/ stents on ASA/Plavix, HLD, hypothyroid, anemia s/p trip and fall outside the bank today. Per EMS reported trip and fall witnessed by bystanders w/ LOC. Per EMS, AAOx3 at that time of initial eval. Now AAOx2 and unable to recall how she fell. Now c/o nausea and frontal head pain.    Interval Events: Seen and examined in ED, patient remains AAOx2, which is stable per ED team. Patient recalls being at the bank where she fell but remains unsure how exactly she fell. At this time denies LOC, however LOC witnessed by bystanders. C collar in place. She denies HA, dizziness, chest pain, abdominal pain, altered vision at this time.       Allergies: latex (Rash)  penicillin (Faint)      MEDICATIONS:   --------------------------------------------------------------------------------------  Neurologic Medications:  levETIRAcetam  IVPB 500 milliGRAM(s) IV Intermittent every 12 hours    Respiratory Medications:    Cardiovascular Medications:    Gastrointestinal Medications:  pantoprazole   Suspension 40 milliGRAM(s) Oral daily  senna Syrup 15 milliLiter(s) Oral at bedtime  sodium chloride 2 Gram(s) Oral every 8 hours    Genitourinary Medications:    Hematologic/Oncologic Medications:  heparin   Injectable 5000 Unit(s) SubCutaneous every 8 hours    Antimicrobials/Immunologic Medications:    Endocrine/Metabolic Medications:  atorvastatin 80 milliGRAM(s) Oral at bedtime  levothyroxine 75 MICROGram(s) Oral daily    Topical/Other Medications:  chlorhexidine 0.12% Liquid 15 milliLiter(s) Oral Mucosa every 12 hours    --------------------------------------------------------------------------------------    VITAL SIGNS, INS/OUTS (last 24 hours):  --------------------------------------------------------------------------------------      T(C): 37.7 (05-05-21 @ 20:00), Max: 38.2 (05-05-21 @ 03:00)  T(F): 99.8 (05-05-21 @ 20:00), Max: 100.8 (05-05-21 @ 03:00)  HR: 70 (05-05-21 @ 23:00) (62 - 102)  BP: 133/65 (05-05-21 @ 23:00) (116/64 - 139/65)  RR: 23 (05-05-21 @ 23:00) (19 - 33)  SpO2: 100% (05-05-21 @ 23:00) (100% - 100%)      04 May 2021 07:01  -  05 May 2021 07:00  --------------------------------------------------------  IN:    Enteral Tube Flush: 180 mL    IV PiggyBack: 300 mL    Jevity 1.2: 1080 mL  Total IN: 1560 mL    OUT:    Indwelling Catheter - Urethral (mL): 915 mL    Rectal Tube (mL): 280 mL  Total OUT: 1195 mL    Total NET: 365 mL      05 May 2021 07:01  -  06 May 2021 00:01  --------------------------------------------------------  IN:    Enteral Tube Flush: 90 mL    IV PiggyBack: 100 mL    Jevity 1.2: 630 mL  Total IN: 820 mL    OUT:    Indwelling Catheter - Urethral (mL): 105 mL    Rectal Tube (mL): 50 mL    Voided (mL): 200 mL  Total OUT: 355 mL    Total NET: 465 mL          --------------------------------------------------------------------------------------    EXAM  NEUROLOGY  GCS: 9 T     Eyelid  opening apraxia   b/l Pupils : 3 mm equal and reactive   Follows Commands on the RIGHT Side - upper extremity, can raise hand on command and attempt to extend thumb, unable to follow commands of RLE during examination   RUE : 3/5 ( shows 2 fingers and antigravity)   RLE: 2/5  LUE: 1/5 to deep noxious  LLE: spontaneous movt 2/5 ( not to commands )         HEENT  Exam: Normocephalic, Right Eye orbital edema     RESPIRATORY  Exam: coarse breath sounds b/l , equal  chest expansion       CARDIOVASCULAR  Exam: S1, S2 heard     GI/NUTRITION  Exam: Abdomen soft, Non-tender, Non-distended.   Current Diet:  TF@ goal ( via OGT )     VASCULAR  Exam: Extremities warm, pink, well-perfused    MUSCULOSKELETAL  Exam: Weakly follows with RUE , spontaneous movt seen LLE sporadically ,   LUE 1/5 to deep noxious commands     SKIN  Exam: Good skin turgor, no skin breakdown    METABOLIC/FLUIDS/ELECTROLYTES  potassium phosphate / sodium phosphate Powder (PHOS-NaK) 1 Packet(s) Oral two times a day  sodium chloride 1.5%. 500 milliLiter(s) IV Continuous <Continuous>      HEMATOLOGIC  [x] VTE Prophylaxis: Lovenox 40mg SQ daily   Transfusions:	[] PRBC	[] Platelets		[] FFP	[] Cryoprecipitate    INFECTIOUS DISEASE  Antimicrobials/Immunologic Medications:  None    Tubes/Lines/Drains    [x] Peripheral IV  [] Central Venous Line     	[] R	[] L	[] IJ	[] Fem	[] SC	Date Placed:   [] Arterial Line		[] R	[] L	[] Fem	[] Rad	[] Ax	Date Placed:   [] PICC		[] Midline		[] Mediport  [X] Urinary Catheter		Date Placed:   [x] Necessity of urinary, arterial, and venous catheters discussed    LABS  --------------------------------------------------------------------------------------                    CAPILLARY BLOOD GLUCOSE        --------------------------------------------------------------------------------------

## 2021-05-06 NOTE — PROGRESS NOTE ADULT - SUBJECTIVE AND OBJECTIVE BOX
PAST 24HR EVENTS:  No acute events overnight, patient remains intubated     Vital Signs Last 24 Hrs  T(C): 37.5 (06 May 2021 00:00), Max: 37.8 (05 May 2021 08:00)  T(F): 99.5 (06 May 2021 00:00), Max: 100 (05 May 2021 08:00)  HR: 74 (06 May 2021 03:04) (62 - 102)  BP: 128/73 (06 May 2021 03:00) (112/66 - 139/65)  BP(mean): 85 (06 May 2021 03:00) (75 - 99)  ABP: 94/57 (06 May 2021 03:00) (87/51 - 104/74)  ABP(mean): 73 (06 May 2021 03:00) (66 - 88)  RR: 22 (06 May 2021 03:00) (19 - 31)  SpO2: 100% (06 May 2021 03:04) (100% - 100%)    grimaces to noxious stimuli  OE partially, not fc  PERRL with rightward gaze deviation  RUE/RLE antigravity  LUE 0/5, withdrawing in LLE     MEDS:   atorvastatin 80 milliGRAM(s) Oral at bedtime  chlorhexidine 0.12% Liquid 15 milliLiter(s) Oral Mucosa every 12 hours  enoxaparin Injectable 40 milliGRAM(s) SubCutaneous daily  levETIRAcetam  IVPB 500 milliGRAM(s) IV Intermittent every 12 hours  levothyroxine 75 MICROGram(s) Oral daily  pantoprazole   Suspension 40 milliGRAM(s) Oral daily  potassium phosphate / sodium phosphate Powder (PHOS-NaK) 2 Packet(s) Oral three times a day  senna Syrup 15 milliLiter(s) Oral at bedtime  sodium chloride 2 Gram(s) Oral every 8 hours      LABS:                        8.7    8.34  )-----------( 156      ( 06 May 2021 01:49 )             27.8   05-06    142  |  111<H>  |  19  ----------------------------<  137<H>  3.7   |  22  |  0.49<L>    Ca    8.9      06 May 2021 01:49  Phos  2.8     05-06  Mg     2.0     05-06        RADIOLOGY:   < from: CT Head No Cont (05.03.21 @ 02:42) >  Abnormal high attenuation involving the high right posterior frontal parietal region is again identified. This is compatible with area of acute parenchymal hemorrhage. This finding measures approximately 6.9 x 4.0 cm and previously measured approximately 7.0 x 4.2 cm. Surrounding edema is again identified. Mass effect on right lateral ventricle is again seen. Right to left shift (8.0 mm) is again seen.    Acute subdural hematoma involving the right parietal-occipital region is again identified. This finding measures approximately 0.8 cm widest diameter and previously measured approximately 0.8 cm widest diameter.    Evaluation of the osseous structures with the appropriate window appears unremarkable    The visualized paranasal sinuses mastoid and middle ear regions appear clear.    IMPRESSION: Parenchymal hemorrhage and subdural hematoma again seen as described above.

## 2021-05-06 NOTE — PROGRESS NOTE ADULT - ASSESSMENT
81y Female with history of NSTEMI, PCI, HLD, hypothyroidism, presents s/p fall found to have R IPH, now s/p intubation for worsening mental status.    PLAN:    NEURO: R IPH, increased on repeat CTH with new SAH and SDH  - Q4H neurochecks  - Follows commands on R side, nonpurposeful movement of LLE sporadically noted   - Keppra 500 BID for seizure ppx  - MRI head w/ IV contrast     RESPIRATORY: intubated due to mental status  - Continue CPAP 5/5 21%   - Plan for trach & PEG    CARDIOVASCULAR: hx CAD s/p stents (unknown date of stents, last cardiac cath 2017 per chart review)  - Goal MAP 65-70 - monitor BP cuff but will continue a-line for blood draws  - Continue home atorvastatin     GI/NUTRITION: no acute issues  - NPO w OGT  - TF@goal - consider Jevity 1.5 to reduce H2O content to help reach sodium goals   - Protonix 40 daily    GENITOURINARY/RENAL: no acute issues  - Goal Na 145-155, continue salt tabs 2q8h  -Donaldson d/c ; primafit in place - passed TOV     HEMATOLOGIC: on ASA/Plavix s/p DDAVP & platelets x1 for ICH  - Trend CBC daily - downtrending H/H stable on repeat CBC  - VTE ppx: Lovenox    INFECTIOUS DISEASE:   -Febrile overnight ; no leukocytosis   - Monitor for fevers or elevation in WBC     ENDOCRINE: no acute issues  - Continue home synthroid    Lines/Tubes:  - Donaldson 5/2, discontinued 5/5   - Intubated 5/2  - PIVs   81y Female with history of NSTEMI, PCI, HLD, hypothyroidism, presents s/p fall found to have R IPH, now s/p intubation for worsening mental status.    PLAN:    NEURO: R IPH, increased on repeat CTH with new SAH and SDH  - Q4H neurochecks  - Follows commands on R side, nonpurposeful movement of LLE  - Keppra 500 BID for seizure ppx  -MRI head w/ IV contrast today    RESPIRATORY: intubated due to mental status  - Continue CPAP 5/5 21%   - Plan for trach & PEG    CARDIOVASCULAR: hx CAD s/p stents (unknown date of stents, last cardiac cath 2017 per chart review)  - Goal MAP 65-70 - monitor BP cuff but will continue a-line for blood draws  - Continue home atorvastatin     GI/NUTRITION: no acute issues  - NPO w OGT  - TF@goal  - Protonix 40 daily    GENITOURINARY/RENAL: no acute issues  - Goal Na 145-155, continue salt tabs 2q8h  -Donaldson d/c ; primafit in place     HEMATOLOGIC: on ASA/Plavix s/p DDAVP & platelets x1 for ICH  - Trend CBC daily - downtrending H/H stable on repeat CBC  - VTE ppx: Lovenox    INFECTIOUS DISEASE:   -Febrile overnight ; no leukocytosis   - Monitor for fevers or elevation in WBC     ENDOCRINE: no acute issues  - Continue home synthroid    Lines/Tubes:  - Donaldson 5/2, discontinued 5/5   - Intubated 5/2  - PIVs

## 2021-05-07 NOTE — PROGRESS NOTE ADULT - SUBJECTIVE AND OBJECTIVE BOX
SICU DAILY PROGRESS NOTE    HPI: 81y Female CAD w/ stents on ASA/Plavix, HLD, hypothyroid, anemia who presented s/p fall, +LOC, initially A&Ox2 in ED, found to have R frontal intraparenchymal hemorrhage. Received DDAVP and 1U Plt in ED. Admitted to SICU for q1h neurochecks, and was intubated for worsening mental status on HD1. On repeat CTH was found to have increased intraparenchymal hemorrhage and new SAH and SDH, with 8mm R to L midline shift. Neurosurgical intervention not pursued per family wishes.  Patient remains intubated, with poor mental status (GCS 7T).    24 HOUR EVENTS:  - MRI obtained, unchanged  - Palliative reconsulted, will see 5/6   - Failed TOV, stephen replaced  - Tolerated CPAP throughout the day but placed on full vent support overnight due to tachypnea and intermittent apneic episodes    SUBJECTIVE/ROS:  [ ] A ten-point review of systems was otherwise negative except as noted.  [x] Due to altered mental status/intubation, subjective information were not able to be obtained from the patient. History was obtained, to the extent possible, from review of the chart and collateral sources of information.      NEURO  GCS: 6T  Exam: does not open eyes, does not follow commands, withdraws from pain RUE  Meds: levETIRAcetam  Solution 500 milliGRAM(s) Oral two times a day    [x] Adequacy of sedation and pain control has been assessed and adjusted      RESPIRATORY  RR: 18 (05-07-21 @ 00:00) (16 - 33)  SpO2: 99% (05-07-21 @ 00:00) (94% - 110%)  Wt(kg): --  Exam: unlabored, clear to auscultation bilaterally  Mechanical Ventilation: Mode: AC/ CMV (Assist Control/ Continuous Mandatory Ventilation), RR (machine): 16, RR (patient): 20, TV (machine): 300, FiO2: 30, PEEP: 5, ITime: 0.76, MAP: 10, PIP: 23    [N/A] Extubation Readiness Assessed  Meds:       CARDIOVASCULAR  HR: 69 (05-07-21 @ 00:00) (64 - 82)  BP: 121/68 (05-07-21 @ 00:00) (91/63 - 142/73)  BP(mean): 80 (05-07-21 @ 00:00) (61 - 104)  ABP: 106/75 (05-06-21 @ 18:30) (78/49 - 106/75)  ABP(mean): 89 (05-06-21 @ 18:30) (62 - 89)  Wt(kg): --  CVP(cm H2O): --      Exam: regular rate and rhythm  Cardiac Rhythm: sinus  Perfusion     [x]Adequate   [ ]Inadequate  Mentation   [x]Normal       [ ]Reduced  Extremities  [x]Warm         [ ]Cool  Volume Status [ ]Hypervolemic [x]Euvolemic [ ]Hypovolemic  Meds:       GI/NUTRITION  Exam: soft, nontender, nondistended  Diet: NPO w/TF via NGT at goal  Meds: pantoprazole   Suspension 40 milliGRAM(s) Oral daily  senna Syrup 15 milliLiter(s) Oral at bedtime      GENITOURINARY  I&O's Detail    05-05 @ 07:01  -  05-06 @ 07:00  --------------------------------------------------------  IN:    Enteral Tube Flush: 90 mL    IV PiggyBack: 200 mL    Jevity 1.2: 1080 mL  Total IN: 1370 mL    OUT:    Indwelling Catheter - Urethral (mL): 105 mL    Intermittent Catheterization - Urethral (mL): 500 mL    Rectal Tube (mL): 50 mL    Voided (mL): 200 mL  Total OUT: 855 mL    Total NET: 515 mL      05-06 @ 07:01  -  05-07 @ 00:19  --------------------------------------------------------  IN:    Enteral Tube Flush: 180 mL    Jevity 1.2: 675 mL  Total IN: 855 mL    OUT:    Indwelling Catheter - Urethral (mL): 315 mL    Intermittent Catheterization - Urethral (mL): 320 mL  Total OUT: 635 mL    Total NET: 220 mL          05-06    142  |  111<H>  |  19  ----------------------------<  137<H>  3.7   |  22  |  0.49<L>    Ca    8.9      06 May 2021 01:49  Phos  2.8     05-06  Mg     2.0     05-06      [x] Stephen catheter, indication: urinary retention  Meds: sodium chloride 2 Gram(s) Oral every 8 hours        HEMATOLOGIC  Meds: enoxaparin Injectable 40 milliGRAM(s) SubCutaneous daily    [x] VTE Prophylaxis                        8.7    8.34  )-----------( 156      ( 06 May 2021 01:49 )             27.8       Transfusion     [ ] PRBC   [ ] Platelets   [ ] FFP   [ ] Cryoprecipitate      INFECTIOUS DISEASES  WBC Count: 8.34 K/uL (05-06 @ 01:49)        ENDOCRINE  CAPILLARY BLOOD GLUCOSE        Meds: atorvastatin 80 milliGRAM(s) Oral at bedtime  levothyroxine 75 MICROGram(s) Oral daily        ACCESS DEVICES:  [x] Peripheral IV  [ ] Central Venous Line	[ ] R	[ ] L	[ ] IJ	[ ] Fem	[ ] SC	Placed:   [ ] Arterial Line		[ ] R	[ ] L	[ ] Fem	[ ] Rad	[ ] Ax	Placed:   [ ] PICC:					[ ] Mediport  [ ] Urinary Catheter, Date Placed:   [x] Necessity of urinary, arterial, and venous catheters discussed    OTHER MEDICATIONS:  chlorhexidine 0.12% Liquid 15 milliLiter(s) Oral Mucosa every 12 hours      CODE STATUS: Full code SICU DAILY PROGRESS NOTE    HPI: 81y Female CAD w/ stents on ASA/Plavix, HLD, hypothyroid, anemia who presented s/p fall, +LOC, initially A&Ox2 in ED, found to have R frontal intraparenchymal hemorrhage. Received DDAVP and 1U Plt in ED. Admitted to SICU for q1h neurochecks, and was intubated for worsening mental status on HD1. On repeat CTH was found to have increased intraparenchymal hemorrhage and new SAH and SDH, with 8mm R to L midline shift. Neurosurgical intervention not pursued per family wishes.  Patient remains intubated, with poor mental status (GCS 7T).    24 HOUR EVENTS:  - MRI obtained, unchanged  - Failed TOV, stephen replaced  - Tolerated CPAP throughout the day but placed on full vent support overnight due to tachypnea and intermittent apneic episodes    SUBJECTIVE/ROS:  [ ] A ten-point review of systems was otherwise negative except as noted.  [x] Due to altered mental status/intubation, subjective information were not able to be obtained from the patient. History was obtained, to the extent possible, from review of the chart and collateral sources of information.      NEURO  GCS: 6T  Exam: does not open eyes, does not follow commands, withdraws from pain RUE  Meds: levETIRAcetam  Solution 500 milliGRAM(s) Oral two times a day    [x] Adequacy of sedation and pain control has been assessed and adjusted      RESPIRATORY  RR: 18 (05-07-21 @ 00:00) (16 - 33)  SpO2: 99% (05-07-21 @ 00:00) (94% - 110%)  Wt(kg): --  Exam: unlabored, clear to auscultation bilaterally  Mechanical Ventilation: Mode: AC/ CMV (Assist Control/ Continuous Mandatory Ventilation), RR (machine): 16, RR (patient): 20, TV (machine): 300, FiO2: 30, PEEP: 5, ITime: 0.76, MAP: 10, PIP: 23    [N/A] Extubation Readiness Assessed  Meds:       CARDIOVASCULAR  HR: 69 (05-07-21 @ 00:00) (64 - 82)  BP: 121/68 (05-07-21 @ 00:00) (91/63 - 142/73)  BP(mean): 80 (05-07-21 @ 00:00) (61 - 104)  ABP: 106/75 (05-06-21 @ 18:30) (78/49 - 106/75)  ABP(mean): 89 (05-06-21 @ 18:30) (62 - 89)  Wt(kg): --  CVP(cm H2O): --      Exam: regular rate and rhythm  Cardiac Rhythm: sinus  Perfusion     [x]Adequate   [ ]Inadequate  Mentation   [x]Normal       [ ]Reduced  Extremities  [x]Warm         [ ]Cool  Volume Status [ ]Hypervolemic [x]Euvolemic [ ]Hypovolemic  Meds:       GI/NUTRITION  Exam: soft, nontender, nondistended  Diet: NPO w/TF via NGT at goal  Meds: pantoprazole   Suspension 40 milliGRAM(s) Oral daily  senna Syrup 15 milliLiter(s) Oral at bedtime      GENITOURINARY  I&O's Detail    05-05 @ 07:01  -  05-06 @ 07:00  --------------------------------------------------------  IN:    Enteral Tube Flush: 90 mL    IV PiggyBack: 200 mL    Jevity 1.2: 1080 mL  Total IN: 1370 mL    OUT:    Indwelling Catheter - Urethral (mL): 105 mL    Intermittent Catheterization - Urethral (mL): 500 mL    Rectal Tube (mL): 50 mL    Voided (mL): 200 mL  Total OUT: 855 mL    Total NET: 515 mL      05-06 @ 07:01  -  05-07 @ 00:19  --------------------------------------------------------  IN:    Enteral Tube Flush: 180 mL    Jevity 1.2: 675 mL  Total IN: 855 mL    OUT:    Indwelling Catheter - Urethral (mL): 315 mL    Intermittent Catheterization - Urethral (mL): 320 mL  Total OUT: 635 mL    Total NET: 220 mL          05-06    142  |  111<H>  |  19  ----------------------------<  137<H>  3.7   |  22  |  0.49<L>    Ca    8.9      06 May 2021 01:49  Phos  2.8     05-06  Mg     2.0     05-06      [x] Stephen catheter, indication: urinary retention  Meds: sodium chloride 2 Gram(s) Oral every 8 hours        HEMATOLOGIC  Meds: enoxaparin Injectable 40 milliGRAM(s) SubCutaneous daily    [x] VTE Prophylaxis                        8.7    8.34  )-----------( 156      ( 06 May 2021 01:49 )             27.8       Transfusion     [ ] PRBC   [ ] Platelets   [ ] FFP   [ ] Cryoprecipitate      INFECTIOUS DISEASES  WBC Count: 8.34 K/uL (05-06 @ 01:49)        ENDOCRINE  CAPILLARY BLOOD GLUCOSE        Meds: atorvastatin 80 milliGRAM(s) Oral at bedtime  levothyroxine 75 MICROGram(s) Oral daily        ACCESS DEVICES:  [x] Peripheral IV  [ ] Central Venous Line	[ ] R	[ ] L	[ ] IJ	[ ] Fem	[ ] SC	Placed:   [ ] Arterial Line		[ ] R	[ ] L	[ ] Fem	[ ] Rad	[ ] Ax	Placed:   [ ] PICC:					[ ] Mediport  [ ] Urinary Catheter, Date Placed:   [x] Necessity of urinary, arterial, and venous catheters discussed    OTHER MEDICATIONS:  chlorhexidine 0.12% Liquid 15 milliLiter(s) Oral Mucosa every 12 hours      CODE STATUS: Full code SICU DAILY PROGRESS NOTE    HPI: 81y Female CAD w/ stents on ASA/Plavix, HLD, hypothyroid, anemia who presented s/p fall, +LOC, initially A&Ox2 in ED, found to have R frontal intraparenchymal hemorrhage. Received DDAVP and 1U Plt in ED. Admitted to SICU for q1h neurochecks, and was intubated for worsening mental status on HD1. On repeat CTH was found to have increased intraparenchymal hemorrhage and new SAH and SDH, with 8mm R to L midline shift. Neurosurgical intervention not pursued per family wishes.  Patient remains intubated, with poor mental status (GCS 7T).    24 HOUR EVENTS:  - MRI obtained, unchanged  - Failed TOV, stephen replaced  - Tolerated CPAP throughout the day but placed on full vent support overnight due to tachypnea and intermittent apneic episodes    SUBJECTIVE/ROS:  [ ] A ten-point review of systems was otherwise negative except as noted.  [x] Due to altered mental status/intubation, subjective information were not able to be obtained from the patient. History was obtained, to the extent possible, from review of the chart and collateral sources of information.      NEURO  GCS: 5T  Exam: does not open eyes, does not follow commands, withdraws from pain RUE  Meds: levETIRAcetam  Solution 500 milliGRAM(s) Oral two times a day    [x] Adequacy of sedation and pain control has been assessed and adjusted      RESPIRATORY  RR: 18 (05-07-21 @ 00:00) (16 - 33)  SpO2: 99% (05-07-21 @ 00:00) (94% - 110%)  Wt(kg): --  Exam: unlabored, clear to auscultation bilaterally  Mechanical Ventilation: Mode: AC/ CMV (Assist Control/ Continuous Mandatory Ventilation), RR (machine): 16, RR (patient): 20, TV (machine): 300, FiO2: 30, PEEP: 5, ITime: 0.76, MAP: 10, PIP: 23    [N/A] Extubation Readiness Assessed  Meds:       CARDIOVASCULAR  HR: 69 (05-07-21 @ 00:00) (64 - 82)  BP: 121/68 (05-07-21 @ 00:00) (91/63 - 142/73)  BP(mean): 80 (05-07-21 @ 00:00) (61 - 104)  ABP: 106/75 (05-06-21 @ 18:30) (78/49 - 106/75)  ABP(mean): 89 (05-06-21 @ 18:30) (62 - 89)  Wt(kg): --  CVP(cm H2O): --      Exam: regular rate and rhythm  Cardiac Rhythm: sinus  Perfusion     [x]Adequate   [ ]Inadequate  Mentation   [x]Normal       [ ]Reduced  Extremities  [x]Warm         [ ]Cool  Volume Status [ ]Hypervolemic [x]Euvolemic [ ]Hypovolemic  Meds:       GI/NUTRITION  Exam: soft, nontender, nondistended  Diet: NPO w/TF via NGT at goal  Meds: pantoprazole   Suspension 40 milliGRAM(s) Oral daily  senna Syrup 15 milliLiter(s) Oral at bedtime      GENITOURINARY  I&O's Detail    05-05 @ 07:01  -  05-06 @ 07:00  --------------------------------------------------------  IN:    Enteral Tube Flush: 90 mL    IV PiggyBack: 200 mL    Jevity 1.2: 1080 mL  Total IN: 1370 mL    OUT:    Indwelling Catheter - Urethral (mL): 105 mL    Intermittent Catheterization - Urethral (mL): 500 mL    Rectal Tube (mL): 50 mL    Voided (mL): 200 mL  Total OUT: 855 mL    Total NET: 515 mL      05-06 @ 07:01  -  05-07 @ 00:19  --------------------------------------------------------  IN:    Enteral Tube Flush: 180 mL    Jevity 1.2: 675 mL  Total IN: 855 mL    OUT:    Indwelling Catheter - Urethral (mL): 315 mL    Intermittent Catheterization - Urethral (mL): 320 mL  Total OUT: 635 mL    Total NET: 220 mL          05-06    142  |  111<H>  |  19  ----------------------------<  137<H>  3.7   |  22  |  0.49<L>    Ca    8.9      06 May 2021 01:49  Phos  2.8     05-06  Mg     2.0     05-06      [x] Stephen catheter, indication: urinary retention  Meds: sodium chloride 2 Gram(s) Oral every 8 hours        HEMATOLOGIC  Meds: enoxaparin Injectable 40 milliGRAM(s) SubCutaneous daily    [x] VTE Prophylaxis                        8.7    8.34  )-----------( 156      ( 06 May 2021 01:49 )             27.8       Transfusion     [ ] PRBC   [ ] Platelets   [ ] FFP   [ ] Cryoprecipitate      INFECTIOUS DISEASES  WBC Count: 8.34 K/uL (05-06 @ 01:49)        ENDOCRINE  CAPILLARY BLOOD GLUCOSE        Meds: atorvastatin 80 milliGRAM(s) Oral at bedtime  levothyroxine 75 MICROGram(s) Oral daily        ACCESS DEVICES:  [x] Peripheral IV  [ ] Central Venous Line	[ ] R	[ ] L	[ ] IJ	[ ] Fem	[ ] SC	Placed:   [x ] Arterial Line		[ ] R	[ ] L	[ ] Fem	[ ] Rad	[ ] Ax	Placed:   [ ] PICC:					[ ] Mediport  [ ] Urinary Catheter, Date Placed:   [x] Necessity of urinary, arterial, and venous catheters discussed    OTHER MEDICATIONS:  chlorhexidine 0.12% Liquid 15 milliLiter(s) Oral Mucosa every 12 hours      CODE STATUS: Full code

## 2021-05-07 NOTE — PROGRESS NOTE ADULT - ASSESSMENT
81y Female with history of NSTEMI, PCI, HLD, hypothyroidism, presents s/p fall found to have R IPH, now s/p intubation for worsening mental status.    PLAN:    NEURO: R IPH, increased on repeat CTH with new SAH and SDH, MRI 5/6 with no new findings  - Q4H neurochecks  - Withdraws to painful stimuli on R side, nonpurposeful movement of LLE  - Keppra 500 BID for seizure ppx    RESPIRATORY: intubated for airway protection due to mental status  - Continue full vent support due to tachypnea and apneic episodes while on CPAP  - Plan for Trach & PEG ( to be discussed with Neurosurg, Palliative, Gen Surg and Family)    CARDIOVASCULAR: hx CAD s/p stents (unknown date of stents, last cardiac cath 2017 per chart review)  - Goal MAP 65-70 - monitor BP cuff but will continue a-line for blood draws  - Continue home atorvastatin     GI/NUTRITION: no acute issues  - NPO w OGT  - TF @ goal  - Protonix 40 daily    GENITOURINARY/RENAL: urinary retention  - Goal Na 145-155, continue salt tabs 2q8h  - Continue stephen catheter (failed TOV 5/6)    HEMATOLOGIC: on ASA/Plavix s/p DDAVP & platelets x1 for ICH  - Trend CBC daily - downtrending H/H stable on repeat CBC  - VTE ppx: Lovenox, SCDs    INFECTIOUS DISEASE: intermittent low grade fevers w/o leukocytosis  - Trend fever curve, WBC    ENDOCRINE: no acute issues  - Continue home synthroid    Lines/Tubes:  - Stephen replaced 5/6  - Intubated 5/2  - PIVs    DISPO: SICU    ---------------------------------------------------------------------------------------    Critical care diagnoses: intracranial hemorrhage 81y Female with history of NSTEMI, PCI, HLD, hypothyroidism, presents s/p fall found to have R IPH, now s/p intubation for worsening mental status.    PLAN:    NEURO:    -GCS 5   -R IPH, increased on repeat CTH with new SAH and SDH, MRI 5/6 with no new findings  - Q4H neurochecks  - Withdraws to painful stimuli on R side, nonpurposeful movement of LLE  - Keppra 500 BID for seizure ppx    RESPIRATORY: intubated for airway protection due to mental status  - Continue full vent support due to tachypnea and apneic episodes while on CPAP  - Plan for Trach & PEG ( to be discussed with Neurosurg, Palliative, Gen Surg and Family)    CARDIOVASCULAR: hx CAD s/p stents (unknown date of stents, last cardiac cath 2017 per chart review)  - Goal MAP 65-70 - monitor BP cuff but will continue a-line for blood draws  - Continue home atorvastatin     GI/NUTRITION: no acute issues  - NPO w OGT  - TF @ goal  - Protonix 40 daily    GENITOURINARY/RENAL: urinary retention  - Goal Na 145-155, continue salt tabs 2 q 6h  - Continue stephen catheter (failed TOV 5/6)    HEMATOLOGIC: on ASA/Plavix s/p DDAVP & platelets x1 for ICH  - Trend CBC daily - downtrending H/H stable on repeat CBC  - VTE ppx: Lovenox, SCDs    INFECTIOUS DISEASE: intermittent low grade fevers w/o leukocytosis  - Trend fever curve, WBC    ENDOCRINE: no acute issues  - Continue home synthroid    Lines/Tubes:  - Stephen replaced 5/6  - Intubated 5/2  - PIVs    DISPO: SICU    ---------------------------------------------------------------------------------------    Critical care diagnoses: intracranial hemorrhage

## 2021-05-07 NOTE — PROGRESS NOTE ADULT - SUBJECTIVE AND OBJECTIVE BOX
SUBJECTIVE AND OBJECTIVE: asked to reconsult as pt now not moving RUE re: goc.  Pt intubated, no meaningful movement non verbal no sedation.  daughter at bedside  INTERVAL HPI/OVERNIGHT EVENTS:    DNR on chart:   Allergies    latex (Rash)  penicillin (Faint)    Intolerances    MEDICATIONS  (STANDING):  atorvastatin 80 milliGRAM(s) Oral at bedtime  chlorhexidine 0.12% Liquid 15 milliLiter(s) Oral Mucosa every 12 hours  enoxaparin Injectable 40 milliGRAM(s) SubCutaneous daily  levETIRAcetam  Solution 500 milliGRAM(s) Oral two times a day  levothyroxine 75 MICROGram(s) Oral daily  pantoprazole   Suspension 40 milliGRAM(s) Oral daily  senna Syrup 15 milliLiter(s) Oral at bedtime  sodium chloride 2 Gram(s) Oral every 6 hours    MEDICATIONS  (PRN):      ITEMS UNCHECKED ARE NOT PRESENT    PRESENT SYMPTOMS: [ x]Unable to obtain due to poor mentation   Source if other than patient:  [ ]Family   [ ]Team     Pain (Impact on QOL):    Location:  Minimal acceptable level (0-10 scale):            Aggravating factors:  Quality:  Radiation:  Severity (0-10 scale):    Timing:    Dyspnea:                           [ ]Mild [ ]Moderate [ ]Severe  Anxiety:                             [ ]Mild [ ]Moderate [ ]Severe  Fatigue:                             [ ]Mild [ ]Moderate [ ]Severe  Nausea:                             [ ]Mild [ ]Moderate [ ]Severe  Loss of appetite:              [ ]Mild [ ]Moderate [ ]Severe  Constipation:                    [ ]Mild [ ]Moderate [ ]Severe    PAIN AD Score:	  [email or web address] (Ctrl + left click to view)    Other Symptoms:  [ ]All other review of systems negative     Karnofsky Performance Score/Palliative Performance Status Version 2:     10    %    http://palliative.info/resource_material/[email or web address]  PHYSICAL EXAM:  Vital Signs Last 24 Hrs  T(C): 38 (07 May 2021 12:00), Max: 38 (06 May 2021 20:00)  T(F): 100.4 (07 May 2021 12:00), Max: 100.4 (06 May 2021 20:00)  HR: 66 (07 May 2021 15:11) (64 - 82)  BP: 97/56 (07 May 2021 15:00) (91/63 - 132/92)  BP(mean): 65 (07 May 2021 15:00) (61 - 103)  RR: 16 (07 May 2021 15:00) (16 - 33)  SpO2: 94% (07 May 2021 15:11) (94% - 110%) I&O's Summary    06 May 2021 07:01  -  07 May 2021 07:00  --------------------------------------------------------  IN: 1205 mL / OUT: 860 mL / NET: 345 mL    07 May 2021 07:01  -  07 May 2021 15:37  --------------------------------------------------------  IN: 660 mL / OUT: 315 mL / NET: 345 mL     GENERAL:  [ ]Alert  [ ]Oriented x   [ ]Lethargic  [ ]Cachexia  [ ]Unarousable  [ ]Verbal  [x ]Non-Verbal    Behavioral:   [ ] Anxiety  [ ] Delirium [ ] Agitation [ ] Other    HEENT:  [ ]Normal   [ ]Dry mouth   [x ]ET Tube/Trach  [ ]Oral lesions    PULMONARY:   [x ]Clear [ ]Tachypnea  [ ]Audible excessive secretions   [ ]Rhonchi        [ ]Right [ ]Left [ ]Bilateral  [ ]Crackles        [ ]Right [ ]Left [ ]Bilateral  [ ]Wheezing     [ ]Right [ ]Left [ ]Bilateral    CARDIOVASCULAR:    [x ]Regular [ ]Irregular [ ]Tachy  [ ]Sal [ ]Murmur [ ]Other    GASTROINTESTINAL:  [x ]Soft  [ ]Distended   [ ]+BS  [ ]Non tender [ ]Tender  [ ]PEG [x ]OGT/ NGT   Last BM:   05-04-21 @ 07:01  -  05-05-21 @ 07:00  --------------------------------------------------------  OUT: 280 mL    05-05-21 @ 07:01  -  05-06-21 @ 07:00  --------------------------------------------------------  OUT: 50 mL    05-06-21 @ 07:01  -  05-07-21 @ 07:00  --------------------------------------------------------  OUT: 50 mL     GENITOURINARY:  [ ]Normal [ ] Incontinent   [ ]Oliguria/Anuria   [x ]Donaldson    MUSCULOSKELETAL:   [ ]Normal   [ ]Weakness  [ x]Bed/Wheelchair bound [ ]Edema    NEUROLOGIC:   [ ]No focal deficits  [ x] Cognitive impairment  [x ] Dysphagia [ ]Dysarthria [x ] Paresis [ ]Other     SKIN:   [x ]Normal   [ ]Pressure ulcer(s)  [ ]Rash    CRITICAL CARE:  [ ] Shock Present  [ ]Septic [ ]Cardiogenic [ ]Neurologic [ ]Hypovolemic  [ ]  Vasopressors [ ]  Inotropes   [ ] Respiratory failure present  [ ] Acute  [ ] Chronic [ ] Hypoxic  [ ] Hypercarbic [ ] Other  [ ] Other organ failure     LABS:                        8.4    9.08  )-----------( 174      ( 07 May 2021 02:44 )             27.1   05-07    141  |  111<H>  |  22  ----------------------------<  149<H>  3.8   |  23  |  0.58    Ca    9.0      07 May 2021 02:44  Phos  3.1     05-07  Mg     2.1     05-07          RADIOLOGY & ADDITIONAL STUDIES:    Protein Calorie Malnutrition Present: [ ] yes [ ] no  [ ] PPSV2 < or = 30%  [ ] significant weight loss [ ] poor nutritional intake [ ] anasarca [ ] catabolic state Albumin, Serum: 3.9 g/dL (05-01-21 @ 22:29)  Artificial Nutrition [ ]     REFERRALS:   [ ]Chaplaincy  [ ] Hospice  [ ]Child Life  [ ]Social Work  [ ]Case management [ ]Holistic Therapy   Goals of Care Document:

## 2021-05-07 NOTE — PROGRESS NOTE ADULT - CONVERSATION DETAILS
spoke with pt's daughter re: IPH and SDH and prognosis  expressed concern that there would be min recovery and choices of trach/peg and snf vs extubation and focusing on comfort  family to discuss options  appear to be leaning toward trach/peg for now and consider focusing more on comfort in future if no significant improvement  discuss also concern for need of cpr in future  family also to discuss this decision

## 2021-05-07 NOTE — PROGRESS NOTE ADULT - SUBJECTIVE AND OBJECTIVE BOX
No issues overnight  Patient remains intubated, on vent  ICU Vital Signs Last 24 Hrs  T(C): 37.6 (07 May 2021 00:00), Max: 38 (06 May 2021 20:00)  T(F): 99.7 (07 May 2021 00:00), Max: 100.4 (06 May 2021 20:00)  HR: 71 (07 May 2021 03:06) (64 - 82)  BP: 114/79 (07 May 2021 03:00) (91/63 - 142/73)  BP(mean): 88 (07 May 2021 03:00) (61 - 104)  ABP: 106/75 (06 May 2021 18:30) (78/49 - 106/75)  ABP(mean): 89 (06 May 2021 18:30) (62 - 89)  RR: 21 (07 May 2021 03:00) (16 - 33)  SpO2: 100% (07 May 2021 03:06) (94% - 110%)    responds to vigorous tactile stimuli  Not opening eyes  +Following commands on right  PERRL with rightward gaze deviation  RUE/RLE antigravity  No movement on left    MEDICATIONS  (STANDING):  atorvastatin 80 milliGRAM(s) Oral at bedtime  chlorhexidine 0.12% Liquid 15 milliLiter(s) Oral Mucosa every 12 hours  enoxaparin Injectable 40 milliGRAM(s) SubCutaneous daily  levETIRAcetam  Solution 500 milliGRAM(s) Oral two times a day  levothyroxine 75 MICROGram(s) Oral daily  pantoprazole   Suspension 40 milliGRAM(s) Oral daily  potassium chloride   Powder 20 milliEquivalent(s) Oral once  senna Syrup 15 milliLiter(s) Oral at bedtime  sodium chloride 2 Gram(s) Oral every 8 hours    MEDICATIONS  (PRN):                          8.4    9.08  )-----------( 174      ( 07 May 2021 02:44 )             27.1     05-07    141  |  111<H>  |  22  ----------------------------<  149<H>  3.8   |  23  |  0.58    Ca    9.0      07 May 2021 02:44  Phos  3.1     05-07  Mg     2.1     05-07    < from: MR Head w/ IV Cont (05.06.21 @ 11:46) >  FINDINGS: Previously seen large parenchymal hematoma within the right parietal lobe is again reidentified with mixed signal characteristics. It appears grossly unchanged in size and measures 7.2 x 4.1 x 4.9 cm (AP x TRV x CC). Surrounding mass effect and edema are again noted with shift of the midline structures measuring approximately 1 cm which appears grossly unchanged. Effacement of the right lateral ventricle is again seen. Right to leftward subfalcine herniation is seen which appears unchanged. Right-sided uncal herniation also appears unchanged.    No gross underlying enhancement is seen when comparing the precontrast T1 to the postcontrast images.    Layering subdural hemorrhage along the right posterior falx and right occipital convexity are again seen which demonstrates high signal on T1-weighted imaging with low signal on T2-weighted series, compatible with early subacute blood products.    No new areas of acute intracranial hemorrhage are seen.    Multiple patchy confluent nonspecific T2/FLAIR hyperintense signal changes are noted throughout the bihemispheric white matter without associated mass effect or restricted diffusion. Flow voids are noted throughout the major intracranial vessels,on the T2 weighted images, consistent with their patency. The sella turcica and posterior fossa are unremarkable.    Scattered mucosal thickening is seen throughout the paranasal sinuses. The right mastoid air cells are relatively well aerated. A nonspecific left-sided mastoid air cell effusion is noted. A geographic area of low signal is seen within the left frontal calvarium corresponding to an area of sclerosis seen on the prior CT exam. Minimal underlying enhancement is appreciated. This finding is nonspecific. A rightward gaze is noted. Mild platybasia is apparent with retroflexion of the dens.    IMPRESSION: Redemonstration of a large right parietal parenchymal hematoma with similar-appearing surrounding mass effect, vasogenic edema, shift of the midline structures from right to left, right to leftward subfalcine herniation, and right-sided uncal herniation when compared to the prior examinations.    No underlying abnormal enhancement.    Redemonstration of layering posterior right-sided subdural hemorrhage.    < end of copied text >

## 2021-05-07 NOTE — PROGRESS NOTE ADULT - ASSESSMENT
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 On NaCl 2gm Q8H, Keppra  5/5: Stable exam. Na 141 dc'd NaCl, Continue with Keppra. Family agreeable to Trach/Peg - general surgery consulted  5/6: Na 142, mri brain today, c/w keppra, trach and peg  5/6: Na 141, stable exam. Family deferring Trach and PEG for several days before they make a decision. On keppra

## 2021-05-08 NOTE — PROGRESS NOTE ADULT - SUBJECTIVE AND OBJECTIVE BOX
SICU DAILY PROGRESS NOTE    HPI: 81y Female CAD w/ stents on ASA/Plavix, HLD, hypothyroid, anemia who presented s/p fall, +LOC, initially A&Ox2 in ED, found to have R frontal intraparenchymal hemorrhage. Received DDAVP and 1U Plt in ED. Admitted to SICU for q1h neurochecks, and was intubated for worsening mental status on HD1. On repeat CTH was found to have increased intraparenchymal hemorrhage and new SAH and SDH, with 8mm R to L midline shift. Neurosurgical intervention not pursued per family wishes.  Patient remains intubated, with poor mental status (GCS 7T).    24 HOUR EVENTS:  - No acute events  - Remained on full vent support due to tachypnea and apneic episodes on brief CPAP trial    SUBJECTIVE/ROS:  [ ] A ten-point review of systems was otherwise negative except as noted.  [x] Due to altered mental status/intubation, subjective information were not able to be obtained from the patient. History was obtained, to the extent possible, from review of the chart and collateral sources of information.      NEURO  GCS: 6T  Exam: does not open eyes, does not follow commands, withdraws from pain RUE  Meds: levETIRAcetam  Solution 500 milliGRAM(s) Oral two times a day    [x] Adequacy of sedation and pain control has been assessed and adjusted      RESPIRATORY  RR: 16 (05-07-21 @ 22:00) (16 - 32)  SpO2: 100% (05-07-21 @ 23:21) (94% - 100%)  Wt(kg): --  Exam: unlabored, clear to auscultation bilaterally, synchronous with vent  Mechanical Ventilation: Mode: AC/ CMV (Assist Control/ Continuous Mandatory Ventilation), RR (machine): 16, RR (patient): 19, TV (machine): 300, FiO2: 30, PEEP: 5, ITime: 0.7, MAP: 10, PIP: 26  ABG - ( 07 May 2021 02:44 )  pH: 7.46  /  pCO2: 35    /  pO2: 141   / HCO3: 26    / Base Excess: 1.6   /  SaO2: 99.3    Lactate: x                [N/A] Extubation Readiness Assessed  Meds:       CARDIOVASCULAR  HR: 64 (05-07-21 @ 23:21) (64 - 82)  BP: 114/61 (05-07-21 @ 22:00) (93/59 - 132/92)  BP(mean): 71 (05-07-21 @ 22:00) (60 - 103)  ABP: 64/41 (05-07-21 @ 08:00) (64/41 - 64/41)  ABP(mean): 52 (05-07-21 @ 08:00) (52 - 52)  Wt(kg): --  CVP(cm H2O): --      Exam: regular rate and rhythm  Cardiac Rhythm: sinus  Perfusion     [x]Adequate   [ ]Inadequate  Mentation   [x]Normal       [ ]Reduced  Extremities  [x]Warm         [ ]Cool  Volume Status [ ]Hypervolemic [x]Euvolemic [ ]Hypovolemic  Meds:       GI/NUTRITION  Exam: soft, nontender, nondistended, midline scar well healed  Diet: NPO w/TF via NGT at goal  Meds: pantoprazole   Suspension 40 milliGRAM(s) Oral daily  senna Syrup 15 milliLiter(s) Oral at bedtime      GENITOURINARY  I&O's Detail    05-06 @ 07:01  -  05-07 @ 07:00  --------------------------------------------------------  IN:    Enteral Tube Flush: 260 mL    Jevity 1.2: 945 mL  Total IN: 1205 mL    OUT:    Indwelling Catheter - Urethral (mL): 490 mL    Intermittent Catheterization - Urethral (mL): 320 mL    Rectal Tube (mL): 50 mL  Total OUT: 860 mL    Total NET: 345 mL      05-07 @ 07:01  -  05-08 @ 00:10  --------------------------------------------------------  IN:    Enteral Tube Flush: 90 mL    Jevity 1.2: 540 mL  Total IN: 630 mL    OUT:    Indwelling Catheter - Urethral (mL): 505 mL    Rectal Tube (mL): 25 mL  Total OUT: 530 mL    Total NET: 100 mL          05-07    141  |  111<H>  |  22  ----------------------------<  149<H>  3.8   |  23  |  0.58    Ca    9.0      07 May 2021 02:44  Phos  3.1     05-07  Mg     2.1     05-07      [x] Donaldson catheter, indication: urinary retention  Meds: sodium chloride 2 Gram(s) Oral every 6 hours        HEMATOLOGIC  Meds: enoxaparin Injectable 40 milliGRAM(s) SubCutaneous daily    [x] VTE Prophylaxis                        8.4    9.08  )-----------( 174      ( 07 May 2021 02:44 )             27.1       Transfusion     [ ] PRBC   [ ] Platelets   [ ] FFP   [ ] Cryoprecipitate      INFECTIOUS DISEASES  WBC Count: 9.08 K/uL (05-07 @ 02:44)    RECENT CULTURES:    Meds:       ENDOCRINE  CAPILLARY BLOOD GLUCOSE        Meds: atorvastatin 80 milliGRAM(s) Oral at bedtime  levothyroxine 75 MICROGram(s) Oral daily        ACCESS DEVICES:  [x] Peripheral IV  [ ] Central Venous Line	[ ] R	[ ] L	[ ] IJ	[ ] Fem	[ ] SC	Placed:   [x] Arterial Line		[ ] R	[ ] L	[ ] Fem	[ ] Rad	[ ] Ax	Placed:   [ ] PICC:					[ ] Mediport  [x] Urinary Catheter, Date Placed: 5/6  [x] Necessity of urinary, arterial, and venous catheters discussed    OTHER MEDICATIONS:  chlorhexidine 0.12% Liquid 15 milliLiter(s) Oral Mucosa every 12 hours      CODE STATUS:  Full code

## 2021-05-08 NOTE — PROGRESS NOTE ADULT - ASSESSMENT
82 YO Female with Right parietooccipital IPH    5/3: bradycardic 40's, 50's o/n, hypotensive SBP 70's, phenylephrine started, CTH done appears stable, neurological exam stable, on keppra, On 1.5%  5/4: Stable exam. On NaCl 2gm Q8H, Keppra  5/5: Stable exam. Na 141 dc'd NaCl, Continue with Keppra. Family agreeable to Trach/Peg - general surgery consulted  5/6: Na 142, mri brain today, c/w keppra, trach and peg  5/7: Na 141, stable exam. Family deferring Trach and PEG for several days before they make a decision. On keppra  5/8: Na 141, stable exam. On keppra. GOC discussion held with family, may be amenable to Trach and PEG

## 2021-05-08 NOTE — PROGRESS NOTE ADULT - SUBJECTIVE AND OBJECTIVE BOX
No issues overnight  Patient remains intubated, on vent  ICU Vital Signs Last 24 Hrs  T(C): 37.8 (08 May 2021 00:00), Max: 38.1 (07 May 2021 16:00)  T(F): 100.1 (08 May 2021 00:00), Max: 100.5 (07 May 2021 16:00)  HR: 63 (08 May 2021 00:00) (61 - 82)  BP: 104/51 (08 May 2021 02:00) (93/59 - 132/92)  BP(mean): 63 (08 May 2021 02:00) (59 - 103)  ABP: 64/41 (07 May 2021 08:00) (64/41 - 64/41)  ABP(mean): 52 (07 May 2021 08:00) (52 - 52)  RR: 16 (08 May 2021 02:00) (16 - 32)  SpO2: 100% (08 May 2021 02:00) (94% - 100%)    responds to noxious stimuli  Not opening eyes  PERRL with rightward gaze deviation  RUE/RLE antigravity  No movement on left    MEDICATIONS  (STANDING):  atorvastatin 80 milliGRAM(s) Oral at bedtime  chlorhexidine 0.12% Liquid 15 milliLiter(s) Oral Mucosa every 12 hours  enoxaparin Injectable 40 milliGRAM(s) SubCutaneous daily  levETIRAcetam  Solution 500 milliGRAM(s) Oral two times a day  levothyroxine 75 MICROGram(s) Oral daily  pantoprazole   Suspension 40 milliGRAM(s) Oral daily  senna Syrup 15 milliLiter(s) Oral at bedtime  sodium chloride 2 Gram(s) Oral every 6 hours    MEDICATIONS  (PRN):                          7.7    7.15  )-----------( 164      ( 08 May 2021 02:32 )             24.9     05-07    141  |  111<H>  |  22  ----------------------------<  149<H>  3.8   |  23  |  0.58    Ca    9.0      07 May 2021 02:44  Phos  3.1     05-07  Mg     2.1     05-07

## 2021-05-09 NOTE — PROGRESS NOTE ADULT - SUBJECTIVE AND OBJECTIVE BOX
SICU DAILY PROGRESS NOTE    HPI: 81y Female CAD w/ stents on ASA/Plavix, HLD, hypothyroid, anemia who presented s/p fall, +LOC, initially A&Ox2 in ED, found to have R frontal intraparenchymal hemorrhage. Received DDAVP and 1U Plt in ED. Admitted to SICU for q1h neurochecks, and was intubated for worsening mental status on HD1. On repeat CTH was found to have increased intraparenchymal hemorrhage and new SAH and SDH, with 8mm R to L midline shift. Neurosurgical intervention not pursued per family wishes.  Patient remains intubated, with poor mental status (GCS 7T).    24 HOUR EVENTS:  - No acute events  - Failed CPAP trial due to tachypnea (RR 40s)    SUBJECTIVE/ROS:  [ ] A ten-point review of systems was otherwise negative except as noted.  [x] Due to altered mental status/intubation, subjective information were not able to be obtained from the patient. History was obtained, to the extent possible, from review of the chart and collateral sources of information.      NEURO  GCS: 6T  Exam: moves RUE to noxious stimuli, does not open eyes, does not follow commands  Meds: levETIRAcetam  Solution 500 milliGRAM(s) Oral two times a day    [x] Adequacy of sedation and pain control has been assessed and adjusted      RESPIRATORY  RR: 17 (05-08-21 @ 23:00) (15 - 25)  SpO2: 100% (05-08-21 @ 23:00) (97% - 100%)  Wt(kg): --  Exam: unlabored, clear to auscultation bilaterally, synchronous with vent  Mechanical Ventilation: Mode: AC/ CMV (Assist Control/ Continuous Mandatory Ventilation), RR (machine): 16, RR (patient): 16, TV (machine): 300, FiO2: 30, PEEP: 5, ITime: 0.75, MAP: 9, PIP: 22  ABG - ( 08 May 2021 02:32 )  pH: 7.44  /  pCO2: 37    /  pO2: 150   / HCO3: 26    / Base Excess: 1.2   /  SaO2: 99.4    Lactate: x                [N/A] Extubation Readiness Assessed  Meds:       CARDIOVASCULAR  HR: 67 (05-08-21 @ 23:00) (61 - 77)  BP: 116/61 (05-08-21 @ 23:00) (100/53 - 131/42)  BP(mean): 71 (05-08-21 @ 23:00) (60 - 76)  ABP: 73/50 (05-08-21 @ 07:00) (73/50 - 73/50)  ABP(mean): 61 (05-08-21 @ 07:00) (61 - 61)  Wt(kg): --  CVP(cm H2O): --      Exam: regular rate and rhythm  Cardiac Rhythm: sinus  Perfusion     [x]Adequate   [ ]Inadequate  Mentation   [x]Normal       [ ]Reduced  Extremities  [x]Warm         [ ]Cool  Volume Status [ ]Hypervolemic [x]Euvolemic [ ]Hypovolemic  Meds:       GI/NUTRITION  Exam: soft, nontender, nondistended, incision C/D/I  Diet: NPO w/TF via NGT at goal  Meds: pantoprazole   Suspension 40 milliGRAM(s) Oral daily  senna Syrup 15 milliLiter(s) Oral at bedtime      GENITOURINARY  I&O's Detail    05-07 @ 07:01  -  05-08 @ 07:00  --------------------------------------------------------  IN:    Enteral Tube Flush: 490 mL    Jevity 1.2: 990 mL  Total IN: 1480 mL    OUT:    Indwelling Catheter - Urethral (mL): 1020 mL    Rectal Tube (mL): 25 mL  Total OUT: 1045 mL    Total NET: 435 mL      05-08 @ 07:01  -  05-09 @ 00:07  --------------------------------------------------------  IN:    Enteral Tube Flush: 60 mL    Jevity 1.2: 675 mL  Total IN: 735 mL    OUT:    Indwelling Catheter - Urethral (mL): 530 mL  Total OUT: 530 mL    Total NET: 205 mL          05-08    144  |  113<H>  |  24<H>  ----------------------------<  133<H>  3.7   |  22  |  0.58    Ca    8.6      08 May 2021 02:32  Phos  2.6     05-08  Mg     2.1     05-08      [x] Donaldson catheter, indication: urinary retention  Meds: sodium chloride 2 Gram(s) Oral every 6 hours        HEMATOLOGIC  Meds: enoxaparin Injectable 40 milliGRAM(s) SubCutaneous daily    [x] VTE Prophylaxis                        7.7    7.15  )-----------( 164      ( 08 May 2021 02:32 )             24.9       Transfusion     [ ] PRBC   [ ] Platelets   [ ] FFP   [ ] Cryoprecipitate      INFECTIOUS DISEASES  WBC Count: 7.15 K/uL (05-08 @ 02:32)    RECENT CULTURES:    Meds:       ENDOCRINE  CAPILLARY BLOOD GLUCOSE        Meds: atorvastatin 80 milliGRAM(s) Oral at bedtime  levothyroxine 75 MICROGram(s) Oral daily        ACCESS DEVICES:  [x] Peripheral IV  [ ] Central Venous Line	[ ] R	[ ] L	[ ] IJ	[ ] Fem	[ ] SC	Placed:   [x] Arterial Line		[ ] R	[x] L	[ ] Fem	[x] Rad	[ ] Ax	Placed: 5/3/21  [ ] PICC:					[ ] Mediport  [x] Urinary Catheter, Date Placed: 5/6  [x] Necessity of urinary, arterial, and venous catheters discussed    OTHER MEDICATIONS:  chlorhexidine 0.12% Liquid 15 milliLiter(s) Oral Mucosa every 12 hours      CODE STATUS: Full code SICU DAILY PROGRESS NOTE    HPI: 81y Female CAD w/ stents on ASA/Plavix, HLD, hypothyroid, anemia who presented s/p fall, +LOC, initially A&Ox2 in ED, found to have R frontal intraparenchymal hemorrhage. Received DDAVP and 1U Plt in ED. Admitted to SICU for q1h neurochecks, and was intubated for worsening mental status on HD1. On repeat CTH was found to have increased intraparenchymal hemorrhage and new SAH and SDH, with 8mm R to L midline shift. Neurosurgical intervention not pursued per family wishes.  Patient remains intubated, with poor mental status (GCS 7T).    24 HOUR EVENTS:  - No acute events  - Failed CPAP trial due to tachypnea (RR 40s)  - plan for trach/peg Wednesday 5/12    SUBJECTIVE/ROS:  [ ] A ten-point review of systems was otherwise negative except as noted.  [x] Due to altered mental status/intubation, subjective information were not able to be obtained from the patient. History was obtained, to the extent possible, from review of the chart and collateral sources of information.      NEURO  GCS: 6T  Exam: moves RUE to noxious stimuli, does not open eyes, does not follow commands  Meds: levETIRAcetam  Solution 500 milliGRAM(s) Oral two times a day    [x] Adequacy of sedation and pain control has been assessed and adjusted      RESPIRATORY  RR: 17 (05-08-21 @ 23:00) (15 - 25)  SpO2: 100% (05-08-21 @ 23:00) (97% - 100%)  Wt(kg): --  Exam: unlabored, clear to auscultation bilaterally, synchronous with vent  Mechanical Ventilation: Mode: AC/ CMV (Assist Control/ Continuous Mandatory Ventilation), RR (machine): 16, RR (patient): 16, TV (machine): 300, FiO2: 30, PEEP: 5, ITime: 0.75, MAP: 9, PIP: 22  ABG - ( 08 May 2021 02:32 )  pH: 7.44  /  pCO2: 37    /  pO2: 150   / HCO3: 26    / Base Excess: 1.2   /  SaO2: 99.4    Lactate: x                [N/A] Extubation Readiness Assessed  Meds:       CARDIOVASCULAR  HR: 67 (05-08-21 @ 23:00) (61 - 77)  BP: 116/61 (05-08-21 @ 23:00) (100/53 - 131/42)  BP(mean): 71 (05-08-21 @ 23:00) (60 - 76)  ABP: 73/50 (05-08-21 @ 07:00) (73/50 - 73/50)  ABP(mean): 61 (05-08-21 @ 07:00) (61 - 61)  Wt(kg): --  CVP(cm H2O): --      Exam: regular rate and rhythm  Cardiac Rhythm: sinus  Perfusion     [x]Adequate   [ ]Inadequate  Mentation   [x]Normal       [ ]Reduced  Extremities  [x]Warm         [ ]Cool  Volume Status [ ]Hypervolemic [x]Euvolemic [ ]Hypovolemic  Meds:       GI/NUTRITION  Exam: soft, nontender, nondistended, incision C/D/I  Diet: NPO w/TF via NGT at goal  Meds: pantoprazole   Suspension 40 milliGRAM(s) Oral daily  senna Syrup 15 milliLiter(s) Oral at bedtime      GENITOURINARY  I&O's Detail    05-07 @ 07:01  -  05-08 @ 07:00  --------------------------------------------------------  IN:    Enteral Tube Flush: 490 mL    Jevity 1.2: 990 mL  Total IN: 1480 mL    OUT:    Indwelling Catheter - Urethral (mL): 1020 mL    Rectal Tube (mL): 25 mL  Total OUT: 1045 mL    Total NET: 435 mL      05-08 @ 07:01  -  05-09 @ 00:07  --------------------------------------------------------  IN:    Enteral Tube Flush: 60 mL    Jevity 1.2: 675 mL  Total IN: 735 mL    OUT:    Indwelling Catheter - Urethral (mL): 530 mL  Total OUT: 530 mL    Total NET: 205 mL          05-08    144  |  113<H>  |  24<H>  ----------------------------<  133<H>  3.7   |  22  |  0.58    Ca    8.6      08 May 2021 02:32  Phos  2.6     05-08  Mg     2.1     05-08      [x] Donaldson catheter, indication: urinary retention  Meds: sodium chloride 2 Gram(s) Oral every 6 hours        HEMATOLOGIC  Meds: enoxaparin Injectable 40 milliGRAM(s) SubCutaneous daily    [x] VTE Prophylaxis                        7.7    7.15  )-----------( 164      ( 08 May 2021 02:32 )             24.9       Transfusion     [ ] PRBC   [ ] Platelets   [ ] FFP   [ ] Cryoprecipitate      INFECTIOUS DISEASES  WBC Count: 7.15 K/uL (05-08 @ 02:32)    RECENT CULTURES:    Meds:       ENDOCRINE  CAPILLARY BLOOD GLUCOSE        Meds: atorvastatin 80 milliGRAM(s) Oral at bedtime  levothyroxine 75 MICROGram(s) Oral daily        ACCESS DEVICES:  [x] Peripheral IV  [ ] Central Venous Line	[ ] R	[ ] L	[ ] IJ	[ ] Fem	[ ] SC	Placed:   [x] Arterial Line		[ ] R	[x] L	[ ] Fem	[x] Rad	[ ] Ax	Placed: 5/3/21  [ ] PICC:					[ ] Mediport  [x] Urinary Catheter, Date Placed: 5/6  [x] Necessity of urinary, arterial, and venous catheters discussed    OTHER MEDICATIONS:  chlorhexidine 0.12% Liquid 15 milliLiter(s) Oral Mucosa every 12 hours      CODE STATUS: Full code

## 2021-05-09 NOTE — PROGRESS NOTE ADULT - ASSESSMENT
81y Female with history of NSTEMI, PCI, HLD, hypothyroidism, presents s/p fall found to have R IPH, now s/p intubation for worsening mental status. Remains on full vent support. MRI with evidence of subfalcine and uncal herniataion.    PLAN:  NEURO:    - GCS 6T   - R IPH, increased on repeat CTH with new SAH and SDH, MRI 5/6 with evidence of subfalcine and uncal herniation  - Q4H neurochecks  - Withdraws to painful stimuli on R side, nonpurposeful movement of LLE  - Keppra 500 BID for seizure ppx    RESPIRATORY: intubated for airway protection due to mental status  - CPAP as tolerated 5/10   - Plan for Trach & PEG ( to be discussed with Neurosurg, Palliative, Gen Surg and Family)    CARDIOVASCULAR: hx CAD s/p stents (unknown date of stents, last cardiac cath 2017 per chart review)  - Goal MAP 65-70 - monitor BP cuff but will continue a-line for blood draws  - Continue home atorvastatin     GI/NUTRITION: no acute issues  - NPO w OGT  - TF @ goal  - Protonix 40 daily    GENITOURINARY/RENAL: urinary retention  - Goal Na 145-155, continue salt tabs 2 q 6h  - Continue stephen catheter (failed TOV 5/6)    HEMATOLOGIC: on ASA/Plavix s/p DDAVP & platelets x1 for ICH  - Trend CBC daily - downtrending H/H stable on repeat CBC  - VTE ppx: Lovenox, SCDs    INFECTIOUS DISEASE: intermittent low grade fevers w/o leukocytosis  - Trend fever curve, WBC    ENDOCRINE: no acute issues  - Continue home synthroid    Lines/Tubes:  - Stephen replaced 5/6  - Intubated 5/2  - PIVs  - A-line for blood draws    DISPO: SICU

## 2021-05-09 NOTE — PROGRESS NOTE ADULT - SUBJECTIVE AND OBJECTIVE BOX
No issues overnight  ICU Vital Signs Last 24 Hrs  T(C): 37.9 (09 May 2021 00:00), Max: 38.1 (08 May 2021 12:00)  T(F): 100.2 (09 May 2021 00:00), Max: 100.6 (08 May 2021 16:00)  HR: 77 (09 May 2021 03:00) (61 - 78)  BP: 128/59 (09 May 2021 03:00) (100/53 - 131/56)  BP(mean): 75 (09 May 2021 03:00) (60 - 80)  ABP: 73/50 (08 May 2021 07:00) (73/50 - 73/50)  ABP(mean): 61 (08 May 2021 07:00) (61 - 61)  RR: 21 (09 May 2021 03:00) (15 - 25)  SpO2: 98% (09 May 2021 03:00) (97% - 100%)      responds to noxious stimuli  Not opening eyes  PERRL with rightward gaze deviation  RUE antigravity, withdraws RLE  No movement on left    MEDICATIONS  (STANDING):  atorvastatin 80 milliGRAM(s) Oral at bedtime  chlorhexidine 0.12% Liquid 15 milliLiter(s) Oral Mucosa every 12 hours  enoxaparin Injectable 40 milliGRAM(s) SubCutaneous daily  levETIRAcetam  Solution 500 milliGRAM(s) Oral two times a day  levothyroxine 75 MICROGram(s) Oral daily  pantoprazole   Suspension 40 milliGRAM(s) Oral daily  senna Syrup 15 milliLiter(s) Oral at bedtime  sodium chloride 2 Gram(s) Oral every 6 hours    MEDICATIONS  (PRN):                          8.1    9.08  )-----------( 192      ( 09 May 2021 01:55 )             27.4     05-09    144  |  113<H>  |  26<H>  ----------------------------<  141<H>  4.0   |  23  |  0.60    Ca    9.0      09 May 2021 01:56  Phos  2.7     05-09  Mg     2.2     05-09

## 2021-05-09 NOTE — PROGRESS NOTE ADULT - ASSESSMENT
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 On NaCl 2gm Q8H, Keppra  5/5: Stable exam. Na 141 dc'd NaCl, Continue with Keppra. Family agreeable to Trach/Peg - general surgery consulted  5/6: Na 142, mri brain today, c/w keppra, trach and peg  5/7: Na 141, stable exam. Family deferring Trach and PEG for several days before they make a decision. On keppra  5/8: Na 141, stable exam. On keppra. GOC discussion held with family, may be amenable to Trach and PEG  5/9: Na 144, stable exam. On keppra. Await family decision regarding Trach and PEG

## 2021-05-10 NOTE — PROGRESS NOTE ADULT - ASSESSMENT
81y Female with history of NSTEMI, PCI, HLD, hypothyroidism, presents s/p fall found to have R IPH, now s/p intubation for worsening mental status. Remains on full vent support. MRI with evidence of subfalcine and uncal herniation    PLAN:  NEURO:    - R IPH, increased on repeat CTH with new SAH and SDH, MRI 5/6 with evidence of subfalcine and uncal herniation  - Q4H neurochecks  - Withdraws to painful stimuli on R side, nonpurposeful movement of LLE  - Keppra 500 BID for seizure ppx    RESPIRATORY: intubated for airway protection due to mental status  - CPAP as tolerated 5/10, continues to be apneic on pressure support trials  - Plan for Trach & PEG ( to be discussed with Neurosurg, Palliative, Gen Surg and Family)    CARDIOVASCULAR: hx CAD s/p stents (unknown date of stents, last cardiac cath 2017 per chart review)  - Goal MAP 65-70 - monitor BP cuff but will continue a-line for blood draws  - Continue home atorvastatin     GI/NUTRITION: no acute issues  - TF @ goal, Jevity @ 45ml/hr  - Protonix 40 daily    GENITOURINARY/RENAL: urinary retention  - Goal Na 145-155, continue salt tabs 2 q 6h  - Continue Donaldson catheter (failed TOV 5/6)    HEMATOLOGIC: on ASA/Plavix s/p DDAVP & platelets x1 for ICH  - H/H stable  - VTE ppx: Lovenox, SCDs    INFECTIOUS DISEASE: intermittent low grade fevers w/o leukocytosis  - Trend fever curve, WBC  - Monitor off antibiotics    ENDOCRINE: no acute issues  - Continue home synthroid    Lines/Tubes:  - Donaldson replaced 5/6  - Intubated 5/2  - PIVs  - A-line for blood draws    DISPO: SICU 81y Female with history of NSTEMI, PCI, HLD, hypothyroidism, presents s/p fall found to have R IPH, now s/p intubation for worsening mental status. Remains on full vent support. MRI with evidence of subfalcine and uncal herniation    PLAN:  NEURO:    - R IPH, increased on repeat CTH with new SAH and SDH, MRI 5/6 with evidence of subfalcine and uncal herniation  - Q8H neurochecks  - Withdraws to painful stimuli on R side, nonpurposeful movement of LLE  - Keppra 500 BID for seizure ppx    RESPIRATORY: intubated for airway protection due to mental status  - CPAP as tolerated 5/10, continues to be apneic on pressure support trials  - Plan for Trach & PEG on wed 5/12 ( to be discussed with Neurosurg, Palliative, Gen Surg and Family)    CARDIOVASCULAR: hx CAD s/p stents (unknown date of stents, last cardiac cath 2017 per chart review)  - Goal MAP 65-70 - monitor BP cuff but will continue a-line for blood draws  - Continue home atorvastatin     GI/NUTRITION: no acute issues  - TF @ goal, Jevity @ 45ml/hr  - Protonix 40 daily    GENITOURINARY/RENAL: urinary retention  - Goal Na 145-155, continue salt tabs 2 q 6h  - Continue Donaldson catheter (failed TOV 5/6)    HEMATOLOGIC: on ASA/Plavix s/p DDAVP & platelets x1 for ICH  - H/H stable  - VTE ppx: Lovenox, SCDs    INFECTIOUS DISEASE: intermittent low grade fevers w/o leukocytosis  - Trend fever curve, WBC  - Monitor off antibiotics    ENDOCRINE: no acute issues  - Continue home synthroid    Lines/Tubes:  - Donaldson replaced 5/6  - Intubated 5/2  - PIVs  - A-line for blood draws    DISPO: SICU      Critical care diagnosis: Intraparenchymal hemorrhage

## 2021-05-10 NOTE — PROGRESS NOTE ADULT - SUBJECTIVE AND OBJECTIVE BOX
SICU Daily Progress Note  =====================================================  Interval/Overnight Events:    - No acute events overnight    HPI:  81y Female CAD w/ stents on ASA/Plavix, HLD, hypothyroid, anemia who presented s/p fall, +LOC, initially A&Ox2 in ED, found to have R frontal intraparenchymal hemorrhage. Received DDAVP and 1U Plt in ED. Admitted to SICU for q1h neurochecks, and was intubated for worsening mental status on HD1. On repeat CTH was found to have increased intraparenchymal hemorrhage and new SAH and SDH, with 8mm R to L midline shift. Neurosurgical intervention not pursued per family wishes.  Patient remains intubated, with poor mental status (GCS 7T).    Allergies: latex (Rash)  penicillin (Faint)      MEDICATIONS:   --------------------------------------------------------------------------------------  Neurologic Medications  levETIRAcetam  Solution 500 milliGRAM(s) Oral two times a day    Respiratory Medications    Cardiovascular Medications    Gastrointestinal Medications  pantoprazole   Suspension 40 milliGRAM(s) Oral daily  senna Syrup 15 milliLiter(s) Oral at bedtime  sodium chloride 2 Gram(s) Oral every 6 hours    Genitourinary Medications    Hematologic/Oncologic Medications  enoxaparin Injectable 40 milliGRAM(s) SubCutaneous daily    Antimicrobial/Immunologic Medications    Endocrine/Metabolic Medications  atorvastatin 80 milliGRAM(s) Oral at bedtime  levothyroxine 75 MICROGram(s) Oral daily    Topical/Other Medications  chlorhexidine 0.12% Liquid 15 milliLiter(s) Oral Mucosa every 12 hours    --------------------------------------------------------------------------------------    VITAL SIGNS, INS/OUTS (last 24 hours):  --------------------------------------------------------------------------------------  ((Insert SICU Vitals/Is+Os here))***  --------------------------------------------------------------------------------------    EXAM  NEUROLOGY  GCS:  6T  Exam: moves RUE to noxious stimuli, does not open eyes, does not follow commands    HEENT  Exam: Normocephalic, atraumatic    RESPIRATORY  Exam: Lungs clear to auscultation, Normal expansion/effort.  Mechanical Ventilation: Mode: AC/ CMV (Assist Control/ Continuous Mandatory Ventilation), RR (machine): 16, TV (machine): 300, FiO2: 30, PEEP: 5, ITime: 0.7, MAP: 9, PIP: 24    CARDIOVASCULAR  Exam: S1, S2.  Regular rate and rhythm.       GI/NUTRITION  Exam: Abdomen soft, Non-tender, Non-distended.      VASCULAR  Exam: Extremities warm, pink, well-perfused.     SKIN  Exam: Good skin turgor, no skin breakdown.    METABOLIC/FLUIDS/ELECTROLYTES  sodium chloride 2 Gram(s) Oral every 6 hours    HEMATOLOGIC  [x] VTE Prophylaxis: enoxaparin Injectable 40 milliGRAM(s) SubCutaneous daily    Transfusions:	[] PRBC	[] Platelets		[] FFP	[] Cryoprecipitate    INFECTIOUS DISEASE  Antimicrobials/Immunologic Medications:    Tubes/Lines/Drains    [x] Peripheral IV  [] Central Venous Line     	[] R	[] L	[] IJ	[] Fem	[] SC	Date Placed:   [x] Arterial Line		[] R	[] L	[] Fem	[] Rad	[] Ax	Date Placed:   [] PICC		[] Midline		[] Mediport  [] Urinary Catheter		Date Placed:   [x] Necessity of urinary, arterial, and venous catheters discussed    LABS  --------------------------------------------------------------------------------------  ((Insert SICU Labs here))***  --------------------------------------------------------------------------------------    OTHER LABORATORY:     IMAGING STUDIES:   CXR:

## 2021-05-10 NOTE — CONSULT NOTE ADULT - ASSESSMENT
Conclusion:   Thank you for this challenging case. All three of Ms. Dennis’s daughters had collectively come to the decision to a trial period with life sustaining measures with both tracheostomy and PEG tube placement. The patient’s daughter Cathie has been elected by her siblings to be the primary surrogate decision-maker for the patient. After discussions with the family, there appears to be an understanding that the patient was cognitively declining prior to the neurological insults she ensued as a result from the subdural and intraparenchymal hemorrhage. However, given that the patient had some functional status when initially admitted to the hospital, the family believe it would serve in their mother’s best interest to attempt a trial period of tracheostomy to ventilator, PEG tube feeding, and long-term care placement to see if any recovery is possible.     The team is commended for their virtue in the care of Ms. Dennis and support for her family during this challenging time. Please contact the ethics consultation service with further questions or concerns.    References:  [email or web address]  Kaye ESPINOZA, Rubin S, Mojgan T. Critical care in [facility]: The ethics of beneficence and justice. [facility] Med J. 2017 Sep;29(3):268-271. doi:10.4314/mmj.v29i3.8. PMID: 87702838; PMCID: [id number].  Nidia GAN, Matias BA, Amalia C, Timothy J, Torsten G, Zoe R, Arnaldo. TM; Difficult Airway Society; Intensive Care Society; Faculty of Intensive Care. Medicine; [employer]. Guidelines for the management of tracheal intubation in critically ill adults. Br J Anaesth. 2018 Feb;120(2):323-352. doi: 10.1016/j.bja.2017.10.021. Epub 2017 Nov 26. PMID:18993331.  GAUDENCIO Horner (2013). The ethics of defining quality of life. Nursing science quarterly, 26(2), 121-123.    Case Report written by Medina Jacobson MD (Medicine Resident)  Case discussed with Medical Ethicist Franklyn Lofton DrPH, [facility]	  More than 50% of the time of this consultation was spent in coordination of Care of Patient

## 2021-05-10 NOTE — PROGRESS NOTE ADULT - ASSESSMENT
82 YO Female with Right parietooccipital IPH    5/3: bradycardic 40's, 50's o/n, hypotensive SBP 70's, phenylephrine started, CTH done appears stable, neurological exam stable, on keppra, On 1.5%  5/4: Stable exam. On NaCl 2gm Q8H, Keppra  5/5: Stable exam. Na 141 dc'd NaCl, Continue with Keppra. Family agreeable to Trach/Peg - general surgery consulted  5/6: Na 142, mri brain today, c/w keppra, trach and peg  5/7: Na 141, stable exam. Family deferring Trach and PEG for several days before they make a decision. On keppra  5/8: Na 141, stable exam. On keppra. GOC discussion held with family, may be amenable to Trach and PEG  5/9: Na 144, stable exam. On keppra. Await family decision regarding Trach and PEG  5/10: Na 146, neuro exam stable, on keppra, awaiting family decision for trach and peg

## 2021-05-10 NOTE — PROGRESS NOTE ADULT - SUBJECTIVE AND OBJECTIVE BOX
No issues overnight    ICU Vital Signs Last 24 Hrs  T(C): 37.7 (10 May 2021 00:00), Max: 37.9 (09 May 2021 12:00)  T(F): 99.8 (10 May 2021 00:00), Max: 100.2 (09 May 2021 12:00)  HR: 69 (10 May 2021 03:37) (53 - 85)  BP: 147/59 (10 May 2021 03:00) (74/55 - 147/59)  BP(mean): 80 (10 May 2021 03:00) (55 - 89)  ABP: 82/54 (10 May 2021 03:00) (61/42 - 113/74)  ABP(mean): 66 (10 May 2021 03:00) (51 - 91)  RR: 18 (10 May 2021 03:00) (12 - 41)  SpO2: 100% (10 May 2021 03:37) (98% - 100%)    grimaces to noxious stimuli  Not opening eyes  PERRL with rightward gaze deviation  RUE antigravity, withdraws RLE  No movement on left    MEDICATIONS  (STANDING):  atorvastatin 80 milliGRAM(s) Oral at bedtime  chlorhexidine 0.12% Liquid 15 milliLiter(s) Oral Mucosa every 12 hours  enoxaparin Injectable 40 milliGRAM(s) SubCutaneous daily  levETIRAcetam  Solution 500 milliGRAM(s) Oral two times a day  levothyroxine 75 MICROGram(s) Oral daily  pantoprazole   Suspension 40 milliGRAM(s) Oral daily  senna Syrup 15 milliLiter(s) Oral at bedtime  sodium chloride 2 Gram(s) Oral every 6 hours    MEDICATIONS  (PRN):                                       8.2    9.73  )-----------( 218      ( 10 May 2021 02:41 )             26.9   05-10    146<H>  |  114<H>  |  22  ----------------------------<  154<H>  3.7   |  25  |  0.55    Ca    8.9      10 May 2021 02:41  Phos  2.5     05-10  Mg     2.2     05-10

## 2021-05-10 NOTE — CONSULT NOTE ADULT - SUBJECTIVE AND OBJECTIVE BOX
Consult requested by:  Dr. Gillette            Role: MD      Service: SICU          Contact#(s): [phone]                   Consultant:   Medina Jacobson MD/ Franklyn Lofton DrPH, [facility]   Contact #s: [phone]   Consult purpose: To assist the health care team in the ethical dilemma posed by an 81-year-old female with a history of Coronary artery disease with stents on ASA/Plavix, hyperlipidemia, hypothyroid, anemia and intracerebral hemorrhage, intubated for nine days whose family is deciding between tracheostomy and percutaneous endoscopic gastrostomy (PEG) placement vs. compassionate extubation.  								  Clinical summary: Patient is an 81- year- old female with a past medical history of anemia, hyperlipidemia, Coronary Artery Disease with stents on ASA/ Plavix who presented to the hospital following a fall and found to have a right   Parenchymal hemorrhage and right intrahemispheric subdural hemorrhage. According to EMS when patient had initially presented to the hospital the patient was AoX3. According to the electronic medical record was A0X2 in the emergency department. Two days into her hospitalization she became bradycardic, hypotensive and was started on ionotropic support to help support her hemodynamics. She was placed on seizure prophylaxis. Patient had had MRI on 5/6 with findings subfalcine and uncal herniation. Palliative care team had discussion with the family on 5/7 where the family was leaning towards tracheostomy and PEG tube placement and were considering focusing more on comfort in future if no significant improvement occurred in the future.      Per the electronic medical record, multiple goals of care conversations were had with the family with strong consideration towards the decision to move forward with tracheostomy and PEG tube placement.      Prognosis Estimate (survival in days, wks, mos, yrs):	poor  Patient Decision-Making Capacity: Has capacity  Lacks capacity due to medical condition 				   Patient Aware of: Diagnosis:  Yes   -No  Unknown  Prognosis:  Yes   -No  Unknown      Name of medical decision-maker should patient lack capacity: Cathie           Relationship: Daughter 		    Role:  Health Care Agent   X Legal Surrogate  Contact #(r: [phone]				    Other ‘stakeholders’: Elyse and Cait who are the other two daughters – both other daughters defers decision-making to Cathie    Other Decision-Maker (i.e., HCA or Surrogate) Aware of: Diagnosis: Yes  No   Prognosis:  Yes   No   Evidence of Patient’s Preference of Life-Sustaining Treatment (Written or Oral): none- identified   Resuscitation status:  DNR: Yes  - No   DNI: Yes  - No      Discussions  Patient was observed at 9:30 AM on 5/10. She was intubated on minimal vent settings with level 1 restraint present on the R upper extremity.   (5/10/2020 11:30 AM). Dr. Medina Jacobson (rotator with ethics consultation service) spoke with patient’s daughter Elyse [phone]. Dr. Jacobson introduced as rotator within the ethics department and reason for call explained to learn more about the patient and her family. Daughter confirmed understanding. She states that she currently lives five hours away in [place] and her sister Cathie is has been coordinating health plan with the team given that she lives closest to the patient. She states that her mom was an active woman who had done aerobic exercise daily prior to the COVID 19 pandemic. She states her mother had declined in the past year and had become less active prior to her mechanical fall. She wanted further information in regards to what the pros and cons that tracheostomy could provide. After discussion of the risks vs benefits of moving forward with tracheostomy and PEG tube placement, Elyse asked if Cathie could be called as she is the sister who was mainly involved in coordinating decision making with the health care team and who lived closest to her mother. Cathie had discussed prognosis of patient’s diagnosis with the palliative team and SICU team and the details in regards to the tracheostomy procedure with the palliative team on 5/7.   5/10/2020 at 12:45 PM. Dr. Medina Jacobson (rotator with ethics consultation service) spoke with daughter Cathie [phone] to learn more about the patient. She states that her mother experienced a period of cognitive decline which started approximately 2 years ago following a stent placement for her heart. Her cognitive decline had become progressive where she would drive and get lost and within the past year, she has been having more difficulty in terms of paying for her finances and rent on time. Cathie has been helping her mother with her finances and was in the process of setting up her mother for an appointment with a doctor to evaluate her for early onset Alzheimer’s/ dementia or cognitive impairment. Patient had fallen and hit her head near a bank on 5/1 and while she was alert and oriented at that time she had already been in this state where she was experiencing changes in her memory. Cathie states she has had lengthy discussion with the palliative team about the patient’s prognosis. She is aware that the patient may never gain meaningful recovery. She may at worse only be able to move slightly her right side and open and shut her eyes. She had discussions with both of her sisters Cait and Elyse, who wanted to attempt to give their mother (the patient) a few more months to see if she would be able to be weaned from the vent. Cathie said that the discussion they all shared was at that time if no meaningful recovery was achieved, they would consider DNR/ DNI status and reassessment of their options. Dr. Jacobson stated understanding that this decision is very difficult and emotional support was provided.     Bioethics analysis:	  The ethical question that is highlighted by this case weighs the principle’s autonomy, and the physician’s duty to beneficence and non- maleficence. Autonomy recognizes that the patient’s right to choose the appropriate medical treatment based on her own beliefs, culture, and needs. Beneficence is the act of angel, mercy, and kindness which includes the moral obligation of doing what is best for the patient. Non-maleficence is defined as not causing harm to the patient or inflicting the lease amount of harm.     Given the state of Ms. Dennis’s medical condition, she cannot at the present time exercise capacity to make medical decisions. Therefore, in this circumstance, the decision would have to be made by the patient’s health care surrogate. There are three sisters Elyse, Cathie, and Cait who would be considered the health care surrogates, After discussion with Elyse and Cathie it appears that the discussions with the medical team and the greatest insight into the prognosis and diagnosis of the patient are best understood by Cathie. Elyse states that Cathie is the closest physically to the patient and she was elected amongst the sisters to represent the family’s wishes and medical decision making. Cathie, as the representative and health care surrogate would be a moral agent who would make decisions according to the patient substituted judgement. 		  					  One of the bioethical principles of beneficence infers a moral obligation to act for another’s benefit. This is often done by preventing or removing possible harms (1). This principle is closely related to non-maleficence. The combination of these two principles affirm that more good is done for the patient than harm (2). In this patient’s case, the family is inclined to avoid unnecessary harm and suffering for the patient. A thorough discussion took placed with the palliative team in regards to potential harms that could be caused by being intubated for prolonged period of time. A prolonged presence of an ET tube increases risk to the patient for complications such as tracheomalacia, edema and erosion of tissue (3). In glancing at the decision to move forward with Tracheostomy, this would minimize these risks and harms which prolonged intubation could cause. On the other hand, the family was informed and expressed understanding that there would be little perceived meaningful recovery once tracheostomy and peg tube would be performed. From this perspective it could be argued that the quality of the patient’s life would not be the same once trach-dependent in a nursing facility and that moving forward with a tracheostomy would not promote the best possible "quality of life" for the patient. However, modern bioethical approaches to healthcare decision-making suggests that quality of life determinations for patients are made by patients themselves and interpreted by those who understand their values, beliefs, and cultural preferences (4).    In understanding this predicament, the patient’s daughter, and health care decision maker Cathie stated an understanding amongst her sisters that following a short trial of Trach/ PEG placement if no significant recovery would be met, they would likely elect to move forward with a DNR/ DNI status and further discuss non-aggressive treatment options and care. Cathie stated that both her sisters Elyse and Cait strongly believe to give their mother additional time to see if she may improve and Cathie states that she is respectful of their decision to trial these life-sustaining measures for the time being.

## 2021-05-11 NOTE — CHART NOTE - NSCHARTNOTEFT_GEN_A_CORE
NUTRITION FOLLOW-UP:    Extensive chart review conducted to obtain nutrition related information.  Pt continues on mechanical ventilation/no sedation.  Pt semicomatose as per chart.  Plan for Trach/PEG tomorrow.  NPO after midnight.  Palliative and Ethics involved in case.  Pt receiving enteral nutrition support, adjusted to 23h/d 2/2 levothyroxine administration.      HPI:  81y Female CAD w/ stents on ASA/Plavix, HLD, hypothyroid, anemia who presented s/p fall, +LOC, initially A&Ox2 in ED, found to have R frontal intraparenchymal hemorrhage. Received DDAVP and 1U Plt in ED. Admitted to SICU for q1h neurochecks, and was intubated for worsening mental status on HD1. On repeat CTH was found to have increased intraparenchymal hemorrhage and new SAH and SDH, with 8mm R to L midline shift. Neurosurgical intervention not pursued per family wishes.  Patient remains intubated, with poor mental status (GCS 7T).    Current wt is 5.2kg above admission wt.  Likely fluid related.  Pt noted w/ 2+ generalized and 3+ dependent edema.      Weight:  5/11 - 80kg     5/7 - 82.2kg     8.3 - 75.4kg     Adm - 74.8kg     UBW - 74.6kg     IBW - 47.7kg  Labs:  H/H 8.0/27.3  Glu 149    Current Diet:  Jevity 1.2 @45mL/h x 23h via OGT  Enteral Recommendations:  TF providing 1242kcal w/59gm protein.  Estimated kcal needs = 25-30kcal/kg = 1192-1431kcal/d.  Estimated protein needs  = 1.2-1.5gm/kg = 57-71.5gms/d.  Current TForder meets kcal/protein needs.  TF should be held 30minutes before and after levothyroxine dose to avoid food/med interaction.      RD to Remain Available:  yes    Additional Recommendations:   1) Monitor weights, labs, BM's, skin integrity, tolerance to TF  2) Resume current TF order as appropriate after PEG placement.

## 2021-05-11 NOTE — PROGRESS NOTE ADULT - SUBJECTIVE AND OBJECTIVE BOX
No issues overnight    ICU Vital Signs Last 24 Hrs  T(C): 37.8 (11 May 2021 00:00), Max: 38.6 (10 May 2021 16:00)  T(F): 100 (11 May 2021 00:00), Max: 101.5 (10 May 2021 16:00)  HR: 62 (11 May 2021 04:00) (62 - 77)  BP: 104/51 (10 May 2021 20:00) (102/47 - 141/55)  BP(mean): 63 (10 May 2021 20:00) (55 - 89)  ABP: 89/55 (11 May 2021 00:00) (70/47 - 104/65)  ABP(mean): 70 (11 May 2021 00:00) (57 - 82)  RR: 16 (11 May 2021 04:00) (16 - 40)  SpO2: 100% (11 May 2021 04:00) (100% - 100%)    grimaces to noxious stimuli  Not opening eyes  PERRL with rightward gaze deviation  RUE antigravity, withdraws RLE  No movement on left    MEDICATIONS  (STANDING):  atorvastatin 80 milliGRAM(s) Oral at bedtime  chlorhexidine 0.12% Liquid 15 milliLiter(s) Oral Mucosa every 12 hours  enoxaparin Injectable 40 milliGRAM(s) SubCutaneous daily  levETIRAcetam  Solution 500 milliGRAM(s) Oral two times a day  levothyroxine 75 MICROGram(s) Oral daily  pantoprazole   Suspension 40 milliGRAM(s) Oral daily  senna Syrup 15 milliLiter(s) Oral at bedtime  sodium chloride 2 Gram(s) Oral every 6 hours    MEDICATIONS  (PRN):                            8.0    10.28 )-----------( 225      ( 11 May 2021 01:53 )             27.3   05-11    145  |  114<H>  |  22  ----------------------------<  149<H>  3.9   |  25  |  0.56    Ca    9.0      11 May 2021 01:53  Phos  2.5     05-11  Mg     2.2     05-11

## 2021-05-11 NOTE — PROGRESS NOTE ADULT - ASSESSMENT
82 YO Female with Right parietooccipital IPH    5/3: bradycardic 40's, 50's o/n, hypotensive SBP 70's, phenylephrine started, CTH done appears stable, neurological exam stable, on keppra, On 1.5%  5/4: Stable exam. On NaCl 2gm Q8H, Keppra  5/5: Stable exam. Na 141 dc'd NaCl, Continue with Keppra. Family agreeable to Trach/Peg - general surgery consulted  5/6: Na 142, mri brain today, c/w keppra, trach and peg  5/7: Na 141, stable exam. Family deferring Trach and PEG for several days before they make a decision. On keppra  5/8: Na 141, stable exam. On keppra. GOC discussion held with family, may be amenable to Trach and PEG  5/9: Na 144, stable exam. On keppra. Await family decision regarding Trach and PEG  5/10: Na 146, neuro exam stable, on keppra, awaiting family decision for trach and peg  5/11: Na 146, neuro exam stable, on keppra, awaiting family decision for trach and peg

## 2021-05-11 NOTE — CHART NOTE - NSCHARTNOTEFT_GEN_A_CORE
Pt to get trach/peg tomorrow.  Ethics consult appreciated.  Will sign off.  Please reconsult if goals or symptoms change.  Silvana Marroquin DO

## 2021-05-11 NOTE — PROGRESS NOTE ADULT - SUBJECTIVE AND OBJECTIVE BOX
SICU Daily Progress Note  =====================================================  Interval/Overnight Events:      Pt is not tolerationg CPAP for  more than 1 to 2 hrs . Plan for Trach and PEG on WEDNESDAY  Tmax: 100.9 , HR: 60-70 , BP : 100-120 mmHg ( by cuff pressure- A line is off by 10 to 15 points)   avg UO: 50-75 ml /hr , NEt : +250 ml   Tolerating TF at goal 45     ==========================================================  HPI:  81y Female CAD w/ stents on ASA/Plavix, HLD, hypothyroid, anemia who presented s/p fall, +LOC, initially A&Ox2 in ED, found to have R frontal intraparenchymal hemorrhage. Received DDAVP and 1U Plt in ED. Admitted to SICU for q1h neurochecks, and was intubated for worsening mental status on HD1. On repeat CTH was found to have increased intraparenchymal hemorrhage and new SAH and SDH, with 8mm R to L midline shift. Neurosurgical intervention not pursued per family wishes.  Patient remains intubated, with poor mental status (GCS 7T).    Allergies: latex (Rash)  penicillin (Faint)      MEDICATIONS:   --------------------------------------------------------------------------------------  Neurologic Medications:  levETIRAcetam  Solution 500 milliGRAM(s) Oral two times a day    Respiratory Medications:    Cardiovascular Medications:    Gastrointestinal Medications:  pantoprazole   Suspension 40 milliGRAM(s) Oral daily  senna Syrup 15 milliLiter(s) Oral at bedtime  sodium chloride 2 Gram(s) Oral every 6 hours    Genitourinary Medications:    Hematologic/Oncologic Medications:  enoxaparin Injectable 40 milliGRAM(s) SubCutaneous daily    Antimicrobials/Immunologic Medications:    Endocrine/Metabolic Medications:  atorvastatin 80 milliGRAM(s) Oral at bedtime  levothyroxine 75 MICROGram(s) Oral daily    Topical/Other Medications:  chlorhexidine 0.12% Liquid 15 milliLiter(s) Oral Mucosa every 12 hours    --------------------------------------------------------------------------------------    VITAL SIGNS, INS/OUTS (last 24 hours):  --------------------------------------------------------------------------------------  T(C): 37.8 (05-11-21 @ 00:00), Max: 38.6 (05-10-21 @ 16:00)  HR: 72 (05-11-21 @ 00:00) (64 - 77)  BP: 104/51 (05-10-21 @ 20:00) (102/47 - 147/59)  BP(mean): 63 (05-10-21 @ 20:00) (55 - 89)  ABP: 89/55 (05-11-21 @ 00:00) (70/47 - 105/67)  ABP(mean): 70 (05-11-21 @ 00:00) (57 - 83)  RR: 17 (05-11-21 @ 00:00) (16 - 40)  SpO2: 100% (05-11-21 @ 00:00) (100% - 100%)  Wt(kg): --  CVP(mm Hg): --  CI: --  CAPILLARY BLOOD GLUCOSE       N/A      05-09 @ 07:01  -  05-10 @ 07:00  --------------------------------------------------------  IN:    Enteral Tube Flush: 200 mL    Jevity 1.2: 990 mL  Total IN: 1190 mL    OUT:    Indwelling Catheter - Urethral (mL): 830 mL    Rectal Tube (mL): 100 mL  Total OUT: 930 mL    Total NET: 260 mL      05-10 @ 07:01  -  05-11 @ 00:40  --------------------------------------------------------  IN:    Enteral Tube Flush: 180 mL    Jevity 1.2: 765 mL  Total IN: 945 mL    OUT:    Indwelling Catheter - Urethral (mL): 690 mL  Total OUT: 690 mL    Total NET: 255 mL        --------------------------------------------------------------------------------------    EXAM  NEUROLOGY  GCS:  6T  Exam: moves RUE to noxious stimuli, does not open eyes, does not follow commands    HEENT  Exam: Normocephalic, atraumatic    RESPIRATORY  Exam: Lungs clear to auscultation, Normal expansion/effort.  Mechanical Ventilation: Mode: AC/ CMV (Assist Control/ Continuous Mandatory Ventilation), RR (machine): 16, TV (machine): 300, FiO2: 30, PEEP: 5, ITime: 0.7, MAP: 9, PIP: 24    CARDIOVASCULAR  Exam: S1, S2.  Regular rate and rhythm.       GI/NUTRITION  Exam: Abdomen soft, Non-tender, Non-distended.      VASCULAR  Exam: Extremities warm, pink, well-perfused.     SKIN  Exam: Good skin turgor, no skin breakdown.    METABOLIC/FLUIDS/ELECTROLYTES  sodium chloride 2 Gram(s) Oral every 6 hours    HEMATOLOGIC  [x] VTE Prophylaxis: enoxaparin Injectable 40 milliGRAM(s) SubCutaneous daily    Transfusions:	[] PRBC	[] Platelets		[] FFP	[] Cryoprecipitate    INFECTIOUS DISEASE  Antimicrobials/Immunologic Medications:    Tubes/Lines/Drains    [x] Peripheral IV  [] Central Venous Line     	[] R	[] L	[] IJ	[] Fem	[] SC	Date Placed:   [x] Arterial Line		[] R	[] L	[] Fem	[] Rad	[] Ax	Date Placed:   [] PICC		[] Midline		[] Mediport  [] Urinary Catheter		Date Placed:   [x] Necessity of urinary, arterial, and venous catheters discussed    LABS  --------------------------------------------------------------------------------------    --------------------------------------------------------------------------------------    OTHER LABORATORY:     IMAGING STUDIES:   CXR:

## 2021-05-11 NOTE — CHART NOTE - NSCHARTNOTEFT_GEN_A_CORE
Surgery Preop Note    Patient is a 81y old  Female who presents with a chief complaint of IPH (11 May 2021 05:19)    Diagnosis: Right parietooccipital IPH  Procedure:PEG Placement  Surgeon:Dr. Stanley                          8.0    10.28 )-----------( 225      ( 11 May 2021 01:53 )             27.3     05-11    145  |  114<H>  |  22  ----------------------------<  149<H>  3.9   |  25  |  0.56    Ca    9.0      11 May 2021 01:53  Phos  2.5     05-11  Mg     2.2     05-11    Chest X RAY: Clear lungs with endotracheal and enteric tubes in position.  EKG: < from: 12 Lead ECG (08.02.20 @ 04:09) >    Diagnosis Line Sinus bradycardia with Premature atrial complexes  Nonspecific ST abnormality  Abnormal ECG     MEDICATIONS  (STANDING):  atorvastatin 80 milliGRAM(s) Oral at bedtime  chlorhexidine 0.12% Liquid 15 milliLiter(s) Oral Mucosa every 12 hours  enoxaparin Injectable 40 milliGRAM(s) SubCutaneous daily  levETIRAcetam  Solution 500 milliGRAM(s) Oral two times a day  levothyroxine 75 MICROGram(s) Oral daily  pantoprazole   Suspension 40 milliGRAM(s) Oral daily  senna Syrup 15 milliLiter(s) Oral at bedtime  sodium chloride 2 Gram(s) Oral every 6 hours    Assessment & Plan:  81y Female with Right parietooccipital IPH unable to feed herself  NPO after midnight, IVF  Pain Control  DVT prophylaxis    Divina Deluna [facility] [id number] Surgery Preop Note    Patient is a 81y old  Female who presents with a chief complaint of IPH (11 May 2021 05:19)    Diagnosis: Right parietooccipital IPH  Procedure:PEG and Trach Placement  Surgeon:Dr. Stanley                          8.0    10.28 )-----------( 225      ( 11 May 2021 01:53 )             27.3     05-11    145  |  114<H>  |  22  ----------------------------<  149<H>  3.9   |  25  |  0.56    Ca    9.0      11 May 2021 01:53  Phos  2.5     05-11  Mg     2.2     05-11    Chest X RAY: Clear lungs with endotracheal and enteric tubes in position.  EKG: < from: 12 Lead ECG (08.02.20 @ 04:09) >    Diagnosis Line Sinus bradycardia with Premature atrial complexes  Nonspecific ST abnormality  Abnormal ECG     MEDICATIONS  (STANDING):  atorvastatin 80 milliGRAM(s) Oral at bedtime  chlorhexidine 0.12% Liquid 15 milliLiter(s) Oral Mucosa every 12 hours  enoxaparin Injectable 40 milliGRAM(s) SubCutaneous daily  levETIRAcetam  Solution 500 milliGRAM(s) Oral two times a day  levothyroxine 75 MICROGram(s) Oral daily  pantoprazole   Suspension 40 milliGRAM(s) Oral daily  senna Syrup 15 milliLiter(s) Oral at bedtime  sodium chloride 2 Gram(s) Oral every 6 hours    Assessment & Plan:  81y Female with Right parietooccipital IPH unable to feed herself going for Trach and PEG tomorrow  NPO after midnight, IVF  Pain Control  COVID Test  DVT prophylaxis    Divina Deluna PAC [id number]

## 2021-05-11 NOTE — PROGRESS NOTE ADULT - ASSESSMENT
81y Female with history of NSTEMI, PCI, HLD, hypothyroidism, presents s/p fall found to have R IPH, now s/p intubation for worsening mental status. Remains on full vent support. MRI with evidence of subfalcine and uncal herniation    PLAN:  NEURO:    - R IPH, increased on repeat CTH with new SAH and SDH, MRI 5/6 with evidence of subfalcine and uncal herniation  - Q8H neurochecks  - Withdraws to painful stimuli on R side, nonpurposeful movement of LLE  - Keppra 500 BID for seizure ppx    RESPIRATORY: intubated for airway protection due to mental status  - CPAP as tolerated 5/10, continues to be apneic on pressure support trials  - Plan for Trach & PEG on wed 5/12 ( to be discussed with Neurosurg, Palliative, Gen Surg and Family)    CARDIOVASCULAR: hx CAD s/p stents (unknown date of stents, last cardiac cath 2017 per chart review)  - Goal MAP 65-70 - monitor BP cuff but will continue a-line for blood draws  - Continue home atorvastatin     GI/NUTRITION: no acute issues  - TF @ goal, Jevity @ 45ml/hr  - Protonix 40 daily    GENITOURINARY/RENAL: urinary retention  - Goal Na 145-155, continue salt tabs 2 q 6h  - Continue Donaldson catheter (failed TOV 5/6)    HEMATOLOGIC: on ASA/Plavix s/p DDAVP & platelets x1 for ICH  - H/H stable  - VTE ppx: Lovenox, SCDs    INFECTIOUS DISEASE: intermittent low grade fevers w/o leukocytosis  - Trend fever curve, WBC  - Monitor off antibiotics    ENDOCRINE: no acute issues  - Continue home synthroid      PLAN FOR TRACH AND PEG on WEDNESDAY     Lines/Tubes:  - Donaldson replaced 5/6  - Intubated 5/2  - PIVs  - A-line for blood draws    DISPO: SICU      Critical care diagnosis: Intraparenchymal hemorrhage  81y Female with history of NSTEMI, PCI, HLD, hypothyroidism, presents s/p fall found to have R IPH, now s/p intubation for worsening mental status. Remains on full vent support. MRI with evidence of subfalcine and uncal herniation    PLAN:  NEURO:    - R IPH, increased on repeat CTH with new SAH and SDH, MRI 5/6 with evidence of subfalcine and uncal herniation  - Q8H neurochecks  - Withdraws to painful stimuli on R side, nonpurposeful movement of LLE  - Keppra 500 BID for seizure ppx    RESPIRATORY: intubated for airway protection due to mental status  - CPAP as tolerated 5/10, continues to be apneic on pressure support trials  - Plan for Trach & PEG on wed 5/12     CARDIOVASCULAR: hx CAD s/p stents (unknown date of stents, last cardiac cath 2017 per chart review)  - Goal MAP 65-70 - monitor BP cuff but will continue a-line for blood draws  - Continue home atorvastatin     GI/NUTRITION: no acute issues  - TF @ goal, Jevity @ 45ml/hr  - Protonix 40 daily    GENITOURINARY/RENAL: urinary retention  - Goal Na 145-155, continue salt tabs 2 q 6h  - Continue Donaldson catheter (failed TOV 5/6)    HEMATOLOGIC: on ASA/Plavix s/p DDAVP & platelets x1 for ICH  - H/H stable  - VTE ppx: Lovenox, SCDs    INFECTIOUS DISEASE: intermittent low grade fevers w/o leukocytosis  - Trend fever curve, WBC  - Monitor off antibiotics    ENDOCRINE: no acute issues  - Continue home synthroid      PLAN FOR TRACH AND PEG on WEDNESDAY     Lines/Tubes:  - Donaldson replaced 5/6  - Intubated 5/2  - PIVs  - A-line for blood draws    DISPO: SICU      Critical care diagnosis: Intraparenchymal hemorrhage  81y Female with history of NSTEMI, PCI, HLD, hypothyroidism, presents s/p fall found to have R IPH, now s/p intubation for worsening mental status. Remains on full vent support. MRI with evidence of subfalcine and uncal herniation    PLAN:  NEURO:    - R IPH, increased on repeat CTH with new SAH and SDH, MRI 5/6 with evidence of subfalcine and uncal herniation  - Q8H neurochecks  - Withdraws to painful stimuli on R side, nonpurposeful movement of LLE  - Keppra 500 BID for seizure ppx    RESPIRATORY: intubated for airway protection due to mental status  - CPAP as tolerated 5/10, continues to be apneic on pressure support trials  - Plan for Trach & PEG on wed 5/12     CARDIOVASCULAR: hx CAD s/p stents (unknown date of stents, last cardiac cath 2017 per chart review)  - Goal MAP 65-70 - monitor BP cuff but will continue a-line for blood draws  - Continue home atorvastatin     GI/NUTRITION: no acute issues  - TF @ goal, Jevity @ 45ml/hr  - will make NPO @ [place] for trach/PEG tomorrow  - Protonix 40 daily    GENITOURINARY/RENAL: urinary retention  - Goal Na 145-155, continue salt tabs 2 q 6h  - Continue Donaldson catheter (failed TOV 5/6)    HEMATOLOGIC: on ASA/Plavix s/p DDAVP & platelets x1 for ICH  - H/H stable  - VTE ppx: Lovenox, SCDs    INFECTIOUS DISEASE: intermittent low grade fevers w/o leukocytosis  - Trend fever curve, WBC  - Monitor off antibiotics    ENDOCRINE: no acute issues  - Continue home synthroid      PLAN FOR TRACH AND PEG on WEDNESDAY     Lines/Tubes:  - Donaldson replaced 5/6  - Intubated 5/2  - PIVs  - A-line for blood draws    DISPO: SICU      Critical care diagnosis: Intraparenchymal hemorrhage

## 2021-05-12 NOTE — CHART NOTE - NSCHARTNOTEFT_GEN_A_CORE
Post Operative Note    Procedure: S/P tracheostomy     Subjective: unable to obtain. Patient tolerating trach collar saturating 100%.    Objective:  Vitals: T(F): 99 (05-12-21 @ 12:00), Max: 100.2 (05-11-21 @ 20:00)  HR: 70 (05-12-21 @ 12:00)  BP: 109/66 (05-12-21 @ 12:00) (94/59 - 138/77)  RR: 20 (05-12-21 @ 12:00)  SpO2: 100% (05-12-21 @ 12:00)  Vent Settings: Mode: AC/ CMV (Assist Control/ Continuous Mandatory Ventilation), RR (machine): 16, TV (machine): 300, FiO2: 30, PEEP: 5, MAP: 9, PIP: 25    In:   05-11-21 @ 07:01  -  05-12-21 @ 07:00  --------------------------------------------------------  IN: 925 mL    05-12-21 @ 07:01  -  05-12-21 @ 14:46  --------------------------------------------------------  IN: 45 mL      IV Fluids: sodium chloride 2 Gram(s) Oral every 6 hours      Out:   05-11-21 @ 07:01  -  05-12-21 @ 07:00  --------------------------------------------------------  OUT: 805 mL    05-12-21 @ 07:01  -  05-12-21 @ 14:46  --------------------------------------------------------  OUT: 200 mL      EBL:     Voided Urine:   05-11-21 @ 07:01  -  05-12-21 @ 07:00  --------------------------------------------------------  OUT: 805 mL    05-12-21 @ 07:01  -  05-12-21 @ 14:46  --------------------------------------------------------  OUT: 200 mL        Physical Examination:  General Appearance: obtunded  HEENT: tracheostomy in place, no hematoma or bleeding noted,   Lungs: nonlabored respirations      Medications: [Standing]  atorvastatin 80 milliGRAM(s) Oral at bedtime  chlorhexidine 0.12% Liquid 15 milliLiter(s) Oral Mucosa every 12 hours  enoxaparin Injectable 40 milliGRAM(s) SubCutaneous daily  levETIRAcetam  Solution 500 milliGRAM(s) Oral two times a day  levothyroxine 75 MICROGram(s) Oral daily  pantoprazole   Suspension 40 milliGRAM(s) Oral daily  senna Syrup 15 milliLiter(s) Oral at bedtime  sodium chloride 2 Gram(s) Oral every 6 hours    Medications: [PRN]  atorvastatin 80 milliGRAM(s) Oral at bedtime  chlorhexidine 0.12% Liquid 15 milliLiter(s) Oral Mucosa every 12 hours  enoxaparin Injectable 40 milliGRAM(s) SubCutaneous daily  levETIRAcetam  Solution 500 milliGRAM(s) Oral two times a day  levothyroxine 75 MICROGram(s) Oral daily  pantoprazole   Suspension 40 milliGRAM(s) Oral daily  senna Syrup 15 milliLiter(s) Oral at bedtime  sodium chloride 2 Gram(s) Oral every 6 hours    Labs:                        8.0    9.54  )-----------( 217      ( 12 May 2021 01:04 )             26.9     05-12    147<H>  |  114<H>  |  26<H>  ----------------------------<  139<H>  3.7   |  24  |  0.55    Ca    9.1      12 May 2021 01:04  Phos  2.7     05-12  Mg     2.2     05-12      PT/INR - ( 12 May 2021 01:04 )   PT: 14.0 sec;   INR: 1.23 ratio         PTT - ( 12 May 2021 01:04 )  PTT:26.0 sec      Imaging:  No post-op imaging studies    Assessment:  81F with history of NSTEMI, PCI, HLD, hypothyroidism, presents s/p fall found to have R IPH, s/p intubation for worsening mental status. Now several hours s/p tracheostomy      Plan:  - care per SICU and primary team  - remove trach ties at 7 days  - routine trach care     B Team surgery [id number]

## 2021-05-12 NOTE — PROGRESS NOTE ADULT - SUBJECTIVE AND OBJECTIVE BOX
SICU Daily Progress Note  =====================================================  HPI: 81y Female CAD w/ stents on ASA/Plavix, HLD, hypothyroid, anemia who presented s/p fall, +LOC, initially A&Ox2 in ED, found to have R frontal intraparenchymal hemorrhage. Received DDAVP and 1U Plt in ED. Admitted to SICU for q1h neurochecks, and was intubated for worsening mental status on HD1. On repeat CTH was found to have increased intraparenchymal hemorrhage and new SAH and SDH, with 8mm R to L midline shift. Neurosurgical intervention not pursued per family wishes.  Patient remains intubated, with poor mental status.    24 HR Events:  - Preop for trach/ peg in AM    MEDICATIONS:   --------------------------------------------------------------------------------------  Neurologic Medications  levETIRAcetam  Solution 500 milliGRAM(s) Oral two times a day    Respiratory Medications    Cardiovascular Medications    Gastrointestinal Medications  pantoprazole   Suspension 40 milliGRAM(s) Oral daily  senna Syrup 15 milliLiter(s) Oral at bedtime  sodium chloride 2 Gram(s) Oral every 6 hours    Genitourinary Medications    Hematologic/Oncologic Medications  enoxaparin Injectable 40 milliGRAM(s) SubCutaneous daily    Antimicrobial/Immunologic Medications    Endocrine/Metabolic Medications  atorvastatin 80 milliGRAM(s) Oral at bedtime  levothyroxine 75 MICROGram(s) Oral daily    Topical/Other Medications  chlorhexidine 0.12% Liquid 15 milliLiter(s) Oral Mucosa every 12 hours    --------------------------------------------------------------------------------------    VITAL SIGNS, INS/OUTS (last 24 hours):    T(C): 36.8 (12 May 2021 00:00), Max: 37.9 (11 May 2021 20:00)  T(F): 98.3 (12 May 2021 00:00), Max: 100.2 (11 May 2021 20:00)  HR: 75 (12 May 2021 00:00) (56 - 75)  BP: 118/52 (12 May 2021 00:00) (104/56 - 132/78)  BP(mean): 364 (12 May 2021 00:00) (56 - 364)  ABP: 86/59 (12 May 2021 00:00) (55/38 - 86/59)  ABP(mean): 71 (12 May 2021 00:00) (46 - 71)  RR: 25 (12 May 2021 00:00) (16 - 25)  SpO2: 98% (12 May 2021 00:00) (96% - 100%)    --------------------------------------------------------------------------------------    05-10-21 @ 07:01 - 05-11-21 @ 07:00  --------------------------------------------------------  IN: 1125 mL / OUT: 990 mL / NET: 135 mL    05-11-21 @ 07:01 - 05-12-21 @ 00:55  --------------------------------------------------------  IN: 825 mL / OUT: 495 mL / NET: 330 mL    -------------------------------------------------------------------------------------    EXAM  NEUROLOGY  GCS:  6T  Exam: moves RUE to noxious stimuli, does not open eyes, does not follow commands    HEENT  Exam: Normocephalic, atraumatic    RESPIRATORY  Exam: Lungs clear to auscultation, Normal expansion/effort.  Mechanical Ventilation: Mode: AC/ CMV (Assist Control/ Continuous Mandatory Ventilation), RR (machine): 16, TV (machine): 300, FiO2: 30, PEEP: 5, ITime: 0.7, MAP: 9, PIP: 24    CARDIOVASCULAR  Exam: S1, S2.  Regular rate and rhythm.       GI/NUTRITION  Exam: Abdomen soft, Non-tender, Non-distended.      VASCULAR  Exam: Extremities warm, pink, well-perfused.     SKIN  Exam: Good skin turgor, no skin breakdown.    METABOLIC/FLUIDS/ELECTROLYTES  sodium chloride 2 Gram(s) Oral every 6 hours    HEMATOLOGIC  [x] VTE Prophylaxis: enoxaparin Injectable 40 milliGRAM(s) SubCutaneous daily    Transfusions:	[] PRBC	[] Platelets		[] FFP	[] Cryoprecipitate    INFECTIOUS DISEASE  Antimicrobials/Immunologic Medications    LABS                  8.0    10.28 )-----------( 225      ( 11 May 2021 01:53 )             27.3     145  |  114<H>  |  22  ----------------------------<  149<H>  3.9   |  25  |  0.56    Ca    9.0      11 May 2021 01:53  Phos  2.5     05-11  Mg     2.2     05-11    --------------------------------------------------------------------------------------

## 2021-05-12 NOTE — CONSULT NOTE ADULT - NSICDXPILOT_GEN_ALL_CORE
[place]
Oriented - self; Oriented - place; Oriented - time
[place]

## 2021-05-12 NOTE — CHART NOTE - NSCHARTNOTEFT_GEN_A_CORE
please obtain noncontrast CT abdomen for evaluation for window for percutaneous gastrostomy placement by IR. full IR consult to follow when CT complete please obtain noncontrast CT abdomen for evaluation for window for percutaneous gastrostomy placement by IR. full IR consult to follow when CT complete.

## 2021-05-12 NOTE — PROGRESS NOTE ADULT - ASSESSMENT
81y Female with history of NSTEMI, PCI, HLD, hypothyroidism, presents s/p fall found to have R IPH, now s/p intubation for worsening mental status. Remains on full vent support. MRI with evidence of subfalcine and uncal herniation    PLAN:  NEURO:    - R IPH, increased on repeat CTH with new SAH and SDH, MRI 5/6 with evidence of subfalcine and uncal herniation  - Q8H neurochecks  - Withdraws to painful stimuli on R side, nonpurposeful movement of LLE  - Keppra 500 BID for seizure ppx    RESPIRATORY: intubated for airway protection due to mental status  - CPAP as tolerated 5/10, continues to be apneic on pressure support trials  - Plan for Trach & PEG on wed 5/12     CARDIOVASCULAR: hx CAD s/p stents (unknown date of stents, last cardiac cath 2017 per chart review)  - Goal MAP 65-70 - monitor BP cuff but will continue a-line for blood draws  - Continue home atorvastatin     GI/NUTRITION: no acute issues  - TF @ goal, Jevity @ 45ml/hr  - NPO at midnight for trach/ peg  - Protonix 40 daily    GENITOURINARY/RENAL: urinary retention  - Goal Na 145-155, continue salt tabs 2 q 6h  - Continue Donaldson catheter (failed TOV 5/6)    HEMATOLOGIC: on ASA/Plavix s/p DDAVP & platelets x1 for ICH  - H/H stable  - VTE ppx: Lovenox, SCDs    INFECTIOUS DISEASE: intermittent low grade fevers w/o leukocytosis  - Trend fever curve, WBC  - Monitor off antibiotics    ENDOCRINE: no acute issues  - Continue home synthroid      PLAN FOR TRACH AND PEG on TODAY    Lines/Tubes:  - Donaldson replaced 5/6  - Intubated 5/2  - PIVs  - A-line for blood draws    DISPO: SICU

## 2021-05-12 NOTE — CONSULT NOTE ADULT - CONSULT REQUESTED DATE/TIME
04-May-2021 13:21
01-May-2021 15:31
10-May-2021 09:30
04-May-2021 15:37
12-May-2021 16:48
01-May-2021 15:45

## 2021-05-12 NOTE — PROGRESS NOTE ADULT - SUBJECTIVE AND OBJECTIVE BOX
PAST 24HR EVENTS: no issues overnight. NPO for PEG/Trach today     PHYSICAL EXAM:  Vital Signs Last 24 Hrs  T(C): 36.8 (12 May 2021 00:00), Max: 37.9 (11 May 2021 20:00)  T(F): 98.3 (12 May 2021 00:00), Max: 100.2 (11 May 2021 20:00)  HR: 74 (12 May 2021 04:00) (56 - 75)  BP: 120/54 (12 May 2021 04:00) (94/59 - 132/78)  BP(mean): 69 (12 May 2021 04:00) (56 - 364)  RR: 17 (12 May 2021 04:00) (16 - 25)  SpO2: 100% (12 May 2021 04:00) (96% - 100%)    grimaces to noxious stimuli  intubated  Not opening eyes  PERRL with rightward gaze deviation  RUE antigravity, withdraws RLE  No movement on left    MEDS:   atorvastatin 80 milliGRAM(s) Oral at bedtime  chlorhexidine 0.12% Liquid 15 milliLiter(s) Oral Mucosa every 12 hours  enoxaparin Injectable 40 milliGRAM(s) SubCutaneous daily  levETIRAcetam  Solution 500 milliGRAM(s) Oral two times a day  levothyroxine 75 MICROGram(s) Oral daily  pantoprazole   Suspension 40 milliGRAM(s) Oral daily  senna Syrup 15 milliLiter(s) Oral at bedtime  sodium chloride 2 Gram(s) Oral every 6 hours      LABS:                        8.0    9.54  )-----------( 217      ( 12 May 2021 01:04 )             26.9     05-12    147<H>  |  114<H>  |  26<H>  ----------------------------<  139<H>  3.7   |  24  |  0.55    Ca    9.1      12 May 2021 01:04  Phos  2.7     05-12  Mg     2.2     05-12      PT/INR - ( 12 May 2021 01:04 )   PT: 14.0 sec;   INR: 1.23 ratio         PTT - ( 12 May 2021 01:04 )  PTT:26.0

## 2021-05-12 NOTE — CONSULT NOTE ADULT - CONSULT REASON
CONSULT PURPOSE: To assist the health care team in the ethical dilemma posed by an 81-year-old female with a history of Coronary artery disease with stents on ASA/Plavix, hyperlipidemia, hypothyroid, anemia and intracerebral hemorrhage, intubated for nine days whose family is deciding between tracheostomy and percutaneous endoscopic gastrostomy (PEG) placement vs. compassionate extubation.
trach/PEG
Fall and L-hemiparesis
goc
eval percutaneous gastrostomy tube placement
Intraparenchymal hemorrhage, need for frequent neurochecks

## 2021-05-12 NOTE — CONSULT NOTE ADULT - SUBJECTIVE AND OBJECTIVE BOX
Interventional Radiology    Evaluate for Procedure:     81y Female with history of NSTEMI, PCI, HLD, hypothyroidism, presents s/p fall found to have R IPH, unable to transilluminate for endoscopic placement of gastrostomy, IR consulted for percutaneous placement of gastrostomy    Allergies: latex (Rash)  penicillin (Faint)    Medications (Abx/Cardiac/Anticoagulation/Blood Products)    enoxaparin Injectable: 40 milliGRAM(s) SubCutaneous (05-12 @ 12:00)    Data:    T(C): 37.2  HR: 70  BP: 109/66  RR: 20  SpO2: 100%    -WBC 9.54 / HgB 8.0 / Hct 26.9 / Plt 217  -Na 147 / Cl 114 / BUN 26 / Glucose 139  -K 3.7 / CO2 24 / Cr 0.55  -ALT -- / Alk Phos -- / T.Bili --  -INR 1.23 / PTT 26.0    Radiology:     Assessment/Plan:   81y Female with history of NSTEMI, PCI, HLD, hypothyroidism, presents s/p fall found to have R IPH, unable to transilluminate for endoscopic placement of gastrostomy, IR consulted for percutaneous placement of gastrostomy.     CT reviewed, no percutaneous window for IR biopsy.  Interventional Radiology    Evaluate for Procedure:     81y Female with history of NSTEMI, PCI, HLD, hypothyroidism, presents s/p fall found to have R IPH, unable to transilluminate for endoscopic placement of gastrostomy, IR consulted for percutaneous placement of gastrostomy    Allergies: latex (Rash)  penicillin (Faint)    Medications (Abx/Cardiac/Anticoagulation/Blood Products)    enoxaparin Injectable: 40 milliGRAM(s) SubCutaneous (05-12 @ 12:00)    Data:    T(C): 37.2  HR: 70  BP: 109/66  RR: 20  SpO2: 100%    -WBC 9.54 / HgB 8.0 / Hct 26.9 / Plt 217  -Na 147 / Cl 114 / BUN 26 / Glucose 139  -K 3.7 / CO2 24 / Cr 0.55  -ALT -- / Alk Phos -- / T.Bili --  -INR 1.23 / PTT 26.0    Radiology:     Assessment/Plan:   81y Female with history of NSTEMI, PCI, HLD, hypothyroidism, presents s/p fall found to have R IPH, unable to transilluminate for endoscopic placement of gastrostomy, IR consulted for percutaneous placement of gastrostomy.     CT reviewed, no percutaneous window for IR percutaneous gastrostomy.  Interventional Radiology    Evaluate for Procedure:     81y Female with history of NSTEMI, PCI, HLD, hypothyroidism, presents s/p fall found to have R IPH, remote history of Bilroth I, unable to transilluminate for endoscopic placement of gastrostomy, IR consulted for percutaneous placement of gastrostomy    Allergies: latex (Rash)  penicillin (Faint)    Medications (Abx/Cardiac/Anticoagulation/Blood Products)    enoxaparin Injectable: 40 milliGRAM(s) SubCutaneous (05-12 @ 12:00)    Data:    T(C): 37.2  HR: 70  BP: 109/66  RR: 20  SpO2: 100%    -WBC 9.54 / HgB 8.0 / Hct 26.9 / Plt 217  -Na 147 / Cl 114 / BUN 26 / Glucose 139  -K 3.7 / CO2 24 / Cr 0.55  -ALT -- / Alk Phos -- / T.Bili --  -INR 1.23 / PTT 26.0    Radiology:     Assessment/Plan:   81y Female with history of NSTEMI, PCI, HLD, hypothyroidism, presents s/p fall found to have R IPH, unable to transilluminate for endoscopic placement of gastrostomy, remote history of Bilroth I, IR consulted for percutaneous placement of gastrostomy.     CT reviewed, no percutaneous window for IR percutaneous gastrostomy.

## 2021-05-12 NOTE — PROGRESS NOTE ADULT - ASSESSMENT
80 YO Female with Right parietooccipital IPH    5/1: admitted with a right parietal IPH, bleed slightly worse on repeat imaging, patient intubated   5/2: Rpt CTH stable, patient intubated. exam stable.  5/3: bradycardic 40's, 50's o/n, hypotensive SBP 70's, phenylephrine started, CTH done appears stable, neurological exam stable, on keppra, On 1.5%  5/4: Stable exam. On NaCl 2gm Q8H, Keppra  5/5: Stable exam. Na 141 dc'd NaCl, Continue with Keppra. Family agreeable to Trach/Peg - general surgery consulted  5/6: Na 142, mri brain today, c/w keppra, trach and peg  5/7: Na 141, stable exam. Family deferring Trach and PEG for several days before they make a decision. On keppra  5/8: Na 141, stable exam. On keppra. GOC discussion held with family, may be amenable to Trach and PEG  5/9: Na 144, stable exam. On keppra. Await family decision regarding Trach and PEG  5/10: Na 146, neuro exam stable, on keppra, awaiting family decision for trach and peg  5/11: Na 146, neuro exam stable, on keppra, trach/peg tomorrow   5/12: Trach/peg today, exam stable.

## 2021-05-13 NOTE — PROGRESS NOTE ADULT - ASSESSMENT
81y Female with history of NSTEMI, PCI, HLD, hypothyroidism, presents s/p fall found to have R IPH, now s/p intubation for worsening mental status. Remains on full vent support. MRI with evidence of subfalcine and uncal herniation    PLAN:  NEURO:    - R IPH, increased on repeat CTH with new SAH and SDH, MRI 5/6 with evidence of subfalcine and uncal herniation  - Q8H neurochecks  - Withdraws to painful stimuli on R side, nonpurposeful movement of LLE  - Keppra 500 BID for seizure ppx    RESPIRATORY: intubated for airway protection due to mental status  - S/p Trach today, tolerating trach collar    CARDIOVASCULAR: hx CAD s/p stents (unknown date of stents, last cardiac cath 2017 per chart review)  - Goal MAP 65-70 - monitor BP cuff but will continue a-line for blood draws  - Continue home atorvastatin     GI/NUTRITION: no acute issues  - TF @ goal, Jevity @ 45ml/hr  - Protonix 40 daily  - PEG unsuccessful, IR consulted but also unable   - Possible open PEG, discuss with surgery    GENITOURINARY/RENAL: urinary retention  - Goal Na 145-155, continue salt tabs 2 q 6h  - Continue Donaldson catheter (failed TOV 5/6)    HEMATOLOGIC: on ASA/Plavix s/p DDAVP & platelets x1 for ICH  - H/H stable  - VTE ppx: Lovenox, SCDs    INFECTIOUS DISEASE: intermittent low grade fevers w/o leukocytosis  - Trend fever curve, WBC  - Monitor off antibiotics    ENDOCRINE: no acute issues  - Continue home synthroid    Lines/Tubes:  - Donaldson replaced 5/6  - Intubated 5/2  - PIVs  - A-line for blood draws    DISPO: SICU 81y Female with history of NSTEMI, PCI, HLD, hypothyroidism, presents s/p fall found to have R IPH, now s/p intubation for worsening mental status. Remains on full vent support. MRI with evidence of subfalcine and uncal herniation    PLAN:  NEURO:    - R IPH, increased on repeat CTH with new SAH and SDH, MRI 5/6 with evidence of subfalcine and uncal herniation  - Q8H neurochecks  - Withdraws to painful stimuli on R side, nonpurposeful movement of LLE  - Keppra 500 BID for seizure ppx    RESPIRATORY: intubated for airway protection due to mental status  - S/p Trach, tolerating trach collar    CARDIOVASCULAR: hx CAD s/p stents (unknown date of stents, last cardiac cath 2017 per chart review)  - Goal MAP 65-70  - Continue home atorvastatin     GI/NUTRITION: no acute issues  - TF @ goal, Jevity @ 45ml/hr / NPO for J tube placement today  - Protonix 40 daily  - PEG unsuccessful, IR consulted but also unable   - Plan for J tube placement with Gen Surg later today    GENITOURINARY/RENAL: urinary retention  - Goal Na 145-155, continue salt tabs 2 q 6h  - Continue Donaldson catheter (failed TOV 5/6)    HEMATOLOGIC: on ASA/Plavix s/p DDAVP & platelets x1 for ICH  - H/H stable  - VTE ppx: Lovenox, SCDs    INFECTIOUS DISEASE: intermittent low grade fevers w/o leukocytosis  - Trend fever curve, WBC  - Monitor off antibiotics    ENDOCRINE: no acute issues  - Continue home synthroid    Lines/Tubes:  - Donaldson replaced 5/6  - PIVs    DISPO: SICU

## 2021-05-13 NOTE — PROGRESS NOTE ADULT - SUBJECTIVE AND OBJECTIVE BOX
POST ANESTHESIA EVALUATION    81y Female POSTOP DAY 1  s/p EGD, Needle tracheostomy under general anesthesia.    MENTAL STATUS: Patient participation [  ] Awake     [ x ] Arousable     [  ] Sedated    AIRWAY PATENCY: [  ] Satisfactory  [ x ] Other: Trach    Vital Signs Last 24 Hrs  T(C): 37.2 (13 May 2021 08:00), Max: 37.8 (12 May 2021 16:00)  T(F): 98.9 (13 May 2021 08:00), Max: 100 (12 May 2021 16:00)  HR: 65 (13 May 2021 08:00) (61 - 78)  BP: 111/48 (13 May 2021 08:00) (109/66 - 137/59)  BP(mean): 61 (13 May 2021 08:00) (61 - 90)  RR: 29 (13 May 2021 08:00) (20 - 38)  SpO2: 100% (13 May 2021 08:00) (100% - 100%)  I&O's Summary    12 May 2021 07:01  -  13 May 2021 07:00  --------------------------------------------------------  IN: 805 mL / OUT: 875 mL / NET: -70 mL    13 May 2021 07:01  -  13 May 2021 11:00  --------------------------------------------------------  IN: 45 mL / OUT: 45 mL / NET: 0 mL          NAUSEA/ VOMITTING:  [ x ] NONE  [  ] CONTROLLED [  ] OTHER     PAIN: [ x ] CONTROLLED WITH CURRENT REGIMEN  [  ] OTHER    [ x ] NO APPARENT ANESTHESIA COMPLICATIONS

## 2021-05-13 NOTE — PROGRESS NOTE ADULT - SUBJECTIVE AND OBJECTIVE BOX
SICU Daily Progress Note  =====================================================  HPI: 81y Female CAD w/ stents on ASA/Plavix, HLD, hypothyroid, anemia who presented s/p fall, +LOC, initially A&Ox2 in ED, found to have R frontal intraparenchymal hemorrhage. Received DDAVP and 1U Plt in ED. Admitted to SICU for q1h neurochecks, and was intubated for worsening mental status on HD1. On repeat CTH was found to have increased intraparenchymal hemorrhage and new SAH and SDH, with 8mm R to L midline shift. Neurosurgical intervention not pursued per family wishes.  Patient remains intubated, with poor mental status.    24 HR Events:   - OR 5/12 for trach/ peg  - Tracheostomy performed successfully, patient on trach collar throughout the day  - No transillumination when attempting peg so procedure was aborted  - CTAP with prior gastrojejunostomy, IR consulted but also unable to perform perc gastrostomy    MEDICATIONS:   --------------------------------------------------------------------------------------  Neurologic Medications  levETIRAcetam  Solution 500 milliGRAM(s) Oral two times a day    Respiratory Medications    Cardiovascular Medications    Gastrointestinal Medications  pantoprazole   Suspension 40 milliGRAM(s) Oral daily  senna Syrup 15 milliLiter(s) Oral at bedtime  sodium chloride 2 Gram(s) Oral every 6 hours    Genitourinary Medications    Hematologic/Oncologic Medications  enoxaparin Injectable 40 milliGRAM(s) SubCutaneous daily    Antimicrobial/Immunologic Medications    Endocrine/Metabolic Medications  atorvastatin 80 milliGRAM(s) Oral at bedtime  levothyroxine 75 MICROGram(s) Oral daily    Topical/Other Medications  chlorhexidine 0.12% Liquid 15 milliLiter(s) Oral Mucosa every 12 hours    --------------------------------------------------------------------------------------    VITAL SIGNS, INS/OUTS (last 24 hours):    ICU Vital Signs Last 24 Hrs  T(C): 36.9 (12 May 2021 20:00), Max: 37.8 (12 May 2021 16:00)  T(F): 98.4 (12 May 2021 20:00), Max: 100 (12 May 2021 16:00)  HR: 71 (12 May 2021 23:13) (61 - 78)  BP: 135/80 (12 May 2021 20:00) (94/59 - 138/77)  BP(mean): 90 (12 May 2021 20:00) (59 - 95)  ABP: 70/66 (12 May 2021 20:00) (70/66 - 94/55)  ABP(mean): 68 (12 May 2021 20:00) (62 - 72)  RR: 38 (12 May 2021 20:00) (16 - 38)  SpO2: 100% (12 May 2021 23:13) (99% - 100%)    --------------------------------------------------------------------------------------    05-11-21 @ 07:01  -  05-12-21 @ 07:00  --------------------------------------------------------  IN: 925 mL / OUT: 805 mL / NET: 120 mL    05-12-21 @ 07:01  -  05-13-21 @ 00:05  --------------------------------------------------------  IN: 315 mL / OUT: 500 mL / NET: -185 mL      --------------------------------------------------------------------------------------    EXAM  NEUROLOGY  GCS:  6T  Exam: moves RUE to noxious stimuli, does not open eyes, does not follow commands    HEENT  Exam: Normocephalic, atraumatic, trach site c/d/i    RESPIRATORY  Exam: Lungs clear to auscultation, Normal expansion/effort.  Mechanical Ventilation: Mode: AC/ CMV (Assist Control/ Continuous Mandatory Ventilation), RR (machine): 16, TV (machine): 300, FiO2: 30, PEEP: 5, ITime: 0.7, MAP: 9, PIP: 24    CARDIOVASCULAR  Exam: S1, S2.  Regular rate and rhythm.       GI/NUTRITION  Exam: Abdomen soft, Non-tender, Non-distended.      VASCULAR  Exam: Extremities warm, pink, well-perfused.     SKIN  Exam: Good skin turgor, no skin breakdown.    METABOLIC/FLUIDS/ELECTROLYTES  sodium chloride 2 Gram(s) Oral every 6 hours    HEMATOLOGIC  [x] VTE Prophylaxis: enoxaparin Injectable 40 milliGRAM(s) SubCutaneous daily    Transfusions:	[] PRBC	[] Platelets		[] FFP	[] Cryoprecipitate    INFECTIOUS DISEASE  Antimicrobials/Immunologic Medications:      LABS  --------------------------------------------------------------------------------------                     8.0    9.54  )-----------( 217      ( 12 May 2021 01:04 )             26.9     147<H>  |  114<H>  |  26<H>  ----------------------------<  139<H>  3.7   |  24  |  0.55    Ca    9.1      12 May 2021 01:04  Phos  2.7     05-12  Mg     2.2     05-12    ABG - ( 12 May 2021 06:22 )  pH, Arterial: 7.50  pH, Blood: x     /  pCO2: 34    /  pO2: 133   / HCO3: 28    / Base Excess: 3.2   /  SaO2: 99.0      PT/INR - ( 12 May 2021 01:04 )   PT: 14.0 sec;   INR: 1.23 ratio      PTT - ( 12 May 2021 01:04 )  PTT:26.0 sec    Culture Results:   <10,000 CFU/mL Normal Urogenital Lay (05-01 @ 14:40)    --------------------------------------------------------------------------------------   SICU Daily Progress Note  =====================================================  HPI: 81y Female CAD w/ stents on ASA/Plavix, HLD, hypothyroid, anemia who presented s/p fall, +LOC, initially A&Ox2 in ED, found to have R frontal intraparenchymal hemorrhage. Received DDAVP and 1U Plt in ED. Admitted to SICU for q1h neurochecks, and was intubated for worsening mental status on HD1. On repeat CTH was found to have increased intraparenchymal hemorrhage and new SAH and SDH, with 8mm R to L midline shift. Neurosurgical intervention not pursued per family wishes.  Patient remains intubated, with poor mental status.    24 HR Events:   - OR 5/12 for trach/ peg  - Tracheostomy performed successfully, patient on trach collar throughout the day  - No transillumination when attempting peg so procedure was aborted  - CTAP with prior gastrojejunostomy, IR consulted but also unable to perform perc gastrostomy  - Plan for J tube with gen surg    MEDICATIONS:   --------------------------------------------------------------------------------------  Neurologic Medications  levETIRAcetam  Solution 500 milliGRAM(s) Oral two times a day    Respiratory Medications    Cardiovascular Medications    Gastrointestinal Medications  pantoprazole   Suspension 40 milliGRAM(s) Oral daily  senna Syrup 15 milliLiter(s) Oral at bedtime  sodium chloride 2 Gram(s) Oral every 6 hours    Genitourinary Medications    Hematologic/Oncologic Medications  enoxaparin Injectable 40 milliGRAM(s) SubCutaneous daily    Antimicrobial/Immunologic Medications    Endocrine/Metabolic Medications  atorvastatin 80 milliGRAM(s) Oral at bedtime  levothyroxine 75 MICROGram(s) Oral daily    Topical/Other Medications  chlorhexidine 0.12% Liquid 15 milliLiter(s) Oral Mucosa every 12 hours    --------------------------------------------------------------------------------------    VITAL SIGNS, INS/OUTS (last 24 hours):    ICU Vital Signs Last 24 Hrs  T(C): 36.9 (12 May 2021 20:00), Max: 37.8 (12 May 2021 16:00)  T(F): 98.4 (12 May 2021 20:00), Max: 100 (12 May 2021 16:00)  HR: 71 (12 May 2021 23:13) (61 - 78)  BP: 135/80 (12 May 2021 20:00) (94/59 - 138/77)  BP(mean): 90 (12 May 2021 20:00) (59 - 95)  ABP: 70/66 (12 May 2021 20:00) (70/66 - 94/55)  ABP(mean): 68 (12 May 2021 20:00) (62 - 72)  RR: 38 (12 May 2021 20:00) (16 - 38)  SpO2: 100% (12 May 2021 23:13) (99% - 100%)    --------------------------------------------------------------------------------------    05-11-21 @ 07:01  -  05-12-21 @ 07:00  --------------------------------------------------------  IN: 925 mL / OUT: 805 mL / NET: 120 mL    05-12-21 @ 07:01  -  05-13-21 @ 00:05  --------------------------------------------------------  IN: 315 mL / OUT: 500 mL / NET: -185 mL      --------------------------------------------------------------------------------------    EXAM  NEUROLOGY  GCS:  6T  Exam: moves RUE to noxious stimuli, does not open eyes, does not follow commands    HEENT  Exam: Normocephalic, atraumatic, trach site c/d/i    RESPIRATORY  Exam: Lungs clear to auscultation, Normal expansion/effort.  Mechanical Ventilation: Mode: AC/ CMV (Assist Control/ Continuous Mandatory Ventilation), RR (machine): 16, TV (machine): 300, FiO2: 30, PEEP: 5, ITime: 0.7, MAP: 9, PIP: 24    CARDIOVASCULAR  Exam: S1, S2.  Regular rate and rhythm.       GI/NUTRITION  Exam: Abdomen soft, Non-tender, Non-distended.      VASCULAR  Exam: Extremities warm, pink, well-perfused.     SKIN  Exam: Good skin turgor, no skin breakdown.    METABOLIC/FLUIDS/ELECTROLYTES  sodium chloride 2 Gram(s) Oral every 6 hours    HEMATOLOGIC  [x] VTE Prophylaxis: enoxaparin Injectable 40 milliGRAM(s) SubCutaneous daily    Transfusions:	[] PRBC	[] Platelets		[] FFP	[] Cryoprecipitate    INFECTIOUS DISEASE  Antimicrobials/Immunologic Medications:      LABS  --------------------------------------------------------------------------------------                     8.0    9.54  )-----------( 217      ( 12 May 2021 01:04 )             26.9     147<H>  |  114<H>  |  26<H>  ----------------------------<  139<H>  3.7   |  24  |  0.55    Ca    9.1      12 May 2021 01:04  Phos  2.7     05-12  Mg     2.2     05-12    ABG - ( 12 May 2021 06:22 )  pH, Arterial: 7.50  pH, Blood: x     /  pCO2: 34    /  pO2: 133   / HCO3: 28    / Base Excess: 3.2   /  SaO2: 99.0      PT/INR - ( 12 May 2021 01:04 )   PT: 14.0 sec;   INR: 1.23 ratio      PTT - ( 12 May 2021 01:04 )  PTT:26.0 sec    Culture Results:   <10,000 CFU/mL Normal Urogenital Lay (05-01 @ 14:40)    --------------------------------------------------------------------------------------

## 2021-05-13 NOTE — CHART NOTE - NSCHARTNOTEFT_GEN_A_CORE
PRE OPERATIVE NOTE    Procedure: Open Jejunostomy   Surgeon: GRANT Attending                          6.7    8.82  )-----------( 123      ( 13 May 2021 12:55 )             22.1     05-13    147<H>  |  115<H>  |  26<H>  ----------------------------<  111<H>  3.8   |  26  |  0.57    Ca    9.2      13 May 2021 12:27  Phos  2.7     05-  Mg     2.2     -    PT/INR - ( 12 May 2021 01:04 )   PT: 14.0 sec;   INR: 1.23 ratio    PTT - ( 12 May 2021 01:04 )  PTT:26.0 sec    Type & Screen: 21. B+  CXR: 21  [id number]  Echocardiogram: 20    A/P: 81y Female planned for above procedure  NPO past midnight, except medications  IVF  Pain/nausea control  AM labs  Consent in chart    B Team ([facility])  [id number]

## 2021-05-13 NOTE — PROGRESS NOTE ADULT - ASSESSMENT
80 YO Female with Right parietooccipital IPH    5/1: admitted with a right parietal IPH, bleed slightly worse on repeat imaging, patient intubated   5/2: Rpt CTH stable, patient intubated. exam stable.  5/3: bradycardic 40's, 50's o/n, hypotensive SBP 70's, phenylephrine started, CTH done appears stable, neurological exam stable, on keppra, On 1.5%  5/4: Stable exam. On NaCl 2gm Q8H, Keppra  5/5: Stable exam. Na 141 dc'd NaCl, Continue with Keppra. Family agreeable to Trach/Peg - general surgery consulted  5/6: Na 142, mri brain today, c/w keppra, trach and peg  5/7: Na 141, stable exam. Family deferring Trach and PEG for several days before they make a decision. On keppra  5/8: Na 141, stable exam. On keppra. GOC discussion held with family, may be amenable to Trach and PEG  5/9: Na 144, stable exam. On keppra. Await family decision regarding Trach and PEG  5/10: Na 146, neuro exam stable, on keppra, awaiting family decision for trach and peg  5/11: Na 146, neuro exam stable, on keppra, trach/peg tomorrow   5/12: Trach/peg today, exam stable.   5/13: S/P Trach, PEG deferred for IR placement today. Off vent, tolerating trach collar. On keppra, neuro exam stable

## 2021-05-13 NOTE — PROGRESS NOTE ADULT - SUBJECTIVE AND OBJECTIVE BOX
S/P tracheostomy 5/12  Tolerating trach collar off vent  PEG deferred for IR placement today  ICU Vital Signs Last 24 Hrs  T(C): 37.3 (13 May 2021 00:00), Max: 37.8 (12 May 2021 16:00)  T(F): 99.1 (13 May 2021 00:00), Max: 100 (12 May 2021 16:00)  HR: 72 (13 May 2021 00:00) (61 - 78)  BP: 127/61 (13 May 2021 00:00) (95/50 - 138/77)  BP(mean): 77 (13 May 2021 00:00) (59 - 95)  ABP: 67/63 (13 May 2021 00:00) (67/63 - 94/55)  ABP(mean): 65 (13 May 2021 00:00) (65 - 72)  RR: 36 (13 May 2021 00:00) (16 - 38)  SpO2: 100% (13 May 2021 00:00) (100% - 100%)    Responds to noxious stimuli  Not following commands  Weakly opens eyes  PERRL  RUE antigravity  Withdraws RLE  Left side no movement    MEDICATIONS  (STANDING):  atorvastatin 80 milliGRAM(s) Oral at bedtime  chlorhexidine 0.12% Liquid 15 milliLiter(s) Oral Mucosa every 12 hours  enoxaparin Injectable 40 milliGRAM(s) SubCutaneous daily  levETIRAcetam  Solution 500 milliGRAM(s) Oral two times a day  levothyroxine 75 MICROGram(s) Oral daily  pantoprazole   Suspension 40 milliGRAM(s) Oral daily  senna Syrup 15 milliLiter(s) Oral at bedtime  sodium chloride 2 Gram(s) Oral every 6 hours    MEDICATIONS  (PRN):                          8.0    9.54  )-----------( 217      ( 12 May 2021 01:04 )             26.9     05-12    147<H>  |  114<H>  |  26<H>  ----------------------------<  139<H>  3.7   |  24  |  0.55    Ca    9.1      12 May 2021 01:04  Phos  2.7     05-12  Mg     2.2     05-12

## 2021-05-14 NOTE — BRIEF OPERATIVE NOTE - NSICDXBRIEFPREOP_GEN_ALL_CORE_FT
PRE-OP DIAGNOSIS:  Intraparenchymal hemorrhage of brain 12-May-2021 10:32:58  Purnima Tiwari  On enteral nutrition 14-May-2021 20:57:54  Purnima Tiwari  
PRE-OP DIAGNOSIS:  Intraparenchymal hemorrhage of brain 12-May-2021 10:32:58  Purnima Tiwari  Difficulty weaning from ventilator 12-May-2021 10:33:30  Purnima Tiwari

## 2021-05-14 NOTE — PROGRESS NOTE ADULT - SUBJECTIVE AND OBJECTIVE BOX
SICU Progress Note  __________________    Patient is an 81y old female who presents with a chief complaint of IPH (13 May 2021 10:59)      BRIEF HOSPITAL COURSE: 81y female with PMHx of CAD w/ stents on ASA/Plavix, HLD, hypothyroid, anemia s/p trip and fall. Per EMS reported trip and fall witnessed by bystanders w/ LOC. Found to have right parietal intraparenchymal hemorrhage. Initially GCS15 but intubated on HD1 due to deteriorating mental status and bradycardia. Per NSG, no operative intervention. MRI w/ evidence of subfalcine and uncal herniation. In family discussions, decision made for trach/PEG. Underwent trach on 5/12 but PEG could not be completed due to anatomy constraints.    Events last 24 hours:   - tolerating trach collar  - NPO for open J tube today    Medications:    levETIRAcetam  Solution 500 milliGRAM(s) Oral two times a day      enoxaparin Injectable 40 milliGRAM(s) SubCutaneous daily    pantoprazole   Suspension 40 milliGRAM(s) Oral daily  senna Syrup 15 milliLiter(s) Oral at bedtime      atorvastatin 80 milliGRAM(s) Oral at bedtime  levothyroxine 75 MICROGram(s) Oral daily    sodium chloride 2 Gram(s) Oral every 6 hours      chlorhexidine 0.12% Liquid 15 milliLiter(s) Oral Mucosa every 12 hours        Mode: standby      ICU Vital Signs Last 24 Hrs  T(C): 37.4 (14 May 2021 00:00), Max: 37.4 (14 May 2021 00:00)  T(F): 99.3 (14 May 2021 00:00), Max: 99.3 (14 May 2021 00:00)  HR: 73 (14 May 2021 00:00) (60 - 73)  BP: 125/66 (14 May 2021 00:00) (111/48 - 139/61)  BP(mean): 80 (14 May 2021 00:00) (61 - 81)  ABP: --  ABP(mean): --  RR: 37 (14 May 2021 00:00) (26 - 40)  SpO2: 100% (14 May 2021 00:00) (99% - 100%)      ABG - ( 12 May 2021 06:22 )  pH, Arterial: 7.50  pH, Blood: x     /  pCO2: 34    /  pO2: 133   / HCO3: 28    / Base Excess: 3.2   /  SaO2: 99.0                I&O's Detail    12 May 2021 07:01  -  13 May 2021 07:00  --------------------------------------------------------  IN:    IV PiggyBack: 100 mL    Jevity 1.2: 705 mL  Total IN: 805 mL    OUT:    Indwelling Catheter - Urethral (mL): 875 mL  Total OUT: 875 mL    Total NET: -70 mL      13 May 2021 07:01  -  14 May 2021 02:05  --------------------------------------------------------  IN:    Jevity 1.2: 225 mL  Total IN: 225 mL    OUT:    Indwelling Catheter - Urethral (mL): 340 mL  Total OUT: 340 mL    Total NET: -115 mL            LABS:                        8.7    12.52 )-----------( 289      ( 13 May 2021 15:57 )             28.4     05-13    147<H>  |  115<H>  |  26<H>  ----------------------------<  111<H>  3.8   |  26  |  0.57    Ca    9.2      13 May 2021 12:27  Phos  2.7     05-13  Mg     2.2     05-13        Physical Examination:    General: No acute distress.      HEENT: occasionally opening eyes, MMM    PULM: trach collar, no significant sputum    CVS: Regular rate     ABD: Soft, nondistended, nontender    EXT: No edema, nontender    SKIN: Warm and well perfused, no rashes noted.

## 2021-05-14 NOTE — PROGRESS NOTE ADULT - ASSESSMENT
81y Female CAD w/ stents on ASA/Plavix, HLD, hypothyroid, anemia, s/p fall, now with R frontal intraparenchymal hemorrhage increased on repeat imaging s/p DDAVP/Plt. Remains intubated for poor mental status. Surgery consulted for trach/PEG. s/p EGD and perc trach #6 shiley on 5/12. Unable to transilluminate. No window for IR for RIG.    Recommendations:  -plan for open j tube today  consented, NPO  -COVID negative 5/12  -appreciate SICU care    Discussed with Dr. Jaden Tiwari PGY2  General Surgery    B TEAM SURGERY  [id number]

## 2021-05-14 NOTE — PROGRESS NOTE ADULT - SUBJECTIVE AND OBJECTIVE BOX
IR unable to place PEG  J-tube to be placed today  No acute issues overnight  ICU Vital Signs Last 24 Hrs  T(C): 37.4 (14 May 2021 00:00), Max: 37.4 (14 May 2021 00:00)  T(F): 99.3 (14 May 2021 00:00), Max: 99.3 (14 May 2021 00:00)  HR: 73 (14 May 2021 00:00) (60 - 73)  BP: 125/66 (14 May 2021 00:00) (111/48 - 139/61)  BP(mean): 80 (14 May 2021 00:00) (61 - 81)  ABP: --  ABP(mean): --  RR: 37 (14 May 2021 00:00) (26 - 40)  SpO2: 100% (14 May 2021 00:00) (99% - 100%)    Responds to noxious stimuli  Not following commands or opening eyes  PERRL  RUE antigravity  Withdraws RLE  Left side no movement    MEDICATIONS  (STANDING):  atorvastatin 80 milliGRAM(s) Oral at bedtime  chlorhexidine 0.12% Liquid 15 milliLiter(s) Oral Mucosa every 12 hours  enoxaparin Injectable 40 milliGRAM(s) SubCutaneous daily  levETIRAcetam  Solution 500 milliGRAM(s) Oral two times a day  levothyroxine 75 MICROGram(s) Oral daily  pantoprazole   Suspension 40 milliGRAM(s) Oral daily  senna Syrup 15 milliLiter(s) Oral at bedtime  sodium chloride 2 Gram(s) Oral every 6 hours    MEDICATIONS  (PRN):                          8.7    12.52 )-----------( 289      ( 13 May 2021 15:57 )             28.4     05-13    147<H>  |  115<H>  |  26<H>  ----------------------------<  111<H>  3.8   |  26  |  0.57    Ca    9.2      13 May 2021 12:27  Phos  2.7     05-13  Mg     2.2     05-13

## 2021-05-14 NOTE — PROGRESS NOTE ADULT - ATTENDING COMMENTS
Patient overnight worsening mental status, repeat CT head with worsening intraparenchyma hematoma, given platelets ddavp and required intubation. Neurosurgery recommends no surgery for severe bleed. Patient with poor prognosis will continue goc discussion  N GCS 6t, withdrawing to pain, on keppra for prophlyaxis, no evd or monitoring of ICP per nsx  resp intubated, on mechanical ventilation, adequate gas exchange  cv hemodyanmically stable, perfusing adequately despite primary tbi  gi npo, start tube feeds, gi ppx  gu/renal monitor uop, monitor Na, maintain above 145 per nsx  heme holding chemical vte ppx  id no changes   endo no changes    palliative evaluation, goc discussion, poor overall prognosis per nsx    The patient is a critical care patient with life threatening hemodynamic and metabolic instability in SICU.  I have personally interviewed when possible and examined the patient, reviewed data and laboratory tests/x-rays and all pertinent electronic images.  I was physically present for the key portions of the evaluation and management (E/M) service provided.   The SICU team has a constant risk benefit analyzes discussion with the primary team, all consultants, House Staff and PA's on all decisions.  These diagnoses are unrelated to the surgical procedure noted above.  I meet with family if needed to get further history, discuss the case and make care decisions for this patient who might not be able to participate.  Time involved in performance of separately billable procedures was not counted toward my critical care time. There is no overlap.  I spent 55-75 minutes ( 0800Hrs-0915Hrs in AM/ 1600Hrs-1715Hrs in PM, or other time indicated) of critical care time for the diagnoses, assessment, plan and interventions.  This time excludes time spent on separate procedures and teaching.
Attending supervision statement: I have personally seen and examined the patient.  I fully participated in the care of this patient.  I have made amendments to the documentation where necessary, and agree with the history, physical exam, and plan as documented by the Resident.     S06.341A Intraparenchymal hematoma of brain, right, with loss of consciousness of 30 minutes or less, initial encounter   -continued coma, no meaningful change in exam  -neuroprotective measures, monitor q1 neuro checks, keppra. Imaging noted  J96.00 Acute respiratory failure, unspecified whether with hypoxia or hypercapnia   -apneic on CPAP trials, sedation held.  ABG WNL  -CXR stable  -euvolemic  R40.2434 [place] coma scale total score 3-8, at hospital admission   -family to discuss mgmt this weekend   -appreciate palliative input    Jean Marie Johnson MD  Acute Care Surgery .
I agree with the history, physical, and plan, which I have reviewed and edited where appropriate.  I agree with notes/assessment and detailed interval history of the health care providers on my service.  The patient is in SICU with diagnosis mentioned in the note.    The patient is a critical care patient with life threatening hemodynamic and metabolic instability in SICU.  The SICU team has a constant risk benefit analyzes discussion and coordinating care with the primary team, all consultants, House Staff and PA's..  I was physically present for the key portions of the evaluation and management (E/M) service provided.    The documentation of the total time spent 62 minutes  81y Female with history of NSTEMI, PCI, HLD, hypothyroidism, presents s/p fall found to have R IPH, now s/p intubation for worsening mental status.   does not follow commands  I have personally examined the patient, reviewed data and laboratory tests/x-rays and all pertinent electronic images.  HEENT  Exam: Normocephalic,  RESPIRATORY  Exam: Lungs clear  Mechanical Ventilation: Mode: AC/ CMV   CARDIOVASCULAR  Exam: S1, S2.  RR  GI/NUTRITION  Exam: Abdomen soft, Non-tender, Non-distended.      VASCULAR  Exam: Extremities warm,   The plan is specified below:  NEURO:    - R IPH, increased on repeat CTH with new SAH and SDH, MRI 5/6 with evidence of subfalcine and uncal herniation  - Q8H neurochecks  - Keppra 500 BID for seizure ppx  RESPIRATORY:   - CPAP as tolerated 5/10, continues to be apneic on pressure support trials  - Plan for Trach & PEG   CARDIOVASCULAR:   - Goal MAP 65-70 - monitor BP cuff but will continue a-line for blood draws  - Continue home atorvastatin   GI/NUTRITION:   - TF @ goal, Jevity @ 45ml/hr  - Protonix 40 daily  GENITOURINARY/RENAL:   - Goal Na 145-155, continue salt tabs 2 q 6h  HEMATOLOGIC:    ASA/Plavix s/p DDAVP & platelets x1 for ICH  - VTE ppx: Lovenox, SCDs    INFECTIOUS DISEASE: intermittent low grade fevers w/o leukocytosis  - Trend fever curve, WBC    ENDOCRINE: no acute issues  - Continue home synthroid    DISPO: SICU    Critical care diagnosis: Intraparenchymal hemorrhage
I agree with the history, physical, and plan, which I have reviewed and edited where appropriate.  I agree with notes/assessment and detailed interval history of the health care providers on my service.  The patient is in SICU with diagnosis mentioned in the note.    The patient is a critical care patient with life threatening hemodynamic and metabolic instability in SICU.  The SICU team has a constant risk benefit analyzes discussion and coordinating care with the primary team, all consultants, House Staff and PA's..  I was physically present for the key portions of the evaluation and management (E/M) service provided.    The documentation of the total time spent 62 minutes  81y Female with history of NSTEMI, PCI, HLD, hypothyroidism, s/p fall found to have R IPH, now s/p intubation for worsening mental status.  I have personally examined the patient, reviewed data and laboratory tests/x-rays and all pertinent electronic images.  NEUROLOGY  - Nonpurposeful movements of LLE        GCS: 8 T     Eyelid  opening apraxia   b/l Pupils : 3 mm equal and reactive   Follows Commands on the RIGHT Side - upper extremity, can raise hand on command and attempt to extend thumb, unable to follow commands of RLE during examination   RUE : 3/5 ( shows 2 fingers and antigravity)   RLE: 2/5  LUE: 1/5 to deep noxious  LLE: spontaneous movt 2/5 ( not to commands )     HEENT  Exam, Right Eye orbital edema   RESPIRATORY  Exam: coarse breath sounds   CARDIOVASCULAR  Exam: S1, S2 heard   GI/NUTRITION  soft nt nd    The plan is specified below:    NEURO:   - Q2H neurochecks  - Follows commands on R side, nonpurposeful movement of LLE  - Keppra 500 BID for seizure ppx  RESPIRATORY: intubated due to mental status  - Continue CPAP 5/5 21%   CARDIOVASCULAR:   - Continue home atorvastatin   GI/NUTRITION:   - Protonix 40 daily  GENITOURINARY/RENAL:   - Goal Na 145-155, continue salt tabs 2q8h  HEMATOLOGIC:   - VTE ppx: Lovenox  INFECTIOUS DISEASE:   - Monitor for fevers or elevation in WBC     ENDOCRINE: no acute issues  - Continue home synthroid
I agree with the history, physical, and plan, which I have reviewed and edited where appropriate.  I agree with notes/assessment and detailed interval history of the health care providers on my service.  The patient is in SICU with diagnosis mentioned in the note.    The patient is a critical care patient with life threatening hemodynamic and metabolic instability in SICU.  The SICU team has a constant risk benefit analyzes discussion and coordinating care with the primary team, all consultants, House Staff and PA's..  I was physically present for the key portions of the evaluation and management (E/M) service provided.    The documentation of the total time spent 62 minutes  81y Female with history of NSTEMI, s/p fall found to have R IPH, in respiratory failure. MRI with evidence of subfalcine and uncal herniation  I have personally examined the patient, reviewed data and laboratory tests/x-rays and all pertinent electronic images.  NEUROLOGY  GCS:  6T  Exam: moves RUE to noxious stimuli, does not open eyes, does not follow commands  HEENT  Exam: Normocephalic,   RESPIRATORY  Exam: Lungs clear   Mechanical Ventilation: Mode: AC/ CMV   CARDIOVASCULAR  Exam: S1, S2.  RR  GI/NUTRITION  Exam: Abdomen soft, Non-tender, Non-distended.    VASCULAR  Exam: Extremities warm,  The plan is specified below:  NEURO:    -Keppra 500 BID for seizure ppx  RESPIRATORY:   - CPAP as tolerated 5/10, continues to be apneic on pressure support trials  CARDIOVASCULAR:   - Goal MAP 65-70 - monitor BP cuff but will continue a-line for blood draws  GI/NUTRITION:   - TF @ goal, Jevity @ 45ml/hr  - Protonix 40 daily  GENITOURINARY/RENAL:   - Goal Na 145-155, continue salt tabs 2 q 6h  HEMATOLOGIC:   - VTE ppx: Lovenox, SCDs  INFECTIOUS DISEASE: intermittent low grade fevers w/o leukocytosis  - Trend fever curve, WBC  - Monitor off antibiotics  ENDOCRINE:   - Continue home synthroid    PLAN FOR TRACH AND PEG on TODAY    DISPO: SICU
I agree with the history, physical, and plan, which I have reviewed and edited where appropriate.  I agree with notes/assessment and detailed interval history of the health care providers on my service.  The patient is in SICU with diagnosis mentioned in the note.    The patient is a critical care patient with life threatening hemodynamic and metabolic instability in SICU.  The SICU team has a constant risk benefit analyzes discussion and coordinating care with the primary team, all consultants, House Staff and PA's..  I was physically present for the key portions of the evaluation and management (E/M) service provided.    The documentation of the total time spent 62 minutes  81y female with history of NSTEMI, PCI, HLD, with R IPH, s/p intubation for worsening mental status. MRI with evidence of subfalcine and uncal herniation. S/p trach   I have personally examined the patient, reviewed data and laboratory tests/x-rays and all pertinent electronic images.  No acute distress.      HEENT: occasionally opening eyes,     PULM: clear    CVS: RR    ABD: Soft, nondistended, nontender    EXT: No edema  The plan is specified below:  NEURO:    -Q8H neurochecks  - - d/c Keppra 500 BID - pt is s/p 7d course    RESPIRATORY:  - S/p trach, tolerating trach collar  - Intermittent episodes of Cheyne-Og breathing    CARDIOVASCULAR:   - Continue home atorvastatin     GI/NUTRITION:   - TF at goal, Jevity at 45ml/hr / NPO for J tube placement today  - Protonix 40 daily, continue senna   J tube placement with Gen Surg later today    GENITOURINARY/RENAL:   - Goal Na 145-155, continue salt tabs 2 q 6h      HEMATOLOGIC:   -ASA/Plavix s/p DDAVP & platelets x1 for ICH  - H/H stable  - VTE ppx: Lovenox, SCDs    INFECTIOUS DISEASE:   - Monitor off antibiotics    ENDOCRINE:   - Continue home Synthroid    Critical Care Diagnosis: intraparenchymal hemorrhage  DISPO: SICU
Mrs. 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 I spoke with the family (Cathie [phone]) today and they would like to offer their mother more time via a PEG and trach. We had discussed goals of care as well. They have not spoken with the neurosurgical team yet and I will pass the message along to update the family.     S06.341A Intraparenchymal hematoma of brain, right, with loss of consciousness of 30 minutes or less, initial encounter   -continued coma, no meaningful change in exam  -herniation seen on CT  -neuroprotective measures, monitor q1 neuro checks, keppra. Imaging noted  J96.00 Acute respiratory failure, unspecified whether with hypoxia or hypercapnia   -apneic on CPAP trials, sedation held.  ABG WNL  -CXR stable  R40.2433 Peri coma scale total score 3-8, at hospital admission   -family would like tracheostomy on Wednesday so Cathie could be present on day of.   -appreciate palliative input    Jean Marie Johnson MD  Acute Care Surgery .     Critical care services provided.
I agree with the history, physical, and plan, which I have reviewed and edited where appropriate.  I agree with notes/assessment and detailed interval history of the health care providers on my service.  The patient is in SICU with diagnosis mentioned in the note.    The patient is a critical care patient with life threatening hemodynamic and metabolic instability in SICU.  The SICU team has a constant risk benefit analyzes discussion and coordinating care with the primary team, all consultants, House Staff and PA's..  I was physically present for the key portions of the evaluation and management (E/M) service provided.    The documentation of the total time spent 62 minutes  81y Female with history of NSTEMI,  s/p fall found to have R IPH, now s/p intubation / on full vent support. MRI with evidence of subfalcine and uncal herniation  I have personally examined the patient, reviewed data and laboratory tests/x-rays and all pertinent electronic images.  GCS:  6T  Exam: moves RUE to noxious stimuli, does not open eyes, does not follow commands  HEENT  Exam: trach site c/d/i  RESPIRATORY  Exam: Lungs clear   Mechanical Ventilation: Mode: AC/ CMV   CARDIOVASCULAR  Exam: S1, S2.  RR  GI/NUTRITION  Exam: Abdomen soft, Non-tender, Non-distended.      VASCULAR  Exam: Extremities warm,  The plan is specified below:  NEURO:    - R IPH, increased on repeat CTH with new SAH and SDH, MRI 5/6 with evidence of subfalcine and uncal herniation  - Q8H neurochecks  - Keppra 500 BID for seizure ppx  RESPIRATORY:  - S/p Trach, tolerating trach collar  CARDIOVASCULAR:   - Goal MAP 65-70  - Continue home atorvastatin   GI/NUTRITION:   - TF @ goal, Jevity @ 45ml/hr / NPO for J tube placement today  - Protonix 40 daily  -GENITOURINARY/RENAL:   - Goal Na 145-155, continue salt tabs 2 q 6h  HEMATOLOGIC:   - VTE ppx: Lovenox, SCDs  INFECTIOUS DISEASE:   - Monitor off antibiotics  ENDOCRINE: no acute issues  - Continue home synthroid    DISPO: SICU
I agree with the history, physical, and plan, which I have reviewed and edited where appropriate.  I agree with notes/assessment and detailed interval history of the health care providers on my service.  The patient is in SICU with diagnosis mentioned in the note.    The patient is a critical care patient with life threatening hemodynamic and metabolic instability in SICU.  The SICU team has a constant risk benefit analyzes discussion and coordinating care with the primary team, all consultants, House Staff and PA's..  I was physically present for the key portions of the evaluation and management (E/M) service provided.    The documentation of the total time spent 62 minutes  81y Female with history of NSTEMI, PCI, HLD, hypothyroidism, presents s/p fall found to have R IPH, now s/p intubation for worsening mental status. Remains on full vent support. MRI with evidence of subfalcine and uncal herniation  I have personally examined the patient, reviewed data and laboratory tests/x-rays and all pertinent electronic images.  GCS:  6T  Exam: moves RUE to noxious stimuli, does not open eyes, does not follow commands  HEENT  Exam: Normocephalic,   RESPIRATORY  Exam: Lungs clear   Mechanical Ventilation: Mode: AC/ CMV   CARDIOVASCULAR  Exam: S1, S2.  RR  GI/NUTRITION  Exam: Abdomen soft, Non-tender, Non-distended.    VASCULAR  Exam: Extremities warm,    The plan is specified below:  NEURO:    - R IPH, increased on repeat CTH with new SAH and SDH, MRI 5/6 with evidence of subfalcine and uncal herniation  - Q8H neurochecks  - Keppra 500 BID for seizure ppx  RESPIRATORY:   - CPAP as tolerated 5/10, continues to be apneic on pressure support trials  CARDIOVASCULAR:   - Goal MAP 65-70 - monitor BP cuff but will continue a-line for blood draws  GI/NUTRITION:   - TF @ goal, Jevity @ 45ml/hr  -Protonix 40 daily  GENITOURINARY/RENAL:   - Continue Donaldson catheter (failed TOV 5/6)  HEMATOLOGIC:   - VTE ppx: Lovenox, SCDs  INFECTIOUS DISEASE:   - Trend fever curve, WBC  - Monitor off antibiotics    ENDOCRINE: no acute issues  - Continue home synthroid      PLAN FOR TRACH AND PEG on WEDNESDAY     DISPO: SICU      Critical care diagnosis: Intraparenchymal hemorrhage, respiratory failure
I agree with the history, physical, and plan, which I have reviewed and edited where appropriate.  I agree with notes/assessment and detailed interval history of the health care providers on my service.  The patient is in SICU with diagnosis mentioned in the note.    The patient is a critical care patient with life threatening hemodynamic and metabolic instability in SICU.  The SICU team has a constant risk benefit analyzes discussion and coordinating care with the primary team, all consultants, House Staff and PA's..  I was physically present for the key portions of the evaluation and management (E/M) service provided.    The documentation of the total time spent 62 minutes  81y Female with history of NSTEMI, PCI, HLD, hypothyroidism, s/p fall found to have R IPH, iin respiratory failure.  I have personally examined the patient, reviewed data and laboratory tests/x-rays and all pertinent electronic images.  Exam: improving right Eye orbital edema   RESPIRATORY  Exam: coarse breath sounds   CARDIOVASCULAR  Exam: S1, S2 RR  GI/NUTRITION  Exam: Abdomen soft, Non-tender, Non-distended.   VASCULAR  Exam: Extremities warm, pink, well-perfused  MUSCULOSKELETAL  Exam: Weakly follows with RUE , spontaneous movt seen LLE sporadically ,   LUE 1/5 to deep noxious commands     The plan is specified below:  NEURO:  - Q4H neurochecks  - Follows commands on R side, nonpurposeful movement of LLE  - Keppra 500 BID for seizure ppx  -MRI head w/ IV contrast today  RESPIRATORY:   - Continue CPAP/PSV 5/5 21%   -CARDIOVASCULAR:   - Goal MAP 65-70 - monitor BP cuff but will continue a-line for blood draws  -GI/NUTRITION: no acute issues  -TF@goal  - Protonix 40 daily  GENITOURINARY/RENAL:   - Goal Na 145-155, continue salt tabs 2q8h  -HEMATOLOGIC:   - VTE ppx: Lovenox  INFECTIOUS DISEASE:   - Monitor for fevers or elevation in WBC   ENDOCRINE: no acute issues  - Continue home synthroid
S06.341A Intraparenchymal hematoma of brain, right, with loss of consciousness of 30 minutes or less, initial encounter   -neuroprotective measures, monitor q1 neuro checks, keppra. Imaging noted  -evidence of herniation noted, nsx aware   -nonoperative mgmt for now   J96.00 Acute respiratory failure, unspecified whether with hypoxia or hypercapnia   -apneic on CPAP trials, sedation held.  ABG WNL  -CXR stable  -euvolemic  R40.2433 [place] coma scale total score 3-8, at hospital admission   -continued neurologic decline, likely to progress to brain stem death if course continues.   -family to discuss mgmt this weekend   -appreciate palliative input    Jean Marie Johnson MD  Acute Care Surgery
I agree with the history, physical, and plan, which I have reviewed and edited where appropriate.  I agree with notes/assessment and detailed interval history of the health care providers on my service.  The patient is in SICU with diagnosis mentioned in the note.    The patient is a critical care patient with life threatening hemodynamic and metabolic instability in SICU.  The SICU team has a constant risk benefit analyzes discussion and coordinating care with the primary team, all consultants, House Staff and PA's..  I was physically present for the key portions of the evaluation and management (E/M) service provided.    The documentation of the total time spent 62 minutes  81y Female with history of NSTEMI, PCI, HLD, hypothyroidism, presents s/p fall found to have R IPH, in respiratory failure  I have personally examined the patient, reviewed data and laboratory tests/x-rays and all pertinent electronic images.  EXAM  NEUROLOGY  GCS: 8 T     Eyelid  opening apraxia   b/l Pupils : 3 mm equal and reactive   Follows Commands on the RIGHT Side  RUE : 3/5 ( shows 2 fingers and antigravity)   RLE: 2/5  LUE: 1/5 to deep noxious  LLE: spontaneous movt 2/5 ( not to commands )     HEENT  Exam: Right Eye orbital edema   RESPIRATORY  Exam: coarse breath sounds b/l , equal  chest expansion   AC Vent  Support :   CARDIOVASCULAR  Exam: S1, S2 heard   GI/NUTRITION  Exam: Abdomen soft, Non-tender, Non-distended.     The plan is specified below:  NEURO:  Q1H neuro checks  Follows commands on R side  GCS 8 on 5/4  Keppra 500 BID ( seizure prophylaxis )   palliative consult for GOC on 5/4  RESPIRATORY:   Will Attempt Breathing trials as tolerated   CARDIOVASCULAR:  goal MAP 65-70  Not on pressors   Lipitor   GENITOURINARY/RENAL:  Monitor Lytes and replete as necessary   Check BMP in afternoon for Phos level  HEMATOLOGIC:  S/p antiplatelet reversal with DDAVP & Platelets x1  SQH   INFECTIOUS DISEASE:  Monitor for fevers or elevation in WBC   ENDOCRINE:  home levothyroxine dosage     Neurosurgery discussed prognosis with family given age and DAPT use and increase in hemorrhage would not like to pursue any surgical intervention, want maximum medical management     Diagnoses: Intraparenchymal hemorrhage
I agree with the history, physical, and plan, which I have reviewed and edited where appropriate.  I agree with notes/assessment and detailed interval history of the health care providers on my service.  The patient is in SICU with diagnosis mentioned in the note.    The patient is a critical care patient with life threatening hemodynamic and metabolic instability in SICU.  The SICU team has a constant risk benefit analyzes discussion and coordinating care with the primary team, all consultants, House Staff and PA's..  I was physically present for the key portions of the evaluation and management (E/M) service provided.    The documentation of the total time spent 62 minutes  81y Female with history of NSTEMI, PCI, HLD, hypothyroidism s/p fall found to have R IPH, now s/p intubation for worsening mental status.  I have personally examined the patient, reviewed data and laboratory tests/x-rays and all pertinent electronic images.  GCS:8    Follows motor commands on R  + Babinski on L   HEENT  Exam: Normocephalic,   RESPIRATORY  Exam: clear  Mechanical Ventilation: Mode: AC/ CMV   CARDIOVASCULAR  Exam: S1, S2.   GI/NUTRITION  Exam: Abdomen soft, Non-tender, Non-distended.   Current Diet:  TF@ goal  VASCULAR  Exam: Extremities warm,    The plan is specified below:  NEURO:  Q1H neurochecks  Follows commands on R side  GCS 8 on 5/3  Repeat CTH: increased R parietal IPH, new SAH & SDH  Keppra 500 BID  off sedation, will d/c prn dilaudid  palliative consult for GOC  RESPIRATORY:   was on /16/5/30%  CPAP on 8/5 now,   CARDIOVASCULAR:  goal MAP 65-70  lipitor   GI/NUTRITION:  TF@goal  Protonix  GENITOURINARY/RENAL:  salt tabs to maintain Na 145-155  Check BMP in afternoon for Phos level  HEMATOLOGIC:  Monitor H/H  S/p antiplatelet reversal with DDAVP & Platelets x1  Will start SQH as pt is >24hr s/p brain bleed  INFECTIOUS DISEASE:  Monitor for fevers or elevation in WBC   ENDOCRINE:  home levothyroxine dosage     Neurosurgery discussed prognosis with family given age and DAPT use and increase in hemorrhage would not like to pursue any surgical intervention, want maximum medical management     Diagnoses: Intraparenchymal hemorrhage

## 2021-05-14 NOTE — PROGRESS NOTE ADULT - ATTENDING SUPERVISION STATEMENT
ACP
ACP/Resident
Resident
Resident
ACP
Resident
ACP
Resident
Resident
ACP

## 2021-05-14 NOTE — PROGRESS NOTE ADULT - ASSESSMENT
82 YO Female with Right parietooccipital IPH    5/1: admitted with a right parietal IPH, bleed slightly worse on repeat imaging, patient intubated   5/2: Rpt CTH stable, patient intubated. exam stable.  5/3: bradycardic 40's, 50's o/n, hypotensive SBP 70's, phenylephrine started, CTH done appears stable, neurological exam stable, on keppra, On 1.5%  5/4: Stable exam. On NaCl 2gm Q8H, Keppra  5/5: Stable exam. Na 141 dc'd NaCl, Continue with Keppra. Family agreeable to Trach/Peg - general surgery consulted  5/6: Na 142, mri brain today, c/w keppra, trach and peg  5/7: Na 141, stable exam. Family deferring Trach and PEG for several days before they make a decision. On keppra  5/8: Na 141, stable exam. On keppra. GOC discussion held with family, may be amenable to Trach and PEG  5/9: Na 144, stable exam. On keppra. Await family decision regarding Trach and PEG  5/10: Na 146, neuro exam stable, on keppra, awaiting family decision for trach and peg  5/11: Na 146, neuro exam stable, on keppra, trach/peg tomorrow   5/12: Trach/peg today, exam stable.   5/13: S/P Trach, PEG deferred for IR placement today. Off vent, tolerating trach collar. On keppra, neuro exam stable  5/14: S/P trach, IR unable to place PEG, J-tube to be placed today. Exam stable, remains off vent. Keppra

## 2021-05-14 NOTE — PROGRESS NOTE ADULT - ASSESSMENT
81y female with history of NSTEMI, PCI, HLD, hypothyroidism, presents s/p fall and found to have R IPH, s/p intubation for worsening mental status. MRI with evidence of subfalcine and uncal herniation. S/p trach on 5/12 and with plan for J tube on 5/14    PLAN:  NEURO:    - R IPH, increased on repeat CTH with new SAH and SDH, MRI 5/6 with evidence of subfalcine and uncal herniation  - Q8H neurochecks  - Withdraws to painful stimuli on R side, nonpurposeful movement of LLE  - Keppra 500 BID for seizure ppx    RESPIRATORY: intubated for airway protection due to mental status  - S/p trach, tolerating trach collar    CARDIOVASCULAR: hx CAD s/p stents (unknown date of stents, last cardiac cath 2017 per chart review)  - Goal MAPs 65-70  - Continue home atorvastatin     GI/NUTRITION: no acute issues  - TF at goal, Jevity at 45ml/hr / NPO for J tube placement today  - Protonix 40 daily, continue senna  - PEG unsuccessful, IR consulted but also unable   - Plan for J tube placement with Gen Surg later today    GENITOURINARY/RENAL: urinary retention  - Goal Na 145-155, continue salt tabs 2 q 6h  - Continue Donaldson catheter (failed TOV 5/6)    HEMATOLOGIC: on ASA/Plavix s/p DDAVP & platelets x1 for ICH  - H/H stable  - VTE ppx: Lovenox, SCDs    INFECTIOUS DISEASE: intermittent low grade fevers w/o leukocytosis  - Trend fever curve, WBC  - Monitor off antibiotics    ENDOCRINE: no acute issues  - Continue home Synthroid    Lines/Tubes:  - Donaldson replaced 5/6  - PIVs    Critical Care Diagnosis: intraparenchymal hemorrhage  DISPO: SICU 81y female with history of NSTEMI, PCI, HLD, hypothyroidism, presents s/p fall and found to have R IPH, s/p intubation for worsening mental status. MRI with evidence of subfalcine and uncal herniation. S/p trach on 5/12 and with plan for J tube on 5/14    PLAN:  NEURO:    - R IPH, increased on repeat CTH with new SAH and SDH, MRI 5/6 with evidence of subfalcine and uncal herniation  - Q8H neurochecks  - Withdraws to painful stimuli on R side, nonpurposeful movement of LLE  - d/c Keppra 500 BID - pt is s/p 7d course    RESPIRATORY: intubated for airway protection due to mental status  - S/p trach, tolerating trach collar  - Intermittent episodes of Cheyne-Og breathing    CARDIOVASCULAR: hx CAD s/p stents (unknown date of stents, last cardiac cath 2017 per chart review)  - Goal MAPs 65-70  - Continue home atorvastatin     GI/NUTRITION: no acute issues  - TF at goal, Jevity at 45ml/hr / NPO for J tube placement today  - Protonix 40 daily, continue senna  - Plan for J tube placement with Gen Surg later today    GENITOURINARY/RENAL: urinary retention  - Goal Na 145-155, continue salt tabs 2 q 6h  - Continue Donaldson catheter (failed TOV 5/6)    HEMATOLOGIC: on ASA/Plavix s/p DDAVP & platelets x1 for ICH  - H/H stable  - VTE ppx: Lovenox, SCDs    INFECTIOUS DISEASE: intermittent low grade fevers w/o leukocytosis  - Trend fever curve, WBC  - Monitor off antibiotics    ENDOCRINE: no acute issues  - Continue home Synthroid    Lines/Tubes:  - Donaldson replaced 5/6  - PIVs    Critical Care Diagnosis: intraparenchymal hemorrhage  DISPO: SICU

## 2021-05-14 NOTE — PROGRESS NOTE ADULT - SUBJECTIVE AND OBJECTIVE BOX
GENERAL SURGERY PROGRESS NOTE    SUBJECTIVE  Patient seen and examined on AM rounds  placed back on vent overnight      OBJECTIVE    PHYSICAL EXAM  General: NAD, not opening eyes  CHEST: vent  CV: appears well perfused  Abdomen: soft, nondistended, well healed surgical scars  Extremities: Grossly symmetric    T(C): 37.7 (05-14-21 @ 12:00), Max: 37.7 (05-14-21 @ 12:00)  HR: 98 (05-14-21 @ 12:00) (67 - 98)  BP: 133/66 (05-14-21 @ 12:00) (120/55 - 163/81)  RR: 24 (05-14-21 @ 12:00) (17 - 43)  SpO2: 100% (05-14-21 @ 12:00) (97% - 100%)    05-13-21 @ 07:01  -  05-14-21 @ 07:00  --------------------------------------------------------  IN: 467.5 mL / OUT: 615 mL / NET: -147.5 mL    05-14-21 @ 07:01  -  05-14-21 @ 15:50  --------------------------------------------------------  IN: 125 mL / OUT: 0 mL / NET: 125 mL        MEDICATIONS  atorvastatin 80 milliGRAM(s) Oral at bedtime  chlorhexidine 0.12% Liquid 15 milliLiter(s) Oral Mucosa every 12 hours  enoxaparin Injectable 40 milliGRAM(s) SubCutaneous daily  levothyroxine 75 MICROGram(s) Oral daily  pantoprazole   Suspension 40 milliGRAM(s) Oral daily  senna Syrup 15 milliLiter(s) Oral at bedtime  sodium chloride 2 Gram(s) Oral every 6 hours      LABS                        9.5    12.94 )-----------( 309      ( 14 May 2021 03:56 )             30.5     05-14    144  |  109<H>  |  22  ----------------------------<  114<H>  4.0   |  25  |  0.54    Ca    9.4      14 May 2021 03:56  Phos  2.5     05-14  Mg     2.2     05-14            RADIOLOGY & ADDITIONAL STUDIES

## 2021-05-14 NOTE — BRIEF OPERATIVE NOTE - OPERATION/FINDINGS
endoscopy. history of antrectomy with billroth 1 reconstruction. unable to transilluminate so decision made to abort placing PEG.  percutaneous tracheostomy with flexible bronchoscopy. silk sutures x 4 to secure trach to skin.
midline laparotomy incision left of umbilicus. extensive lysis of adhesions. majority of small bowel traced backwards from terminal ileum to LOT. jejunostomy made approximately 25cm distal to LOT with insertion of 14Fr malecot tube, using witzel technique. portion of jejunum with jejunostomy sutured to anterior abdominal wall. external portion of tube sutured to skin with 4 silk sutures. midline laparotomy incision closed with continuous PDS and skin staples.

## 2021-05-15 NOTE — PROGRESS NOTE ADULT - ASSESSMENT
81y female with history of NSTEMI, PCI, HLD, hypothyroidism, presents s/p fall and found to have R IPH, s/p intubation for worsening mental status. MRI with evidence of subfalcine and uncal herniation. S/p trach on 5/12 and J tube on 5/14    PLAN:  NEURO:    - R IPH, increased on repeat CTH with new SAH and SDH, MRI 5/6 with evidence of subfalcine and uncal herniation  - Q8H neurochecks  - minimal if any withdrawal to pain, occasionally opens eyes but not to command    RESPIRATORY: intubated for airway protection due to mental status  - S/p trach, tolerating trach collar  - Intermittent episodes of Cheyne-Og breathing    CARDIOVASCULAR: hx CAD s/p stents (unknown date of stents, last cardiac cath 2017 per chart review)  - Goal MAPs 65-70  - Continue home atorvastatin     GI/NUTRITION: no acute issues  - will attempt TF by jejunostomy starting at 30cc/hr  - Protonix 40 daily, continue senna    GENITOURINARY/RENAL: urinary retention  - Goal Na 145-155, continue salt tabs 2 q 6h  - Continue Donaldson catheter (failed TOV 5/6)    HEMATOLOGIC: on ASA/Plavix s/p DDAVP & platelets x1 for ICH  - H/H stable  - VTE ppx: Lovenox, SCDs    INFECTIOUS DISEASE: intermittent low grade fevers w/o leukocytosis  - Trend fever curve, WBC  - Monitor off antibiotics    ENDOCRINE: no acute issues  - Continue home Synthroid    Lines/Tubes:  - Donaldson replaced 5/6  - PIVs    Critical Care Diagnosis: intraparenchymal hemorrhage  DISPO: SICU   81y female with history of NSTEMI, PCI, HLD, hypothyroidism, presents s/p fall and found to have R IPH, s/p intubation for worsening mental status. MRI with evidence of subfalcine and uncal herniation. S/p trach on 5/12 and J tube on 5/14    PLAN:  NEURO:    - R IPH, increased on repeat CTH with new SAH and SDH, MRI 5/6 with evidence of subfalcine and uncal herniation  - Q8H neurochecks  - minimal if any withdrawal to pain, occasionally opens eyes but not to command    RESPIRATORY: intubated for airway protection due to mental status  - S/p trach, tolerating trach collar  - Intermittent episodes of Cheyne-Og breathing    CARDIOVASCULAR: hx CAD s/p stents (unknown date of stents, last cardiac cath 2017 per chart review)  - Goal MAPs 65-70  - Continue home atorvastatin   - continue metop 5q6 for afib with RVR; no role for AC in light of IPH    GI/NUTRITION: no acute issues  - will attempt TF by jejunostomy starting at 30cc/hr  - Protonix 40 daily, continue senna    GENITOURINARY/RENAL: urinary retention  - Goal Na 145-155, continue salt tabs 2 q 6h  - Continue Donaldson catheter (failed TOV 5/6)    HEMATOLOGIC: on ASA/Plavix s/p DDAVP & platelets x1 for ICH  - H/H stable  - VTE ppx: Lovenox, SCDs    INFECTIOUS DISEASE: intermittent low grade fevers w/o leukocytosis  - Trend fever curve, WBC  - Monitor off antibiotics    ENDOCRINE: no acute issues  - Continue home Synthroid    Lines/Tubes:  - Donaldson replaced 5/6  - PIVs    Critical Care Diagnosis: intraparenchymal hemorrhage  DISPO: SICU   81y female with history of NSTEMI, PCI, HLD, hypothyroidism, presents s/p fall and found to have R IPH, s/p intubation for worsening mental status. MRI with evidence of subfalcine and uncal herniation. S/p trach on 5/12 and J tube on 5/14    PLAN:  NEURO:    - R IPH, increased on repeat CTH with new SAH and SDH, MRI 5/6 with evidence of subfalcine and uncal herniation  - Q8H neurochecks  - no response to pain, no apparent cranial nerve function    RESPIRATORY: intubated for airway protection due to mental status  - S/p trach, tolerating trach collar  - Intermittent episodes of Cheyne-Og breathing    CARDIOVASCULAR: hx CAD s/p stents (unknown date of stents, last cardiac cath 2017 per chart review)  - Goal MAPs 65-70  - Continue home atorvastatin   - continue metop 5q6 for afib with RVR; no role for AC in light of IPH    GI/NUTRITION: no acute issues  - will attempt TF by jejunostomy starting at 30cc/hr  - Protonix 40 daily, continue senna    GENITOURINARY/RENAL: urinary retention  - Goal Na 145-155, continue salt tabs 2 q 6h  - Continue Donaldson catheter (failed TOV 5/6)    HEMATOLOGIC: on ASA/Plavix s/p DDAVP & platelets x1 for ICH  - H/H stable  - VTE ppx: Lovenox, SCDs    INFECTIOUS DISEASE: intermittent low grade fevers w/o leukocytosis  - Trend fever curve, WBC  - Monitor off antibiotics    ENDOCRINE: no acute issues  - Continue home Synthroid    Lines/Tubes:  - Donaldson replaced 5/6  - PIVs    Critical Care Diagnosis: intraparenchymal hemorrhage  DISPO: SICU

## 2021-05-15 NOTE — PROVIDER CONTACT NOTE (OTHER) - RECOMMENDATIONS
assess patient
ABG/labs
Place a-line/start vasopressors, or call family for goals of care
patient coded, 1 round of compressions and 1 round of epi given.

## 2021-05-15 NOTE — PROGRESS NOTE ADULT - PROBLEM SELECTOR PROBLEM 4
Hypercholesterolemia
Hypothyroidism
Hypothyroidism
CAD (coronary artery disease)
Hypercholesterolemia
CAD (coronary artery disease)
Hypothyroidism
Palliative care encounter

## 2021-05-15 NOTE — PROGRESS NOTE ADULT - REASON FOR ADMISSION
IPH

## 2021-05-15 NOTE — PROGRESS NOTE ADULT - PROBLEM SELECTOR PLAN 3
- Continue Q1 neurochecks   - Repeat CTH yesterday stable   - MRI brain w/wo, ordered per SICU   - Continue to monitor Na and Plt  - Wean to extubate
continue statins
continue statins
continue statin
Continue pantoprazole
continue statin
- Continue Q1 neurochecks   - MRI brain w/wo  - Continue to monitor Na and Plt  - Wean to extubate
continue statins
now kps 10% moves RLE with no purpose  baseline independent but had mild-mod cog impairment as per marcelino
continue statins
continue statin
Continue synthroid

## 2021-05-15 NOTE — PROGRESS NOTE ADULT - NSICDXPILOT_GEN_ALL_CORE
[place]

## 2021-05-15 NOTE — PROGRESS NOTE ADULT - SUBJECTIVE AND OBJECTIVE BOX
SICU Progress Note  __________________    Patient is a 81y old  Female who presents with a chief complaint of IPH (14 May 2021 15:50)      BRIEF HOSPITAL COURSE:   81y female with PMHx of CAD w/ stents on ASA/Plavix, HLD, hypothyroid, anemia s/p trip and fall. Per EMS reported trip and fall witnessed by bystanders w/ LOC. Found to have right parietal intraparenchymal hemorrhage. Initially GCS15 but intubated on HD1 due to deteriorating mental status and bradycardia. Per NSG, no operative intervention. MRI w/ evidence of subfalcine and uncal herniation. In family discussions, decision made for trach/PEG. Underwent trach on 5/12 but PEG could not be completed due to anatomy constraints. Underwent open J tube placement on 5/14, since doing well.    Events last 24 hours:   - underwent open J tube placement     Medications:      enoxaparin Injectable 40 milliGRAM(s) SubCutaneous daily    pantoprazole   Suspension 40 milliGRAM(s) Oral daily  senna Syrup 15 milliLiter(s) Oral at bedtime      atorvastatin 80 milliGRAM(s) Oral at bedtime  levothyroxine 75 MICROGram(s) Oral daily    sodium chloride 2 Gram(s) Oral every 6 hours      chlorhexidine 0.12% Liquid 15 milliLiter(s) Oral Mucosa every 12 hours        Mode: standby      ICU Vital Signs Last 24 Hrs  T(C): 37.3 (15 May 2021 00:00), Max: 37.7 (14 May 2021 12:00)  T(F): 99.2 (15 May 2021 00:00), Max: 99.8 (14 May 2021 12:00)  HR: 95 (15 May 2021 00:00) (64 - 98)  BP: 120/48 (15 May 2021 00:00) (120/48 - 163/81)  BP(mean): 65 (15 May 2021 00:00) (65 - 99)  ABP: --  ABP(mean): --  RR: 27 (15 May 2021 00:00) (17 - 43)  SpO2: 99% (15 May 2021 00:00) (97% - 100%)          I&O's Detail    13 May 2021 07:01  -  14 May 2021 07:00  --------------------------------------------------------  IN:    IV PiggyBack: 62.5 mL    Jevity 1.2: 405 mL  Total IN: 467.5 mL    OUT:    Indwelling Catheter - Urethral (mL): 615 mL  Total OUT: 615 mL    Total NET: -147.5 mL      14 May 2021 07:01  -  15 May 2021 00:40  --------------------------------------------------------  IN:    Enteral Tube Flush: 60 mL    IV PiggyBack: 125 mL    Jevity 1.2: 120 mL  Total IN: 305 mL    OUT:    Indwelling Catheter - Urethral (mL): 1000 mL  Total OUT: 1000 mL    Total NET: -695 mL            LABS:                        10.1   16.18 )-----------( 359      ( 14 May 2021 21:51 )             34.0     05-14    145  |  111<H>  |  22  ----------------------------<  133<H>  4.3   |  23  |  0.55    Ca    9.1      14 May 2021 21:51  Phos  3.8     05-14  Mg     2.1     05-14            Physical Examination:    General: No acute distress.      HEENT: occasionally opening eyes, MMM    PULM: trach collar, no significant sputum    CVS: Regular rate     ABD: Soft, nondistended, surgical dressings in place    EXT: minimal edema, nontender    SKIN: Warm and well perfused, no rashes noted. SICU Progress Note  __________________    Patient is a 81y old  Female who presents with a chief complaint of IPH (14 May 2021 15:50)      BRIEF HOSPITAL COURSE:   81y female with PMHx of CAD w/ stents on ASA/Plavix, HLD, hypothyroid, anemia s/p trip and fall. Per EMS reported trip and fall witnessed by bystanders w/ LOC. Found to have right parietal intraparenchymal hemorrhage. Initially GCS15 but intubated on HD1 due to deteriorating mental status and bradycardia. Per NSG, no operative intervention. MRI w/ evidence of subfalcine and uncal herniation. In family discussions, decision made for trach/PEG. Underwent trach on 5/12 but PEG could not be completed due to anatomy constraints. Underwent open J tube placement on 5/14, since doing well.    Events last 24 hours:   - underwent open J tube placement   - overnight with Afib with RVR, started on metoprolol  - difficult to obtain pulse ox anywhere overnight, no neuro reflexes able to obtained either    Medications:      enoxaparin Injectable 40 milliGRAM(s) SubCutaneous daily    pantoprazole   Suspension 40 milliGRAM(s) Oral daily  senna Syrup 15 milliLiter(s) Oral at bedtime      atorvastatin 80 milliGRAM(s) Oral at bedtime  levothyroxine 75 MICROGram(s) Oral daily    sodium chloride 2 Gram(s) Oral every 6 hours      chlorhexidine 0.12% Liquid 15 milliLiter(s) Oral Mucosa every 12 hours        Mode: standby      ICU Vital Signs Last 24 Hrs  T(C): 37.3 (15 May 2021 00:00), Max: 37.7 (14 May 2021 12:00)  T(F): 99.2 (15 May 2021 00:00), Max: 99.8 (14 May 2021 12:00)  HR: 95 (15 May 2021 00:00) (64 - 98)  BP: 120/48 (15 May 2021 00:00) (120/48 - 163/81)  BP(mean): 65 (15 May 2021 00:00) (65 - 99)  ABP: --  ABP(mean): --  RR: 27 (15 May 2021 00:00) (17 - 43)  SpO2: 99% (15 May 2021 00:00) (97% - 100%)          I&O's Detail    13 May 2021 07:01  -  14 May 2021 07:00  --------------------------------------------------------  IN:    IV PiggyBack: 62.5 mL    Jevity 1.2: 405 mL  Total IN: 467.5 mL    OUT:    Indwelling Catheter - Urethral (mL): 615 mL  Total OUT: 615 mL    Total NET: -147.5 mL      14 May 2021 07:01  -  15 May 2021 00:40  --------------------------------------------------------  IN:    Enteral Tube Flush: 60 mL    IV PiggyBack: 125 mL    Jevity 1.2: 120 mL  Total IN: 305 mL    OUT:    Indwelling Catheter - Urethral (mL): 1000 mL  Total OUT: 1000 mL    Total NET: -695 mL            LABS:                        10.1   16.18 )-----------( 359      ( 14 May 2021 21:51 )             34.0     05-14    145  |  111<H>  |  22  ----------------------------<  133<H>  4.3   |  23  |  0.55    Ca    9.1      14 May 2021 21:51  Phos  3.8     05-14  Mg     2.1     05-14            Physical Examination:    General: No acute distress.      HEENT: occasionally opening eyes, MMM    PULM: trach collar, no significant sputum    CVS: Regular rate     ABD: Soft, nondistended, surgical dressings in place    EXT: minimal edema, nontender    SKIN: Warm and well perfused, no rashes noted. SICU Progress Note  __________________    Patient is a 81y old  Female who presents with a chief complaint of IPH (14 May 2021 15:50)      BRIEF HOSPITAL COURSE:   81y female with PMHx of CAD w/ stents on ASA/Plavix, HLD, hypothyroid, anemia s/p trip and fall. Per EMS reported trip and fall witnessed by bystanders w/ LOC. Found to have right parietal intraparenchymal hemorrhage. Initially GCS15 but intubated on HD1 due to deteriorating mental status and bradycardia. Per NSG, no operative intervention. MRI w/ evidence of subfalcine and uncal herniation. In family discussions, decision made for trach/PEG. Underwent trach on 5/12 but PEG could not be completed due to anatomy constraints. Underwent open J tube placement on 5/14, since doing well.    Events last 24 hours:   - underwent open J tube placement   - overnight with Afib with RVR, started on metoprolol  - difficult to obtain pulse ox anywhere overnight, no neuro reflexes able to be obtained either  - then with difficulty getting BP    Medications:      enoxaparin Injectable 40 milliGRAM(s) SubCutaneous daily    pantoprazole   Suspension 40 milliGRAM(s) Oral daily  senna Syrup 15 milliLiter(s) Oral at bedtime      atorvastatin 80 milliGRAM(s) Oral at bedtime  levothyroxine 75 MICROGram(s) Oral daily    sodium chloride 2 Gram(s) Oral every 6 hours      chlorhexidine 0.12% Liquid 15 milliLiter(s) Oral Mucosa every 12 hours        Mode: standby      ICU Vital Signs Last 24 Hrs  T(C): 37.3 (15 May 2021 00:00), Max: 37.7 (14 May 2021 12:00)  T(F): 99.2 (15 May 2021 00:00), Max: 99.8 (14 May 2021 12:00)  HR: 95 (15 May 2021 00:00) (64 - 98)  BP: 120/48 (15 May 2021 00:00) (120/48 - 163/81)  BP(mean): 65 (15 May 2021 00:00) (65 - 99)  ABP: --  ABP(mean): --  RR: 27 (15 May 2021 00:00) (17 - 43)  SpO2: 99% (15 May 2021 00:00) (97% - 100%)          I&O's Detail    13 May 2021 07:01  -  14 May 2021 07:00  --------------------------------------------------------  IN:    IV PiggyBack: 62.5 mL    Jevity 1.2: 405 mL  Total IN: 467.5 mL    OUT:    Indwelling Catheter - Urethral (mL): 615 mL  Total OUT: 615 mL    Total NET: -147.5 mL      14 May 2021 07:01  -  15 May 2021 00:40  --------------------------------------------------------  IN:    Enteral Tube Flush: 60 mL    IV PiggyBack: 125 mL    Jevity 1.2: 120 mL  Total IN: 305 mL    OUT:    Indwelling Catheter - Urethral (mL): 1000 mL  Total OUT: 1000 mL    Total NET: -695 mL            LABS:                        10.1   16.18 )-----------( 359      ( 14 May 2021 21:51 )             34.0     05-14    145  |  111<H>  |  22  ----------------------------<  133<H>  4.3   |  23  |  0.55    Ca    9.1      14 May 2021 21:51  Phos  3.8     05-14  Mg     2.1     05-14            Physical Examination:    General: No acute distress.      HEENT: occasionally opening eyes, MMM    PULM: trach collar, no significant sputum    CVS: Regular rate     ABD: Soft, nondistended, surgical dressings in place    EXT: minimal edema, nontender    SKIN: Warm and well perfused, no rashes noted.

## 2021-05-15 NOTE — CHART NOTE - NSCHARTNOTEFT_GEN_A_CORE
Overnight, nurse unable to obtain pulse oximetry via finger, ear, or nelcore  Stat ABG done, PO2 285 with pt on vent A/C with Fio2 100%  Due to this change, family was contacted to discuss pt worsening condition  Soon after at change of shift, pt loss pulses  ACLS protocol was started  Family was called (daughter Cathie, primary care giver) was told of situation, daughter said to stop all intervention   DNR was filled out Overnight, nurse unable to obtain pulse oximetry via finger, ear, or nelcore  Stat ABG done, PO2 285 with pt on vent A/C with Fio2 100%  Due to this change, family was contacted to discuss pt worsening condition  Soon after at change of shift, pt loss pulses  ACLS protocol was started  Family was called (daughter Cathie, primary care giver) was told of situation, daughter said to stop all intervention   DNR was filled out    Called to bedside for patient loss of pulse and blood pressure  Pt evaluated, no heart sounds or breaths sounds heard   Pupils dilated, nonreactive  Time of death 7:20am

## 2021-05-15 NOTE — PROGRESS NOTE ADULT - PROBLEM SELECTOR PROBLEM 5
CAD (coronary artery disease)
Hypothyroidism
CAD (coronary artery disease)
Hypothyroidism

## 2021-05-15 NOTE — PROVIDER CONTACT NOTE (OTHER) - ACTION/TREATMENT ORDERED:
Provider back on ventilator, ABG and labs sent. Will continue to monitor patient.
Providers called health care proxy for goals of care, family declines vasopressors at this time. Will continue to monitor patient.
After 1 round of compressions and 1 round of epi, patient's HR back to 140s then down to 50-60s. SICU team at bedside and aware, provider called family, patient now DNR.
Provider aware, no new orders. Will continue to monitor patient.

## 2021-05-15 NOTE — PROVIDER CONTACT NOTE (OTHER) - REASON
Unable to read pt's BP, electronic or manual
Patient's pupils fixed, no response to noxious stimuli
Patient's in asystole
Unable to read Pt's spO2 on any extremity/nellcor

## 2021-05-15 NOTE — PROGRESS NOTE ADULT - PROBLEM SELECTOR PLAN 1
Continue supportive care  Follow up with family regarding Trach and PEG
Continue neurologic checks  Plan for placement after PEG
Stable exam  Await J-tube  Transfer to floor/medicine service
Continue supportive care  Follow up with family regarding Trach and PEG
continue supportive care  neurological exam stable
Continue supportive care  Follow up with family regarding Trach and PEG
- q1h neuro checks  - c/w keppra for seizure ppx  - Na goal 145-155  - GOC discussion with family  - supportive care    d/w attending
Continue supportive care  Follow up with family regarding Trach and PEG
Continue supportive care  Continue neurologic checks  Wean to extubate
no surgical intervention   family aware
- Continue Q1 neurochecks   - MRI brain w/wo  - Continue to monitor Na and Plt  - Wean to extubate
- Continue Q1 neurochecks   - Repeat CTH yesterday stable   - MRI brain w/wo, ordered per SICU   - Continue to monitor Na and Plt  - Wean to extubate
Stable exam  Await J-tube  Transfer to floor/medicine service
Continue supportive care  Follow up with family regarding Trach and PEG

## 2021-05-15 NOTE — PROGRESS NOTE ADULT - PROBLEM SELECTOR PROBLEM 2
Acute respiratory failure with hypercapnia
Acute respiratory failure with hypercapnia
CAD (coronary artery disease)
Hypothyroidism
Acquired hypothyroidism
Hypothyroidism
Hypothyroidism
PUD (peptic ulcer disease)
PUD (peptic ulcer disease)
Acute respiratory failure with hypercapnia
Acquired hypothyroidism
Acute respiratory failure with hypercapnia
Acute respiratory failure with hypercapnia

## 2021-05-15 NOTE — PROGRESS NOTE ADULT - ASSESSMENT
82 YO Female with Right parietooccipital IPH    5/1: admitted with a right parietal IPH, bleed slightly worse on repeat imaging, patient intubated   5/2: Rpt CTH stable, patient intubated. exam stable.  5/3: bradycardic 40's, 50's o/n, hypotensive SBP 70's, phenylephrine started, CTH done appears stable, neurological exam stable, on keppra, On 1.5%  5/4: Stable exam. On NaCl 2gm Q8H, Keppra  5/5: Stable exam. Na 141 dc'd NaCl, Continue with Keppra. Family agreeable to Trach/Peg - general surgery consulted  5/6: Na 142, mri brain today, c/w keppra, trach and peg  5/7: Na 141, stable exam. Family deferring Trach and PEG for several days before they make a decision. On keppra  5/8: Na 141, stable exam. On keppra. GOC discussion held with family, may be amenable to Trach and PEG  5/9: Na 144, stable exam. On keppra. Await family decision regarding Trach and PEG  5/10: Na 146, neuro exam stable, on keppra, awaiting family decision for trach and peg  5/11: Na 146, neuro exam stable, on keppra, trach/peg tomorrow   5/12: Trach/peg today, exam stable.   5/13: S/P Trach, PEG deferred for IR placement today. Off vent, tolerating trach collar. On keppra, neuro exam stable  5/14: S/P trach, IR unable to place PEG, J-tube to be placed today. Exam stable, remains off vent. Keppra  5/15:  s/p J tube. On vent. Exam stable

## 2021-05-15 NOTE — CHART NOTE - NSCHARTNOTESELECT_GEN_ALL_CORE
Event Note
Nutrition Follow/Up Note/Nutrition Services
Off Service Note
Post Op Note
Post-op check/Event Note
Pre-op note
NSG/Event Note
Preop
interventional radiology

## 2021-05-15 NOTE — CHART NOTE - NSCHARTNOTEFT_GEN_A_CORE
STATUS POST:  Open Jejunostomy tube placement    SUBJECTIVE: Pt seen and examined after procedure. Pt tolerated procedure well. Subjective limited 2/2 intubated. Tube feeds running at 30cc/hr.  Donaldson with 1100cc.  Satting well on 40FiO2.    OBJECTIVE:  PHYSICAL EXAM:  Gen: Intubated, sedated  HEENT: NC/AT  RESP: Vented, b/l chest rise  ABDOMEN: Soft, ND. Incision site C/D/I, tube in place with tube feeds running    Vital Signs Last 24 Hrs  T(C): 37.2 (15 May 2021 04:00), Max: 37.7 (14 May 2021 12:00)  T(F): 99 (15 May 2021 04:00), Max: 99.8 (14 May 2021 12:00)  HR: 113 (15 May 2021 04:00) (64 - 118)  BP: 101/82 (15 May 2021 02:15) (101/82 - 151/72)  BP(mean): 87 (15 May 2021 02:15) (65 - 89)  RR: 16 (15 May 2021 04:00) (16 - 34)  SpO2: 99% (15 May 2021 00:00) (98% - 100%)      A/P: 81y Female s/p Open J tube placement  - Diet: NPO with tube feeds  - Wean to extubation as tolerated  - Labs  - Care per SICU    B team [id number] STATUS POST:  Open Jejunostomy tube placement    SUBJECTIVE: Pt seen and examined after procedure. Pt tolerated procedure well. Subjective limited 2/2 intubated. Tube feeds running at 30cc/hr.  Donaldson with 1100cc.  Satting well on 40FiO2.    OBJECTIVE:  PHYSICAL EXAM:  Gen: Intubated, sedated  HEENT: NC/AT  RESP: Vented, b/l chest rise  ABDOMEN: Soft, ND. Incision site C/D/I, tube in place with tube feeds running    Vital Signs Last 24 Hrs  T(C): 37.2 (15 May 2021 04:00), Max: 37.7 (14 May 2021 12:00)  T(F): 99 (15 May 2021 04:00), Max: 99.8 (14 May 2021 12:00)  HR: 113 (15 May 2021 04:00) (64 - 118)  BP: 101/82 (15 May 2021 02:15) (101/82 - 151/72)  BP(mean): 87 (15 May 2021 02:15) (65 - 89)  RR: 16 (15 May 2021 04:00) (16 - 34)  SpO2: 99% (15 May 2021 00:00) (98% - 100%)      A/P: 81y Female s/p Open J tube placement  - Diet: NPO with tube feeds  - Wean to extubation as tolerated  - Labs  - Care per SICU    B team [id number]    B TEAM ATTENDING ADDENDUM  I have reviewed the postop interval events.  I was informed by the SICU team at 7am of the patient's death.  I will call the family.    The Acute Care Surgery (B Team) Attending Group Practice:  Dr. Raj Stanley, Dr. Poncho Ng, Dr. Jean Marie Johnson, Dr. Bj Viveros    urgent issues - spectra [phone]  nonurgent issues - [phone]  patient appointments or afterhours - [phone]

## 2021-05-15 NOTE — PROGRESS NOTE ADULT - PROBLEM SELECTOR PROBLEM 3
Intracerebral hemorrhage
Hypercholesterolemia
PUD (peptic ulcer disease)
Hypercholesterolemia
Hypothyroidism
Hypercholesterolemia
Intracerebral hemorrhage
Hypercholesterolemia
Hypercholesterolemia
Debility

## 2021-05-15 NOTE — PROGRESS NOTE ADULT - PROBLEM SELECTOR PLAN 4
ASA/Plavix on hold
- Holding ASA/Plavix
- Holding ASA/Plavix
Follow up with cardiology  ASA/Plavix on hold
continue statins
ASA/Plavix on hold
continue statins
Continue synthroid
see [facility]   family to decide on next steps  please call [phone] over weekend with any questions
ASA/Plavix on hold
continue synthroid
Continue synthroid

## 2021-05-15 NOTE — PROGRESS NOTE ADULT - PROVIDER SPECIALTY LIST ADULT
SICU
Surgery
SICU
Surgery
Neurosurgery
SICU
SICU
Anesthesia
Neurology
Neurosurgery
SICU
Neurosurgery
Palliative Care
Neurosurgery

## 2021-05-15 NOTE — PROGRESS NOTE ADULT - THIS PATIENT HAS THE FOLLOWING CONDITION(S)/DIAGNOSES ON THIS ADMISSION:
None
None
Encephalopathy/Cerebral Edema/Brain Compression / Herniation/Acute Respiratory Failure
None
Encephalopathy/Functional Quadriplegia/Cerebral Edema/Brain Compression / Herniation
Functional Quadriplegia
None
None
Functional Quadriplegia
None

## 2021-05-15 NOTE — PROVIDER CONTACT NOTE (OTHER) - SITUATION
Patient returned from OR, pupils fixed and equal 4mm, no response to noxious stimuli
Patient's in asystole
Pt's BP not reading on any extremity, even with manual BP.
Unable to read Pt's spO2 on any extremity/nellcor
left normal/right normal

## 2021-05-15 NOTE — PROGRESS NOTE ADULT - PROBLEM SELECTOR PROBLEM 1
Intracerebral hemorrhage
Traumatic hemorrhage of right cerebrum without loss of consciousness, initial encounter
Traumatic hemorrhage of right cerebrum without loss of consciousness, initial encounter
Intracerebral hemorrhage
Traumatic hemorrhage of right cerebrum without loss of consciousness, initial encounter
Intracerebral hemorrhage
Nontraumatic intracerebral hemorrhage of cerebellum, unspecified laterality

## 2021-05-15 NOTE — PROGRESS NOTE ADULT - PROBLEM SELECTOR PLAN 5
Follow up with cardiology  ASA/Plavix on hold
continue synthroid
Continue to hold Plavix/ASA
continue synthroid
ASA/Plavix on hold
Continue to hold Plavix/ASA

## 2021-05-15 NOTE — DISCHARGE NOTE FOR THE EXPIRED PATIENT - HOSPITAL COURSE
81y Female CAD w/ stents on ASA/Plavix, HLD, hypothyroid, anemia s/p trip and fall outside the bank today/trip on curb. Per EMS reported trip and fall witnessed by bystanders w/ LOC. Per EMS, AAOx3 at that time of initial eval. Now AAOx2 and unable to recall how she fell. Now c/o nausea and frontal head pain. Pt admitted :  with a right parietal IPH, bleed worse on repeat imaging with sdh, patient intubated.  Rpt CTH stable and exam stable .  5/3: bradycardic 40's, 50's o/n, hypotensive SBP 70's, phenylephrine started, CTH done appears stable, neurological exam stable, on keppra, On 1.5% saline. Sodium monitored unil -->141. PAlliative care and ethics consulted and pt's family agreeable to Trach/Peg - general surgery consulted but family deferred decision for a few days. : pt had Trach placed without complication and PEG deferred for IR placement but unable to place it due to no window (hx of partial gastrectomy) today. Pt transitioned to  trach collar as tolerated. On keppra, neuro exam stable.   pt underwent open jejunostomy tube placement and tolerated procedure well without complication and jtube feeds initiated.  Pt noted to have pupils fixed and equal 4mm, no response to noxious stimuli/ change in exam overnight. Pt required return to ventilator from trach collar, oxygen saturations were not picking up but ABG PaO2 was high.  BP not reading and family contacted for concern for hypotension and declined vasopressors  Pt then unable to read pt's spO2, patient hypotensive to 64/30 patient coded, 1 round of compressions and 1 round of epi given.  After 1 round of compressions and 1 round of epi, patient's HR back to 140s then down to 50-60s. SICU team at bedside and aware, provider called family, patient now DNR.  Pt  at 7:20 AM, family and neurosurgical team aware.

## 2021-05-15 NOTE — PROGRESS NOTE ADULT - SUBJECTIVE AND OBJECTIVE BOX
Patient seen and examined at bedside.    --Anticoagulation--    T(C): 37.2 (05-15-21 @ 04:00), Max: 37.7 (05-14-21 @ 12:00)  HR: 92 (05-15-21 @ 05:00) (64 - 118)  BP: 93/41 (05-15-21 @ 05:00) (93/41 - 151/72)  RR: 16 (05-15-21 @ 05:00) (16 - 34)  SpO2: 99% (05-15-21 @ 00:00) (98% - 100%)  Wt(kg): --    Exam:  Responds to noxious stimuli  Not following commands or opening eyes  PERRL  RUE antigravity  Withdraws RLE  LUE : nothing LLE: wds

## 2021-05-15 NOTE — PROVIDER CONTACT NOTE (OTHER) - BACKGROUND
s/p fall on 5/1/21 (+) intraparenchymal hemorrhage

## 2021-05-15 NOTE — PROVIDER CONTACT NOTE (OTHER) - ASSESSMENT
Patient desatting to 80s on trach collar 98%, patient placed back on vent AC fiO2 100% as by provider.
Pt's BP unable to read electronic or manual
Pt's neurocheck - pupils 4mm equal and fixed, no response to noxious or repeated stimuli
unable to read pt's spO2, patient hypotensive to 64/30

## 2021-11-02 NOTE — ED ADULT TRIAGE NOTE - BP NONINVASIVE SYSTOLIC (MM HG)
Detail Level: Simple Additional Notes: Patient consent was obtained to proceed with the visit and recommended plan of care after discussion of all risks and benefits, including the risks of COVID-19 exposure. 81

## 2022-01-01 NOTE — ED PROVIDER NOTE - DATE/TIME 1
Pediatric Cardiac Critical Care Transfer Summary       Patient ID:  Bg Bud Carrillo     MRN: [id number] Age: 2 week old YOB: 2024     Admit Date: 2022    Transfer Date: 2022     Transfer Diagnoses:     Truncus arteriosus, Carrasco' type I                           Past Medical History:   Diagnosis Date   • Truncus arteriosus, Carrasco' type I       Past Surgical History:   Procedure Laterality Date   • Cardiac surgery  2022    VSD closure, partial ASD closure, and 10 mm RV-PA conduit        Hospital Course:  Columba is a 2 week old born at 38 3/7 WGA to a 28 year old  mother via . IVF pregnancy. APGARS 9/9. BW 2905g Pt was born at [facility] and per mother was breastfeeding 20-40 minutes without difficulty. ON DOL 2 patient noted to be tachypneic/grunting on exam and murmur noted. Per mother pre- testing was normal. Echocardiogram was completed that was concerning for possible AP window vs Truncus arteriosus and patient was transferred to [facility]. Repeat echocardiogram confirmed diagnosis of truncus arteriosus type I.     She showed signs of pulmonary overcirculation with saturations >97% and respiratory rate 80-100s. Cardiac output adequate, lactates <2, urine output good. Given signs of worsening pulmonary overcirculation, decision to electively intubate on . Initial planned surgery was delayed due to concern for increased respiratory secretions and tracheitis and she received a course of cefepime starting on 2/3.     Patient is now s/p VSD closure, RV-PA 10 mm conduit and partial ASD closure.      Key events:  1. 22:  diagnosis of truncus arteriosus type 1  2. 22:  3. 22: Surgery postponed due to increased respiratory secretions and gram stain with gnb. Cefepime started.  4. 22: VSD closure, partial ASD closure, and 10 mm RV-PA conduit  5. 22: Extubation to high  flow nasal cannula (HFNC)  6. 02/13/22: Sildenafil started due to clinical concern for pulmonary hypertension in the setting of desaturations  7. 02/14/22: Transferred to [facility] [place]/Telemetry floor from the CICU       CV: No cardiac mediations needed in the preoperative period. Post operatively requiring epinephrine and milrinone through extubation, both of which were weaned to off without issue.    Resp:  High saturations  on 21% FiO2 prior to surgery, intubated on 2/1 for pulmonary overcirculation. Increase secretion burden on 2/3 with respiratory culture sent and concerning for gram negative bacilil on gram stain cultures with many PMN but no speciated culture. Intubated post operatively with nitric oxide management and was able to be extubated on POD 2 without issue. She had desaturations with weaning from oxygen and was started on sildenafil on 2/13. She continues to tolerate oxygenation wean since the initiation of sildenafil.   FEN/GI: Trophic feeds 3 ml/hr maintained until post op with TPN/smoflipids for nutritional management. Continued on TPN until she was started on full enteral feedings follow extubation. She was started on diuresis post-operatively and weaned to enteral dosing following extubation.    Heme: She is maintained on heparin thromboprophylaxis dosing through her central line while it remains in place.   ID: Received Vit K, Erythromycin, Hep B at [facility] prior to transfer to [facility]. She received a course of Cefepime prior to surgery and received post-operative prophylaxis following surgery.   Pain & Sedation:  While intubated she was on fentanyl and Precedex infusions for sedation management and mainteained in post op period until transitioned to PO management on 2/13. She is currently undergoing a scheduled clonidine wean.   Social:  Family present at bedside.                  Vital Signs at time of transfer:  Vitals:    02/15/22 0850   BP:    Pulse: 150    Resp: 52   Temp:      Wt Readings from Last 1 Encounters:   02/15/22 3 kg (6 lb 9.8 oz) (6 %, Z= -1.58)*     * Growth percentiles are based on WHO (Girls, 0-2 years) data.         Most recent labs:   Lab Results   Component Value Date/Time    SODIUM 134 (L) 2022 02:39 AM    POTASSIUM 4.0 2022 02:39 AM    CHLORIDE 103 2022 02:39 AM    CO2 23 2022 02:39 AM    BUN 14 2022 02:39 AM    CREATININE 0.31 2022 02:39 AM    GLUCOSE 99 2022 02:39 AM    WBC 12.8 2022 02:39 AM    HGB 13.3 2022 02:39 AM    HCT 40.0 2022 02:39 AM     2022 02:39 AM    PT 11.1 2022 05:47 AM    INR 1.0 2022 05:47 AM    PTT 35 2022 05:47 AM       Last echocardiogram 2/10:  RV and LV systolic function were normal.  There is no residual VSD.  There is no RVOT or conduit pulmonary valve obstruction.  Conduit and branch pulmonary arteries are free of obstruction.  There is mild aortic valve insufficiency.  No clots or effusions were present.  There was no evidence of pulmonary hypertension.  There is a very small PFO with bidirectional shunting.    Last EKG :  2022     Component   Ventricular Rate EKG/Min (BPM)   177    Atrial Rate (BPM)   177    MS-Interval (MSEC)   108    QRS-Interval (MSEC)   52    QT-Interval (MSEC)   264    QTc   453    P Axis (Degrees)   65    R Axis (Degrees)   104    T Axis (Degrees)   18    REPORT TEXT    Pediatric ECG analysis   Normal sinus rhythm   Right atrial enlargement   Nonspecific ST abnormality   When compared with ECG of   2022 22:03,patient no longer meets voltage criteria for left ventricular hypertrophy   Confirmed by ASHLIE Lemus MD ([id number]) on 2022 8:58:17 AM   Confirmed by ELLEN SWAN MD ([id number]) on 2022 2:53:01 PM          Disposition: Transition to cardiology service    Transfer Medication List:  Current Facility-Administered Medications   Medication Dose Route Frequency Provider Last Rate  Last Admin   • cloNIDine (CATAPRES) 20 MCG/ML (compounded) oral suspension 10 mcg  10 mcg Oral Q6H Michelle Jacobo CNP   10 mcg at 02/15/22 0855   • pediatric multivitamin with iron (POLY-VI-SOL WITH IRON) oral solution 1 mL  1 mL Oral Daily Michelle Jacobo CNP   1 mL at 02/15/22 0943   • cholecalciferol (VITAMIN D) 10 mcg (400 units)/mL oral liquid 10 mcg  10 mcg Oral Daily Michelle Jacobo CNP   10 mcg at 02/15/22 0944   • sildenafil citrate (REVATIO) 10 MG/ML oral suspension 3 mg  1 mg/kg (Dosing Weight) Oral 3 times per day Keila Huang,        • glycerin pediatric suppository 0.25 suppository  0.25 suppository Rectal Daily PRN Michelle Jacobo CNP       • furosemide ORAL (LASIX) oral solution 3 mg  1 mg/kg (Dosing Weight) Oral Q12H Michelle Jacobo CNP   3 mg at 02/14/22 2322   • HYDROcodone-acetaminophen 7.5-325 MG/15ML solution 0.3 mg  0.1 mg/kg (Dosing Weight) Oral Q4H PRN Michelle Jacobo CNP   0.3 mg at 02/14/22 0815   • acetaminophen (TYLENOL) 160 MG/5ML suspension 32 mg  32 mg Oral Q4H PRN Selene Dumont CNP       • heparin (porcine) 10 UNIT/ML lock flush 20 Units  20 Units Intravenous 2 times per day Michelle Jacobo CNP   20 Units at 02/15/22 1032   • heparin (porcine) 10 UNIT/ML lock flush 20 Units  2 mL Intravenous PRN Michelle Jacobo CNP       • heparin (porcine) 2,500 units/25 mL in dextrose 5 % infusion syringe  10 Units/kg/hr (Dosing Weight) Intravenous Continuous Kamini Kenney MD 0.3 mL/hr at 02/14/22 2100 10 Units/kg/hr at 02/14/22 2100          Diet:   Dietary Orders (From admission, onward)             Start     Ordered    02/14/22 0843  Infant/Pediatric Feedings Pediatric Formula/Breast Milk Only; Natural Human Milk; 20 cristela/oz; Q3h; NG; 60  DIET EFFECTIVE NOW        Question Answer Comment   Diet Modifiers Pediatric Formula/Breast Milk Only    Breast Feeding Natural Human Milk    Breast Milk Calories 20 cristela/oz    Frequency Q3h    Route NG    Volume in mL 60        02/14/22 0842                Speech  Recommendations 2/14:  Diet: limited oral feedings   - 10 ml oral trials prior to ng tube feeding  Pace every 5 sucks  Limit feedings to 10 minutes  Use Dr. Smith bottle with preemie nipple  Aspiration risk: minimal  Factors impacting feeding: surgical history  Infant Feeding Plan:    - establish NNS prior to feeding and PO only when infant is showing sign of engagement and interest   - Mode: Dr. Brown's bottle system and preemie flow   - Positioning: sidelying right side down   - Pacing: every 4-5 sucks and pace per infant cues  Limit feeding to 10 mls for trial  Prior to ng tube feedings if awake and interested. Limit feedings to 10 minutes.        Patient Instructions:   1. Discharge instructions will be provided to the parents regarding postoperative cares, sternotomy precautions (for a total of 6 weeks total postop), and medical regimen prior to discharge (please contact discharge coordinator RN prior to discharge).  2. Physical Therapy, Occupational Therapy, and Speech Therapy evaluated patient while in the PCICU.  A developmental plan of care was shared with the family. Prone positioning strongly encouraged 2 weeks after sternotomy at the latest. We recommend to continue consultation with developmental therapies while patient remains inpatient.  3. Hearing screen completed at outside hospital prior to surgery, recommend repeat prior to discharge.  4. No submerged bathing until seen in the cardiovascular surgery follow-up appointment and cleared.   5. Home health care services ordered  6. According to CDC guidelines please do not administer live virus vaccines for 7 months after the last blood transfusion. Other immunizations please delay for one month after discharge (except flu vaccine & synagis which can be given as scheduled).    7. Immunizations: Hepatitis B given at outside hospital prior to admission.  8. SBE prophylaxis for indicated procedures until plan of care changed by personal  cardiologist.  9. Recommend PMD to assess for developmental therapy needs in the next 3-4 months.    Follow Up Care:  No future appointments.      Signed:  Michelle Jacobo CNP  2022  11:08 AM      11-Jun-2020 05:32

## 2022-06-27 NOTE — ED PROVIDER NOTE - HEME/LYMPH NEGATIVE STATEMENT, MLM
no anemia, no easy bruising, no jaundice, no swollen lymph nodes. Melolabial Interpolation Flap Text: A decision was made to reconstruct the defect utilizing an interpolation axial flap and a staged reconstruction.  A telfa template was made of the defect.  This telfa template was then used to outline the melolabial interpolation flap.  The donor area for the pedicle flap was then injected with anesthesia.  The flap was excised through the skin and subcutaneous tissue down to the layer of the underlying musculature.  The pedicle flap was carefully excised within this deep plane to maintain its blood supply.  The edges of the donor site were undermined.   The donor site was closed in a primary fashion.  The pedicle was then rotated into position and sutured.  Once the tube was sutured into place, adequate blood supply was confirmed with blanching and refill.  The pedicle was then wrapped with xeroform gauze and dressed appropriately with a telfa and gauze bandage to ensure continued blood supply and protect the attached pedicle.

## 2022-07-08 NOTE — ED ADULT NURSE NOTE - NS ED NOTE  TALK SOMEONE YN
Consults    History Of Present Illness  Verenice is a 72 year old female has a history of a recent COVID-19.  She is presenting with increasing bilateral leg edema and an element of shortness of breath there is no fever no chills no cough no sputum production also reported to have wheezing her auscultation today is absolutely clear.    Carries diagnosis COPD severity which is unknown pulmonary function testing not available.  At 1 point she was on oxygen and that was stopped because of Medicare guidelines according to her.    CT scan of the chest done this admission shows the following:  FINDINGS:     Unremarkable mediastinal structures. No mediastinal adenopathy identified.  No mediastinal mass identified. Unremarkable hilar structures.  There is  stable cardiomegaly.     No evidence of an aortic aneurysm.         Unremarkable pulmonary arteries. No pulmonary emboli identified.      No pulmonary consolidation identified.  Mosaic appearance to the lungs  which may be secondary to some air trapping.     No pleural effusions identified.  No evidence of a pericardial effusion.      Images of the upper abdomen are unremarkable.     IMPRESSION:     No evidence of pulmonary emboli.      Mosaic appearance to the lungs.  Past Medical History  Past Medical History:   Diagnosis Date   • Congestive cardiac failure (CMS/HCC)    • Diabetes mellitus (CMS/HCC)    • Fracture     Collarbone   • Malignant neoplasm (CMS/HCC)         Surgical History  Past Surgical History:   Procedure Laterality Date   • Appendectomy     • Back surgery     • Fracture surgery     • Hysterectomy     • Kidney stone surgery     • Removal gallbladder          Social History  Social History     Tobacco Use   • Smoking status: Former Smoker     Packs/day: 2.00     Years: 30.00     Pack years: 60.00   • Smokeless tobacco: Never Used   • Tobacco comment: quit 1990   Vaping Use   • Vaping Use: never used   Substance Use Topics   • Alcohol use: Never   •  Drug use: Never       Family History  Family History   Problem Relation Age of Onset   • Cancer Mother    • Heart disease Father         Allergies  ALLERGIES:  Latex    PTA Medications  Current Facility-Administered Medications   Medication Dose Route Frequency Provider Last Rate Last Admin   • HYDROcodone-acetaminophen (NORCO)  MG per tablet 1 tablet  1 tablet Oral Q6H PRN Glenda Chandler CNP   1 tablet at 07/08/22 0611   • ipratropium-albuterol (DUONEB) 0.5-2.5 (3) MG/3ML nebulizer solution 3 mL  3 mL Nebulization 4x Daily Resp Glenda Chandler CNP   3 mL at 07/08/22 1054   • dextrose 50 % injection 25 g  25 g Intravenous PRN Glenda Chandler, CNP       • dextrose 50 % injection 12.5 g  12.5 g Intravenous PRN Glenda Chandler, CNP       • glucagon (GLUCAGEN) injection 1 mg  1 mg Intramuscular PRN Glenda Chandler, CNP       • dextrose (GLUTOSE) 40 % gel 15 g  15 g Oral PRN Glenda Chandler, CNP       • dextrose (GLUTOSE) 40 % gel 30 g  30 g Oral PRN Glenda Chandler, CNP       • insulin lispro (ADMELOG,HumaLOG) - Correction Dose   Subcutaneous Nightly Glenda Chandler CNP       • insulin lispro (ADMELOG,HumaLOG) - Correction Dose   Subcutaneous TID WC Uche Bravo MD   10 Units at 07/08/22 0924   • furosemide (LASIX) tablet 40 mg  40 mg Oral Daily Lemuel Peter DO   40 mg at 07/08/22 1132   • metoPROLOL succinate (TOPROL-XL) ER tablet 25 mg  25 mg Oral Nightly Lemuel Peter DO       • losartan (COZAAR) tablet 50 mg  50 mg Oral Daily Lemuel Peter DO   50 mg at 07/08/22 1132   • ipratropium-albuterol (DUONEB) 0.5-2.5 (3) MG/3ML nebulizer solution 3 mL  3 mL Nebulization Once Caro Cronin MD       • ipratropium-albuterol (DUONEB) 0.5-2.5 (3) MG/3ML nebulizer solution 3 mL  3 mL Nebulization Once Caro Cronin MD           Current Medications  Current Facility-Administered Medications   Medication   • HYDROcodone-acetaminophen (NORCO)  MG per tablet 1 tablet   •  ipratropium-albuterol (DUONEB) 0.5-2.5 (3) MG/3ML nebulizer solution 3 mL   • dextrose 50 % injection 25 g   • dextrose 50 % injection 12.5 g   • glucagon (GLUCAGEN) injection 1 mg   • dextrose (GLUTOSE) 40 % gel 15 g   • dextrose (GLUTOSE) 40 % gel 30 g   • insulin lispro (ADMELOG,HumaLOG) - Correction Dose   • insulin lispro (ADMELOG,HumaLOG) - Correction Dose   • furosemide (LASIX) tablet 40 mg   • metoPROLOL succinate (TOPROL-XL) ER tablet 25 mg   • losartan (COZAAR) tablet 50 mg   • ipratropium-albuterol (DUONEB) 0.5-2.5 (3) MG/3ML nebulizer solution 3 mL   • ipratropium-albuterol (DUONEB) 0.5-2.5 (3) MG/3ML nebulizer solution 3 mL       Review of Systems  Review of Systems      Physical Exam  Physical Exam  Pleasant lady overweight poor dentition no JVD bruits or lymph nodes clear regular soft no edema at this point DVT ischemia or cellulitis she is laying flat in bed.   I/O's    Intake/Output Summary (Last 24 hours) at 7/8/2022 1202  Last data filed at 7/8/2022 1100  Gross per 24 hour   Intake 480 ml   Output 800 ml   Net -320 ml       Last Recorded Vitals  Blood pressure (!) 145/61, pulse 63, temperature 98.1 °F (36.7 °C), temperature source Oral, resp. rate 14, height 4' 11\" (1.499 m), weight 92.3 kg (203 lb 7.8 oz), SpO2 95 %.    Relevant Results  No valid procedures specified.  CTA CHEST PULMONARY EMBOLISM W CONTRAST   Final Result      No evidence of pulmonary emboli.       Mosaic appearance to the lungs.      Electronically Signed by: SAHARA BHAGAT M.D.    Signed on: 7/8/2022 1:41 AM          US VASC LOWER EXTREMITY VENOUS DUPLEX LEFT   Final Result      Negative LEFT lower extremity venous Doppler.      Electronically Signed by: SAHARA BHAGAT M.D.    Signed on: 7/7/2022 11:49 PM          XR CHEST PA OR AP 1 VIEW   Final Result   No acute abnormality is noted.      Electronically Signed by: DAGO CANDELARIO M.D.    Signed on: 7/7/2022 6:03 PM            Recent Results (from the past 24 hour(s))    Electrocardiogram 12-Lead    Collection Time: 07/07/22  3:00 PM   Result Value Ref Range    Ventricular Rate EKG/Min (BPM) 69     Atrial Rate (BPM) 69     MS-Interval (MSEC) 190     QRS-Interval (MSEC) 88     QT-Interval (MSEC) 382     QTc 409     P Axis (Degrees) 77     R Axis (Degrees) 59     T Axis (Degrees) 73     REPORT TEXT       Sinus rhythm  with  premature supraventricular complexes  Low voltage QRS  Septal infarct  (cited on or before  09-OCT-2019)  Abnormal ECG  When compared with ECG of  17-JUN-2022 15:24,  Questionable change in initial forces of  Anterior leads  Nonspecific T wave abnormality no longer evident in  Inferior leads  QT has shortened     Comprehensive Metabolic Panel    Collection Time: 07/07/22  4:51 PM   Result Value Ref Range    Fasting Status      Sodium 140 135 - 145 mmol/L    Potassium 4.0 3.4 - 5.1 mmol/L    Chloride 105 97 - 110 mmol/L    Carbon Dioxide 32 21 - 32 mmol/L    Anion Gap 7 7 - 19 mmol/L    Glucose 198 (H) 70 - 99 mg/dL    BUN 14 6 - 20 mg/dL    Creatinine 0.54 0.51 - 0.95 mg/dL    Glomerular Filtration Rate >90 >=60    BUN/ Creatinine Ratio 26 (H) 7 - 25    Calcium 9.2 8.4 - 10.2 mg/dL    Bilirubin, Total 0.3 0.2 - 1.0 mg/dL    GOT/AST 32 <=37 Units/L    GPT/ALT 31 <64 Units/L    Alkaline Phosphatase 78 45 - 117 Units/L    Albumin 3.2 (L) 3.6 - 5.1 g/dL    Protein, Total 6.7 6.4 - 8.2 g/dL    Globulin 3.5 2.0 - 4.0 g/dL    A/G Ratio 0.9 (L) 1.0 - 2.4   NT proBNP    Collection Time: 07/07/22  4:51 PM   Result Value Ref Range    NT-proBNP 263 (H) <=125 pg/mL   Rapid SARS-CoV-2 by PCR    Collection Time: 07/07/22  4:51 PM    Specimen: Nasal, Mid-turbinate; Swab   Result Value Ref Range    Rapid SARS-COV-2 by PCR Detected (A) Not Detected / Detected / Presumptive Positive / Inhibitors present    Procedural Comment      SARS-CoV-2 Ct Value 40.4    CBC with Automated Differential (performable only)    Collection Time: 07/07/22  4:51 PM   Result Value Ref Range    WBC 6.1  4.2 - 11.0 K/mcL    RBC 4.63 4.00 - 5.20 mil/mcL    HGB 14.1 12.0 - 15.5 g/dL    HCT 42.9 36.0 - 46.5 %    MCV 92.7 78.0 - 100.0 fl    MCH 30.5 26.0 - 34.0 pg    MCHC 32.9 32.0 - 36.5 g/dL    RDW-CV 13.2 11.0 - 15.0 %    RDW-SD 44.5 39.0 - 50.0 fL     (L) 140 - 450 K/mcL    NRBC 0 <=0 /100 WBC    Neutrophil, Percent 62 %    Lymphocytes, Percent 28 %    Mono, Percent 9 %    Eosinophils, Percent 1 %    Basophils, Percent 0 %    Immature Granulocytes 0 %    Absolute Neutrophils 3.8 1.8 - 7.7 K/mcL    Absolute Lymphocytes 1.7 1.0 - 4.0 K/mcL    Absolute Monocytes 0.5 0.3 - 0.9 K/mcL    Absolute Eosinophils  0.0 0.0 - 0.5 K/mcL    Absolute Basophils 0.0 0.0 - 0.3 K/mcL    Absolute Immmature Granulocytes 0.0 0.0 - 0.2 K/mcL   Prothrombin Time    Collection Time: 07/07/22  4:51 PM   Result Value Ref Range    Prothrombin Time 11.3 9.7 - 11.8 sec    INR 1.0     Partial Thromboplastin Time    Collection Time: 07/07/22  4:51 PM   Result Value Ref Range    PTT 27 22 - 30 sec   TROPONIN I, HIGH SENSITIVITY    Collection Time: 07/07/22  4:51 PM   Result Value Ref Range    Troponin I, High Sensitivity 15 <52 ng/L   Magnesium    Collection Time: 07/07/22  4:51 PM   Result Value Ref Range    Magnesium 1.9 1.7 - 2.4 mg/dL   GLUCOSE, BEDSIDE - POINT OF CARE    Collection Time: 07/07/22 10:27 PM   Result Value Ref Range    GLUCOSE, BEDSIDE - POINT OF CARE 175 (H) 70 - 99 mg/dL   D Dimer, Quantitative    Collection Time: 07/07/22 10:48 PM   Result Value Ref Range    D Dimer, Quantitative 0.74 (H) <0.57 mg/L (FEU)   Comprehensive Metabolic Panel    Collection Time: 07/08/22  6:58 AM   Result Value Ref Range    Fasting Status      Sodium 137 135 - 145 mmol/L    Potassium 4.0 3.4 - 5.1 mmol/L    Chloride 103 97 - 110 mmol/L    Carbon Dioxide 25 21 - 32 mmol/L    Anion Gap 13 7 - 19 mmol/L    Glucose 434 (H) 70 - 99 mg/dL    BUN 15 6 - 20 mg/dL    Creatinine 0.57 0.51 - 0.95 mg/dL    Glomerular Filtration Rate >90 >=60    BUN/  Creatinine Ratio 26 (H) 7 - 25    Calcium 9.0 8.4 - 10.2 mg/dL    Bilirubin, Total 0.5 0.2 - 1.0 mg/dL    GOT/AST 26 <=37 Units/L    GPT/ALT 29 <64 Units/L    Alkaline Phosphatase 75 45 - 117 Units/L    Albumin 2.9 (L) 3.6 - 5.1 g/dL    Protein, Total 6.4 6.4 - 8.2 g/dL    Globulin 3.5 2.0 - 4.0 g/dL    A/G Ratio 0.8 (L) 1.0 - 2.4   CBC No Differential    Collection Time: 07/08/22  6:58 AM   Result Value Ref Range    WBC 3.8 (L) 4.2 - 11.0 K/mcL    RBC 4.75 4.00 - 5.20 mil/mcL    HGB 14.4 12.0 - 15.5 g/dL    HCT 43.5 36.0 - 46.5 %    MCV 91.6 78.0 - 100.0 fl    MCH 30.3 26.0 - 34.0 pg    MCHC 33.1 32.0 - 36.5 g/dL     (L) 140 - 450 K/mcL    RDW-CV 12.8 11.0 - 15.0 %    RDW-SD 43.1 39.0 - 50.0 fL    NRBC 0 <=0 /100 WBC   GLUCOSE, BEDSIDE - POINT OF CARE    Collection Time: 07/08/22  7:20 AM   Result Value Ref Range    GLUCOSE, BEDSIDE - POINT OF CARE 358 (H) 70 - 99 mg/dL         Encounter Date: 07/07/22   Electrocardiogram 12-Lead   Result Value    Ventricular Rate EKG/Min (BPM) 69    Atrial Rate (BPM) 69    DE-Interval (MSEC) 190    QRS-Interval (MSEC) 88    QT-Interval (MSEC) 382    QTc 409    P Axis (Degrees) 77    R Axis (Degrees) 59    T Axis (Degrees) 73    REPORT TEXT      Sinus rhythm  with  premature supraventricular complexes  Low voltage QRS  Septal infarct  (cited on or before  09-OCT-2019)  Abnormal ECG  When compared with ECG of  17-JUN-2022 15:24,  Questionable change in initial forces of  Anterior leads  Nonspecific T wave abnormality no longer evident in  Inferior leads  QT has shortened       === 01/29/20 ===    US ABDOMEN COMPLETE    - Narrative -  EXAM: US ABDOMEN COMPLETE    CLINICAL INDICATION: Right lower quadrant abdominal mass.  Abdominal pain.    COMPARISON: CT abdomen and pelvis 10/10/2019    FINDINGS:  Proximal aorta and inferior vena cava are patent.  No evidence of proximal abdominal aortic aneurysm.    Liver demonstrates diffusely increased echogenicity with reduced penetration  and diffusely heterogenous echotexture suggesting diffuse fatty infiltration.  Evaluation of the liver parenchyma is extremely limited due to poor acoustic windows.  The  gallbladder has been removed.  No intrahepatic biliary ductal dilation.  Common bile duct is prominent measuring 11 mm and may be related to postcholecystectomy changes.    Pancreas is obscured by shadowing from overlying bowel gas.    Spleen appears normal in echogenicity without evidence of mass.  See measures approximately 11.6 x 4.7 x 4.6 cm.    Both kidneys are normal in location and configuration. The left kidney measures approximately 10.5 cm in long axis. The right kidney measures approximately 10.5 cm in long axis. Both kidneys are normal in echotexture and echogenicity.    3 mm echogenic focus in the lower pole the right kidney with posterior acoustic shadowing suggesting small renal calculus.  No evidence of hydronephrosis or solid renal masses are seen.    The bladder is decompressed.    Patient describes right abdominal lump.  Scanning over the area of concern no obvious mass or fluid collection identified.    - Impression -  1.  Suggestion of diffuse fatty infiltration of the liver.  2.  Prior cholecystectomy.  Prominent common bile duct which may be related to postcholecystectomy changes.  No intrahepatic biliary ductal dilation.  3.  No evidence of solid renal mass or hydronephrosis.  4.  Nonobstructing right lower pole renal calculus.  5.  No obvious right abdominal mass or fluid collection.    Electronically Signed by: LAZARO DOMINGUEZ M.D.  Signed on: 1/29/2020 1:56 PM      === 11/14/18 ===    US ADVOCATE PROCEDURE    - Narrative -  Accession #  [id number]  EXAM: US KIDNEY EMMANUEL  DATE: 11/14/2018  CLINICAL INDICATION: Kidney stones  COMPARISON: 05/23/2018  FINDINGS: Real-time imaging is performed of the technologist. Representative images submitted for  interpretation.  The right kidney measures 11.2 x 4.7 x 4.8 cm. The left kidney  measures 11.8 x 5.1 x 5.4 cm. The  visualized renal cortex appears maintained both kidneys, some detail is obscured by bowel gas.  No hydronephrosis is demonstrated.  3 mm echogenic focus lower pole of the right kidney is  suspicious for a nonobstructing stone.  There was a 5-8 mm stone lower pole of the right kidney  on prior ultrasound.  Color flow to both kidneys demonstrated.  Pelvic images urinary bladder is empty and cannot be evaluated.    - Impression -  1.  No hydronephrosis.  2.   3 mm echogenic focus lower pole of the right kidney is likely a nonobstructing stone.  *  F I N A L*  Transcribed By: BILLIE  11/14/18 3:57 pm  Dictated By:            CARROL MICHELE DO  Electronically Reviewed and Approved By:           CARROL MICHELE DO  11/14/18 4:00 pm    Problem List  Patient Active Problem List   Diagnosis   • Acute exacerbation of chronic obstructive pulmonary disease (COPD) (CMS/HCC)   • Acute exacerbation of CHF (congestive heart failure) (CMS/HCC)   • Malignant neoplasm of hepatic flexure (CMS/HCC)   • Shortness of breath   • COVID-19 virus infection   • Lactic acidosis   • Type 2 diabetes mellitus with hyperglycemia (CMS/HCC)   • Controlled type 2 diabetes mellitus with diabetic autonomic neuropathy, with long-term current use of insulin (CMS/HCC)   • Acute on chronic congestive heart failure, unspecified heart failure type (CMS/HCC)       Assessment  History of COPD had quit smoking 50 years ago she is inhaler that she cannot recall.  She was on oxygen upon time that she stopped because she said that Medicare would not qualify her.  The edema is most like related to the component of cor pulmonale cardiac work-up is underway sleep apnea should be excluded.  Recognizing that her previous ejection fraction on echo was normal chronic diastolic failure is in differential diagnosis.  This could also be some residual findings from her recent COVID infection.  Obviously her weight and age  contributes to her deconditioning and shortness of breath.  Venous insufficiency is a contributing factor.  There is no evidence of exacerbation of COPD no mosaic pattern on CAT scan of the chest is typical of obstructive lung disease and possible fluid overload    Plan  Reevaluate pulmonary physiology  .  Evaluate oxygen requirements  Check ABG  Consider sleep study as outpatient  Hold off on antibiotics and steroids at this time.  Dr. Wagner will be back tomorrow he knows the patient very well and will assume care.    DVT Prophylaxis/Ulcer Prophylaxis  Per Primary and other Mds      Code Status    Code Status: Full Resuscitation    Daniel Dunbar MD         no

## 2022-08-10 NOTE — ED PROVIDER NOTE - DATA REVIEWED, MDM
8/10/22  Parker Rai : 1968 Sex: female  Age 62 y.o. Subjective:  Chief Complaint   Patient presents with    Cough    Pharyngitis         47-year-old female with a history of GERD and overactive bladder presents to the walk-in clinic for evaluation of cough and sore throat. She states her symptoms began on  evening. She took a COVID-19 test yesterday and it was negative at home. She is taking over-the-counter DayQuil. She denies any known sick contacts. She has not vaccinated for COVID. She did have it last summer. Patient denies fever, chills, nausea or vomiting. No shortness of breath or chest pain. No hemoptysis. No lightheadedness, dizziness or syncope. Review of Systems   Constitutional:  Negative for chills and fever. HENT:  Positive for sore throat. Negative for congestion and ear pain. Eyes:  Negative for photophobia and visual disturbance. Respiratory:  Positive for cough. Negative for shortness of breath. Cardiovascular:  Negative for chest pain. Gastrointestinal:  Negative for abdominal pain, diarrhea, nausea and vomiting. Genitourinary:  Negative for difficulty urinating, dysuria, frequency and urgency. Musculoskeletal:  Negative for back pain, neck pain and neck stiffness. Skin:  Negative for rash. Neurological:  Negative for dizziness, syncope, weakness, light-headedness and headaches. Hematological:  Negative for adenopathy. Does not bruise/bleed easily. Psychiatric/Behavioral:  Negative for agitation and confusion. All other systems reviewed and are negative.       PMH:     Past Medical History:   Diagnosis Date    Abnormal Pap smear of cervix     Bell's palsy     left    Cataract     History of COVID-19 2022       Past Surgical History:   Procedure Laterality Date    CHOLECYSTECTOMY      COLPOSCOPY      CYSTOSCOPY         Family History   Problem Relation Age of Onset    Melanoma Mother 34    Lung Cancer Maternal Grandmother     Rheum Arthritis Half-Sister     No Known Problems Half-Sister     No Known Problems Son     No Known Problems Daughter     Interstitial Cystits Daughter        Medications:     Current Outpatient Medications:     azithromycin (ZITHROMAX) 250 MG tablet, Take 1 tablet by mouth See Admin Instructions for 5 days 500mg on day 1 followed by 250mg on days 2 - 5, Disp: 6 tablet, Rfl: 0    methylPREDNISolone (MEDROL DOSEPACK) 4 MG tablet, Take by mouth., Disp: 1 kit, Rfl: 0    benzonatate (TESSALON) 100 MG capsule, Take 1 capsule by mouth 2 times daily as needed for Cough, Disp: 20 capsule, Rfl: 0    omeprazole (PRILOSEC) 40 MG delayed release capsule, TAKE ONE CAPSULE BY MOUTH EVERY MORNING (BEFORE BREAKFAST), Disp: 30 capsule, Rfl: 5    estradiol (ESTRACE) 0.1 MG/GM vaginal cream, APPLY 1 GRAM INTRAVAGINALLY EVERY DAY AT BEDTIME FOR 3 WEEKS  THEN   AND FRIDAY, Disp: , Rfl:     mirabegron (MYRBETRIQ) 50 MG TB24, Take 50 mg by mouth daily Take one tablet daily, Disp: 30 tablet, Rfl: 5    Allergies: Allergies   Allergen Reactions    Sulfa Antibiotics      As a child        Social History:     Social History     Tobacco Use    Smoking status: Former     Packs/day: 1.00     Years: 20.00     Pack years: 20.00     Types: Cigarettes     Quit date: 2004     Years since quittin.2    Smokeless tobacco: Never   Vaping Use    Vaping Use: Never used   Substance Use Topics    Alcohol use: Yes     Alcohol/week: 2.0 standard drinks     Types: 2 Shots of liquor per week     Comment: weekends    Drug use: Never       Patient lives at home. Physical Exam:     Vitals:    08/10/22 0821 08/10/22 0822   BP: (!) 140/88 (!) 140/88   Pulse: 60    Resp: 20    Temp: 97 °F (36.1 °C)    TempSrc: Temporal    SpO2: 96%    Weight: 186 lb (84.4 kg)    Height: 5' 9\" (1.753 m)        Exam:  Physical Exam  Vitals and nursing note reviewed.    Constitutional:       General: She is not in acute distress. Appearance: She is well-developed. HENT:      Head: Normocephalic and atraumatic. Right Ear: Tympanic membrane normal.      Left Ear: Tympanic membrane normal.      Nose: Nose normal.      Mouth/Throat:      Mouth: Mucous membranes are moist.      Pharynx: Oropharynx is clear. No oropharyngeal exudate or posterior oropharyngeal erythema. Eyes:      Conjunctiva/sclera: Conjunctivae normal.      Pupils: Pupils are equal, round, and reactive to light. Cardiovascular:      Rate and Rhythm: Normal rate and regular rhythm. Pulmonary:      Effort: Pulmonary effort is normal. No respiratory distress. Breath sounds: Normal breath sounds. Abdominal:      General: Bowel sounds are normal.      Palpations: Abdomen is soft. Tenderness: There is no abdominal tenderness. Musculoskeletal:         General: Normal range of motion. Cervical back: Normal range of motion. No rigidity. Lymphadenopathy:      Cervical: No cervical adenopathy. Skin:     General: Skin is warm and dry. Neurological:      General: No focal deficit present. Mental Status: She is alert and oriented to person, place, and time. Psychiatric:         Mood and Affect: Mood normal.         Behavior: Behavior normal.         Thought Content: Thought content normal.         Judgment: Judgment normal.         Testing:       No results found for this visit on 08/10/22. Medical Decision Making:       Patient upon arrival did not appear toxic or lethargic. Vital signs were reviewed. Past medical history reviewed. Allergies reviewed. Medications reviewed. Patient is presenting with the above complaint of cough and sore throat. Rapid COVID-19 test is negative. Differential diagnosis was discussed with the patient. Patient will be given a prescription for Z-Armando, Medrol Dosepak and Tessalon Perles. Patient may use over-the-counter analgesics and decongestants as needed.   Patient is continue to monitor symptoms and follow-up with their primary care provider in 3-5 days if no improvement. Patient will return or go to the emergency department for any worsening symptoms. Patient understands the plan is agreeable. Clinical Impression:   Suzette Fleming was seen today for cough and pharyngitis. Diagnoses and all orders for this visit:    Cough  -     POCT COVID-19, Antigen  -     azithromycin (ZITHROMAX) 250 MG tablet; Take 1 tablet by mouth See Admin Instructions for 5 days 500mg on day 1 followed by 250mg on days 2 - 5    Upper respiratory tract infection, unspecified type    Other orders  -     methylPREDNISolone (MEDROL DOSEPACK) 4 MG tablet; Take by mouth. -     benzonatate (TESSALON) 100 MG capsule; Take 1 capsule by mouth 2 times daily as needed for Cough      The patient is to call for any concerns or return if any of the signs or symptoms worsen. The patient is to follow-up with PCP in the next 2-3 days for repeat evaluation repeat assessment or go directly to the emergency department. SIGNATURE: Leyla March PA-C vital signs/old records

## 2022-10-08 NOTE — ED PROVIDER NOTE - ATTENDING CONTRIBUTION TO CARE
NEUROLOGY CONSULT NOTE    NAME:  DERRICK QUIGLEY      ASSESSMENT:  73 RHF with subacute onset of right leg weakness, likely an exacerbation of underlying polymyositis while off of Prednisone or sequelae of right knee osteoarthritis, exacerbated by sepsis secondary to respiratory infection      RECOMMENDATIONS:    - Check right knee x-ray to evaluate for joint disease    - Given concern for flare-up of polymyositis and recent discontinuation of prednisone due to behavioral disturbances, recommend adding a non-steroid immunosuppressant for long-term management after treatment for sepsis is complete: Azathioprine 50mg PO Daily or Methotrexate 7.5mg PO QWeekly + Folic acid 1mg PO Daily    - Continue Memantine 5mg PO BID with food, to help slow down cognitive decline in the setting of Alzheimer's dementia    - PT/OT to help with recovery of bilateral leg strength and gait    - DVT ppx: SCDs, Enoxaparin    - Patient is scheduled for follow-up with Dr. Evelyne Jesus on 12/8/22 at 2:20 pm at the [facility] [phone])          NOTE TO BE COMPLETED - PLEASE REFER TO ABOVE ONLY AND IGNORE INFORMATION BELOW    *******************************      CHIEF COMPLAINT:  Patient is a 73y old  Female who presents with a chief complaint of Sepsis (08 Oct 2022 10:13)      HPI:  72 year old  w/ PMHx anemia, asthma, HTN, ITP, muscular dystrophy, polymyositis (prev on prednisone) PE (prev on Eliquis) , presents with increase in weakness. Patient states she returned from vacation from [place] two days ago and then this morning started to feel more tired and was having right neck pain. Patient also endorses having a hx of chronic cough, but now her cough is productive with pink/cream phlegm. Denies any fever, chills, nausea, vomiting headache, visual changes, chest pain, SOB, abdominal pain, diarrhea or constipation.  According to  at bedside, patient was recently taken off prednisone due to worsening dementia. For the past year, her dementia has been getting progressively worse, now A/O x1, but still able to ambulate with walker and cook and clean for herself.     In the ED:  Temp 100.5, , /72, 97% on RA  WBC 17  CXR Slight increase left base interstitial markings  s/p Levaquin + 1.2L bolus  (06 Oct 2022 15:08)      NEURO HPI:  This is a 73-year-old right-handed woman who follows with neurologist, Dr. Evelyne Jesus, for polymyositis, with Prednisone recently stopped due to behavioral changes, and for Alzheimer's dementia, on Memantine (with previous adverse reaction to Donepezil). She presented to the ED after having severe pain in her right knee for a couple of days after returning from a trip to [place], causing her to have difficulty with walking and standing. She continues to be apprehensive about standing up from a seated position today, but she states that her right knee pain has subsided.      PAST MEDICAL & SURGICAL HISTORY:  Asthma  History of hypertension  ITP (idiopathic thrombocytopenic purpura)  Type 2 diabetes mellitus without complication  Gastritis  Compression fracture  Pulmonary embolism in 11/2015, on Eliquis  Polymyositis, on Prednisone  Anemia  Diverticulosis  Cholelithiasis  Renal cyst, left  Hiatal hernia  Muscular dystrophy  H/O splenectomy      MEDICATIONS:  amLODIPine   Tablet 10 milliGRAM(s) Oral daily  atorvastatin 10 milliGRAM(s) Oral at bedtime  enoxaparin Injectable 40 milliGRAM(s) SubCutaneous every 24 hours  famotidine    Tablet 20 milliGRAM(s) Oral two times a day  fludroCORTISONE 0.1 milliGRAM(s) Oral daily  levoFLOXacin IVPB 750 milliGRAM(s) IV Intermittent every 24 hours  memantine 5 milliGRAM(s) Oral two times a day  metroNIDAZOLE  IVPB 500 milliGRAM(s) IV Intermittent every 8 hours  sodium chloride 0.9%. 1000 milliLiter(s) IV Continuous <Continuous>      ALLERGIES:  Keflex (Rash)      FAMILY HISTORY:  Family history of diabetes mellitus          SOCIAL HISTORY:  Denies alcohol, tobacco, or illicit drug use      REVIEW OF SYSTEMS:  GENERAL: No fever, weight changes, fatigue  EYES: No eye pain or discharge  EAR/NOSE/MOUTH/THROAT: No sinus or throat pain; No difficulty hearing  NECK: No pain or stiffness  RESPIRATORY: No cough, wheezing, chills, or hemoptysis  CARDIOVASCULAR: No chest pain, palpitations, shortness of breath, or dyspnea on exertion  GASTROINTESTINAL: No abdominal pain, nausea, vomiting, hematemesis, diarrhea, or constipation  GENITOURINARY: No dysuria, frequency, hematuria, or incontinence  SKIN: No rashes or lesions  ENDOCRINE: No heat or cold intolerance  HEMATOLOGIC: No easy bruising or bleeding  PSYCHIATRIC: No depression, anxiety, or mood swings  MUSCULOSKELETAL: No joint pain or swelling  NEUROLOGICAL: As per HPI        OBJECTIVE:    Vital Signs Last 24 Hrs  T(C): 37.1 (08 Oct 2022 17:10), Max: 37.3 (07 Oct 2022 21:20)  T(F): 98.7 (08 Oct 2022 17:10), Max: 99.2 (07 Oct 2022 21:20)  HR: 112 (08 Oct 2022 17:10) (93 - 112)  BP: 108/63 (08 Oct 2022 17:10) (108/63 - 127/61)  BP(mean): --  RR: 18 (08 Oct 2022 17:10) (18 - 18)  SpO2: 94% (08 Oct 2022 17:10) (94% - 99%)    Parameters below as of 08 Oct 2022 17:10  Patient On (Oxygen Delivery Method): room air        General Examination:  General: No acute distress  HEENT: Atraumatic, Normocephalic  Respiratory: CTA B/l.  No crackles, rhonchi, or wheezes.  Cardiovascular: RRR.  Normal S1 & S2.  Normal b/l radial and pedal pulses.    Neurological Examination:  General / Mental Status: AAO x 3.  No aphasia or dysarthria.  Naming and repetition intact.  Cranial Nerves: VFF x 4.  PERRL.  EOMI x 2, No nystagmus or diplopia.  B/l V1-V3 equal and intact to light touch and pinprick.  Symmetric facial movement and palate elevation.  B/l hearing equal to finger rub.  5/5 strength with b/l sternocleidomastoid & trapezius.  Midline tongue protrusion, with no atrophy or fasciculations.  Motor: Normal bulk & tone in all four extremities.  5/5 strength throughout all four extremities.  No downward drift, rigidity, spasticity, or tremors in any of the four extremities.  Sensory: Intact to light touch and pinprick in all four extremities.  Negative Romberg.  Reflex: 2+ and symmetric at b/l biceps, triceps, brachioradialis, patellae, and ankles.  Downgoing toes b/l.  Coordination: No dysmetria with b/l finger-to-nose and heel raise tests.  Symmetric rapid alternating movements b/l.  Gait: Normal, narrow-based gait.  No difficulty with tiptoe, heel, and tandem gaits.        LABORATORY VALUES:                        10.4   8.03  )-----------( 312      ( 08 Oct 2022 06:33 )             32.0       10-08    142  |  115<H>  |  12  ----------------------------<  129<H>  5.4<H>   |  22  |  0.67    Ca    8.9      08 Oct 2022 14:45  Phos  2.1     10-08  Mg     2.2     10-08                      Folate, Serum: >20.0 ng/mL (10-07-22 @ 06:33)  Vitamin B12, Serum: 968 pg/mL (10-07-22 @ 06:33)          NEUROIMAGING:          Please contact the Neurology consult service with any neurological questions.    Ronni Flores MD   of Neurology  [facility] at [facility] NEUROLOGY CONSULT NOTE    NAME:  DERRICK QUIGLEY      ASSESSMENT:  73 RHF with subacute onset of right leg weakness, likely an exacerbation of underlying polymyositis while off of Prednisone or sequelae of right knee osteoarthritis, exacerbated by sepsis secondary to respiratory infection; also with chronic right PCA territory infarct      RECOMMENDATIONS:    - Check right knee x-ray to evaluate for joint disease    - Given concern for flare-up of polymyositis and recent discontinuation of prednisone due to behavioral disturbances, recommend adding a non-steroid immunosuppressant for long-term management after treatment for sepsis is complete: Azathioprine 50mg PO Daily or Methotrexate 7.5mg PO QWeekly + Folic acid 1mg PO Daily    - Continue Memantine 5mg PO BID with food, to help slow down cognitive decline in the setting of Alzheimer's dementia    - Consider adding Aspirin 81mg PO Daily and increasing Atorvastatin dosage from 10mg to 40mg PO QHS for incidental finding of chronic right PCA territory infarct    - PT/OT to help with recovery of bilateral leg strength and gait    - DVT ppx: SCDs, Enoxaparin    - Patient is scheduled for follow-up with Dr. Evelyne Jesus on 12/8/22 at 2:20 pm at the [facility] [phone])          *******************************      CHIEF COMPLAINT:  Patient is a 73y old  Female who presents with a chief complaint of Sepsis (08 Oct 2022 10:13)      HPI:  72 year old  w/ PMHx anemia, asthma, HTN, ITP, muscular dystrophy, polymyositis (prev on prednisone) PE (prev on Eliquis) , presents with increase in weakness. Patient states she returned from vacation from [place] two days ago and then this morning started to feel more tired and was having right neck pain. Patient also endorses having a hx of chronic cough, but now her cough is productive with pink/cream phlegm. Denies any fever, chills, nausea, vomiting headache, visual changes, chest pain, SOB, abdominal pain, diarrhea or constipation.  According to  at bedside, patient was recently taken off prednisone due to worsening dementia. For the past year, her dementia has been getting progressively worse, now A/O x1, but still able to ambulate with walker and cook and clean for herself.   In the ED:  Temp 100.5, , /72, 97% on RA  WBC 17  CXR Slight increase left base interstitial markings  s/p Levaquin + 1.2L bolus  (06 Oct 2022 15:08)      NEURO HPI:  This is a 73-year-old right-handed woman who follows with neurologist, Dr. Evelyne Jesus, for polymyositis, with Prednisone recently stopped due to behavioral changes, and for Alzheimer's dementia, on Memantine (with previous adverse reaction to Donepezil). She presented to the ED after having severe pain in her right knee for a couple of days after returning from a trip to [place], causing her to have difficulty with walking and standing. She continues to be apprehensive about standing up from a seated position today, but she states that her right knee pain has subsided.      PAST MEDICAL & SURGICAL HISTORY:  Asthma  History of hypertension  ITP (idiopathic thrombocytopenic purpura)  Type 2 diabetes mellitus without complication  Gastritis  Compression fracture  Pulmonary embolism in 11/2015, on Eliquis  Polymyositis, on Prednisone  Anemia  Diverticulosis  Cholelithiasis  Renal cyst, left  Hiatal hernia  Muscular dystrophy  H/O splenectomy      MEDICATIONS:  amLODIPine   Tablet 10 milliGRAM(s) Oral daily  atorvastatin 10 milliGRAM(s) Oral at bedtime  enoxaparin Injectable 40 milliGRAM(s) SubCutaneous every 24 hours  famotidine    Tablet 20 milliGRAM(s) Oral two times a day  fludroCORTISONE 0.1 milliGRAM(s) Oral daily  levoFLOXacin IVPB 750 milliGRAM(s) IV Intermittent every 24 hours  memantine 5 milliGRAM(s) Oral two times a day  metroNIDAZOLE  IVPB 500 milliGRAM(s) IV Intermittent every 8 hours  sodium chloride 0.9%. 1000 milliLiter(s) IV Continuous <Continuous>      ALLERGIES:  Keflex (Rash)      FAMILY HISTORY:  Family history of diabetes mellitus      SOCIAL HISTORY:  Denies alcohol, tobacco, or illicit drug use      REVIEW OF SYSTEMS:  GENERAL: No fever, weight changes, fatigue  EYES: No eye pain or discharge  EAR/NOSE/MOUTH/THROAT: No sinus or throat pain; No difficulty hearing  NECK: Right neck pain as per HPI; No stiffness  RESPIRATORY: Recent productive cough, wheezing, chills, or hemoptysis  CARDIOVASCULAR: No chest pain, palpitations, shortness of breath, or dyspnea on exertion  GASTROINTESTINAL: No abdominal pain, nausea, vomiting, hematemesis, diarrhea, or constipation  GENITOURINARY: No dysuria, frequency, hematuria, or incontinence  SKIN: No rashes or lesions  ENDOCRINE: No heat or cold intolerance  HEMATOLOGIC: No easy bruising or bleeding  PSYCHIATRIC: No depression, anxiety, or mood swings  MUSCULOSKELETAL: As per HPI  NEUROLOGICAL: As per HPI        OBJECTIVE:    Vital Signs Last 24 Hrs  T(C): 37.1 (08 Oct 2022 17:10), Max: 37.3 (07 Oct 2022 21:20)  T(F): 98.7 (08 Oct 2022 17:10), Max: 99.2 (07 Oct 2022 21:20)  HR: 112 (08 Oct 2022 17:10) (93 - 112)  BP: 108/63 (08 Oct 2022 17:10) (108/63 - 127/61)  RR: 18 (08 Oct 2022 17:10) (18 - 18)  SpO2: 94% (08 Oct 2022 17:10) (94% - 99%)  Parameters below as of 08 Oct 2022 17:10  Patient On (Oxygen Delivery Method): room air    General Examination:  General: No acute distress  HEENT: Atraumatic, Normocephalic  Respiratory: CTA B/l.  No crackles, rhonchi, or wheezes.  Cardiovascular: Rapid rate, Regular rhythm.  Normal S1 & S2.  Normal b/l radial and pedal pulses.  Musculoskeletal: No tenderness to palpation at either knee    Neurological Examination:  General / Mental Status: AAO x 2 (not to time).  No aphasia or dysarthria.  Cranial Nerves: VFF x 4.  PERRL.  EOMI x 2, No nystagmus or diplopia.  B/l V1-V3 equal and intact to light touch and pinprick.  Symmetric facial movement and palate elevation.  B/l hearing equal to finger rub.  At least 4/5 strength with b/l sternocleidomastoid & trapezius.  Midline tongue protrusion, with no atrophy or fasciculations.  Motor: Normal bulk & tone in all four extremities.  5/5 strength throughout b/l lower extremities.  At least 3/5 strength throughout b/l lower extremities, limited by poor effort. No downward drift, rigidity, spasticity, or tremors in any of the four extremities.  Sensory: Intact to light touch and pinprick in all four extremities.  Reflex: 1+ and symmetric at b/l biceps, triceps, brachioradialis, patellae, and ankles.  Downgoing toes b/l.  Coordination: No dysmetria with b/l finger-to-nose and heel raise tests.  Gait and Romberg sign testing deferred per patient request.        LABORATORY VALUES:                        10.4   8.03  )-----------( 312      ( 08 Oct 2022 06:33 )             32.0       10-08    142  |  115<H>  |  12  ----------------------------<  129<H>  5.4<H>   |  22  |  0.67    Ca    8.9      08 Oct 2022 14:45  Phos  2.1     10-08  Mg     2.2     10-08      Folate, Serum: >20.0 ng/mL (10-07-22 @ 06:33)  Vitamin B12, Serum: 968 pg/mL (10-07-22 @ 06:33)  Thyroid Stimulating Hormone, Serum: 2.55 uU/mL (10.07.22 @ 06:33)   Creatine Kinase, Serum: 220 U/L (10.06.22 @ 18:21)   Vitamin D, 25-Hydroxy: 79.0 ng/mL (10.07.22 @ 06:33)         NEUROIMAGING:      CT Neck Soft Tissue w/ IV Contrast (10/6/22):  - No mass or abnormal enhancement  - Right submandibular gland ductal dilatation  - Mild T4 vertebral compression fracture  - Incidental chronic right PCA territory infarct          Please contact the Neurology consult service with any neurological questions.    Ronni Flores MD   of Neurology  [facility] at [facility] 78F pmh pud, cad, htn, gerd, pre DM, presents with episode of brbp. Yesterday patient developed nausea, vomiting. Today with multiple episodes of nbnb vomiting. Multiple episodes of diarrhea beginning yesterday, today noted blood in stool with one episode. +Abd pain, lower, constant, moderate, no alleviating factors. Denies f/c, chest pain, sob.    PE: NAD, NCAT, MMM, Trachea midline, Normal conjunctiva, lungs CTAB, S1/S2 RRR, Normal perfusion, 2+ radial pulses bilat, Abdomen Soft, minimal lower abd ttp, No rebound/guarding, No LE edema, No deformity of extremities, No rashes,  No focal motor or sensry deficits.    78F with extensive pmh as noted presents with abd pain, n/v, diarrhea, with one episode of bloody stool. +lower abd ttp on exam. Soft bp on arrival, initial temp recorded at 100.5 orally but rectal temp afebrile. Low bp likely 2/2 dehydration, give IVF. Also concern for acute intra-abd pathology including but not limited to colitis, diverticulitis, appendicitis, check ct a/p. Check ua/culture. Re-eval. - Gildardo Yadav MD

## 2022-10-11 NOTE — ED ADULT NURSE NOTE - TEMPLATE LIST FOR HEAD TO TOE ASSESSMENT
Sun Protection    Ultraviolet light from the sun is responsible for inducing sunburn, photoaging (wrinkles, discoloration), and skin cancer.    Photoprotection is crucial to prevent or reduce the potential harms associated with UV exposure.  All individuals, regardless of skin color are subject to the effects of the sun.     1) Minimize sun exposure, especially during the middle of the day (between 10 am and 3 pm) when the UV intensity is greatest.   2) Wear sun-protective clothing when outside:  A hat, long sleeved shirt, long pants and sunglasses.  Clothing with ultraviolet protection factor (UPF) is another way to prevent sun damage.  3) Liberally apply SPF 30 sunscreen or higher, use a broad spectrum sunscreen that covers both UVA and UVB.   Reapply sunscreen every 2 hours, or after sweating or swimming.     Brands that we like:  Blue Lizard, Neutrogena, CeraVe, Aveeno, SunBum  The cheapest titanium/zinc spray sunscreen is Generic Target Brand Mineral Spray SPF 30 (though it doesn't smell nice and doesn't rub in as well as the others)    Please use a sunscreen that contains Zinc or Titanium as the active ingredient    Heliocare is an oral antioxidant supplement that helps to prevent sunburn. If you want to try Heliocare, it should be used together with topical sunscreen.     Perform skin self exams, return to the dermatology office if you notice anything new or changing on your skin.     For more information visit: http://[email or web address].org/prevention/sun-protection   
General

## 2023-07-25 NOTE — ED ADULT TRIAGE NOTE - NS ED NURSE BANDS TYPE
Name band;
Additional Notes: - Patient to call if no improvement with OTC hydrocortisone cream, will call in Desonide cream.
Detail Level: Simple
Render Risk Assessment In Note?: no

## 2023-07-27 NOTE — DIETITIAN INITIAL EVALUATION ADULT. - +GENDER
Photo Preface (Leave Blank If You Do Not Want): Photographs were obtained today Detail Level: Zone Statement Selected

## 2024-03-05 NOTE — ED PROVIDER NOTE - INTERNATIONAL TRAVEL
Problem: Respiratory Function, Ineffective (with Ventilator)  Goal: Oxygenation/ventilation parameters are achieved/maintained without ventilator support (with/without artificial airway)  Description: Patients are considered \"ready\" for weaning when there is the is some resolution in the acute phase of current illness, cardiac stability (HR<140) without vasopressors, afebrile (<38C), and adequate gas exchange (PaO2/FIO2 ratio>150-200 on lower PEEP/CPAP), and able to initiate inspiratory effort (Ronnell, 2001)  3/5/2024 0430 by Louis Mccloud, RRT  Outcome: Monitoring/Evaluating progress  3/4/2024 2133 by Louis Mccloud, RRT  Outcome: Monitoring/Evaluating progress     Problem: Artificial Airway Management  Goal: # Maintains effective artificial airway  3/5/2024 0430 by Louis Mccloud, RRT  Outcome: Monitoring/Evaluating progress  3/4/2024 2133 by Louis Mccloud, RRT  Outcome: Monitoring/Evaluating progress  Goal: # Maintains skin integrity around the airway  3/5/2024 0430 by Louis Mccloud, RRT  Outcome: Monitoring/Evaluating progress  3/4/2024 2133 by Louis Mccloud, RRT  Outcome: Monitoring/Evaluating progress      No

## 2024-03-14 NOTE — PRE-OP CHECKLIST - WARM FLUIDS/WARM BLANKETS
Continue ozempic 0.5 mg weekly. There is mounjaro too which might cause less stomach impact    Continue glimepiride as you are    You can try to increase jardiance by doubling it (taking 20 mg at the same time) and if tolerating, you can reach out and you can reach out and we can do the next dose up which is 25 mg.     Make sure to drink 64 ounces of water daily to reduce risk of UTI.  
no

## 2024-07-13 NOTE — ED ADULT NURSE NOTE - DOES PATIENT HAVE ADVANCE DIRECTIVE
MEDICATIONS  (STANDING):  acetaminophen     Tablet .. 975 milliGRAM(s) Oral every 8 hours  bisacodyl 5 milliGRAM(s) Oral daily  ceFAZolin   IVPB 2000 milliGRAM(s) IV Intermittent every 8 hours  enoxaparin Injectable 40 milliGRAM(s) SubCutaneous every 24 hours  levETIRAcetam 500 milliGRAM(s) Oral two times a day  lidocaine   4% Patch 1 Patch Transdermal daily  melatonin 3 milliGRAM(s) Oral at bedtime  methadone    Tablet 160 milliGRAM(s) Oral daily  mirtazapine 15 milliGRAM(s) Oral daily  polyethylene glycol 3350 17 Gram(s) Oral daily  QUEtiapine 25 milliGRAM(s) Oral at bedtime    MEDICATIONS  (PRN):  naloxone Injectable 1 milliGRAM(s) IV Push every 3 minutes PRN in case of overdose  oxyCODONE    IR 5 milliGRAM(s) Oral every 6 hours PRN Moderate Pain (4 - 6)  oxyCODONE    IR 10 milliGRAM(s) Oral every 6 hours PRN Severe Pain (7 - 10)  saline laxative (FLEET) Rectal Enema 1 Enema Rectal once PRN constipation, severe, please try oral laxatives first  senna 2 Tablet(s) Oral at bedtime PRN Constipation  
unknown

## 2024-12-06 NOTE — ED ADULT NURSE NOTE - EXTENSIONS OF SELF_ADULT
-- CST Phone (Incoming) Joanne Phillips [phone]            None

## 2025-02-25 NOTE — PROGRESS NOTE ADULT - PROVIDER SPECIALTY LIST ADULT
Gastroenterology Here are some nearby facilities who do neuropsychological evaluations:    - [facility]  Located in [place]  Sees patients 13 yrs and older  Ph # [phone]  - [facility]  Several [facility] including [facility], [place], [place], [place]   Ph #[phone]  - [facility]   Located in [place]  Sees 18 months and older  Ph #[phone]  -[facility]   Located in [place]  Sees ages 2-21 years old  Ph [phone]  -[facility]  Located in [place] or [place]  Sees ages 3-17 yrs old   Ph # [phone]  -[facility]   Located in [place]   Ph # [phone] ext. [phone]  -Dr. Shantelle Nava  Located in [place]  Ph # [phone]

## 2025-03-11 NOTE — DISCHARGE NOTE PROVIDER - NSDCFUADDINST_GEN_ALL_CORE_FT
See above.     For new or worsening symptoms, contact your physician or return to the hospital.
your primary care doctor,   Phone: (   )    -  Fax: (   )    -  Follow Up Time: 1-3 Days

## 2025-03-12 NOTE — PROCEDURE NOTE - NSPROCNAME_GEN_A_CORE
Abdomen is soft, nontender, nondistended , no rebound or guarding. No organomegaly appreciated
Arterial Puncture/Cannulation
Tracheal Intubation

## 2025-06-19 NOTE — BRIEF OPERATIVE NOTE - NSICDXBRIEFPROCEDURE_GEN_ALL_CORE_FT
PROCEDURES:  Open jejunostomy with insertion of feeding tube 14-May-2021 20:54:03  Purnima Tiwari  
[de-identified] : c/o tinnitus audio 2025 using gabapentin w relief amitriptyline can have acute imbalance and have fallen as result co frontal headaches
PROCEDURES:  EGD 12-May-2021 10:30:11  Purnima Tiwari  Bronchoscopy, with percutaneous tracheostomy creation 12-May-2021 10:30:25  Purnima Tiwari